# Patient Record
Sex: FEMALE | Race: WHITE | NOT HISPANIC OR LATINO | Employment: OTHER | URBAN - METROPOLITAN AREA
[De-identification: names, ages, dates, MRNs, and addresses within clinical notes are randomized per-mention and may not be internally consistent; named-entity substitution may affect disease eponyms.]

---

## 2017-04-24 ENCOUNTER — ALLSCRIPTS OFFICE VISIT (OUTPATIENT)
Dept: OTHER | Facility: OTHER | Age: 67
End: 2017-04-24

## 2017-10-01 DIAGNOSIS — D72.819 DECREASED WHITE BLOOD CELL COUNT: ICD-10-CM

## 2017-10-01 DIAGNOSIS — E55.9 VITAMIN D DEFICIENCY: ICD-10-CM

## 2017-10-01 DIAGNOSIS — E78.5 HYPERLIPIDEMIA: ICD-10-CM

## 2017-10-10 ENCOUNTER — APPOINTMENT (OUTPATIENT)
Dept: LAB | Facility: CLINIC | Age: 67
End: 2017-10-10
Payer: MEDICARE

## 2017-10-10 ENCOUNTER — TRANSCRIBE ORDERS (OUTPATIENT)
Dept: LAB | Facility: CLINIC | Age: 67
End: 2017-10-10

## 2017-10-10 DIAGNOSIS — E55.9 VITAMIN D DEFICIENCY: ICD-10-CM

## 2017-10-10 DIAGNOSIS — E78.5 HYPERLIPIDEMIA: ICD-10-CM

## 2017-10-10 DIAGNOSIS — D72.819 DECREASED WHITE BLOOD CELL COUNT: ICD-10-CM

## 2017-10-10 LAB
25(OH)D3 SERPL-MCNC: 32.5 NG/ML (ref 30–100)
ALBUMIN SERPL BCP-MCNC: 3.7 G/DL (ref 3.5–5)
ALP SERPL-CCNC: 69 U/L (ref 46–116)
ALT SERPL W P-5'-P-CCNC: 20 U/L (ref 12–78)
ANION GAP SERPL CALCULATED.3IONS-SCNC: 9 MMOL/L (ref 4–13)
AST SERPL W P-5'-P-CCNC: 22 U/L (ref 5–45)
BASOPHILS # BLD AUTO: 0.03 THOUSANDS/ΜL (ref 0–0.1)
BASOPHILS NFR BLD AUTO: 1 % (ref 0–1)
BILIRUB SERPL-MCNC: 0.43 MG/DL (ref 0.2–1)
BUN SERPL-MCNC: 16 MG/DL (ref 5–25)
CALCIUM SERPL-MCNC: 9.3 MG/DL (ref 8.3–10.1)
CHLORIDE SERPL-SCNC: 105 MMOL/L (ref 100–108)
CHOLEST SERPL-MCNC: 205 MG/DL (ref 50–200)
CO2 SERPL-SCNC: 27 MMOL/L (ref 21–32)
CREAT SERPL-MCNC: 0.89 MG/DL (ref 0.6–1.3)
EOSINOPHIL # BLD AUTO: 0.14 THOUSAND/ΜL (ref 0–0.61)
EOSINOPHIL NFR BLD AUTO: 3 % (ref 0–6)
ERYTHROCYTE [DISTWIDTH] IN BLOOD BY AUTOMATED COUNT: 13.7 % (ref 11.6–15.1)
GFR SERPL CREATININE-BSD FRML MDRD: 68 ML/MIN/1.73SQ M
GLUCOSE P FAST SERPL-MCNC: 84 MG/DL (ref 65–99)
HCT VFR BLD AUTO: 41.1 % (ref 34.8–46.1)
HDLC SERPL-MCNC: 78 MG/DL (ref 40–60)
HGB BLD-MCNC: 13.8 G/DL (ref 11.5–15.4)
LDLC SERPL CALC-MCNC: 117 MG/DL (ref 0–100)
LYMPHOCYTES # BLD AUTO: 0.84 THOUSANDS/ΜL (ref 0.6–4.47)
LYMPHOCYTES NFR BLD AUTO: 20 % (ref 14–44)
MCH RBC QN AUTO: 29.9 PG (ref 26.8–34.3)
MCHC RBC AUTO-ENTMCNC: 33.6 G/DL (ref 31.4–37.4)
MCV RBC AUTO: 89 FL (ref 82–98)
MONOCYTES # BLD AUTO: 0.39 THOUSAND/ΜL (ref 0.17–1.22)
MONOCYTES NFR BLD AUTO: 9 % (ref 4–12)
NEUTROPHILS # BLD AUTO: 2.76 THOUSANDS/ΜL (ref 1.85–7.62)
NEUTS SEG NFR BLD AUTO: 67 % (ref 43–75)
NRBC BLD AUTO-RTO: 0 /100 WBCS
PLATELET # BLD AUTO: 257 THOUSANDS/UL (ref 149–390)
PMV BLD AUTO: 11.3 FL (ref 8.9–12.7)
POTASSIUM SERPL-SCNC: 3.9 MMOL/L (ref 3.5–5.3)
PROT SERPL-MCNC: 7.6 G/DL (ref 6.4–8.2)
RBC # BLD AUTO: 4.62 MILLION/UL (ref 3.81–5.12)
SODIUM SERPL-SCNC: 141 MMOL/L (ref 136–145)
TRIGL SERPL-MCNC: 48 MG/DL
TSH SERPL DL<=0.05 MIU/L-ACNC: 3.57 UIU/ML (ref 0.36–3.74)
WBC # BLD AUTO: 4.17 THOUSAND/UL (ref 4.31–10.16)

## 2017-10-10 PROCEDURE — 85025 COMPLETE CBC W/AUTO DIFF WBC: CPT

## 2017-10-10 PROCEDURE — 84443 ASSAY THYROID STIM HORMONE: CPT

## 2017-10-10 PROCEDURE — 80053 COMPREHEN METABOLIC PANEL: CPT

## 2017-10-10 PROCEDURE — 82306 VITAMIN D 25 HYDROXY: CPT

## 2017-10-10 PROCEDURE — 80061 LIPID PANEL: CPT

## 2017-10-10 PROCEDURE — 36415 COLL VENOUS BLD VENIPUNCTURE: CPT

## 2017-10-11 ENCOUNTER — TRANSCRIBE ORDERS (OUTPATIENT)
Dept: ADMINISTRATIVE | Facility: HOSPITAL | Age: 67
End: 2017-10-11

## 2017-10-11 DIAGNOSIS — Z12.31 VISIT FOR SCREENING MAMMOGRAM: Primary | ICD-10-CM

## 2017-10-31 ENCOUNTER — ALLSCRIPTS OFFICE VISIT (OUTPATIENT)
Dept: OTHER | Facility: OTHER | Age: 67
End: 2017-10-31

## 2017-11-01 NOTE — PROGRESS NOTES
Assessment  1  Depression with anxiety (300 4) (F41 8)   2  Hyperlipidemia (272 4) (E78 5)   3  Irritable bowel syndrome (564 1) (K58 9)   4  Leukopenia (288 50) (D72 819)   5  Need for pneumococcal vaccine (V03 82) (Z23)   6  Vitamin D deficiency (268 9) (E55 9)   7  Abnormal mammogram (793 80) (R92 8)    Plan  Hyperlipidemia    · (1) CBC/PLT/DIFF; Status:Active; Requested for:01Apr2018;    · (1) COMPREHENSIVE METABOLIC PANEL; Status:Active; Requested for:01Apr2018;    · (1) LIPID PANEL, FASTING; Status:Active; Requested for:01Apr2018;   Need for pneumococcal vaccine    · Prevnar 13 Intramuscular Suspension; Inject one dose; To Be Done: [id number]  Urge incontinence of urine    · The plan of care for urinary incontinence is detailed in the plan and/or discussion section of today's  note ; Status:Complete;   Done: 31Oct2017    Discussion/Summary  Discussion Summary:   Lab data reviewed in detail and compared to prior  improvements in LDL with dietary modification, continue with same, no indication for medication  leukopenia remains stable, other cell lines in the normal range  with anxiety stable on present regimen  mammogramâfollow-up diagnostic mammogram and ultrasounds to be done, I will evaluate results when available  D deficiency stable on the supplements  stable with Prilosec next  maintenanceâflu shot and Prevnar today next  follow-up after labs in 6 months, sooner as neededyour advanced directive  Medication SE Review and Pt Understands Tx: Possible side effects of new medications were reviewed with the patient/guardian today  The treatment plan was reviewed with the patient/guardian  The patient/guardian understands and agrees with the treatment plan      Chief Complaint  Chief Complaint Chronic Condition [facility]: Patient is here today for follow up of chronic conditions described in HPI        History of Present Illness  HPI: Feeling generally wellâbut upset w/ recent abnl mammo, done in  system, needs diagnostic  Plans to complete dx w/u then come back to    with anxiety has been fairly stable, down lately d/t 's PD  Keeping busy w/ quilting and bible study  has been better since she stopped using Advil is using Tylenol for her pains, she really needs the Bentyl  out desserts 6 wks ago  vitiligo seems to be worsening, seeing Derm in a few mos  Review of Systems  Complete-Female:   Constitutional: No fever, no chills, feels well, no tiredness, no recent weight gain or weight loss  Eyes: No complaints of eye pain, no red eyes, no eyesight problems, no discharge, no dry eyes, no itching of eyes  ENT: no complaints of earache, no loss of hearing, no nose bleeds, no nasal discharge, no sore throat, no hoarseness  Cardiovascular: No complaints of slow heart rate, no fast heart rate, no chest pain, no palpitations, no leg claudication, no lower extremity edema  Respiratory: No complaints of shortness of breath, no wheezing, no cough, no SOB on exertion, no orthopnea, no PND  Gastrointestinal: No complaints of abdominal pain, no constipation, no nausea or vomiting, no diarrhea, no bloody stools  Genitourinary: No complaints of dysuria, no incontinence, no pelvic pain, no dysmenorrhea, no vaginal discharge or bleeding  Musculoskeletal: No complaints of arthralgias, no myalgias, no joint swelling or stiffness, no limb pain or swelling  Integumentary: as noted in HPI  Neurological: No complaints of headache, no confusion, no convulsions, no numbness, no dizziness or fainting, no tingling, no limb weakness, no difficulty walking  Psychiatric: Not suicidal, no sleep disturbance, no anxiety or depression, no change in personality, no emotional problems  Endocrine: No complaints of proptosis, no hot flashes, no muscle weakness, no deepening of the voice, no feelings of weakness     Hematologic/Lymphatic: No complaints of swollen glands, no swollen glands in the neck, does not bleed easily, does not bruise easily  Active Problems  1  Advance directive declined by patient (V49 89) (Z78 9)   2  Allergic rhinitis (477 9) (J30 9)   3  Aortic valve insufficiency (424 1) (I35 1)   4  Depression with anxiety (300 4) (F41 8)   5  Dermatitis (692 9) (L30 9)   6  Flu vaccine need (V04 81) (Z23)   7  GERD without esophagitis (530 81) (K21 9)   8  Hyperlipidemia (272 4) (E78 5)   9  Irritable bowel syndrome (564 1) (K58 9)   10  Leukopenia (288 50) (D72 819)   11  Need for pneumococcal vaccine (V03 82) (Z23)   12  Osteopenia (733 90) (M85 80)   13  Pigmentation abnormality of skin (709 00) (L81 9)   14  Screening for genitourinary condition (V81 6) (Z13 89)   15  Skin abnormality (757 9) (L98 9)   16  Tick bite (919 4,E906 4) (W57 XXXA)   17  TMJ arthralgia (524 62) (M26 629)   18  Urge incontinence of urine (788 31) (N39 41)   19  Vitamin D deficiency (268 9) (E55 9)    Past Medical History  1  History of Depression with anxiety (300 4) (F41 8)   2  History of Fibrocystic breast disease, unspecified laterality (610 1) (N60 19)   3  History of basal cell carcinoma (V10 83) (Z85 828)   4  History of osteopenia (V13 59) (Z87 39)   5  History of urinary frequency (V13 09) (Z87 898)   6  History of Irritable bowel syndrome (564 1) (K58 9)  Active Problems And Past Medical History Reviewed: The active problems and past medical history were reviewed and updated today  Surgical History  1  History of Biopsy Breast Open   2  History of Complete Colonoscopy   3  History of Total Abdominal Hysterectomy  Surgical History Reviewed: The surgical history was reviewed and updated today  Family History  Mother    1  Family history of Atrial Fibrillation   2  Family history of Bladder Cancer (V16 52)   3  Family history of Breast Cancer (V16 3)   4  Family history of Chronic Obstructive Pulmonary Disease   5  Family history of Kidney Cancer (V16 51)   6  Family history of Osteoporosis (V17 81)   7  Family history of Transient Ischemic Attack  Father    8  Family history of Bladder Cancer (V16 52)   9  Family history of Glaucoma (V19 11)   10  Family history of Hypertension (V17 49)  Maternal Grandfather    11  Family history of Coronary Artery Disease (V17 49)  Maternal Aunt    12  Family history of Coronary Artery Disease (V17 49)  Family History Reviewed: The family history was reviewed and updated today  Social History   · Advance directive declined by patient (V49 89) (Z78 9)   · Being A Social Drinker   · Never A Smoker   · Non smoker / tobacco user (V49 89) (Z78 9)  Social History Reviewed: The social history was reviewed and updated today  Current Meds   1  Advil CAPS Recorded   2  ALPRAZolam 0 25 MG Oral Tablet; TAKE 1 TABLET 3 times daily PRN anxiety; Therapy: 77Pem4268 to (Evaluate:19Mar2016); Last Rx:20Nov2015 Ordered   3  Calcium + D TABS; Take 1 tablet daily Recorded   4  [employer]S Vitamin D CAPS; 1 Cap daily Recorded   5  Daily Multivitamin TABS; Take 1 tablet daily Recorded   6  Dicyclomine HCl - 10 MG Oral Capsule; 1 cap QID PRN; Therapy: 28HVB9609 to (Evaluate:16Nov2016)  Requested for: 13TJV1548; Last Rx:62Tmz1112   Ordered   7  Escitalopram Oxalate 10 MG Oral Tablet; 1 PO QD; Therapy: 74PAC7810 to (Last Rx:27Gtn4296)  Requested for: 69Gxd4849 Ordered   8  Estrace 0 1 MG/GM Vaginal Cream; 0 1 CREA PV X 30 Recorded   9  Fish Oil 1200 MG Oral Capsule; Take 1 capsule twice daily; Therapy: (Recorded:30Apr2015) to Recorded   10  PriLOSEC OTC TBEC Recorded  Medication List Reviewed: The medication list was reviewed and updated today  Allergies  1  Penicillins   2   Sulfa Drugs    Vitals  Vital Signs    Recorded: 14RJV1726 10:07AM   Heart Rate 64   Respiration 12   Systolic 990   Diastolic 84   Height 5 ft 2 4 in   Weight 152 lb 4 oz   BMI Calculated 27 49   BSA Calculated 1 71     Physical Exam    Constitutional   General appearance: No acute distress, well appearing and well nourished  Eyes   Conjunctiva and lids: No swelling, erythema or discharge  Pupils and irises: Equal, round and reactive to light  Ears, Nose, Mouth, and Throat   Oropharynx: Normal with no erythema, edema, exudate or lesions  Pulmonary   Respiratory effort: No increased work of breathing or signs of respiratory distress  Auscultation of lungs: Clear to auscultation  Cardiovascular   Auscultation of heart: Normal rate and rhythm, normal S1 and S2, without murmurs  Examination of extremities for edema and/or varicosities: Normal     Carotid pulses: Normal     Lymphatic   Palpation of lymph nodes in neck: No lymphadenopathy  Musculoskeletal   Gait and station: Normal     Skin   Skin and subcutaneous tissue: Abnormal  -- hypopigmentation on hands  Neurologic   Cranial nerves: Cranial nerves 2-12 intact  Psychiatric   Orientation to person, place, and time: Normal     Mood and affect: Normal          Results/Data  Falls Risk Assessment (Dx Z13 89 Screen for Neurologic Disorder) 31Oct2017 10:06AM Russell Ruggiero     Test Name Result Flag Reference   Falls Risk      No falls in the past year     (1) VITAMIN D 25-HYDROXY 10Oct2017 10:02AM Ivania Stinson Order Number: [id number]     Test Name Result Flag Reference   VIT D 25-HYDROX 32 5 ng/mL  30 0-100 0   This assay is a certified procedure of the CDC Vitamin D Standardization Certification Program (VDSCP)     Deficiency <20ng/ml   Insufficiency 20-30ng/ml   Sufficient  ng/ml     *Patients undergoing fluorescein dye angiography may retain small amounts of fluorescein in the body for 48-72 hours post procedure  Samples containing fluorescein can produce falsely elevated Vitamin D values  If the patient had this procedure, a specimen should be resubmitted post fluorescein clearance       (1) CBC/PLT/DIFF 38DSG1121 10:02AM Ivania Stinson Order Number: [id number]     Test Name Result Flag Reference   WBC COUNT 4 17 Thousand/uL L 4 31-10 16   RBC COUNT 4 62 Million/uL  3 81-5 12   HEMOGLOBIN 13 8 g/dL  11 5-15 4   HEMATOCRIT 41 1 %  34 8-46  1   MCV 89 fL  82-98   MCH 29 9 pg  26 8-34 3   MCHC 33 6 g/dL  31 4-37 4   RDW 13 7 %  11 6-15 1   MPV 11 3 fL  8 9-12 7   PLATELET COUNT 731 Thousands/uL  149-390   nRBC AUTOMATED 0 /100 WBCs     NEUTROPHILS RELATIVE PERCENT 67 %  43-75   LYMPHOCYTES RELATIVE PERCENT 20 %  14-44   MONOCYTES RELATIVE PERCENT 9 %  4-12   EOSINOPHILS RELATIVE PERCENT 3 %  0-6   BASOPHILS RELATIVE PERCENT 1 %  0-1   NEUTROPHILS ABSOLUTE COUNT 2 76 Thousands/? ??L  1 85-7 62   LYMPHOCYTES ABSOLUTE COUNT 0 84 Thousands/? ??L  0 60-4 47   MONOCYTES ABSOLUTE COUNT 0 39 Thousand/? ??L  0 17-1 22   EOSINOPHILS ABSOLUTE COUNT 0 14 Thousand/? ??L  0 00-0 61   BASOPHILS ABSOLUTE COUNT 0 03 Thousands/? ??L  0 00-0 10     (1) LIPID PANEL, FASTING 20OSX4144 10:02AM Tita Stinson [facility] Order Number: [id number]     Test Name Result Flag Reference   CHOLESTEROL 205 mg/dL H    HDL,DIRECT 78 mg/dL H 40-60   Specimen collection should occur prior to Metamizole administration due to the potential for falsley depressed results  LDL CHOLESTEROL CALCULATED 117 mg/dL H 0-100   Triglyceride:        Normal <150 mg/dl   Borderline High 150-199 mg/dl   High 200-499 mg/dl   Very High >499 mg/dl      Cholesterol:       Desirable <200 mg/dl    Borderline High 200-239 mg/dl    High >239 mg/dl      HDL Cholesterol:       High>59 mg/dL    Low <41 mg/dL      This screening LDL is a calculated result  It does not have the accuracy of the Direct Measured LDL in the monitoring of patients with hyperlipidemia and/or statin therapy  Direct Measure LDL (DYA466) must be ordered separately in these patients  TRIGLYCERIDES 48 mg/dL  <=150   Specimen collection should occur prior to N-Acetylcysteine or Metamizole administration due to the potential for falsely depressed results       (1) COMPREHENSIVE METABOLIC PANEL 38ECC3495 43:85VS Salvatore Streeter Order Number: [id number]     Test Name Result Flag Reference   SODIUM 141 mmol/L  136-145   POTASSIUM 3 9 mmol/L  3 5-5 3   CHLORIDE 105 mmol/L  100-108   CARBON DIOXIDE 27 mmol/L  21-32   ANION GAP (CALC) 9 mmol/L  4-13   BLOOD UREA NITROGEN 16 mg/dL  5-25   CREATININE 0 89 mg/dL  0 60-1 30   Standardized to IDMS reference method   CALCIUM 9 3 mg/dL  8 3-10 1   BILI, TOTAL 0 43 mg/dL  0 20-1 00   ALK PHOSPHATAS 69 U/L     ALT (SGPT) 20 U/L  12-78   Specimen collection should occur prior to Sulfasalazine and/or Sulfapyridine administration due to the potential for falsely depressed results  AST(SGOT) 22 U/L  5-45   Specimen collection should occur prior to Sulfasalazine administration due to the potential for falsely depressed results  ALBUMIN 3 7 g/dL  3 5-5 0   TOTAL PROTEIN 7 6 g/dL  6 4-8 2   eGFR 68 ml/min/1 73sq m     National Kidney Disease Education Program recommendations are as follows:  GFR calculation is accurate only with a steady state creatinine  Chronic Kidney disease less than 60 ml/min/1 73 sq  meters  Kidney failure less than 15 ml/min/1 73 sq  meters  GLUCOSE FASTING 84 mg/dL  65-99   Specimen collection should occur prior to Sulfasalazine administration due to the potential for falsely depressed results  Specimen collection should occur prior to Sulfapyridine administration due to the potential for falsely elevated results  (1) TSH WITH FT4 REFLEX 10Oct2017 10:02AM Gamal Stinson Order Number: [id number]     Test Name Result Flag Reference   TSH 3 570 uIU/mL  0 358-3 740   Patients undergoing fluorescein dye angiography may retain small amounts of fluorescein in the body for 48-72 hours post procedure  Samples containing fluorescein can produce falsely depressed TSH values  If the patient had this procedure,a specimen should be resubmitted post fluorescein clearance            The recommended reference ranges for TSH during pregnancy are as follows:  First trimester 0 1 to 2 5 uIU/mL  Second trimester  0 2 to 3 0 uIU/mL  Third trimester 0 3 to 3 0 uIU/m     Future Appointments    Date/Time Provider Specialty Site   04/26/2018 10:45 AM KENDALL Rosado   Internal Medicine [facility]     Signatures   Electronically signed by : KENDALL Navarro ; Oct 31 2017 10:48AM EST                       (Author)

## 2018-01-13 VITALS
BODY MASS INDEX: 28.02 KG/M2 | HEART RATE: 64 BPM | HEIGHT: 62 IN | SYSTOLIC BLOOD PRESSURE: 122 MMHG | DIASTOLIC BLOOD PRESSURE: 84 MMHG | WEIGHT: 152.25 LBS | RESPIRATION RATE: 12 BRPM

## 2018-01-15 VITALS
BODY MASS INDEX: 28.25 KG/M2 | DIASTOLIC BLOOD PRESSURE: 80 MMHG | RESPIRATION RATE: 12 BRPM | HEART RATE: 68 BPM | WEIGHT: 153.5 LBS | HEIGHT: 62 IN | SYSTOLIC BLOOD PRESSURE: 122 MMHG

## 2018-03-21 ENCOUNTER — TELEPHONE (OUTPATIENT)
Dept: INTERNAL MEDICINE CLINIC | Facility: CLINIC | Age: 68
End: 2018-03-21

## 2018-03-21 DIAGNOSIS — F32.4 MAJOR DEPRESSIVE DISORDER WITH SINGLE EPISODE, IN PARTIAL REMISSION (HCC): Primary | ICD-10-CM

## 2018-03-21 RX ORDER — ESCITALOPRAM OXALATE 10 MG/1
10 TABLET ORAL DAILY
Qty: 90 TABLET | Refills: 3 | Status: SHIPPED | OUTPATIENT
Start: 2018-03-21 | End: 2018-04-26

## 2018-03-21 RX ORDER — ESCITALOPRAM OXALATE 10 MG/1
TABLET ORAL DAILY
COMMUNITY
Start: 2016-11-11 | End: 2018-03-21 | Stop reason: SDUPTHER

## 2018-04-01 DIAGNOSIS — E78.5 HYPERLIPIDEMIA: ICD-10-CM

## 2018-04-16 ENCOUNTER — TRANSCRIBE ORDERS (OUTPATIENT)
Dept: ADMINISTRATIVE | Facility: HOSPITAL | Age: 68
End: 2018-04-16

## 2018-04-16 DIAGNOSIS — J32.9 RHINOSINUSITIS: Primary | ICD-10-CM

## 2018-04-16 DIAGNOSIS — J31.0 RHINOSINUSITIS: Primary | ICD-10-CM

## 2018-04-19 ENCOUNTER — HOSPITAL ENCOUNTER (OUTPATIENT)
Dept: CT IMAGING | Facility: CLINIC | Age: 68
Discharge: HOME | End: 2018-04-19
Payer: MEDICARE

## 2018-04-19 ENCOUNTER — APPOINTMENT (OUTPATIENT)
Dept: LAB | Facility: CLINIC | Age: 68
End: 2018-04-19
Payer: MEDICARE

## 2018-04-19 ENCOUNTER — TRANSCRIBE ORDERS (OUTPATIENT)
Dept: MRI IMAGING | Facility: CLINIC | Age: 68
End: 2018-04-19

## 2018-04-19 DIAGNOSIS — E03.9 HYPOTHYROIDISM, UNSPECIFIED TYPE: ICD-10-CM

## 2018-04-19 DIAGNOSIS — D64.9 ANEMIA, UNSPECIFIED TYPE: ICD-10-CM

## 2018-04-19 DIAGNOSIS — L80 VITILIGO: Primary | ICD-10-CM

## 2018-04-19 DIAGNOSIS — E78.5 HYPERLIPIDEMIA: ICD-10-CM

## 2018-04-19 DIAGNOSIS — L80 VITILIGO: ICD-10-CM

## 2018-04-19 DIAGNOSIS — J32.9 RHINOSINUSITIS: ICD-10-CM

## 2018-04-19 DIAGNOSIS — J31.0 RHINOSINUSITIS: ICD-10-CM

## 2018-04-19 LAB
ALBUMIN SERPL BCP-MCNC: 4.3 G/DL (ref 3.5–5)
ALP SERPL-CCNC: 84 U/L (ref 46–116)
ALT SERPL W P-5'-P-CCNC: 24 U/L (ref 12–78)
ANION GAP SERPL CALCULATED.3IONS-SCNC: 6 MMOL/L (ref 4–13)
AST SERPL W P-5'-P-CCNC: 22 U/L (ref 5–45)
BASOPHILS # BLD AUTO: 0.03 THOUSANDS/ΜL (ref 0–0.1)
BASOPHILS NFR BLD AUTO: 1 % (ref 0–1)
BILIRUB SERPL-MCNC: 0.46 MG/DL (ref 0.2–1)
BUN SERPL-MCNC: 25 MG/DL (ref 5–25)
CALCIUM SERPL-MCNC: 9.3 MG/DL (ref 8.3–10.1)
CHLORIDE SERPL-SCNC: 104 MMOL/L (ref 100–108)
CHOLEST SERPL-MCNC: 246 MG/DL (ref 50–200)
CO2 SERPL-SCNC: 29 MMOL/L (ref 21–32)
CREAT SERPL-MCNC: 0.93 MG/DL (ref 0.6–1.3)
EOSINOPHIL # BLD AUTO: 0.09 THOUSAND/ΜL (ref 0–0.61)
EOSINOPHIL NFR BLD AUTO: 2 % (ref 0–6)
ERYTHROCYTE [DISTWIDTH] IN BLOOD BY AUTOMATED COUNT: 13.7 % (ref 11.6–15.1)
GFR SERPL CREATININE-BSD FRML MDRD: 64 ML/MIN/1.73SQ M
GLUCOSE P FAST SERPL-MCNC: 103 MG/DL (ref 65–99)
HCT VFR BLD AUTO: 41.9 % (ref 34.8–46.1)
HDLC SERPL-MCNC: 78 MG/DL (ref 40–60)
HGB BLD-MCNC: 14.2 G/DL (ref 11.5–15.4)
LDLC SERPL CALC-MCNC: 158 MG/DL (ref 0–100)
LYMPHOCYTES # BLD AUTO: 0.85 THOUSANDS/ΜL (ref 0.6–4.47)
LYMPHOCYTES NFR BLD AUTO: 18 % (ref 14–44)
MCH RBC QN AUTO: 30.7 PG (ref 26.8–34.3)
MCHC RBC AUTO-ENTMCNC: 33.9 G/DL (ref 31.4–37.4)
MCV RBC AUTO: 91 FL (ref 82–98)
MONOCYTES # BLD AUTO: 0.48 THOUSAND/ΜL (ref 0.17–1.22)
MONOCYTES NFR BLD AUTO: 10 % (ref 4–12)
NEUTROPHILS # BLD AUTO: 3.17 THOUSANDS/ΜL (ref 1.85–7.62)
NEUTS SEG NFR BLD AUTO: 69 % (ref 43–75)
NONHDLC SERPL-MCNC: 168 MG/DL
NRBC BLD AUTO-RTO: 0 /100 WBCS
PLATELET # BLD AUTO: 270 THOUSANDS/UL (ref 149–390)
PMV BLD AUTO: 11.2 FL (ref 8.9–12.7)
POTASSIUM SERPL-SCNC: 3.9 MMOL/L (ref 3.5–5.3)
PROT SERPL-MCNC: 8.1 G/DL (ref 6.4–8.2)
RBC # BLD AUTO: 4.63 MILLION/UL (ref 3.81–5.12)
SODIUM SERPL-SCNC: 139 MMOL/L (ref 136–145)
TRIGL SERPL-MCNC: 51 MG/DL
TSH SERPL DL<=0.05 MIU/L-ACNC: 4.16 UIU/ML (ref 0.36–3.74)
VIT B12 SERPL-MCNC: 570 PG/ML (ref 100–900)
WBC # BLD AUTO: 4.64 THOUSAND/UL (ref 4.31–10.16)

## 2018-04-19 PROCEDURE — 36415 COLL VENOUS BLD VENIPUNCTURE: CPT

## 2018-04-19 PROCEDURE — 85025 COMPLETE CBC W/AUTO DIFF WBC: CPT

## 2018-04-19 PROCEDURE — 86376 MICROSOMAL ANTIBODY EACH: CPT

## 2018-04-19 PROCEDURE — 80053 COMPREHEN METABOLIC PANEL: CPT

## 2018-04-19 PROCEDURE — 70486 CT MAXILLOFACIAL W/O DYE: CPT

## 2018-04-19 PROCEDURE — 86800 THYROGLOBULIN ANTIBODY: CPT

## 2018-04-19 PROCEDURE — 82941 ASSAY OF GASTRIN: CPT

## 2018-04-19 PROCEDURE — 83918 ORGANIC ACIDS TOTAL QUANT: CPT

## 2018-04-19 PROCEDURE — 80061 LIPID PANEL: CPT

## 2018-04-19 PROCEDURE — 82607 VITAMIN B-12: CPT

## 2018-04-19 PROCEDURE — 86255 FLUORESCENT ANTIBODY SCREEN: CPT

## 2018-04-19 PROCEDURE — 84443 ASSAY THYROID STIM HORMONE: CPT

## 2018-04-20 LAB
THYROGLOB AB SERPL-ACNC: <1 IU/ML (ref 0–0.9)
THYROPEROXIDASE AB SERPL-ACNC: 15 IU/ML (ref 0–34)

## 2018-04-21 LAB — ADRENAL AB TITR SER IF: NEGATIVE {TITER}

## 2018-04-22 LAB — GASTRIN SERPL-MCNC: 31 PG/ML (ref 0–115)

## 2018-04-23 LAB — METHYLMALONATE SERPL-SCNC: 157 NMOL/L (ref 0–378)

## 2018-04-25 RX ORDER — AMOXICILLIN 500 MG
1 CAPSULE ORAL 2 TIMES DAILY
COMMUNITY
End: 2018-04-26

## 2018-04-25 RX ORDER — ERGOCALCIFEROL (VITAMIN D2) 10 MCG
1 TABLET ORAL DAILY
COMMUNITY
End: 2021-05-06 | Stop reason: ALTCHOICE

## 2018-04-25 RX ORDER — DICYCLOMINE HYDROCHLORIDE 10 MG/1
CAPSULE ORAL AS NEEDED
COMMUNITY
Start: 2014-01-15 | End: 2021-01-28 | Stop reason: ALTCHOICE

## 2018-04-25 RX ORDER — ALPRAZOLAM 0.25 MG/1
1 TABLET ORAL 3 TIMES DAILY
COMMUNITY
Start: 2014-09-18 | End: 2018-04-26 | Stop reason: SDUPTHER

## 2018-04-25 RX ORDER — OMEPRAZOLE 20 MG/1
TABLET, DELAYED RELEASE ORAL
COMMUNITY
End: 2019-05-10

## 2018-04-25 RX ORDER — ESTRADIOL 0.1 MG/G
CREAM VAGINAL
COMMUNITY
End: 2019-11-18

## 2018-04-25 RX ORDER — OMEGA-3 FATTY ACIDS/FISH OIL 300-1000MG
CAPSULE ORAL AS NEEDED
COMMUNITY

## 2018-04-25 RX ORDER — LANOLIN ALCOHOL/MO/W.PET/CERES
1 CREAM (GRAM) TOPICAL DAILY
COMMUNITY
End: 2018-04-26

## 2018-04-26 ENCOUNTER — OFFICE VISIT (OUTPATIENT)
Dept: INTERNAL MEDICINE CLINIC | Facility: CLINIC | Age: 68
End: 2018-04-26
Payer: MEDICARE

## 2018-04-26 VITALS
HEART RATE: 80 BPM | WEIGHT: 154.4 LBS | SYSTOLIC BLOOD PRESSURE: 118 MMHG | BODY MASS INDEX: 28.41 KG/M2 | HEIGHT: 62 IN | RESPIRATION RATE: 14 BRPM | DIASTOLIC BLOOD PRESSURE: 80 MMHG

## 2018-04-26 DIAGNOSIS — Z01.818 PREOP EXAMINATION: ICD-10-CM

## 2018-04-26 DIAGNOSIS — E03.8 SUBCLINICAL HYPOTHYROIDISM: ICD-10-CM

## 2018-04-26 DIAGNOSIS — R73.01 IMPAIRED FASTING BLOOD SUGAR: ICD-10-CM

## 2018-04-26 DIAGNOSIS — J30.89 NON-SEASONAL ALLERGIC RHINITIS, UNSPECIFIED TRIGGER: ICD-10-CM

## 2018-04-26 DIAGNOSIS — K21.9 GERD WITHOUT ESOPHAGITIS: Primary | ICD-10-CM

## 2018-04-26 DIAGNOSIS — F41.8 DEPRESSION WITH ANXIETY: ICD-10-CM

## 2018-04-26 DIAGNOSIS — E78.2 MIXED HYPERLIPIDEMIA: ICD-10-CM

## 2018-04-26 DIAGNOSIS — K58.9 IRRITABLE BOWEL SYNDROME, UNSPECIFIED TYPE: ICD-10-CM

## 2018-04-26 DIAGNOSIS — E55.9 VITAMIN D DEFICIENCY: ICD-10-CM

## 2018-04-26 PROCEDURE — 99214 OFFICE O/P EST MOD 30 MIN: CPT | Performed by: INTERNAL MEDICINE

## 2018-04-26 RX ORDER — ESCITALOPRAM OXALATE 20 MG/1
20 TABLET ORAL DAILY
Qty: 90 TABLET | Refills: 3 | Status: SHIPPED | OUTPATIENT
Start: 2018-04-26 | End: 2019-05-10 | Stop reason: SDUPTHER

## 2018-04-26 RX ORDER — ALPRAZOLAM 0.25 MG/1
0.25 TABLET ORAL 3 TIMES DAILY PRN
Qty: 30 TABLET | Refills: 0 | Status: SHIPPED | OUTPATIENT
Start: 2018-04-26 | End: 2019-05-10 | Stop reason: SDUPTHER

## 2018-04-26 NOTE — PATIENT INSTRUCTIONS
Lab data reviewed in detail in compared to prior    EKG reviewed showing normal sinus rhythm with incomplete right bundle branch block and nonpathologic appearing Q-waves in leads 2, 3 and AVF  There is no old EKG for comparison  There is an echocardiogram from 2016 showing normal ejection fraction with no regional wall motion abnormalities  Patient has excellent exercise tolerance  I see no medical contraindication to proceeding with planned surgery  I see no indication for further preoperative testing    Impaired fasting glucose at 1:03 a m -check A1c prior to follow-up     Hyperlipidemia -10 year atherosclerotic risk is calculated at 4 8%- continue with lifestyle modifications with exercise and low cholesterol diet, weight loss as able and recheck in 6 months    GERD-we discussed long-term potential affects of proton pump inhibitors to include loss of bone mineral density, vitamin and electrolyte deficiencies, pneumonia, C diff colitis, interstitial nephritis  She will attempt to wean off taking every other day for 2 weeks then every 3rd day, okay to supplement with over-the-counter Zantac or Pepcid as needed, okay to take 3-5 times per week rather than 7 days per week if that is what is necessary  Routine follow-up after labs in 6 months, sooner as needed    Pneumovax due after 10/31/18

## 2018-04-26 NOTE — PROGRESS NOTES
Assessment/Plan:    No problem-specific Assessment & Plan notes found for this encounter  Diagnoses and all orders for this visit:    GERD without esophagitis    Irritable bowel syndrome, unspecified type    Non-seasonal allergic rhinitis, unspecified trigger    Depression with anxiety  -     escitalopram (LEXAPRO) 20 mg tablet; Take 1 tablet (20 mg total) by mouth daily  -     ALPRAZolam (XANAX) 0 25 mg tablet; Take 1 tablet (0 25 mg total) by mouth 3 (three) times a day as needed for anxiety    Mixed hyperlipidemia  -     CBC; Future  -     Comprehensive metabolic panel; Future  -     Lipid panel; Future    Vitamin D deficiency  -     Vitamin D 25 hydroxy; Future    Impaired fasting blood sugar  -     Hemoglobin A1c; Future    Subclinical hypothyroidism  -     TSH, 3rd generation with T4 reflex; Future    Preop examination    Other orders  -     Ibuprofen (ADVIL) 200 MG CAPS; Take by mouth  -     Discontinue: ALPRAZolam (XANAX) 0 25 mg tablet; Take 1 tablet by mouth 3 (three) times a day  -     Discontinue: calcium citrate-vitamin D (CITRACAL+D) 315-200 MG-UNIT per tablet; Take 1 tablet by mouth daily  -     Cholecalciferol (CVS VITAMIN D) 2000 units CAPS; Take 400 Units by mouth daily    -     Multiple Vitamin (DAILY VALUE MULTIVITAMIN) TABS; Take 1 tablet by mouth daily  -     dicyclomine (BENTYL) 10 mg capsule; Take by mouth  -     estradiol (ESTRACE VAGINAL) 0 1 mg/g vaginal cream; Insert into the vagina  -     Discontinue: Omega-3 Fatty Acids (FISH OIL) 1200 MG CAPS; Take 1 capsule by mouth 2 (two) times a day  -     omeprazole (PRILOSEC OTC) 20 MG tablet; Take by mouth        Patient Instructions     Lab data reviewed in detail in compared to prior    EKG reviewed showing normal sinus rhythm with incomplete right bundle branch block and nonpathologic appearing Q-waves in leads 2, 3 and AVF  There is no old EKG for comparison    There is an echocardiogram from 2016 showing normal ejection fraction with no regional wall motion abnormalities  Patient has excellent exercise tolerance  I see no medical contraindication to proceeding with planned surgery  I see no indication for further preoperative testing    Impaired fasting glucose at 1:03 a m -check A1c prior to follow-up     Hyperlipidemia -10 year atherosclerotic risk is calculated at 4 8%- continue with lifestyle modifications with exercise and low cholesterol diet, weight loss as able and recheck in 6 months    GERD-we discussed long-term potential affects of proton pump inhibitors to include loss of bone mineral density, vitamin and electrolyte deficiencies, pneumonia, C diff colitis, interstitial nephritis  She will attempt to wean off taking every other day for 2 weeks then every 3rd day, okay to supplement with over-the-counter Zantac or Pepcid as needed, okay to take 3-5 times per week rather than 7 days per week if that is what is necessary  Routine follow-up after labs in 6 months, sooner as needed  Pneumovax due after 10/31/18    Subjective:      Patient ID: Shoaib Devries is a 79 y o  female  Feeling generally well    Spent 3 months in  [facility] and was quite active riding bikes with no exertion related chest pain or shortness of breath also playing tennis with no exertion related issues, less active since she came home but as weather change she plans to get back into the exercise routine  Started w/ yoga  Vitiligo-een by derm, opted no tx  Using tanning cream     Had precancerous lesion frozen on ear  She had been seen by ENT and was noted to have reflux and was started on omeprazole, she has questions regarding long-term use  GERD symptoms are under good control  Also she is undergoing elective septoplasty and turbinoplasty next week, EKG needed to be completed  Anxiety has been  Under good control on Lexapro, she had to go from 10-20 mg because 10 was ineffective and insurance would not cover 15    She has been stable on 20 mg but recently a 10 mg dose was refilled so she will run out  She has been using alprazolam as needed, once or twice per month and would like refill  Hyperlipidemia-she admits she has not been following low-cholesterol diet over the last several months  The following portions of the patient's history were reviewed and updated as appropriate: allergies, current medications, past family history, past medical history, past social history, past surgical history and problem list     Review of Systems   Constitutional: Negative for appetite change, chills, diaphoresis, fatigue, fever and unexpected weight change  HENT: Negative for congestion, hearing loss and rhinorrhea  Eyes: Negative for visual disturbance  Respiratory: Negative for cough, chest tightness, shortness of breath and wheezing  Cardiovascular: Negative for chest pain, palpitations and leg swelling  Gastrointestinal: Negative for abdominal pain and blood in stool  Endocrine: Negative for cold intolerance, heat intolerance, polydipsia and polyuria  Genitourinary: Negative for difficulty urinating, dysuria, frequency and urgency  Musculoskeletal: Negative for arthralgias and myalgias  Skin: Negative for rash  Neurological: Negative for dizziness, weakness, light-headedness and headaches  Hematological: Does not bruise/bleed easily  Psychiatric/Behavioral: Negative for dysphoric mood and sleep disturbance  Objective:      /80 (BP Location: Left arm, Patient Position: Sitting)   Pulse 80   Resp 14   Ht 5' 2 4" (1 585 m)   Wt 70 kg (154 lb 6 4 oz)   BMI 27 88 kg/m²          Physical Exam   Constitutional: She is oriented to person, place, and time  She appears well-developed and well-nourished  HENT:   Head: Normocephalic and atraumatic  Nose: Nose normal    Mouth/Throat: Oropharynx is clear and moist    Eyes: Conjunctivae and EOM are normal  Pupils are equal, round, and reactive to light   No scleral icterus  Neck: Normal range of motion  Neck supple  No JVD present  No tracheal deviation present  No thyromegaly present  Cardiovascular: Normal rate, regular rhythm and intact distal pulses  Exam reveals no gallop and no friction rub  No murmur heard  Pulmonary/Chest: Effort normal and breath sounds normal  No respiratory distress  She has no wheezes  She has no rales  Abdominal: Soft  Bowel sounds are normal  She exhibits no distension and no mass  There is no tenderness  There is no rebound and no guarding  Musculoskeletal: She exhibits no edema or tenderness  Lymphadenopathy:     She has no cervical adenopathy  Neurological: She is alert and oriented to person, place, and time  No cranial nerve deficit  Skin: Skin is warm and dry  No rash noted  No erythema  R ear lesion w/ slough and mild surrounding erythema  Psychiatric: She has a normal mood and affect   Her behavior is normal  Judgment and thought content normal

## 2018-08-14 ENCOUNTER — OFFICE VISIT (OUTPATIENT)
Dept: OBGYN CLINIC | Facility: CLINIC | Age: 68
End: 2018-08-14
Payer: MEDICARE

## 2018-08-14 VITALS
HEIGHT: 63 IN | SYSTOLIC BLOOD PRESSURE: 118 MMHG | HEART RATE: 73 BPM | BODY MASS INDEX: 28 KG/M2 | DIASTOLIC BLOOD PRESSURE: 77 MMHG | WEIGHT: 158 LBS

## 2018-08-14 DIAGNOSIS — M18.0 ARTHRITIS OF CARPOMETACARPAL (CMC) JOINT OF BOTH THUMBS: Primary | ICD-10-CM

## 2018-08-14 PROCEDURE — 20600 DRAIN/INJ JOINT/BURSA W/O US: CPT | Performed by: ORTHOPAEDIC SURGERY

## 2018-08-14 PROCEDURE — 99213 OFFICE O/P EST LOW 20 MIN: CPT | Performed by: ORTHOPAEDIC SURGERY

## 2018-08-14 RX ORDER — LIDOCAINE HYDROCHLORIDE 10 MG/ML
0.5 INJECTION, SOLUTION INFILTRATION; PERINEURAL
Status: COMPLETED | OUTPATIENT
Start: 2018-08-14 | End: 2018-08-14

## 2018-08-14 RX ORDER — LIDOCAINE HYDROCHLORIDE 10 MG/ML
2 INJECTION, SOLUTION INFILTRATION; PERINEURAL
Status: COMPLETED | OUTPATIENT
Start: 2018-08-14 | End: 2018-08-14

## 2018-08-14 RX ORDER — TRIAMCINOLONE ACETONIDE 40 MG/ML
20 INJECTION, SUSPENSION INTRA-ARTICULAR; INTRAMUSCULAR
Status: COMPLETED | OUTPATIENT
Start: 2018-08-14 | End: 2018-08-14

## 2018-08-14 RX ADMIN — LIDOCAINE HYDROCHLORIDE 0.5 ML: 10 INJECTION, SOLUTION INFILTRATION; PERINEURAL at 11:20

## 2018-08-14 RX ADMIN — LIDOCAINE HYDROCHLORIDE 2 ML: 10 INJECTION, SOLUTION INFILTRATION; PERINEURAL at 11:20

## 2018-08-14 RX ADMIN — TRIAMCINOLONE ACETONIDE 20 MG: 40 INJECTION, SUSPENSION INTRA-ARTICULAR; INTRAMUSCULAR at 11:20

## 2018-08-14 RX ADMIN — Medication 0.05 MEQ: at 11:20

## 2018-08-14 NOTE — PROGRESS NOTES
CHIEF COMPLAINT:  Chief Complaint   Patient presents with    Left Thumb - Pain    Right Thumb - Pain       SUBJECTIVE:  Arjun Fu is a 79y o  year old LHD female who presents to the office as a previous patient of DR Te Godoy at [address] [place] [address]  Pt states she has had 3 jenae injections for her right CMC arthritis, spaced 3 months to 1 year apart  She describes minimal numbness to the volar aspect of her right palm  Patient states she is now having pain into her left CMC joint as well  Pt states that her last injection was given 8/10/2017 into her right [facility] joint  She has not yet had any treatment for her left [facility] joint arthritis  She describes increased pain and difficulty with opening Jars bilaterally        PAST MEDICAL HISTORY:  Past Medical History:   Diagnosis Date    Basal cell carcinoma     Depression with anxiety     Fibrocystic breast disease, unspecified laterality     Irritable bowel syndrome     Osteopenia        PAST SURGICAL HISTORY:  Past Surgical History:   Procedure Laterality Date    BREAST BIOPSY      COLONOSCOPY      7/13/12, colon polyp biopsied, hemorrhoids - Dr Mauro Aldana      due to uterine prolapse       FAMILY HISTORY:  Family History   Problem Relation Age of Onset    Atrial fibrillation Mother     Cancer Mother         Bladder    Breast cancer Mother     COPD Mother     Kidney cancer Mother     Osteoporosis Mother     Transient ischemic attack Mother    [employer] Cancer Father         Bladder    Glaucoma Father     Hypertension Father     Coronary artery disease Maternal Grandfather     Coronary artery disease Maternal Aunt        SOCIAL HISTORY:  Social History   Substance Use Topics    Smoking status: Never Smoker    Smokeless tobacco: Never Used    Alcohol use Yes      Comment: social       MEDICATIONS:    Current Outpatient Prescriptions:     escitalopram (LEXAPRO) 20 mg tablet, Take 1 tablet (20 mg total) by mouth daily, Disp: 90 tablet, Rfl: 3    estradiol (ESTRACE VAGINAL) 0 1 mg/g vaginal cream, Insert into the vagina, Disp: , Rfl:     Ibuprofen (ADVIL) 200 MG CAPS, Take by mouth, Disp: , Rfl:     Multiple Vitamin (DAILY VALUE MULTIVITAMIN) TABS, Take 1 tablet by mouth daily, Disp: , Rfl:     omeprazole (PRILOSEC OTC) 20 MG tablet, Take by mouth, Disp: , Rfl:     ALPRAZolam (XANAX) 0 25 mg tablet, Take 1 tablet (0 25 mg total) by mouth 3 (three) times a day as needed for anxiety, Disp: 30 tablet, Rfl: 0    dicyclomine (BENTYL) 10 mg capsule, Take by mouth, Disp: , Rfl:     ALLERGIES:  Allergies   Allergen Reactions    Penicillins Hives and Rash    Sulfa Antibiotics Hives and Rash       REVIEW OF SYSTEMS:  Review of Systems   Constitutional: Negative for chills, fever and unexpected weight change  HENT: Negative for hearing loss, nosebleeds and sore throat  Eyes: Negative for pain, redness and visual disturbance  Respiratory: Negative for cough, shortness of breath and wheezing  Cardiovascular: Positive for palpitations  Negative for chest pain and leg swelling  Gastrointestinal: Negative for abdominal pain, nausea and vomiting  Endocrine: Negative for polydipsia and polyuria  Genitourinary: Negative for dysuria and hematuria  Musculoskeletal: Positive for arthralgias  Negative for joint swelling and myalgias  Skin: Negative for rash and wound  Neurological: Positive for numbness  Negative for dizziness and headaches  Psychiatric/Behavioral: Negative for decreased concentration, dysphoric mood and suicidal ideas  The patient is nervous/anxious          VITALS:  Vitals:    08/14/18 1055   BP: 118/77   Pulse: 73       LABS:  HgA1c: No results found for: HGBA1C  BMP:   Lab Results   Component Value Date    CALCIUM 9 3 04/19/2018     04/19/2018    K 3 9 04/19/2018    CO2 29 04/19/2018     04/19/2018    BUN 25 04/19/2018    CREATININE 0 93 04/19/2018 _____________________________________________________  PHYSICAL EXAMINATION:  General: well developed and well nourished, alert, oriented times 3 and appears comfortable  Psychiatric: Normal  HEENT: Trachea Midline, No torticollis  Pulmonary: No audible wheezing or respiratory distress   Skin: No masses, erthema, lacerations, fluctation, ulcerations  Neurovascular: Sensation Intact to the Median, Ulnar, Radial Nerve, Motor Intact to the Median, Ulnar, Radial Nerve and Pulses Intact    MUSCULOSKELETAL EXAMINATION:    CMC Exam:  Right   No adduction contracture  No hyperextension deformity of MCP joint  Positive localized tenderness over radial and dorsal aspect of thumb (CMC joint)  Grind test is Positive for pain and Positive for crepitus  No triggering or tenderness over the A1 pulley  No pain with Finkelsteins maneuver         CMC Exam:  Left  No adduction contracture  No hyperextension deformity of MCP joint  Positive localized tenderness over radial and dorsal aspect of thumb (CMC joint)  Grind test is Positive for pain and Positive for crepitus  No triggering or tenderness over the A1 pulley  No pain with Finkelsteins maneuver           ___________________________________________________  STUDIES REVIEWED:  No studies reviewed         PROCEDURES PERFORMED:  Small joint injection  Date/Time: 8/14/2018 11:20 AM  Consent given by: patient  Timeout: Immediately prior to procedure a time out was called to verify the correct patient, procedure, equipment, support staff and site/side marked as required   Supporting Documentation  Indications: pain   Procedure Details  Location: thumb - R thumb CMC  Needle size: 27 G  Ultrasound guidance: no  Medications administered: 0 5 mL lidocaine 1 %; 2 mL lidocaine 1 %; 0 05 mEq sodium bicarbonate 8 4 %; 20 mg triamcinolone acetonide 40 mg/mL    Patient tolerance: patient tolerated the procedure well with no immediate complications  Dressing:  Sterile dressing applied  Small joint injection  Date/Time: 8/14/2018 11:20 AM  Consent given by: patient  Timeout: Immediately prior to procedure a time out was called to verify the correct patient, procedure, equipment, support staff and site/side marked as required   Supporting Documentation  Indications: pain   Procedure Details  Location: thumb - L thumb CMC  Needle size: 27 G  Ultrasound guidance: no  Medications administered: 0 5 mL lidocaine 1 %; 2 mL lidocaine 1 %; 0 05 mEq sodium bicarbonate 8 4 %; 20 mg triamcinolone acetonide 40 mg/mL    Patient tolerance: patient tolerated the procedure well with no immediate complications  Dressing:  Sterile dressing applied           _____________________________________________________  ASSESSMENT/PLAN:    Assessment:   bilateral Thumb CMC Arthritis      Plan:   CSI cornell cmc joint given today  Pt was advised to use ice for 1-2 days for discomfort from injection  F/u in 2 weeks for reevaluation with xrays of b/l wrists ericka view    Diagnoses and all orders for this visit:    Arthritis of carpometacarpal (CMC) joint of both thumbs  -     Small joint arthrocentesis  -     Small joint arthrocentesis            Follow Up:  Return in about 2 weeks (around 8/28/2018)  To Do Next Visit:  Re-evaluation of current issue and X-rays of the  bilateral  wrist with ericka view    General Discussions:  [facility] Arthritis: The anatomy and physiology of carpometacarpal joint arthritis was discussed with the patient today in the office  Deterioration of the articular cartilage eventually leads to hypermobility at the thumb [facility] joint, resulting in joint subluxation, osteophyte formation, cystic changes within the trapezium and base of the first metacarpal, as well as subchondral sclerosis  Eventually, pain, limited mobility, and compensatory hyperextension at the metacarpophalangeal joint may develop    While normal activity and usage of the thumb joint may provide a painful experience to the patient, this typically does not result in damage to the thumb or hand  Treatment options include resting thumb spica splints to decreased joint edema, pain, and inflammation  Therapy exercises to strengthen the thenar musculature may relieve pain, but do not alter the overall continued development of osteoarthritis  Oral medications, topical medications, corticosteroid injections may decrease pain and increase overall function  Eventually, approximately 5% of patients may require surgical intervention                                                                                                                                                                                                       Scribe Attestation    I,:   Boyd Montoya am acting as a scribe while in the presence of the attending physician :        I,:   Adeola Price MD personally performed the services described in this documentation    as scribed in my presence :

## 2018-08-30 ENCOUNTER — APPOINTMENT (OUTPATIENT)
Dept: RADIOLOGY | Facility: CLINIC | Age: 68
End: 2018-08-30
Payer: MEDICARE

## 2018-08-30 ENCOUNTER — OFFICE VISIT (OUTPATIENT)
Dept: OBGYN CLINIC | Facility: CLINIC | Age: 68
End: 2018-08-30
Payer: MEDICARE

## 2018-08-30 VITALS
WEIGHT: 157.6 LBS | SYSTOLIC BLOOD PRESSURE: 144 MMHG | DIASTOLIC BLOOD PRESSURE: 84 MMHG | BODY MASS INDEX: 27.93 KG/M2 | RESPIRATION RATE: 18 BRPM | HEIGHT: 63 IN | HEART RATE: 76 BPM

## 2018-08-30 DIAGNOSIS — M25.532 BILATERAL WRIST PAIN: ICD-10-CM

## 2018-08-30 DIAGNOSIS — M25.532 BILATERAL WRIST PAIN: Primary | ICD-10-CM

## 2018-08-30 DIAGNOSIS — M25.531 BILATERAL WRIST PAIN: Primary | ICD-10-CM

## 2018-08-30 DIAGNOSIS — M25.531 BILATERAL WRIST PAIN: ICD-10-CM

## 2018-08-30 DIAGNOSIS — M18.0 ARTHRITIS OF CARPOMETACARPAL (CMC) JOINT OF BOTH THUMBS: ICD-10-CM

## 2018-08-30 PROCEDURE — 99213 OFFICE O/P EST LOW 20 MIN: CPT | Performed by: ORTHOPAEDIC SURGERY

## 2018-08-30 PROCEDURE — 73110 X-RAY EXAM OF WRIST: CPT

## 2018-08-30 NOTE — PROGRESS NOTES
CHIEF COMPLAINT:  Chief Complaint   Patient presents with    Left Thumb - Pain    Right Thumb - Pain       SUBJECTIVE:  Val Winslow is a 79y o  year old LHD female who presents to the office today for follow up for bilateral thumb CMC arthritis  At the patient last appointment she received bilateral CMC joint CSI  The patient states that the cortisone injection into her left [facility] joint gave her near 100 percent improvement of her symptoms  The patient states the cortisone injection into her right [facility] joint gave her 75 percent improvement of her symptoms  The patient states she is still experiencing occasional soreness with moment of her right thumb as well as minimal numbness to the volar aspect of her right palm  The patient denies any other associated numbness or tingling            PAST MEDICAL HISTORY:  Past Medical History:   Diagnosis Date    Basal cell carcinoma     Depression with anxiety     Fibrocystic breast disease, unspecified laterality     Irritable bowel syndrome     Osteopenia        PAST SURGICAL HISTORY:  Past Surgical History:   Procedure Laterality Date    BREAST BIOPSY      COLONOSCOPY      7/13/12, colon polyp biopsied, hemorrhoids - Dr Alfred Counter      due to uterine prolapse       FAMILY HISTORY:  Family History   Problem Relation Age of Onset    Atrial fibrillation Mother     Cancer Mother         Bladder    Breast cancer Mother     COPD Mother     Kidney cancer Mother     Osteoporosis Mother     Transient ischemic attack Mother    José Miguel Destiney Cancer Father         Bladder    Glaucoma Father     Hypertension Father     Coronary artery disease Maternal Grandfather     Coronary artery disease Maternal Aunt        SOCIAL HISTORY:  Social History   Substance Use Topics    Smoking status: Never Smoker    Smokeless tobacco: Never Used    Alcohol use Yes      Comment: social       MEDICATIONS:    Current Outpatient Prescriptions:     ALPRAZolam (XANAX) 0 25 mg tablet, Take 1 tablet (0 25 mg total) by mouth 3 (three) times a day as needed for anxiety, Disp: 30 tablet, Rfl: 0    dicyclomine (BENTYL) 10 mg capsule, Take by mouth, Disp: , Rfl:     escitalopram (LEXAPRO) 20 mg tablet, Take 1 tablet (20 mg total) by mouth daily, Disp: 90 tablet, Rfl: 3    estradiol (ESTRACE VAGINAL) 0 1 mg/g vaginal cream, Insert into the vagina, Disp: , Rfl:     Ibuprofen (ADVIL) 200 MG CAPS, Take by mouth, Disp: , Rfl:     Multiple Vitamin (DAILY VALUE MULTIVITAMIN) TABS, Take 1 tablet by mouth daily, Disp: , Rfl:     omeprazole (PRILOSEC OTC) 20 MG tablet, Take by mouth, Disp: , Rfl:     ALLERGIES:  Allergies   Allergen Reactions    Penicillins Hives and Rash    Sulfa Antibiotics Hives and Rash       REVIEW OF SYSTEMS:  Review of Systems   Constitutional: Negative for chills, fever and unexpected weight change  HENT: Negative for hearing loss, nosebleeds and sore throat  Eyes: Negative for pain, redness and visual disturbance  Respiratory: Negative for cough, shortness of breath and wheezing  Cardiovascular: Negative for chest pain, palpitations and leg swelling  Gastrointestinal: Negative for abdominal pain, nausea and vomiting  Endocrine: Negative for polydipsia and polyuria  Genitourinary: Negative for difficulty urinating and hematuria  Musculoskeletal: Negative for arthralgias, joint swelling and myalgias  Skin: Negative for rash and wound  Neurological: Negative for dizziness, numbness and headaches  Psychiatric/Behavioral: Negative for decreased concentration, dysphoric mood and suicidal ideas  The patient is not nervous/anxious          VITALS:  Vitals:    08/30/18 1020   BP: 144/84   Pulse: 76   Resp: 18       LABS:  HgA1c: No results found for: HGBA1C  BMP:   Lab Results   Component Value Date    CALCIUM 9 3 04/19/2018     04/19/2018    K 3 9 04/19/2018    CO2 29 04/19/2018     04/19/2018    BUN 25 04/19/2018    CREATININE 0 93 04/19/2018       _____________________________________________________  PHYSICAL EXAMINATION:  General: well developed and well nourished, alert, oriented times 3 and appears comfortable  Psychiatric: Normal  HEENT: Trachea Midline, No torticollis  Pulmonary: No audible wheezing or respiratory distress   Skin: No masses, erthema, lacerations, fluctation, ulcerations  Neurovascular: Sensation Intact to the Median, Ulnar, Radial Nerve, Motor Intact to the Median, Ulnar, Radial Nerve and Pulses Intact    MUSCULOSKELETAL EXAMINATION:    Left CMC Exam:  No adduction contracture  No hyperextension deformity of MCP joint  Negative localized tenderness over radial and dorsal aspect of thumb (CMC joint)  Grind test is Negative for pain and Negative for crepitus  No triggering or tenderness over the A1 pulley  No pain with Finkelsteins maneuver       Right CMC Exam:  No adduction contracture  No hyperextension deformity of MCP joint  Negative localized tenderness over radial and dorsal aspect of thumb (CMC joint)  Grind test is Negative for pain and Positive for crepitus  No triggering or tenderness over the A1 pulley  No pain with Finkelsteins maneuver         ___________________________________________________  STUDIES REVIEWED:  I personally reviewed the following images of 3v left and right wrist with rey views and my interpretation is CMC arthritis, right worse then left       PROCEDURES PERFORMED:  Procedures  No Procedures performed today    _____________________________________________________  ASSESSMENT/PLAN:    Bilateral thumb CMC arthritis   * Take NSAID's as needed for pain control, recommended to take with food, she may take tylenol in between   * Take vitamin B6 to help with the numbness to the right palm, if this numbness gets worse and travels into the thumb, index or long fingers of the right hand she should follow up in the office   * Follow up as needed if symptoms get worse   At any point if she feels like the CSI are no longer working for her she may want to think about surgical intervention     Follow Up:  No Follow-up on file        To Do Next Visit:  Re-evaluation of current issue                                                                                                                                                                                                   Scribe Attestation    I,:   Junior Brown am acting as a scribe while in the presence of the attending physician :        I,:   Daphne Jackson MD personally performed the services described in this documentation    as scribed in my presence :

## 2018-11-01 ENCOUNTER — APPOINTMENT (OUTPATIENT)
Dept: LAB | Age: 68
End: 2018-11-01
Payer: MEDICARE

## 2018-11-01 DIAGNOSIS — E55.9 VITAMIN D DEFICIENCY: ICD-10-CM

## 2018-11-01 DIAGNOSIS — E78.2 MIXED HYPERLIPIDEMIA: ICD-10-CM

## 2018-11-01 DIAGNOSIS — E03.8 SUBCLINICAL HYPOTHYROIDISM: ICD-10-CM

## 2018-11-01 DIAGNOSIS — R73.01 IMPAIRED FASTING BLOOD SUGAR: ICD-10-CM

## 2018-11-01 LAB
25(OH)D3 SERPL-MCNC: 36.4 NG/ML (ref 30–100)
ALBUMIN SERPL BCP-MCNC: 3.8 G/DL (ref 3.5–5)
ALP SERPL-CCNC: 75 U/L (ref 46–116)
ALT SERPL W P-5'-P-CCNC: 20 U/L (ref 12–78)
ANION GAP SERPL CALCULATED.3IONS-SCNC: 6 MMOL/L (ref 4–13)
AST SERPL W P-5'-P-CCNC: 19 U/L (ref 5–45)
BILIRUB SERPL-MCNC: 0.52 MG/DL (ref 0.2–1)
BUN SERPL-MCNC: 22 MG/DL (ref 5–25)
CALCIUM SERPL-MCNC: 9.3 MG/DL (ref 8.3–10.1)
CHLORIDE SERPL-SCNC: 104 MMOL/L (ref 100–108)
CHOLEST SERPL-MCNC: 240 MG/DL (ref 50–200)
CO2 SERPL-SCNC: 30 MMOL/L (ref 21–32)
CREAT SERPL-MCNC: 1.05 MG/DL (ref 0.6–1.3)
ERYTHROCYTE [DISTWIDTH] IN BLOOD BY AUTOMATED COUNT: 13.1 % (ref 11.6–15.1)
EST. AVERAGE GLUCOSE BLD GHB EST-MCNC: 117 MG/DL
GFR SERPL CREATININE-BSD FRML MDRD: 55 ML/MIN/1.73SQ M
GLUCOSE P FAST SERPL-MCNC: 97 MG/DL (ref 65–99)
HBA1C MFR BLD: 5.7 % (ref 4.2–6.3)
HCT VFR BLD AUTO: 40.2 % (ref 34.8–46.1)
HDLC SERPL-MCNC: 75 MG/DL (ref 40–60)
HGB BLD-MCNC: 13.2 G/DL (ref 11.5–15.4)
LDLC SERPL CALC-MCNC: 153 MG/DL (ref 0–100)
MCH RBC QN AUTO: 30 PG (ref 26.8–34.3)
MCHC RBC AUTO-ENTMCNC: 32.8 G/DL (ref 31.4–37.4)
MCV RBC AUTO: 91 FL (ref 82–98)
NONHDLC SERPL-MCNC: 165 MG/DL
PLATELET # BLD AUTO: 247 THOUSANDS/UL (ref 149–390)
PMV BLD AUTO: 10.9 FL (ref 8.9–12.7)
POTASSIUM SERPL-SCNC: 3.7 MMOL/L (ref 3.5–5.3)
PROT SERPL-MCNC: 7.2 G/DL (ref 6.4–8.2)
RBC # BLD AUTO: 4.4 MILLION/UL (ref 3.81–5.12)
SODIUM SERPL-SCNC: 140 MMOL/L (ref 136–145)
T4 FREE SERPL-MCNC: 1.01 NG/DL (ref 0.76–1.46)
TRIGL SERPL-MCNC: 59 MG/DL
TSH SERPL DL<=0.05 MIU/L-ACNC: 4.7 UIU/ML (ref 0.36–3.74)
WBC # BLD AUTO: 4.19 THOUSAND/UL (ref 4.31–10.16)

## 2018-11-01 PROCEDURE — 82306 VITAMIN D 25 HYDROXY: CPT

## 2018-11-01 PROCEDURE — 85027 COMPLETE CBC AUTOMATED: CPT

## 2018-11-01 PROCEDURE — 80061 LIPID PANEL: CPT

## 2018-11-01 PROCEDURE — 83036 HEMOGLOBIN GLYCOSYLATED A1C: CPT

## 2018-11-01 PROCEDURE — 84439 ASSAY OF FREE THYROXINE: CPT

## 2018-11-01 PROCEDURE — 84443 ASSAY THYROID STIM HORMONE: CPT

## 2018-11-01 PROCEDURE — 36415 COLL VENOUS BLD VENIPUNCTURE: CPT

## 2018-11-01 PROCEDURE — 80053 COMPREHEN METABOLIC PANEL: CPT

## 2018-11-29 ENCOUNTER — OFFICE VISIT (OUTPATIENT)
Dept: OBGYN CLINIC | Facility: CLINIC | Age: 68
End: 2018-11-29
Payer: MEDICARE

## 2018-11-29 VITALS
WEIGHT: 158 LBS | HEART RATE: 74 BPM | DIASTOLIC BLOOD PRESSURE: 85 MMHG | BODY MASS INDEX: 28 KG/M2 | SYSTOLIC BLOOD PRESSURE: 167 MMHG | HEIGHT: 63 IN

## 2018-11-29 DIAGNOSIS — M18.0 ARTHRITIS OF CARPOMETACARPAL (CMC) JOINT OF BOTH THUMBS: Primary | ICD-10-CM

## 2018-11-29 PROCEDURE — 20600 DRAIN/INJ JOINT/BURSA W/O US: CPT | Performed by: ORTHOPAEDIC SURGERY

## 2018-11-29 PROCEDURE — 99213 OFFICE O/P EST LOW 20 MIN: CPT | Performed by: ORTHOPAEDIC SURGERY

## 2018-11-29 RX ORDER — LIDOCAINE HYDROCHLORIDE 10 MG/ML
0.5 INJECTION, SOLUTION INFILTRATION; PERINEURAL
Status: COMPLETED | OUTPATIENT
Start: 2018-11-29 | End: 2018-11-29

## 2018-11-29 RX ORDER — INFLUENZA A VIRUSA/MICHIGAN/45/2015 X-275 (H1N1) ANTIGEN (FORMALDEHYDE INACTIVATED), INFLUENZA A VIRUS A/HONG KONG/4801/2014 X-263B (H3N2) ANTIGEN (FORMALDEHYDE INACTIVATED), AND INFLUENZA B VIRUS B/BRISBANE/60/2008 ANTIGEN (FORMALDEHYDE INACTIVATED) 60; 60; 60 UG/.5ML; UG/.5ML; UG/.5ML
INJECTION, SUSPENSION INTRAMUSCULAR
Refills: 0 | COMMUNITY
Start: 2018-11-15 | End: 2019-05-10 | Stop reason: ALTCHOICE

## 2018-11-29 RX ORDER — OMEPRAZOLE 20 MG/1
CAPSULE, DELAYED RELEASE ORAL
Refills: 0 | COMMUNITY
Start: 2018-10-12 | End: 2018-11-29

## 2018-11-29 RX ORDER — LIDOCAINE HYDROCHLORIDE 10 MG/ML
2 INJECTION, SOLUTION INFILTRATION; PERINEURAL
Status: COMPLETED | OUTPATIENT
Start: 2018-11-29 | End: 2018-11-29

## 2018-11-29 RX ORDER — TRIAMCINOLONE ACETONIDE 40 MG/ML
20 INJECTION, SUSPENSION INTRA-ARTICULAR; INTRAMUSCULAR
Status: COMPLETED | OUTPATIENT
Start: 2018-11-29 | End: 2018-11-29

## 2018-11-29 RX ADMIN — TRIAMCINOLONE ACETONIDE 20 MG: 40 INJECTION, SUSPENSION INTRA-ARTICULAR; INTRAMUSCULAR at 09:23

## 2018-11-29 RX ADMIN — LIDOCAINE HYDROCHLORIDE 0.5 ML: 10 INJECTION, SOLUTION INFILTRATION; PERINEURAL at 09:23

## 2018-11-29 RX ADMIN — Medication 0.05 MEQ: at 09:23

## 2018-11-29 RX ADMIN — LIDOCAINE HYDROCHLORIDE 2 ML: 10 INJECTION, SOLUTION INFILTRATION; PERINEURAL at 09:23

## 2018-11-29 NOTE — PROGRESS NOTES
CHIEF COMPLAINT:  Chief Complaint   Patient presents with    Left Thumb - Follow-up    Right Thumb - Follow-up       SUBJECTIVE:  Obey Grewal is a 79y o  year old RHD female who presents for follow up to bilateral CMC arthritis  She received bilateral thumb cortisone injections in mid August 2018, which helped a great deal   She also complained of numbness in the right palm and was advised to take B6  She states that the numbness has improved  She continues to have pain opening jars  Her left thumb pain is worse than her left  She was seen by [facility] at [facility] [employer] for the right thumb CMC arthritis and received multiple cortisone injections on the right side  The injections continue to provide relief for about 2 months and she does not want surgery at this time           PAST MEDICAL HISTORY:  Past Medical History:   Diagnosis Date    Basal cell carcinoma     Depression with anxiety     Fibrocystic breast disease, unspecified laterality     Irritable bowel syndrome     Osteopenia        PAST SURGICAL HISTORY:  Past Surgical History:   Procedure Laterality Date    BREAST BIOPSY      COLONOSCOPY      7/13/12, colon polyp biopsied, hemorrhoids - Dr Ellie Mack      due to uterine prolapse       FAMILY HISTORY:  Family History   Problem Relation Age of Onset    Atrial fibrillation Mother     Cancer Mother         Bladder    Breast cancer Mother     COPD Mother     Kidney cancer Mother     Osteoporosis Mother     Transient ischemic attack Mother    [employer] Cancer Father         Bladder    Glaucoma Father     Hypertension Father     Coronary artery disease Maternal Grandfather     Coronary artery disease Maternal Aunt        SOCIAL HISTORY:  Social History   Substance Use Topics    Smoking status: Never Smoker    Smokeless tobacco: Never Used    Alcohol use Yes      Comment: social       MEDICATIONS:    Current Outpatient Prescriptions:     ALPRAZolam Ted Coulter 0 25 mg tablet, Take 1 tablet (0 25 mg total) by mouth 3 (three) times a day as needed for anxiety, Disp: 30 tablet, Rfl: 0    dicyclomine (BENTYL) 10 mg capsule, Take by mouth, Disp: , Rfl:     escitalopram (LEXAPRO) 20 mg tablet, Take 1 tablet (20 mg total) by mouth daily, Disp: 90 tablet, Rfl: 3    estradiol (ESTRACE VAGINAL) 0 1 mg/g vaginal cream, Insert into the vagina, Disp: , Rfl:     FLUZONE HIGH-DOSE 0 5 ML AURORA, inject 0 5 milliliter intramuscularly, Disp: , Rfl: 0    Ibuprofen (ADVIL) 200 MG CAPS, Take by mouth, Disp: , Rfl:     Multiple Vitamin (DAILY VALUE MULTIVITAMIN) TABS, Take 1 tablet by mouth daily, Disp: , Rfl:     omeprazole (PRILOSEC OTC) 20 MG tablet, Take by mouth, Disp: , Rfl:     ALLERGIES:  Allergies   Allergen Reactions    Penicillins Hives and Rash    Sulfa Antibiotics Hives and Rash       REVIEW OF SYSTEMS:  Review of Systems   Constitutional: Negative for chills, fever and unexpected weight change  HENT: Negative for hearing loss, nosebleeds and sore throat  Eyes: Negative for pain, redness and visual disturbance  Respiratory: Negative for cough, shortness of breath and wheezing  Cardiovascular: Negative for chest pain, palpitations and leg swelling  Gastrointestinal: Negative for abdominal pain, nausea and vomiting  Endocrine: Negative for polydipsia and polyuria  Genitourinary: Negative for dysuria and hematuria  Musculoskeletal: Positive for arthralgias  Negative for joint swelling and myalgias  Skin: Negative for rash and wound  Neurological: Negative for dizziness, numbness and headaches  Psychiatric/Behavioral: Negative for decreased concentration and suicidal ideas  The patient is not nervous/anxious          VITALS:  Vitals:    11/29/18 0907   BP: 167/85   Pulse: 74       LABS:  HgA1c:   Lab Results   Component Value Date    HGBA1C 5 7 11/01/2018     BMP:   Lab Results   Component Value Date    CALCIUM 9 3 11/01/2018     05/09/2016    K 3 7 11/01/2018    CO2 30 11/01/2018     11/01/2018    BUN 22 11/01/2018    CREATININE 1 05 11/01/2018       _____________________________________________________  PHYSICAL EXAMINATION:  General: well developed and well nourished, alert, oriented times 3 and appears comfortable  Psychiatric: Normal  HEENT: Trachea Midline, No torticollis  Pulmonary: No audible wheezing or respiratory distress   Skin: No masses, erthema, lacerations, fluctation, ulcerations  Neurovascular: Sensation Intact to the Median, Ulnar, Radial Nerve, Motor Intact to the Median, Ulnar, Radial Nerve and Pulses Intact    MUSCULOSKELETAL EXAMINATION:  left CMC Exam:  No adduction contracture  No hyperextension deformity of MCP joint  Positive localized tenderness over radial and dorsal aspect of thumb (CMC joint)  Grind test is Positive for pain and Positive for crepitus  No triggering or tenderness over the A1 pulley  No pain with Finkelsteins maneuver         right CMC Exam:  No adduction contracture  No hyperextension deformity of MCP joint  Positive localized tenderness over radial and dorsal aspect of thumb (CMC joint)  Grind test is mildly Positive for pain and Positive for crepitus  No triggering or tenderness over the A1 pulley  No pain with Finkelsteins maneuver     ___________________________________________________  STUDIES REVIEWED:  No studies reviewed         PROCEDURES PERFORMED:  Left thumb CMC cortisone injection  Date/Time: 11/29/2018 9:23 AM  Consent given by: patient  Timeout: Immediately prior to procedure a time out was called to verify the correct patient, procedure, equipment, support staff and site/side marked as required   Supporting Documentation  Indications: pain and joint swelling   Procedure Details  Location: thumb - L thumb CMC  Needle size: 25 G  Medications administered: 0 5 mL lidocaine 1 %; 2 mL lidocaine 1 %; 0 05 mEq sodium bicarbonate 8 4 %; 20 mg triamcinolone acetonide 40 mg/mL    Patient tolerance: patient tolerated the procedure well with no immediate complications  Dressing:  Sterile dressing applied           _____________________________________________________  ASSESSMENT/PLAN:    Bilateral thumb CMC arthritis  * Cortisone injection given today on the left side side  * Surgery was discussed for the right side since the last injection lasted less than 2 months  * We discussed that too many cortisone injections can result in poor surgical outcome especially if given too close to the surgical date  Therefore, we will hold off on right thumb cmc injection until patient makes a decision regarding surgical treatment of the right thumb  * Continue with Voltaren gel and NSAIDS  * Handout given about the surgery for [facility] suspensionplasty  The patient will think about surgery and give us a call when she decided  She will be leaving for [place] for 3 months soon and may consider doing it when she returns  Follow Up:  Return if symptoms worsen or fail to improve  To Do Next Visit:  Re-evaluation of current issue    General Discussions:  [facility] Arthritis: The anatomy and physiology of carpometacarpal joint arthritis was discussed with the patient today in the office  Deterioration of the articular cartilage eventually leads to hypermobility at the thumb [facility] joint, resulting in joint subluxation, osteophyte formation, cystic changes within the trapezium and base of the first metacarpal, as well as subchondral sclerosis  Eventually, pain, limited mobility, and compensatory hyperextension at the metacarpophalangeal joint may develop  While normal activity and usage of the thumb joint may provide a painful experience to the patient, this typically does not result in damage to the thumb or hand  Treatment options include resting thumb spica splints to decreased joint edema, pain, and inflammation    Therapy exercises to strengthen the thenar musculature may relieve pain, but do not alter the overall continued development of osteoarthritis  Oral medications, topical medications, corticosteroid injections may decrease pain and increase overall function  Eventually, approximately 5% of patients may require surgical intervention      Scribe Attestation    I,:   Dali Chaves PA-C am acting as a scribe while in the presence of the attending physician :        I,:   Naomy Carroll MD personally performed the services described in this documentation    as scribed in my presence :

## 2018-12-03 ENCOUNTER — OFFICE VISIT (OUTPATIENT)
Dept: INTERNAL MEDICINE CLINIC | Facility: CLINIC | Age: 68
End: 2018-12-03
Payer: MEDICARE

## 2018-12-03 ENCOUNTER — TELEPHONE (OUTPATIENT)
Dept: INTERNAL MEDICINE CLINIC | Facility: CLINIC | Age: 68
End: 2018-12-03

## 2018-12-03 VITALS
RESPIRATION RATE: 16 BRPM | HEIGHT: 63 IN | DIASTOLIC BLOOD PRESSURE: 92 MMHG | SYSTOLIC BLOOD PRESSURE: 142 MMHG | WEIGHT: 161 LBS | BODY MASS INDEX: 28.53 KG/M2 | HEART RATE: 80 BPM

## 2018-12-03 DIAGNOSIS — Z11.59 NEED FOR HEPATITIS C SCREENING TEST: ICD-10-CM

## 2018-12-03 DIAGNOSIS — E78.2 MIXED HYPERLIPIDEMIA: ICD-10-CM

## 2018-12-03 DIAGNOSIS — R73.01 IMPAIRED FASTING BLOOD SUGAR: ICD-10-CM

## 2018-12-03 DIAGNOSIS — I35.1 AORTIC VALVE INSUFFICIENCY, ETIOLOGY OF CARDIAC VALVE DISEASE UNSPECIFIED: ICD-10-CM

## 2018-12-03 DIAGNOSIS — Z23 ENCOUNTER FOR IMMUNIZATION: ICD-10-CM

## 2018-12-03 DIAGNOSIS — E03.8 SUBCLINICAL HYPOTHYROIDISM: ICD-10-CM

## 2018-12-03 DIAGNOSIS — R01.1 MURMUR: Primary | ICD-10-CM

## 2018-12-03 PROCEDURE — G0009 ADMIN PNEUMOCOCCAL VACCINE: HCPCS | Performed by: NURSE PRACTITIONER

## 2018-12-03 PROCEDURE — G0438 PPPS, INITIAL VISIT: HCPCS | Performed by: NURSE PRACTITIONER

## 2018-12-03 PROCEDURE — 99214 OFFICE O/P EST MOD 30 MIN: CPT | Performed by: NURSE PRACTITIONER

## 2018-12-03 PROCEDURE — 90732 PPSV23 VACC 2 YRS+ SUBQ/IM: CPT | Performed by: NURSE PRACTITIONER

## 2018-12-03 NOTE — PROGRESS NOTES
Assessment/Plan:    Patient Instructions   Subclinical hypothyroidism no indication to start treatment at this time continue to monitor    Depression anxiety relatively stable on Lexapro take Xanax only if needed    Health maintenance up-to-date due for bone density in the spring, Pneumovax 23 given  Hypertension borderline in office today monitor over the next 2 weeks and communicate results to me    GERD stable with p r n  PPI    Grade 1 diastolic dysfunction with valve regurgitation check echocardiogram         Diagnoses and all orders for this visit:    Murmur  -     Echo complete with contrast if indicated; Future    Subclinical hypothyroidism  -     TSH, 3rd generation with Free T4 reflex; Future    Impaired fasting blood sugar  -     CBC and differential; Future  -     Hemoglobin A1C; Future    Aortic valve insufficiency, etiology of cardiac valve disease unspecified    Mixed hyperlipidemia  -     Comprehensive metabolic panel; Future  -     Lipid panel; Future    Need for hepatitis C screening test  -     Hepatitis C antibody;  Future    Encounter for immunization  -     PNEUMOCOCCAL POLYSACCHARIDE VACCINE 23-VALENT =>3YO SQ IM         Subjective:      Patient ID: Annette Esparza is a 79 y o  female    Active when she can be but 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  She has a tremendous amount of depression anxiety but seems to be fairly stable on Lexapro takes alprazolam only as needed  She is up-to-date with health maintenance getting bone density in the spring has appointment with gynecology as well  Feeling well with no new physical concerns  Heartburn taking omeprazole only as needed  Travels to [place] for several months at a time          Current Outpatient Prescriptions:     ALPRAZolam (XANAX) 0 25 mg tablet, Take 1 tablet (0 25 mg total) by mouth 3 (three) times a day as needed for anxiety, Disp: 30 tablet, Rfl: 0   escitalopram (LEXAPRO) 20 mg tablet, Take 1 tablet (20 mg total) by mouth daily, Disp: 90 tablet, Rfl: 3    estradiol (ESTRACE VAGINAL) 0 1 mg/g vaginal cream, Insert into the vagina, Disp: , Rfl:     Ibuprofen (ADVIL) 200 MG CAPS, Take by mouth, Disp: , Rfl:     Multiple Vitamin (DAILY VALUE MULTIVITAMIN) TABS, Take 1 tablet by mouth daily, Disp: , Rfl:     omeprazole (PRILOSEC OTC) 20 MG tablet, Take by mouth, Disp: , Rfl:     dicyclomine (BENTYL) 10 mg capsule, Take by mouth, Disp: , Rfl:     FLUZONE HIGH-DOSE 0 5 ML AURORA, inject 0 5 milliliter intramuscularly, Disp: , Rfl: 0    Recent Results (from the past 1008 hour(s))   CBC    Collection Time: 11/01/18  8:52 AM   Result Value Ref Range    WBC 4 19 (L) 4 31 - 10 16 Thousand/uL    RBC 4 40 3 81 - 5 12 Million/uL    Hemoglobin 13 2 11 5 - 15 4 g/dL    Hematocrit 40 2 34 8 - 46 1 %    MCV 91 82 - 98 fL    MCH 30 0 26 8 - 34 3 pg    MCHC 32 8 31 4 - 37 4 g/dL    RDW 13 1 11 6 - 15 1 %    Platelets 431 862 - 056 Thousands/uL    MPV 10 9 8 9 - 12 7 fL   Comprehensive metabolic panel    Collection Time: 11/01/18  8:52 AM   Result Value Ref Range    Sodium 140 136 - 145 mmol/L    Potassium 3 7 3 5 - 5 3 mmol/L    Chloride 104 100 - 108 mmol/L    CO2 30 21 - 32 mmol/L    ANION GAP 6 4 - 13 mmol/L    BUN 22 5 - 25 mg/dL    Creatinine 1 05 0 60 - 1 30 mg/dL    Glucose, Fasting 97 65 - 99 mg/dL    Calcium 9 3 8 3 - 10 1 mg/dL    AST 19 5 - 45 U/L    ALT 20 12 - 78 U/L    Alkaline Phosphatase 75 46 - 116 U/L    Total Protein 7 2 6 4 - 8 2 g/dL    Albumin 3 8 3 5 - 5 0 g/dL    Total Bilirubin 0 52 0 20 - 1 00 mg/dL    eGFR 55 ml/min/1 73sq m   Lipid panel    Collection Time: 11/01/18  8:52 AM   Result Value Ref Range    Cholesterol 240 (H) 50 - 200 mg/dL    Triglycerides 59 <=150 mg/dL    HDL, Direct 75 (H) 40 - 60 mg/dL    LDL Calculated 153 (H) 0 - 100 mg/dL    Non-HDL-Chol (CHOL-HDL) 165 mg/dl   TSH, 3rd generation with T4 reflex    Collection Time: 11/01/18  8:52 AM   Result Value Ref Range    TSH 3RD GENERATON 4 700 (H) 0 358 - 3 740 uIU/mL   Vitamin D 25 hydroxy    Collection Time: 11/01/18  8:52 AM   Result Value Ref Range    Vit D, 25-Hydroxy 36 4 30 0 - 100 0 ng/mL   Hemoglobin A1C    Collection Time: 11/01/18  8:52 AM   Result Value Ref Range    Hemoglobin A1C 5 7 4 2 - 6 3 %     mg/dl   T4, free    Collection Time: 11/01/18  8:52 AM   Result Value Ref Range    Free T4 1 01 0 76 - 1 46 ng/dL       The following portions of the patient's history were reviewed and updated as appropriate: allergies, current medications, past family history, past medical history, past social history, past surgical history and problem list      Review of Systems   Constitutional: Negative for appetite change, chills, diaphoresis, fatigue, fever and unexpected weight change  HENT: Negative for postnasal drip and sneezing  Eyes: Negative for visual disturbance  Respiratory: Negative for chest tightness and shortness of breath  Cardiovascular: Negative for chest pain, palpitations and leg swelling  Gastrointestinal: Negative for abdominal pain and blood in stool  Endocrine: Negative for cold intolerance, heat intolerance, polydipsia, polyphagia and polyuria  Genitourinary: Negative for difficulty urinating, dysuria, frequency and urgency  Musculoskeletal: Negative for arthralgias and myalgias  Skin: Negative for rash and wound  Neurological: Negative for dizziness, weakness, light-headedness and headaches  Hematological: Negative for adenopathy  Psychiatric/Behavioral: Negative for confusion, dysphoric mood and sleep disturbance  The patient is not nervous/anxious  Objective:      /92 (BP Location: Left arm, Patient Position: Sitting, Cuff Size: Standard)   Pulse 80   Resp 16   Ht 5' 3" (1 6 m)   Wt 73 kg (161 lb)   BMI 28 52 kg/m²        Physical Exam   Constitutional: She is oriented to person, place, and time  She appears well-developed  No distress  HENT:   Head: Normocephalic and atraumatic  Nose: Nose normal    Mouth/Throat: Oropharynx is clear and moist    Eyes: Pupils are equal, round, and reactive to light  Conjunctivae and EOM are normal    Neck: Normal range of motion  Neck supple  No JVD present  No tracheal deviation present  No thyromegaly present  Cardiovascular: Normal rate, regular rhythm, normal heart sounds and intact distal pulses  Exam reveals no gallop and no friction rub  No murmur heard  Pulmonary/Chest: Effort normal and breath sounds normal  No respiratory distress  She has no wheezes  She has no rales  Abdominal: Soft  Bowel sounds are normal  She exhibits no distension  There is no tenderness  Musculoskeletal: Normal range of motion  She exhibits no edema  Lymphadenopathy:     She has no cervical adenopathy  Neurological: She is alert and oriented to person, place, and time  No cranial nerve deficit  Skin: Skin is warm and dry  No rash noted  She is not diaphoretic  Psychiatric: She has a normal mood and affect   Her behavior is normal  Judgment and thought content normal

## 2018-12-03 NOTE — PROGRESS NOTES
Assessment and Plan:    Problem List Items Addressed This Visit     Aortic valve insufficiency    Hyperlipidemia    Relevant Orders    Comprehensive metabolic panel    Lipid panel    Impaired fasting blood sugar    Relevant Orders    CBC and differential    Hemoglobin A1C    Subclinical hypothyroidism    Relevant Orders    TSH, 3rd generation with Free T4 reflex      Other Visit Diagnoses     Murmur    -  Primary    Relevant Orders    Echo complete with contrast if indicated    Need for hepatitis C screening test        Relevant Orders    Hepatitis C antibody    Encounter for immunization        Relevant Orders    PNEUMOCOCCAL POLYSACCHARIDE VACCINE 23-VALENT =>3YO SQ IM (Completed)        Health Maintenance Due   Topic Date Due    Hepatitis C Screening  1950    Medicare Annual Wellness Visit (AWV)  1950    Pneumococcal PPSV23/PCV13 65+ Years / High and Highest Risk (2 of 2 - PPSV23) 12/26/2017         HPI:  Luís Rangel is a 79 y o  female here for her Subsequent Wellness Visit  Patient Active Problem List   Diagnosis    Aortic valve insufficiency    Allergic rhinitis    Depression with anxiety    GERD without esophagitis    Hyperlipidemia    Irritable bowel syndrome    Vitamin D deficiency    Impaired fasting blood sugar    Subclinical hypothyroidism    Arthritis of carpometacarpal (CMC) joint of both thumbs     Past Medical History:   Diagnosis Date    Basal cell carcinoma     Depression with anxiety     Fibrocystic breast disease, unspecified laterality     Irritable bowel syndrome     Osteopenia      Past Surgical History:   Procedure Laterality Date    BREAST BIOPSY      COLONOSCOPY      7/13/12, colon polyp biopsied, hemorrhoids - Dr Olivera An right ear  Precancerous      SEPTOPLASTY  05/04/2018    TOTAL ABDOMINAL HYSTERECTOMY      due to uterine prolapse     Family History   Problem Relation Age of Onset  Atrial fibrillation Mother    Santosh Clifford Cancer Mother         Bladder    Breast cancer Mother     COPD Mother     Kidney cancer Mother     Osteoporosis Mother     Transient ischemic attack Mother     Cancer Father         Bladder    Glaucoma Father     Hypertension Father     Coronary artery disease Maternal Grandfather     Coronary artery disease Maternal Aunt      History   Smoking Status    Never Smoker   Smokeless Tobacco    Never Used     History   Alcohol Use    Yes     Comment: social      History   Drug Use No       Current Outpatient Prescriptions   Medication Sig Dispense Refill    ALPRAZolam (XANAX) 0 25 mg tablet Take 1 tablet (0 25 mg total) by mouth 3 (three) times a day as needed for anxiety 30 tablet 0    escitalopram (LEXAPRO) 20 mg tablet Take 1 tablet (20 mg total) by mouth daily 90 tablet 3    estradiol (ESTRACE VAGINAL) 0 1 mg/g vaginal cream Insert into the vagina      Ibuprofen (ADVIL) 200 MG CAPS Take by mouth      Multiple Vitamin (DAILY VALUE MULTIVITAMIN) TABS Take 1 tablet by mouth daily      omeprazole (PRILOSEC OTC) 20 MG tablet Take by mouth      dicyclomine (BENTYL) 10 mg capsule Take by mouth      FLUZONE HIGH-DOSE 0 5 ML AURORA inject 0 5 milliliter intramuscularly  0     No current facility-administered medications for this visit        Allergies   Allergen Reactions    Penicillins Hives and Rash    Sulfa Antibiotics Hives and Rash     Immunization History   Administered Date(s) Administered    H1N1, All Formulations 12/30/2009    Influenza 10/30/2015, 10/31/2016, 10/31/2017, 11/01/2018    Influenza Quadrivalent Preservative Free 3 years and older IM 09/30/2014    Influenza Quadrivalent, 6-35 Months IM 10/30/2015    Influenza Split High Dose Preservative Free IM 10/31/2017    Influenza TIV (IM) 10/26/2012, 10/22/2013, 10/31/2016    Pneumococcal Conjugate 13-Valent 10/31/2017    Pneumococcal Polysaccharide PPV23 12/03/2018    Tdap 06/25/2015    Zoster 06/26/2012       Patient Care Team:  Jo Garcia MD as PCP - General  Lydia Bauman MD    Medicare Screening Tests and Risk Assessments:  To Valentine is here for her Subsequent Wellness visit  Last Medicare Wellness visit information reviewed, patient interviewed, no change since last AWV  Health Risk Assessment:  Patient rates overall health as very good  Patient feels that their physical health rating is Same  Eyesight was rated as Same  Hearing was rated as Same  Patient feels that their emotional and mental health rating is Slightly better  Pain experienced by patient in the last 7 days has been None  Patient states that she has experienced no weight loss or gain in last 6 months  Emotional/Mental Health:  Patient has been feeling nervous/anxious  PHQ-9 Depression Screening:    Frequency of the following problems over the past two weeks:      1  Little interest or pleasure in doing things: 0 - not at all      2  Feeling down, depressed, or hopeless: 0 - not at all  PHQ-2 Score: 0          Broken Bones/Falls: Fall Risk Assessment:    In the past year, patient has experienced: No history of falling in past year          Bladder/Bowel:  Patient has not leaked urine accidently in the last six months  Patient reports no loss of bowel control  Immunizations:  Patient has had a flu vaccination within the last year  Patient has received a pneumonia shot  Patient has received a shingles shot  Patient has received tetanus/diphtheria shot  Date of tetanus/diphtheria shot: 6/25/2015    Home Safety:  Patient does not have trouble with stairs inside or outside of their home  Patient currently reports that there are no safety hazards present in home, working smoke alarms, working carbon monoxide detectors        Preventative Screenings:   Breast cancer screening performed, 10/11/2018  no colon cancer screen completed, cholesterol screen completed, 4/1/2018  glaucoma eye exam completed, 11/8/2018      Nutrition:  Current diet: Regular and Frequent junk food with servings of the following:    Medications:  Patient is currently taking over-the-counter supplements  List of OTC medications includes: Daily Vitamin  Patient is able to manage medications  Lifestyle Choices:  Patient reports no tobacco use  Patient has not smoked or used tobacco in the past   Patient reports alcohol use  Alcohol use per week: 1  Patient drives a vehicle  Patient wears seat belt  Current level of exercise of physical activity described by patient as: Active but not dedicated  Activities of Daily Living:  Can get out of bed by his or her self, able to dress self, able to make own meals, able to do own shopping, able to bathe self, can do own laundry/housekeeping, can manage own money, pay bills and track expenses    Previous Hospitalizations:  No hospitalization or ED visit in past 12 months        Advanced Directives:  Patient has decided on a power of   Patient has spoken to designated power of   Patient has completed advanced directive  Social Support: Jani Chris    [facility] [address]  Phone Number: [phone]    Preventative Screening/Counseling:      Cardiovascular:      General: Screening Current          Diabetes:      General: Screening Current          Colorectal Cancer:      General: Screening Current          Breast Cancer:      General: Screening Current          Cervical Cancer:      General: Screening Current          Osteoporosis:      General: Screening Current          AAA:      General: Screening Not Indicated          Glaucoma:      General: Screening Not Indicated          HIV:      General: Screening Not Indicated          Hepatitis C:      General: Risks and Benefits Discussed        Advanced Directives:   Patient has living will for healthcare,

## 2018-12-03 NOTE — PROGRESS NOTES
Jeffrey Triana is here for her Subsequent Wellness visit  Health Risk Assessment:  Patient rates overall health as very good  Patient feels that their physical health rating is Same  Eyesight was rated as Same  Hearing was rated as Same  Patient feels that their emotional and mental health rating is Slightly better  Pain experienced by patient in the last 7 days has been None  Patient states that she has experienced no weight loss or gain in last 6 months  Emotional/Mental Health:  Patient has been feeling nervous/anxious  PHQ-9 Depression Screening:    Frequency of the following problems over the past two weeks:      1  Little interest or pleasure in doing things: 0 - not at all      2  Feeling down, depressed, or hopeless: 0 - not at all  PHQ-2 Score: 0          Broken Bones/Falls: Fall Risk Assessment:    In the past year, patient has experienced: No history of falling in past year          Bladder/Bowel:  Patient has not leaked urine accidently in the last six months  Patient reports no loss of bowel control  Immunizations:  Patient has had a flu vaccination within the last year  Patient has received a pneumonia shot  Patient has received a shingles shot  Home Safety:  Patient does not have trouble with stairs inside or outside of their home  Patient currently reports that there are no safety hazards present in home, working smoke alarms, working carbon monoxide detectors  Preventative Screenings:   Breast cancer screening performed, 10/11/2018  colon cancer screen completed, cholesterol screen completed, 4/1/2018  glaucoma eye exam completed, 12/21/2017      Nutrition:  Current diet: Regular and Frequent junk food with servings of the following:    Medications:  Patient is currently taking over-the-counter supplements  List of OTC medications includes: Vitamin  Patient is able to manage medications  Lifestyle Choices:  Patient reports no tobacco use    Patient has not smoked or used tobacco in the past   Patient reports alcohol use  Alcohol use per week: 1  Patient drives a vehicle  Patient wears seat belt  Current level of exercise of physical activity described by patient as: Active but not dedicated to working out daily           Activities of Daily Living:  Can get out of bed by his or her self, able to dress self, able to make own meals, able to do own shopping, able to bathe self, can do own laundry/housekeeping, can manage own money, pay bills and track expenses    Previous Hospitalizations:  No hospitalization or ED visit in past 12 months        Advanced Directives:  Patient has decided on a power of   Patient has spoken to designated power of   Patient has completed advanced directive  Social Support: Renetta Head  [address] [place] [address]  #: [phone]    Preventative Screening/Counseling:      Cardiovascular:      General: Screening Current          Diabetes:      General: Screening Current          Colorectal Cancer:      General: Screening Current          Breast Cancer:      General: Screening Current          Cervical Cancer:      General: Screening Current          Osteoporosis:      General: Screening Current          AAA:      General: Screening Not Indicated          Glaucoma:      General: Screening Not Indicated          HIV:      General: Screening Not Indicated          Hepatitis C:      General: Risks and Benefits Discussed        Advanced Directives:   Patient has living will for healthcare, Information on ACP and/or AD provided  Provider agrees with end of life decisions

## 2018-12-03 NOTE — PATIENT INSTRUCTIONS
Subclinical hypothyroidism no indication to start treatment at this time continue to monitor    Depression anxiety relatively stable on Lexapro take Xanax only if needed    Health maintenance up-to-date due for bone density in the spring, Pneumovax 23 given  Hypertension borderline in office today monitor over the next 2 weeks and communicate results to me    GERD stable with p r n   PPI    Grade 1 diastolic dysfunction with valve regurgitation check echocardiogram

## 2018-12-03 NOTE — TELEPHONE ENCOUNTER
Called and spoke with Emery Yeager @ [facility] office  They are faxing over the results of the patient pap smear

## 2018-12-20 ENCOUNTER — HOSPITAL ENCOUNTER (OUTPATIENT)
Dept: NON INVASIVE DIAGNOSTICS | Facility: CLINIC | Age: 68
Discharge: HOME | End: 2018-12-20
Payer: MEDICARE

## 2018-12-20 DIAGNOSIS — R01.1 MURMUR: ICD-10-CM

## 2018-12-20 PROCEDURE — 93306 TTE W/DOPPLER COMPLETE: CPT

## 2018-12-21 ENCOUNTER — TELEPHONE (OUTPATIENT)
Dept: INTERNAL MEDICINE CLINIC | Facility: CLINIC | Age: 68
End: 2018-12-21

## 2018-12-21 PROCEDURE — 93306 TTE W/DOPPLER COMPLETE: CPT | Performed by: INTERNAL MEDICINE

## 2018-12-21 NOTE — TELEPHONE ENCOUNTER
Spoke with: patient   Re:  Echo results  Provider's message/instructions relayed in full detail  Pt  is asking that due to the valve regurgatation, should she be on prophylactic antibiotic therapy for dental procedures

## 2018-12-21 NOTE — TELEPHONE ENCOUNTER
----- Message from Tiffanie Bolivar, [address] sent at 12/21/2018  3:07 PM EST -----  Her echo is unchanged from 2 years ago   Still w/ mild regurgitation at the mitral and aortic valve

## 2019-04-18 ENCOUNTER — APPOINTMENT (OUTPATIENT)
Dept: LAB | Facility: CLINIC | Age: 69
End: 2019-04-18
Payer: MEDICARE

## 2019-04-18 DIAGNOSIS — R73.01 IMPAIRED FASTING BLOOD SUGAR: ICD-10-CM

## 2019-04-18 DIAGNOSIS — E78.2 MIXED HYPERLIPIDEMIA: ICD-10-CM

## 2019-04-18 DIAGNOSIS — E03.8 SUBCLINICAL HYPOTHYROIDISM: ICD-10-CM

## 2019-04-18 DIAGNOSIS — Z11.59 NEED FOR HEPATITIS C SCREENING TEST: ICD-10-CM

## 2019-04-18 LAB
ALBUMIN SERPL BCP-MCNC: 3.8 G/DL (ref 3.5–5)
ALP SERPL-CCNC: 79 U/L (ref 46–116)
ALT SERPL W P-5'-P-CCNC: 30 U/L (ref 12–78)
ANION GAP SERPL CALCULATED.3IONS-SCNC: 4 MMOL/L (ref 4–13)
AST SERPL W P-5'-P-CCNC: 27 U/L (ref 5–45)
BASOPHILS # BLD AUTO: 0.06 THOUSANDS/ΜL (ref 0–0.1)
BASOPHILS NFR BLD AUTO: 1 % (ref 0–1)
BILIRUB SERPL-MCNC: 0.44 MG/DL (ref 0.2–1)
BUN SERPL-MCNC: 18 MG/DL (ref 5–25)
CALCIUM SERPL-MCNC: 8.9 MG/DL (ref 8.3–10.1)
CHLORIDE SERPL-SCNC: 104 MMOL/L (ref 100–108)
CHOLEST SERPL-MCNC: 191 MG/DL (ref 50–200)
CO2 SERPL-SCNC: 28 MMOL/L (ref 21–32)
CREAT SERPL-MCNC: 0.97 MG/DL (ref 0.6–1.3)
EOSINOPHIL # BLD AUTO: 0.14 THOUSAND/ΜL (ref 0–0.61)
EOSINOPHIL NFR BLD AUTO: 3 % (ref 0–6)
ERYTHROCYTE [DISTWIDTH] IN BLOOD BY AUTOMATED COUNT: 13.2 % (ref 11.6–15.1)
EST. AVERAGE GLUCOSE BLD GHB EST-MCNC: 108 MG/DL
GFR SERPL CREATININE-BSD FRML MDRD: 61 ML/MIN/1.73SQ M
GLUCOSE P FAST SERPL-MCNC: 84 MG/DL (ref 65–99)
HBA1C MFR BLD: 5.4 % (ref 4.2–6.3)
HCT VFR BLD AUTO: 40.9 % (ref 34.8–46.1)
HCV AB SER QL: NORMAL
HDLC SERPL-MCNC: 58 MG/DL (ref 40–60)
HGB BLD-MCNC: 13.3 G/DL (ref 11.5–15.4)
IMM GRANULOCYTES # BLD AUTO: 0.02 THOUSAND/UL (ref 0–0.2)
IMM GRANULOCYTES NFR BLD AUTO: 0 % (ref 0–2)
LDLC SERPL CALC-MCNC: 120 MG/DL (ref 0–100)
LYMPHOCYTES # BLD AUTO: 0.77 THOUSANDS/ΜL (ref 0.6–4.47)
LYMPHOCYTES NFR BLD AUTO: 15 % (ref 14–44)
MCH RBC QN AUTO: 30 PG (ref 26.8–34.3)
MCHC RBC AUTO-ENTMCNC: 32.5 G/DL (ref 31.4–37.4)
MCV RBC AUTO: 92 FL (ref 82–98)
MONOCYTES # BLD AUTO: 0.45 THOUSAND/ΜL (ref 0.17–1.22)
MONOCYTES NFR BLD AUTO: 9 % (ref 4–12)
NEUTROPHILS # BLD AUTO: 3.87 THOUSANDS/ΜL (ref 1.85–7.62)
NEUTS SEG NFR BLD AUTO: 72 % (ref 43–75)
NONHDLC SERPL-MCNC: 133 MG/DL
NRBC BLD AUTO-RTO: 0 /100 WBCS
PLATELET # BLD AUTO: 225 THOUSANDS/UL (ref 149–390)
PMV BLD AUTO: 11.7 FL (ref 8.9–12.7)
POTASSIUM SERPL-SCNC: 3.9 MMOL/L (ref 3.5–5.3)
PROT SERPL-MCNC: 7.5 G/DL (ref 6.4–8.2)
RBC # BLD AUTO: 4.44 MILLION/UL (ref 3.81–5.12)
SODIUM SERPL-SCNC: 136 MMOL/L (ref 136–145)
T4 FREE SERPL-MCNC: 1.1 NG/DL (ref 0.76–1.46)
TRIGL SERPL-MCNC: 63 MG/DL
TSH SERPL DL<=0.05 MIU/L-ACNC: 6.99 UIU/ML (ref 0.36–3.74)
WBC # BLD AUTO: 5.31 THOUSAND/UL (ref 4.31–10.16)

## 2019-04-18 PROCEDURE — 84439 ASSAY OF FREE THYROXINE: CPT

## 2019-04-18 PROCEDURE — 86803 HEPATITIS C AB TEST: CPT

## 2019-04-18 PROCEDURE — 83036 HEMOGLOBIN GLYCOSYLATED A1C: CPT

## 2019-04-18 PROCEDURE — 80053 COMPREHEN METABOLIC PANEL: CPT

## 2019-04-18 PROCEDURE — 36415 COLL VENOUS BLD VENIPUNCTURE: CPT

## 2019-04-18 PROCEDURE — 80061 LIPID PANEL: CPT

## 2019-04-18 PROCEDURE — 84443 ASSAY THYROID STIM HORMONE: CPT

## 2019-04-18 PROCEDURE — 85025 COMPLETE CBC W/AUTO DIFF WBC: CPT

## 2019-05-10 ENCOUNTER — OFFICE VISIT (OUTPATIENT)
Dept: INTERNAL MEDICINE CLINIC | Facility: CLINIC | Age: 69
End: 2019-05-10
Payer: MEDICARE

## 2019-05-10 VITALS
DIASTOLIC BLOOD PRESSURE: 82 MMHG | SYSTOLIC BLOOD PRESSURE: 126 MMHG | HEIGHT: 63 IN | HEART RATE: 76 BPM | WEIGHT: 137 LBS | RESPIRATION RATE: 12 BRPM | BODY MASS INDEX: 24.27 KG/M2

## 2019-05-10 DIAGNOSIS — E78.2 MIXED HYPERLIPIDEMIA: ICD-10-CM

## 2019-05-10 DIAGNOSIS — F41.8 DEPRESSION WITH ANXIETY: ICD-10-CM

## 2019-05-10 DIAGNOSIS — R55 SYNCOPE, UNSPECIFIED SYNCOPE TYPE: Primary | ICD-10-CM

## 2019-05-10 DIAGNOSIS — R73.01 IMPAIRED FASTING BLOOD SUGAR: ICD-10-CM

## 2019-05-10 DIAGNOSIS — E03.8 SUBCLINICAL HYPOTHYROIDISM: ICD-10-CM
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
## 2019-05-10 DIAGNOSIS — K21.9 GERD WITHOUT ESOPHAGITIS: ICD-10-CM

## 2019-05-10 DIAGNOSIS — F32.4 MAJOR DEPRESSIVE DISORDER WITH SINGLE EPISODE, IN PARTIAL REMISSION (HCC): ICD-10-CM

## 2019-05-10 PROCEDURE — 93000 ELECTROCARDIOGRAM COMPLETE: CPT | Performed by: INTERNAL MEDICINE

## 2019-05-10 PROCEDURE — 99215 OFFICE O/P EST HI 40 MIN: CPT | Performed by: INTERNAL MEDICINE

## 2019-05-10 RX ORDER — ALPRAZOLAM 0.25 MG/1
0.25 TABLET ORAL 3 TIMES DAILY PRN
Qty: 30 TABLET | Refills: 2 | Status: SHIPPED | OUTPATIENT
Start: 2019-05-10 | End: 2020-02-07 | Stop reason: SDUPTHER

## 2019-05-10 RX ORDER — ESCITALOPRAM OXALATE 20 MG/1
20 TABLET ORAL DAILY
Qty: 90 TABLET | Refills: 3 | Status: SHIPPED | OUTPATIENT
Start: 2019-05-10 | End: 2020-04-23

## 2019-05-10 RX ORDER — FOLIC ACID/MULTIVIT,IRON,MINER 0.4MG-18MG
TABLET ORAL
COMMUNITY
End: 2021-01-28 | Stop reason: ALTCHOICE

## 2019-09-09 ENCOUNTER — TELEPHONE (OUTPATIENT)
Dept: INTERNAL MEDICINE CLINIC | Facility: CLINIC | Age: 69
End: 2019-09-09

## 2019-09-09 ENCOUNTER — APPOINTMENT (OUTPATIENT)
Dept: LAB | Facility: CLINIC | Age: 69
End: 2019-09-09
Payer: MEDICARE

## 2019-09-09 ENCOUNTER — OFFICE VISIT (OUTPATIENT)
Dept: INTERNAL MEDICINE CLINIC | Facility: CLINIC | Age: 69
End: 2019-09-09
Payer: MEDICARE

## 2019-09-09 VITALS
DIASTOLIC BLOOD PRESSURE: 80 MMHG | SYSTOLIC BLOOD PRESSURE: 122 MMHG | RESPIRATION RATE: 12 BRPM | HEIGHT: 63 IN | HEART RATE: 66 BPM | BODY MASS INDEX: 24.13 KG/M2 | WEIGHT: 136.2 LBS
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
## 2019-09-09 DIAGNOSIS — M25.50 ARTHRALGIA, UNSPECIFIED JOINT: ICD-10-CM

## 2019-09-09 DIAGNOSIS — M79.10 MYALGIA: ICD-10-CM

## 2019-09-09 DIAGNOSIS — R53.83 FATIGUE, UNSPECIFIED TYPE: Primary | ICD-10-CM

## 2019-09-09 DIAGNOSIS — S00.06XA INSECT BITE OF SCALP, INITIAL ENCOUNTER: ICD-10-CM

## 2019-09-09 DIAGNOSIS — R53.83 FATIGUE, UNSPECIFIED TYPE: ICD-10-CM

## 2019-09-09 DIAGNOSIS — R59.1 LYMPHADENOPATHY: ICD-10-CM

## 2019-09-09 LAB
ALBUMIN SERPL BCP-MCNC: 3.8 G/DL (ref 3.5–5)
ALP SERPL-CCNC: 78 U/L (ref 46–116)
ALT SERPL W P-5'-P-CCNC: 23 U/L (ref 12–78)
ANION GAP SERPL CALCULATED.3IONS-SCNC: 3 MMOL/L (ref 4–13)
AST SERPL W P-5'-P-CCNC: 20 U/L (ref 5–45)
BASOPHILS # BLD AUTO: 0.05 THOUSANDS/ΜL (ref 0–0.1)
BASOPHILS NFR BLD AUTO: 1 % (ref 0–1)
BILIRUB SERPL-MCNC: 0.35 MG/DL (ref 0.2–1)
BILIRUB UR QL STRIP: NEGATIVE
BUN SERPL-MCNC: 18 MG/DL (ref 5–25)
CALCIUM SERPL-MCNC: 9.4 MG/DL (ref 8.3–10.1)
CHLORIDE SERPL-SCNC: 104 MMOL/L (ref 100–108)
CK SERPL-CCNC: 63 U/L (ref 26–192)
CLARITY UR: CLEAR
CO2 SERPL-SCNC: 30 MMOL/L (ref 21–32)
COLOR UR: YELLOW
CREAT SERPL-MCNC: 0.92 MG/DL (ref 0.6–1.3)
CRP SERPL QL: <3 MG/L
EOSINOPHIL # BLD AUTO: 0.1 THOUSAND/ΜL (ref 0–0.61)
EOSINOPHIL NFR BLD AUTO: 2 % (ref 0–6)
ERYTHROCYTE [DISTWIDTH] IN BLOOD BY AUTOMATED COUNT: 13.1 % (ref 11.6–15.1)
ERYTHROCYTE [SEDIMENTATION RATE] IN BLOOD: 23 MM/HOUR (ref 0–20)
GFR SERPL CREATININE-BSD FRML MDRD: 65 ML/MIN/1.73SQ M
GLUCOSE P FAST SERPL-MCNC: 94 MG/DL (ref 65–99)
GLUCOSE UR STRIP-MCNC: NEGATIVE MG/DL
HCT VFR BLD AUTO: 41.2 % (ref 34.8–46.1)
HGB BLD-MCNC: 13.5 G/DL (ref 11.5–15.4)
HGB UR QL STRIP.AUTO: NEGATIVE
IMM GRANULOCYTES # BLD AUTO: 0.01 THOUSAND/UL (ref 0–0.2)
IMM GRANULOCYTES NFR BLD AUTO: 0 % (ref 0–2)
KETONES UR STRIP-MCNC: NEGATIVE MG/DL
LDH SERPL-CCNC: 181 U/L (ref 81–234)
LEUKOCYTE ESTERASE UR QL STRIP: NEGATIVE
LYMPHOCYTES # BLD AUTO: 0.71 THOUSANDS/ΜL (ref 0.6–4.47)
LYMPHOCYTES NFR BLD AUTO: 17 % (ref 14–44)
MAGNESIUM SERPL-MCNC: 2.4 MG/DL (ref 1.6–2.6)
MCH RBC QN AUTO: 30.3 PG (ref 26.8–34.3)
MCHC RBC AUTO-ENTMCNC: 32.8 G/DL (ref 31.4–37.4)
MCV RBC AUTO: 93 FL (ref 82–98)
MONOCYTES # BLD AUTO: 0.38 THOUSAND/ΜL (ref 0.17–1.22)
MONOCYTES NFR BLD AUTO: 9 % (ref 4–12)
NEUTROPHILS # BLD AUTO: 2.86 THOUSANDS/ΜL (ref 1.85–7.62)
NEUTS SEG NFR BLD AUTO: 71 % (ref 43–75)
NITRITE UR QL STRIP: NEGATIVE
NRBC BLD AUTO-RTO: 0 /100 WBCS
PH UR STRIP.AUTO: 7 [PH]
PHOSPHATE SERPL-MCNC: 3.9 MG/DL (ref 2.3–4.1)
PLATELET # BLD AUTO: 218 THOUSANDS/UL (ref 149–390)
PMV BLD AUTO: 11.4 FL (ref 8.9–12.7)
POTASSIUM SERPL-SCNC: 3.8 MMOL/L (ref 3.5–5.3)
PROT SERPL-MCNC: 7.7 G/DL (ref 6.4–8.2)
PROT UR STRIP-MCNC: NEGATIVE MG/DL
RBC # BLD AUTO: 4.45 MILLION/UL (ref 3.81–5.12)
SODIUM SERPL-SCNC: 137 MMOL/L (ref 136–145)
SP GR UR STRIP.AUTO: 1.01 (ref 1–1.03)
T4 FREE SERPL-MCNC: 0.91 NG/DL (ref 0.76–1.46)
TSH SERPL DL<=0.05 MIU/L-ACNC: 4.74 UIU/ML (ref 0.36–3.74)
UROBILINOGEN UR QL STRIP.AUTO: 0.2 E.U./DL
WBC # BLD AUTO: 4.11 THOUSAND/UL (ref 4.31–10.16)

## 2019-09-09 PROCEDURE — 86140 C-REACTIVE PROTEIN: CPT

## 2019-09-09 PROCEDURE — 85652 RBC SED RATE AUTOMATED: CPT

## 2019-09-09 PROCEDURE — 85025 COMPLETE CBC W/AUTO DIFF WBC: CPT

## 2019-09-09 PROCEDURE — 36415 COLL VENOUS BLD VENIPUNCTURE: CPT

## 2019-09-09 PROCEDURE — 80053 COMPREHEN METABOLIC PANEL: CPT

## 2019-09-09 PROCEDURE — 83735 ASSAY OF MAGNESIUM: CPT

## 2019-09-09 PROCEDURE — 86618 LYME DISEASE ANTIBODY: CPT

## 2019-09-09 PROCEDURE — 83615 LACTATE (LD) (LDH) ENZYME: CPT

## 2019-09-09 PROCEDURE — 81003 URINALYSIS AUTO W/O SCOPE: CPT | Performed by: INTERNAL MEDICINE

## 2019-09-09 PROCEDURE — 84439 ASSAY OF FREE THYROXINE: CPT

## 2019-09-09 PROCEDURE — 82550 ASSAY OF CK (CPK): CPT

## 2019-09-09 PROCEDURE — 84443 ASSAY THYROID STIM HORMONE: CPT

## 2019-09-09 PROCEDURE — 99213 OFFICE O/P EST LOW 20 MIN: CPT | Performed by: INTERNAL MEDICINE

## 2019-09-09 PROCEDURE — 84100 ASSAY OF PHOSPHORUS: CPT

## 2019-09-09 NOTE — TELEPHONE ENCOUNTER
----- Message from Kathyleen Harada, MD sent at 9/9/2019  2:31 PM EDT -----  Notify, initial labs are all very reassuring    Lyme and thyroid pnd

## 2019-09-09 NOTE — PROGRESS NOTES
Assessment/Plan:    Diagnoses and all orders for this visit:    Fatigue, unspecified type  -     Lyme Antibody Profile with reflex to WB; Future  -     LD,Blood; Future  -     C-reactive protein; Future  -     Sedimentation rate, automated; Future  -     CK; Future  -     Magnesium; Future  -     Phosphorus; Future  -     CBC and differential; Future  -     Comprehensive metabolic panel; Future  -     TSH, 3rd generation with Free T4 reflex; Future  -     UA w Reflex to Microscopic w Reflex to Culture    Arthralgia, unspecified joint  -     Lyme Antibody Profile with reflex to WB; Future  -     LD,Blood; Future  -     C-reactive protein; Future  -     Sedimentation rate, automated; Future  -     CK; Future  -     Magnesium; Future  -     Phosphorus; Future  -     CBC and differential; Future  -     Comprehensive metabolic panel; Future  -     TSH, 3rd generation with Free T4 reflex; Future  -     UA w Reflex to Microscopic w Reflex to Culture    Myalgia  -     Lyme Antibody Profile with reflex to WB; Future  -     LD,Blood; Future  -     C-reactive protein; Future  -     Sedimentation rate, automated; Future  -     CK; Future  -     Magnesium; Future  -     Phosphorus; Future  -     CBC and differential; Future  -     Comprehensive metabolic panel; Future  -     TSH, 3rd generation with Free T4 reflex; Future  -     UA w Reflex to Microscopic w Reflex to Culture    Lymphadenopathy  -     Lyme Antibody Profile with reflex to WB; Future  -     LD,Blood; Future  -     C-reactive protein; Future  -     Sedimentation rate, automated; Future  -     CK; Future  -     Magnesium; Future  -     Phosphorus; Future  -     CBC and differential; Future  -     Comprehensive metabolic panel; Future  -     TSH, 3rd generation with Free T4 reflex; Future  -     UA w Reflex to Microscopic w Reflex to Culture    Insect bite of scalp, initial encounter  -     Lyme Antibody Profile with reflex to WB; Future  -     LD,Blood;  Future  - C-reactive protein; Future  -     Sedimentation rate, automated; Future  -     CK; Future  -     Magnesium; Future  -     Phosphorus; Future  -     CBC and differential; Future  -     Comprehensive metabolic panel; Future  -     TSH, 3rd generation with Free T4 reflex; Future  -     UA w Reflex to Microscopic w Reflex to Culture         Patient Instructions   Fatigue, arthralgia, myalgias are of unclear etiology  Lymphadenopathy was likely reactive to insect or other bite  Will check labs and follow accordingly  Patient is advised to get plenty of rest   Keep well-hydrated  Continue to use over-the-counter anti-inflammatories and analgesics as needed  I will contact you with results of blood work and further instructions  If symptoms fail to improve and this round of lab work was unremarkable will follow up to pursue further workup  Subjective:      Patient ID: Camron Moreno is a 81 y o  female    Patient presents for evaluation of fatigue, arthralgias and myalgias  Symptoms began abruptly about 1 week ago  She notes she has been working diligently over the last 2-3 months moving and getting her new house ready, after having packed her old home  She is also doing quite a bit of gardening and has had lots of energy to complete these tasks until last week  She is having significant arthritic pains in her thumbs as well as her neck and notes overwhelming fatigue and lack of energy despite getting good sleep at night  She has been taking some ibuprofen with minimal relief  She also has bitemporal headache but no visual disturbance  She has had occasional palpitations but no chest pain or shortness of breath  She denies any fevers, chills, night sweats, dysuria  She has some slight constipation  She has no change in appetite  She did have a lump on her right neck that she thought was an enlarged lymph node but it seems to have resolved    She had a bite on the back of her neck and scratched at it   She is not aware if it was a tick  She has not had rash  Current Outpatient Medications:     ALPRAZolam (XANAX) 0 25 mg tablet, Take 1 tablet (0 25 mg total) by mouth 3 (three) times a day as needed for anxiety (Patient taking differently: Take 0 25 mg by mouth as needed for anxiety ), Disp: 30 tablet, Rfl: 2    dicyclomine (BENTYL) 10 mg capsule, Take by mouth as needed , Disp: , Rfl:     escitalopram (LEXAPRO) 20 mg tablet, Take 1 tablet (20 mg total) by mouth daily, Disp: 90 tablet, Rfl: 3    estradiol (ESTRACE VAGINAL) 0 1 mg/g vaginal cream, Insert into the vagina, Disp: , Rfl:     Ibuprofen (ADVIL) 200 MG CAPS, Take by mouth as needed , Disp: , Rfl:     Multiple Vitamin (DAILY VALUE MULTIVITAMIN) TABS, Take 1 tablet by mouth daily, Disp: , Rfl:     Omega-3 Fatty Acids (OMEGA-3 FISH OIL) 1200 MG CAPS, Take by mouth, Disp: , Rfl:     No results found for this or any previous visit (from the past 1008 hour(s))  The following portions of the patient's history were reviewed and updated as appropriate: allergies, current medications, past family history, past medical history, past social history, past surgical history and problem list      Review of Systems   Constitutional: Positive for fatigue  Negative for appetite change, chills, diaphoresis, fever and unexpected weight change  HENT: Negative for congestion, hearing loss and rhinorrhea  Eyes: Negative for visual disturbance  Respiratory: Negative for cough, chest tightness, shortness of breath and wheezing  Cardiovascular: Negative for chest pain, palpitations and leg swelling  Gastrointestinal: Negative for abdominal pain and blood in stool  Endocrine: Negative for cold intolerance, heat intolerance, polydipsia and polyuria  Genitourinary: Negative for difficulty urinating, dysuria, frequency and urgency  Musculoskeletal: Positive for arthralgias and myalgias  Skin: Positive for wound  Negative for rash  Neurological: Negative for dizziness, weakness, light-headedness and headaches  Hematological: Does not bruise/bleed easily  Psychiatric/Behavioral: Negative for dysphoric mood and sleep disturbance  Objective:      Vitals:    09/09/19 0958   BP: 122/80   Pulse: 66   Resp: 12          Physical Exam   Constitutional: She is oriented to person, place, and time  She appears well-developed and well-nourished  HENT:   Head: Normocephalic and atraumatic  Nose: Nose normal    Mouth/Throat: Oropharynx is clear and moist    Eyes: Pupils are equal, round, and reactive to light  Conjunctivae and EOM are normal  No scleral icterus  Neck: Normal range of motion  Neck supple  No JVD present  No tracheal deviation present  No thyromegaly present  Cardiovascular: Normal rate, regular rhythm and intact distal pulses  Exam reveals no gallop and no friction rub  No murmur heard  Pulmonary/Chest: Effort normal and breath sounds normal  No respiratory distress  She has no wheezes  She has no rales  Abdominal: Soft  Bowel sounds are normal  She exhibits no distension and no mass  There is no tenderness  There is no rebound and no guarding  Musculoskeletal: She exhibits no edema or tenderness  Lymphadenopathy:     She has no cervical adenopathy  Neurological: She is alert and oriented to person, place, and time  No cranial nerve deficit  Skin: Skin is warm and dry  No rash noted  No erythema  Approximately 8 mm crusted lesion in the inferior occipital region with surrounding blanching erythema in an irregular pattern   Psychiatric: She has a normal mood and affect   Her behavior is normal  Judgment and thought content normal

## 2019-09-09 NOTE — PATIENT INSTRUCTIONS
Fatigue, arthralgia, myalgias are of unclear etiology  Lymphadenopathy was likely reactive to insect or other bite  Will check labs and follow accordingly  Patient is advised to get plenty of rest   Keep well-hydrated  Continue to use over-the-counter anti-inflammatories and analgesics as needed  I will contact you with results of blood work and further instructions  If symptoms fail to improve and this round of lab work was unremarkable will follow up to pursue further workup

## 2019-09-10 ENCOUNTER — TELEPHONE (OUTPATIENT)
Dept: INTERNAL MEDICINE CLINIC | Facility: CLINIC | Age: 69
End: 2019-09-10

## 2019-09-10 LAB — B BURGDOR IGG+IGM SER-ACNC: <0.91 ISR (ref 0–0.9)

## 2019-09-10 NOTE — TELEPHONE ENCOUNTER
----- Message from Judy Ambriz MD sent at 9/10/2019  2:08 PM EDT -----  Notify-Lyme negative and thyroid stable

## 2019-10-31 ENCOUNTER — CLINICAL SUPPORT (OUTPATIENT)
Dept: INTERNAL MEDICINE CLINIC | Facility: CLINIC | Age: 69
End: 2019-10-31
Payer: MEDICARE

## 2019-10-31 ENCOUNTER — TELEPHONE (OUTPATIENT)
Dept: INTERNAL MEDICINE CLINIC | Facility: CLINIC | Age: 69
End: 2019-10-31

## 2019-10-31 DIAGNOSIS — Z23 ENCOUNTER FOR IMMUNIZATION: Primary | ICD-10-CM

## 2019-10-31 PROCEDURE — G0008 ADMIN INFLUENZA VIRUS VAC: HCPCS

## 2019-10-31 PROCEDURE — 90662 IIV NO PRSV INCREASED AG IM: CPT

## 2019-10-31 NOTE — TELEPHONE ENCOUNTER
Pt saw dr Daniel Holley 9/9 - had labs done -   Has labs in chart - wants to know if she should do them again? ??    Please call pt

## 2019-11-13 ENCOUNTER — APPOINTMENT (OUTPATIENT)
Dept: LAB | Age: 69
End: 2019-11-13
Payer: MEDICARE

## 2019-11-13 DIAGNOSIS — E78.2 MIXED HYPERLIPIDEMIA: ICD-10-CM

## 2019-11-13 DIAGNOSIS — E03.8 SUBCLINICAL HYPOTHYROIDISM: ICD-10-CM

## 2019-11-13 DIAGNOSIS — R73.01 IMPAIRED FASTING BLOOD SUGAR: ICD-10-CM

## 2019-11-13 LAB
ALBUMIN SERPL BCP-MCNC: 3.8 G/DL (ref 3.5–5)
ALP SERPL-CCNC: 73 U/L (ref 46–116)
ALT SERPL W P-5'-P-CCNC: 28 U/L (ref 12–78)
ANION GAP SERPL CALCULATED.3IONS-SCNC: 9 MMOL/L (ref 4–13)
AST SERPL W P-5'-P-CCNC: 19 U/L (ref 5–45)
BASOPHILS # BLD AUTO: 0.06 THOUSANDS/ΜL (ref 0–0.1)
BASOPHILS NFR BLD AUTO: 1 % (ref 0–1)
BILIRUB SERPL-MCNC: 0.45 MG/DL (ref 0.2–1)
BUN SERPL-MCNC: 19 MG/DL (ref 5–25)
CALCIUM SERPL-MCNC: 9.4 MG/DL (ref 8.3–10.1)
CHLORIDE SERPL-SCNC: 105 MMOL/L (ref 100–108)
CHOLEST SERPL-MCNC: 269 MG/DL (ref 50–200)
CO2 SERPL-SCNC: 27 MMOL/L (ref 21–32)
CREAT SERPL-MCNC: 0.99 MG/DL (ref 0.6–1.3)
EOSINOPHIL # BLD AUTO: 0.18 THOUSAND/ΜL (ref 0–0.61)
EOSINOPHIL NFR BLD AUTO: 4 % (ref 0–6)
ERYTHROCYTE [DISTWIDTH] IN BLOOD BY AUTOMATED COUNT: 12.7 % (ref 11.6–15.1)
EST. AVERAGE GLUCOSE BLD GHB EST-MCNC: 111 MG/DL
GFR SERPL CREATININE-BSD FRML MDRD: 59 ML/MIN/1.73SQ M
GLUCOSE P FAST SERPL-MCNC: 97 MG/DL (ref 65–99)
HBA1C MFR BLD: 5.5 % (ref 4.2–6.3)
HCT VFR BLD AUTO: 41.7 % (ref 34.8–46.1)
HDLC SERPL-MCNC: 76 MG/DL
HGB BLD-MCNC: 13.3 G/DL (ref 11.5–15.4)
IMM GRANULOCYTES # BLD AUTO: 0.01 THOUSAND/UL (ref 0–0.2)
IMM GRANULOCYTES NFR BLD AUTO: 0 % (ref 0–2)
LDLC SERPL CALC-MCNC: 184 MG/DL (ref 0–100)
LYMPHOCYTES # BLD AUTO: 0.82 THOUSANDS/ΜL (ref 0.6–4.47)
LYMPHOCYTES NFR BLD AUTO: 19 % (ref 14–44)
MCH RBC QN AUTO: 29.7 PG (ref 26.8–34.3)
MCHC RBC AUTO-ENTMCNC: 31.9 G/DL (ref 31.4–37.4)
MCV RBC AUTO: 93 FL (ref 82–98)
MONOCYTES # BLD AUTO: 0.36 THOUSAND/ΜL (ref 0.17–1.22)
MONOCYTES NFR BLD AUTO: 9 % (ref 4–12)
NEUTROPHILS # BLD AUTO: 2.79 THOUSANDS/ΜL (ref 1.85–7.62)
NEUTS SEG NFR BLD AUTO: 67 % (ref 43–75)
NONHDLC SERPL-MCNC: 193 MG/DL
NRBC BLD AUTO-RTO: 0 /100 WBCS
PLATELET # BLD AUTO: 228 THOUSANDS/UL (ref 149–390)
PMV BLD AUTO: 10.9 FL (ref 8.9–12.7)
POTASSIUM SERPL-SCNC: 3.6 MMOL/L (ref 3.5–5.3)
PROT SERPL-MCNC: 7.4 G/DL (ref 6.4–8.2)
RBC # BLD AUTO: 4.48 MILLION/UL (ref 3.81–5.12)
SODIUM SERPL-SCNC: 141 MMOL/L (ref 136–145)
T4 FREE SERPL-MCNC: 0.92 NG/DL (ref 0.76–1.46)
TRIGL SERPL-MCNC: 45 MG/DL
TSH SERPL DL<=0.05 MIU/L-ACNC: 8.07 UIU/ML (ref 0.36–3.74)
WBC # BLD AUTO: 4.22 THOUSAND/UL (ref 4.31–10.16)

## 2019-11-13 PROCEDURE — 84439 ASSAY OF FREE THYROXINE: CPT

## 2019-11-13 PROCEDURE — 36415 COLL VENOUS BLD VENIPUNCTURE: CPT

## 2019-11-13 PROCEDURE — 80053 COMPREHEN METABOLIC PANEL: CPT

## 2019-11-13 PROCEDURE — 85025 COMPLETE CBC W/AUTO DIFF WBC: CPT

## 2019-11-13 PROCEDURE — 80061 LIPID PANEL: CPT

## 2019-11-13 PROCEDURE — 84443 ASSAY THYROID STIM HORMONE: CPT

## 2019-11-13 PROCEDURE — 83036 HEMOGLOBIN GLYCOSYLATED A1C: CPT

## 2019-11-18 ENCOUNTER — OFFICE VISIT (OUTPATIENT)
Dept: INTERNAL MEDICINE CLINIC | Facility: CLINIC | Age: 69
End: 2019-11-18
Payer: MEDICARE

## 2019-11-18 VITALS
DIASTOLIC BLOOD PRESSURE: 80 MMHG | HEIGHT: 63 IN | WEIGHT: 142.2 LBS | BODY MASS INDEX: 25.2 KG/M2 | RESPIRATION RATE: 12 BRPM | SYSTOLIC BLOOD PRESSURE: 126 MMHG | HEART RATE: 60 BPM

## 2019-11-18 DIAGNOSIS — E78.2 MIXED HYPERLIPIDEMIA: Primary | ICD-10-CM

## 2019-11-18 DIAGNOSIS — I35.1 AORTIC VALVE INSUFFICIENCY, ETIOLOGY OF CARDIAC VALVE DISEASE UNSPECIFIED: ICD-10-CM

## 2019-11-18 DIAGNOSIS — L80 VITILIGO: ICD-10-CM

## 2019-11-18 DIAGNOSIS — E03.8 SUBCLINICAL HYPOTHYROIDISM: ICD-10-CM

## 2019-11-18 DIAGNOSIS — F41.8 DEPRESSION WITH ANXIETY: ICD-10-CM

## 2019-11-18 DIAGNOSIS — K21.9 GERD WITHOUT ESOPHAGITIS: ICD-10-CM

## 2019-11-18 DIAGNOSIS — R73.01 IMPAIRED FASTING BLOOD SUGAR: ICD-10-CM

## 2019-11-18 PROCEDURE — 99214 OFFICE O/P EST MOD 30 MIN: CPT | Performed by: INTERNAL MEDICINE

## 2019-11-18 NOTE — PROGRESS NOTES
Assessment/Plan:    Diagnoses and all orders for this visit:    Mixed hyperlipidemia  -     Lipid panel; Future    Depression with anxiety    Subclinical hypothyroidism  -     TSH, 3rd generation with Free T4 reflex; Future    Impaired fasting blood sugar  -     CBC and differential; Future  -     Comprehensive metabolic panel; Future  -     Hemoglobin A1C; Future    GERD without esophagitis    Aortic valve insufficiency, etiology of cardiac valve disease unspecified    Vitiligo    Other orders  -     Calcipotriene-Betameth Diprop (ENSTILAR) 0 005-0 064 % FOAM; Apply topically         Patient Instructions   Lab data reviewed in detail and compared prior    Hyperlipidemia-LDL has risen dramatically likely related to recent move and change in diet  We discussed starting on statin if LDL remains greater than 160 by follow-up  Blood pressure remained stable without medication    Depression and anxiety stable with Lexapro and alprazolam    Subclinical hypothyroidism-stable free T4, no indication for medication    Mild aortic insufficiency-echo was stable between 2016 and 2018, recheck in fall    Health maintenance is up-to-date, waiting for availability of new shingles vaccine    Routine follow-up after labs in 6 months      Subjective:      Patient ID: Shani Weinstein is a 81 y o  female    Feeling generally well    Moved back to [place] and loving it  Back to exercising a few days per week, but gained back 6 lbs  Vitiligo-seen by derm, started Enstilar 1 mo ago, giving it a shot  GERD-remains stable since wt loss  Rare gerd improves w/ otc tums  Anxiety has been under good control on Lexapro, 20 mg  She has been using alprazolam as needed, once or twice per month and would like refill  Hyperlipidemia-watching diet          Current Outpatient Medications:     ALPRAZolam (XANAX) 0 25 mg tablet, Take 1 tablet (0 25 mg total) by mouth 3 (three) times a day as needed for anxiety (Patient taking differently: Take 0 25 mg by mouth as needed for anxiety ), Disp: 30 tablet, Rfl: 2    Calcipotriene-Betameth Diprop (ENSTILAR) 0 005-0 064 % FOAM, Apply topically, Disp: , Rfl:     dicyclomine (BENTYL) 10 mg capsule, Take by mouth as needed , Disp: , Rfl:     escitalopram (LEXAPRO) 20 mg tablet, Take 1 tablet (20 mg total) by mouth daily, Disp: 90 tablet, Rfl: 3    Ibuprofen (ADVIL) 200 MG CAPS, Take by mouth as needed , Disp: , Rfl:     Multiple Vitamin (DAILY VALUE MULTIVITAMIN) TABS, Take 1 tablet by mouth daily, Disp: , Rfl:     Omega-3 Fatty Acids (OMEGA-3 FISH OIL) 1200 MG CAPS, Take by mouth, Disp: , Rfl:     Recent Results (from the past 1008 hour(s))   CBC and differential    Collection Time: 11/13/19  8:12 AM   Result Value Ref Range    WBC 4 22 (L) 4 31 - 10 16 Thousand/uL    RBC 4 48 3 81 - 5 12 Million/uL    Hemoglobin 13 3 11 5 - 15 4 g/dL    Hematocrit 41 7 34 8 - 46 1 %    MCV 93 82 - 98 fL    MCH 29 7 26 8 - 34 3 pg    MCHC 31 9 31 4 - 37 4 g/dL    RDW 12 7 11 6 - 15 1 %    MPV 10 9 8 9 - 12 7 fL    Platelets 812 641 - 496 Thousands/uL    nRBC 0 /100 WBCs    Neutrophils Relative 67 43 - 75 %    Immat GRANS % 0 0 - 2 %    Lymphocytes Relative 19 14 - 44 %    Monocytes Relative 9 4 - 12 %    Eosinophils Relative 4 0 - 6 %    Basophils Relative 1 0 - 1 %    Neutrophils Absolute 2 79 1 85 - 7 62 Thousands/µL    Immature Grans Absolute 0 01 0 00 - 0 20 Thousand/uL    Lymphocytes Absolute 0 82 0 60 - 4 47 Thousands/µL    Monocytes Absolute 0 36 0 17 - 1 22 Thousand/µL    Eosinophils Absolute 0 18 0 00 - 0 61 Thousand/µL    Basophils Absolute 0 06 0 00 - 0 10 Thousands/µL   Comprehensive metabolic panel    Collection Time: 11/13/19  8:12 AM   Result Value Ref Range    Sodium 141 136 - 145 mmol/L    Potassium 3 6 3 5 - 5 3 mmol/L    Chloride 105 100 - 108 mmol/L    CO2 27 21 - 32 mmol/L    ANION GAP 9 4 - 13 mmol/L    BUN 19 5 - 25 mg/dL    Creatinine 0 99 0 60 - 1 30 mg/dL    Glucose, Fasting 97 65 - 99 mg/dL    Calcium 9 4 8 3 - 10 1 mg/dL    AST 19 5 - 45 U/L    ALT 28 12 - 78 U/L    Alkaline Phosphatase 73 46 - 116 U/L    Total Protein 7 4 6 4 - 8 2 g/dL    Albumin 3 8 3 5 - 5 0 g/dL    Total Bilirubin 0 45 0 20 - 1 00 mg/dL    eGFR 59 ml/min/1 73sq m   Lipid panel    Collection Time: 11/13/19  8:12 AM   Result Value Ref Range    Cholesterol 269 (H) 50 - 200 mg/dL    Triglycerides 45 <=150 mg/dL    HDL, Direct 76 >=40 mg/dL    LDL Calculated 184 (H) 0 - 100 mg/dL    Non-HDL-Chol (CHOL-HDL) 193 mg/dl   TSH, 3rd generation with Free T4 reflex    Collection Time: 11/13/19  8:12 AM   Result Value Ref Range    TSH 3RD GENERATON 8 070 (H) 0 358 - 3 740 uIU/mL   Hemoglobin A1C    Collection Time: 11/13/19  8:12 AM   Result Value Ref Range    Hemoglobin A1C 5 5 4 2 - 6 3 %     mg/dl   T4, free    Collection Time: 11/13/19  8:12 AM   Result Value Ref Range    Free T4 0 92 0 76 - 1 46 ng/dL       The following portions of the patient's history were reviewed and updated as appropriate: allergies, current medications, past family history, past medical history, past social history, past surgical history and problem list      Review of Systems   Constitutional: Negative for appetite change, chills, diaphoresis, fatigue, fever and unexpected weight change  HENT: Negative for congestion, hearing loss and rhinorrhea  Eyes: Negative for visual disturbance  Respiratory: Negative for cough, chest tightness, shortness of breath and wheezing  Cardiovascular: Negative for chest pain, palpitations and leg swelling  Gastrointestinal: Negative for abdominal pain and blood in stool  Endocrine: Negative for cold intolerance, heat intolerance, polydipsia and polyuria  Genitourinary: Negative for difficulty urinating, dysuria, frequency and urgency  Musculoskeletal: Negative for arthralgias and myalgias  Skin: Negative for rash     Neurological: Negative for dizziness, weakness, light-headedness and headaches  Hematological: Does not bruise/bleed easily  Psychiatric/Behavioral: Negative for dysphoric mood and sleep disturbance  Objective:      Vitals:    11/18/19 1424   BP: 126/80   Pulse: 60   Resp: 12          Physical Exam   Constitutional: She is oriented to person, place, and time  She appears well-developed and well-nourished  HENT:   Head: Normocephalic and atraumatic  Nose: Nose normal    Mouth/Throat: Oropharynx is clear and moist    Eyes: Pupils are equal, round, and reactive to light  Conjunctivae and EOM are normal  No scleral icterus  Neck: Normal range of motion  Neck supple  No JVD present  No tracheal deviation present  No thyromegaly present  Cardiovascular: Normal rate, regular rhythm and intact distal pulses  Exam reveals no gallop and no friction rub  No murmur heard  Pulmonary/Chest: Effort normal and breath sounds normal  No respiratory distress  She has no wheezes  She has no rales  Musculoskeletal: She exhibits no edema or deformity  Lymphadenopathy:     She has no cervical adenopathy  Neurological: She is alert and oriented to person, place, and time  No cranial nerve deficit  Skin: Skin is warm and dry  No rash noted  No erythema  No pallor  Psychiatric: She has a normal mood and affect   Her behavior is normal  Judgment and thought content normal

## 2019-11-18 NOTE — PATIENT INSTRUCTIONS
Lab data reviewed in detail and compared prior    Hyperlipidemia-LDL has risen dramatically likely related to recent move and change in diet  We discussed starting on statin if LDL remains greater than 160 by follow-up      Blood pressure remained stable without medication    Depression and anxiety stable with Lexapro and alprazolam    Subclinical hypothyroidism-stable free T4, no indication for medication    Mild aortic insufficiency-echo was stable between 2016 and 2018, recheck in fall    Health maintenance is up-to-date, waiting for availability of new shingles vaccine    Routine follow-up after labs in 6 months

## 2019-11-19 ENCOUNTER — ANNUAL EXAM (OUTPATIENT)
Dept: OBGYN CLINIC | Facility: CLINIC | Age: 69
End: 2019-11-19
Payer: MEDICARE

## 2019-11-19 VITALS
BODY MASS INDEX: 24.98 KG/M2 | DIASTOLIC BLOOD PRESSURE: 78 MMHG | SYSTOLIC BLOOD PRESSURE: 124 MMHG | HEIGHT: 63 IN | WEIGHT: 141 LBS

## 2019-11-19 DIAGNOSIS — Z12.39 BREAST CANCER SCREENING: ICD-10-CM

## 2019-11-19 DIAGNOSIS — Z01.419 ENCOUNTER FOR ANNUAL ROUTINE GYNECOLOGICAL EXAMINATION: Primary | ICD-10-CM

## 2019-11-19 PROCEDURE — G0101 CA SCREEN;PELVIC/BREAST EXAM: HCPCS | Performed by: OBSTETRICS & GYNECOLOGY

## 2019-11-19 NOTE — PROGRESS NOTES
Assessment/Plan:  Pap smear deferred due to low risk status  Discussed over-the-counter vaginal moisturizers  All questions answered  She will notify me if she would like to restart vaginal estrogen  Encouraged self-breast examination as well as calcium supplementation  Continue annual 3D mammogram, given breast density  She will continue to follow-up with her family physician as scheduled  Return to office in 2 years per Medicare recommendation/protocol  No problem-specific Assessment & Plan notes found for this encounter  Diagnoses and all orders for this visit:    Encounter for annual routine gynecological examination    Breast cancer screening  -     Mammo screening bilateral w 3d & cad; Future          Subjective:      Patient ID: Vivien Deleon is a 81 y o  female  HPI     This is a 73-year-old female  ([facility] x2,  x2) presents as a new patient for annual well gyn exam   Her last gyn exam was approximately 2 years ago  Patient underwent a vaginal hysterectomy at age 29 due to prolapse  She states all her Pap smears have been normal   She went through menopause at age 50  She was never on hormone replacement therapy  She did start vaginal estrogen intermittently and then discontinued approximately 1 year ago  She denies any vaginal bleeding or spotting  There has been no changes in bowel or bladder function  Patient has been in a monogamous relationship with her  for over 40 years  She does follow up with her family physician on a regular basis  She is up-to-date with her screening colonoscopy  Her  was diagnosed with Parkinson's disease approximately 5 years ago  Overall he is doing well      The following portions of the patient's history were reviewed and updated as appropriate: allergies, current medications, past family history, past medical history, past social history, past surgical history and problem list     Review of Systems Constitutional: Negative for fatigue, fever and unexpected weight change  Respiratory: Negative for cough, chest tightness, shortness of breath and wheezing  Cardiovascular: Negative  Negative for chest pain and palpitations  Gastrointestinal: Negative  Negative for abdominal distention, abdominal pain, blood in stool, constipation, diarrhea, nausea and vomiting  Genitourinary: Negative  Negative for difficulty urinating, dyspareunia, dysuria, flank pain, frequency, genital sores, hematuria, pelvic pain, urgency, vaginal bleeding, vaginal discharge and vaginal pain  Skin: Negative for rash  Objective:      /78   Ht 5' 3" (1 6 m)   Wt 64 kg (141 lb)   Breastfeeding? No   BMI 24 98 kg/m²          Physical Exam   Constitutional: She appears well-developed and well-nourished  Cardiovascular: Normal rate and regular rhythm  Pulmonary/Chest: Effort normal and breath sounds normal  Right breast exhibits no inverted nipple, no mass, no nipple discharge, no skin change and no tenderness  Left breast exhibits no inverted nipple, no mass, no nipple discharge, no skin change and no tenderness  Abdominal: Soft  Bowel sounds are normal  She exhibits no distension  There is no tenderness  There is no rebound and no guarding  Genitourinary: Vagina normal  There is no lesion on the right labia  There is no lesion on the left labia  Cervix exhibits no discharge and no friability  Right adnexum displays no mass, no tenderness and no fullness  Left adnexum displays no mass, no tenderness and no fullness  No vaginal discharge found  Genitourinary Comments: The vagina is evident of estrogen deficiency  The cervix is surgically absent  The cuff is well-supported  Rectovaginal exam is confirmatory

## 2019-12-11 ENCOUNTER — OFFICE VISIT (OUTPATIENT)
Dept: OBGYN CLINIC | Facility: HOSPITAL | Age: 69
End: 2019-12-11
Payer: MEDICARE

## 2019-12-11 VITALS
DIASTOLIC BLOOD PRESSURE: 85 MMHG | HEIGHT: 63 IN | HEART RATE: 75 BPM | BODY MASS INDEX: 24.98 KG/M2 | SYSTOLIC BLOOD PRESSURE: 144 MMHG

## 2019-12-11 DIAGNOSIS — M18.0 ARTHRITIS OF CARPOMETACARPAL (CMC) JOINT OF BOTH THUMBS: Primary | ICD-10-CM

## 2019-12-11 PROCEDURE — 20600 DRAIN/INJ JOINT/BURSA W/O US: CPT | Performed by: ORTHOPAEDIC SURGERY

## 2019-12-11 PROCEDURE — 99214 OFFICE O/P EST MOD 30 MIN: CPT | Performed by: ORTHOPAEDIC SURGERY

## 2019-12-11 RX ORDER — LIDOCAINE HYDROCHLORIDE 10 MG/ML
0.5 INJECTION, SOLUTION EPIDURAL; INFILTRATION; INTRACAUDAL; PERINEURAL
Status: COMPLETED | OUTPATIENT
Start: 2019-12-11 | End: 2019-12-11

## 2019-12-11 RX ORDER — MELOXICAM 7.5 MG/1
7.5 TABLET ORAL DAILY
Qty: 30 TABLET | Refills: 0 | Status: SHIPPED | OUTPATIENT
Start: 2019-12-11 | End: 2021-01-28 | Stop reason: ALTCHOICE

## 2019-12-11 RX ORDER — BETAMETHASONE SODIUM PHOSPHATE AND BETAMETHASONE ACETATE 3; 3 MG/ML; MG/ML
6 INJECTION, SUSPENSION INTRA-ARTICULAR; INTRALESIONAL; INTRAMUSCULAR; SOFT TISSUE
Status: COMPLETED | OUTPATIENT
Start: 2019-12-11 | End: 2019-12-11

## 2019-12-11 RX ADMIN — BETAMETHASONE SODIUM PHOSPHATE AND BETAMETHASONE ACETATE 6 MG: 3; 3 INJECTION, SUSPENSION INTRA-ARTICULAR; INTRALESIONAL; INTRAMUSCULAR; SOFT TISSUE at 10:33

## 2019-12-11 RX ADMIN — LIDOCAINE HYDROCHLORIDE 0.5 ML: 10 INJECTION, SOLUTION EPIDURAL; INFILTRATION; INTRACAUDAL; PERINEURAL at 10:33

## 2019-12-11 NOTE — PROGRESS NOTES
ASSESSMENT/PLAN:    Assessment:   Thumb CMC Arthritis  bilateral    Plan:   steroid injection  Comfort cool brace  Mobic 7 5  Activities as tolerated    Follow Up:  3  month(s)    To Do Next Visit:   alis [facility] OA    General Discussions:  CMC Arthritis: The anatomy and physiology of carpometacarpal joint arthritis was discussed with the patient today in the office  Deterioration of the articular cartilage eventually leads to hypermobility at the thumb [facility] joint, resulting in joint subluxation, osteophyte formation, cystic changes within the trapezium and base of the first metacarpal, as well as subchondral sclerosis  Eventually, pain, limited mobility, and compensatory hyperextension at the metacarpophalangeal joint may develop  While normal activity and usage of the thumb joint may provide a painful experience to the patient, this typically does not result in damage to the thumb or hand  Treatment options include resting thumb spica splints to decreased joint edema, pain, and inflammation  Therapy exercises to strengthen the thenar musculature may relieve pain, but do not alter the overall continued development of osteoarthritis  Oral medications, topical medications, corticosteroid injections may decrease pain and increase overall function  Eventually, approximately 5% of patients may require surgical intervention  _____________________________________________________  CHIEF COMPLAINT:  Chief Complaint   Patient presents with    Left Thumb - Pain    Right Thumb - Pain         SUBJECTIVE:  Brandy Brothers is a 81y o  year old female who presents with Pain  Moderate  Intermittant  Dull and Aching to the bilateral CMC joints  This started several year(s) ago as Insidious  Patient previous treated with Dr Anitra Martinez however she has moved down to [place] and does not want to drive all the way up to see him           PAST MEDICAL HISTORY:  Past Medical History:   Diagnosis Date    Basal cell carcinoma     Depression with anxiety     Fibrocystic breast disease, unspecified laterality     Irritable bowel syndrome     Osteopenia        PAST SURGICAL HISTORY:  Past Surgical History:   Procedure Laterality Date    BREAST BIOPSY      COLONOSCOPY      7/13/12, colon polyp biopsied, hemorrhoids - Dr Barbra Rangel right ear  Precancerous      SEPTOPLASTY  05/04/2018    VAGINAL HYSTERECTOMY  1982    prolapse       FAMILY HISTORY:  Family History   Problem Relation Age of Onset    Atrial fibrillation Mother     Cancer Mother         Bladder    Breast cancer Mother     COPD Mother     Kidney cancer Mother     Osteoporosis Mother     Transient ischemic attack Mother     Cancer Father         Bladder    Glaucoma Father     Hypertension Father     Coronary artery disease Maternal Grandfather     Coronary artery disease Maternal Aunt        SOCIAL HISTORY:  Social History     Tobacco Use    Smoking status: Never Smoker    Smokeless tobacco: Never Used   Substance Use Topics    Alcohol use: Yes     Frequency: 4 or more times a week     Drinks per session: 1 or 2     Comment: maybe 1 glass of wine a week    Drug use: No       MEDICATIONS:    Current Outpatient Medications:     ALPRAZolam (XANAX) 0 25 mg tablet, Take 1 tablet (0 25 mg total) by mouth 3 (three) times a day as needed for anxiety (Patient taking differently: Take 0 25 mg by mouth as needed for anxiety ), Disp: 30 tablet, Rfl: 2    Calcipotriene-Betameth Diprop (ENSTILAR) 0 005-0 064 % FOAM, Apply topically, Disp: , Rfl:     dicyclomine (BENTYL) 10 mg capsule, Take by mouth as needed , Disp: , Rfl:     escitalopram (LEXAPRO) 20 mg tablet, Take 1 tablet (20 mg total) by mouth daily, Disp: 90 tablet, Rfl: 3    Ibuprofen (ADVIL) 200 MG CAPS, Take by mouth as needed , Disp: , Rfl:     Multiple Vitamin (DAILY VALUE MULTIVITAMIN) TABS, Take 1 tablet by mouth daily, Disp: , Rfl:   Omega-3 Fatty Acids (OMEGA-3 FISH OIL) 1200 MG CAPS, Take by mouth, Disp: , Rfl:     ALLERGIES:  Allergies   Allergen Reactions    Penicillins Hives and Rash    Sulfa Antibiotics Hives and Rash       REVIEW OF SYSTEMS:  Pertinent items are noted in HPI  A comprehensive review of systems was negative      LABS:  HgA1c:   Lab Results   Component Value Date    HGBA1C 5 5 11/13/2019     BMP:   Lab Results   Component Value Date    CALCIUM 9 4 11/13/2019     05/09/2016    K 3 6 11/13/2019    CO2 27 11/13/2019     11/13/2019    BUN 19 11/13/2019    CREATININE 0 99 11/13/2019         _____________________________________________________  PHYSICAL EXAMINATION:  /85   Pulse 75   Ht 5' 3" (1 6 m)   BMI 24 98 kg/m²   General: well developed and well nourished, alert, oriented times 3 and appears comfortable  Psychiatric: Normal  HEENT: Trachea Midline, No torticollis  Cardiovascular: No discernable arrhythmia  Pulmonary: No wheezing or stridor  Skin: No masses, erythema, lacerations, fluctation, ulcerations  Neurovascular: Sensation Intact to the Median, Ulnar, Radial Nerve, Motor Intact to the Median, Ulnar, Radial Nerve and Pulses Intact    MUSCULOSKELETAL EXAMINATION:  Bilateral CMC Exam:  No hyperextension deformity of MCP joint  Positive localized tenderness over radial and dorsal aspect of thumb (CMC joint)  Grind test is Positive for pain and Positive for crepitus  No triggering or tenderness over the A1 milena  Negative Finkelsteins maneuver       _____________________________________________________  STUDIES REVIEWED:  Images were reviewd in PACS: B/L wrist x-rays demonstrate degenerative changes of the B/L CMC joints      PROCEDURES PERFORMED:  Small joint arthrocentesis: L thumb CMC  Date/Time: 12/11/2019 10:33 AM  Consent given by: patient  Site marked: site marked  Timeout: Immediately prior to procedure a time out was called to verify the correct patient, procedure, equipment, support staff and site/side marked as required   Supporting Documentation  Indications: pain   Procedure Details  Location: thumb - L thumb CMC  Preparation: Patient was prepped and draped in the usual sterile fashion  Needle size: 25 G  Ultrasound guidance: no  Approach: radial  Medications administered: 6 mg betamethasone acetate-betamethasone sodium phosphate 6 (3-3) mg/mL; 0 5 mL lidocaine (PF) 1 %    Patient tolerance: patient tolerated the procedure well with no immediate complications  Dressing:  Sterile dressing applied             Scribe Attestation    I,:   Mary Flores am acting as a scribe while in the presence of the attending physician :        I,:   Sharron Jc MD personally performed the services described in this documentation    as scribed in my presence :

## 2019-12-11 NOTE — PATIENT INSTRUCTIONS
What is it [facility] Arthritis? In a normal joint, cartilage covers the end of the bones and serves as a shock absorber to allow smooth, pain-free movement  In osteoarthritis (OA, or degenerative arthritis) the cartilage layer wears out, resulting in direct contact between the bones and producing pain and deformity  One of the most common joints to develop OA in the hand is the base of the thumb  The thumb basal joint, also called the carpometacarpal McLeod) joint, is a specialized saddle shaped joint that is formed by a small bone of the wrist (trapezium) and the first bone of the thumb (metacarpal)  The saddle shaped joint allows the thumb to have a wide range of motions, including up, down, across the palm, and the ability to pinch (see Figure 1)  Who gets it? OA at the base of the thumb is more commonly seen in women over the age of 36  The exact cause is unknown, but genetics, previous injuries such as fractures or dislocations, and generalized joint laxity may predispose towards development of this type of arthritis  What are the symptoms and signs? The most common symptom is pain at the base of the thumb  The pain can be aggravated by activities that require pinching, such as opening jars, turning door knobs or keys, and writing  Severity can also progress to pain at rest and pain at night  In more severe cases, progressive destruction and mal-alignment of the joint occurs, and a bump develops at the base of the thumb as the metacarpal moves out of the saddle joint  This shift in the joint can cause limited motion and weakness, making pinch difficult (see Figure 2)  The next joint above the [facility] may compensate by loosening, causing it to bend further back (hyperextension)  How is the diagnosis made? The diagnosis is made by history and physical evaluation  Pressure and movement such as twisting will produce pain at the joint  A grinding sensation may also be present at the joint (see Figure 3)  X-rays are used to confirm the diagnosis, although symptom severity often does not correlate with x-ray findings  What are the treatment options? Less severe thumb arthritis will usually respond to non-surgical care  Arthritis medication, splinting and limited cortisone injections may help alleviate pain  A hand therapist might provide a variety of rigid and non-rigid splints which can be used while sleeping or during activities  Patients with advanced disease or who fail non-surgical treatment may be candidates for surgical reconstruction  A variety of surgical techniques are available that can successfully reduce or eliminate pain  Surgical procedures include removal of arthritic bone and joint reconstruction (arthroplasty), joint fusion, bone realignment, and even arthroscopy in select cases  A consultation with your hand surgeon can help decide the best option for you (see Figure 4)  © 2012 [employer]  www handcare  org

## 2020-01-16 ENCOUNTER — HOSPITAL ENCOUNTER (OUTPATIENT)
Dept: RADIOLOGY | Age: 70
Discharge: HOME | End: 2020-01-16
Payer: MEDICARE

## 2020-01-16 VITALS — BODY MASS INDEX: 24.98 KG/M2 | WEIGHT: 141 LBS | HEIGHT: 63 IN

## 2020-01-16 DIAGNOSIS — Z12.39 BREAST CANCER SCREENING: ICD-10-CM

## 2020-01-16 PROCEDURE — 77067 SCR MAMMO BI INCL CAD: CPT

## 2020-01-16 PROCEDURE — 77063 BREAST TOMOSYNTHESIS BI: CPT

## 2020-01-20 ENCOUNTER — TELEPHONE (OUTPATIENT)
Dept: OBGYN CLINIC | Facility: CLINIC | Age: 70
End: 2020-01-20

## 2020-01-20 NOTE — TELEPHONE ENCOUNTER
pt's mgram results 1/16/2020 - (L) breast asymmetry - recom diag (L) breast mgram & diag (L) breast U/S - pt has appt for same scheduled for 1/27/2020

## 2020-01-29 ENCOUNTER — HOSPITAL ENCOUNTER (OUTPATIENT)
Dept: ULTRASOUND IMAGING | Facility: CLINIC | Age: 70
Discharge: HOME | End: 2020-01-29
Payer: MEDICARE

## 2020-01-29 ENCOUNTER — HOSPITAL ENCOUNTER (OUTPATIENT)
Dept: MAMMOGRAPHY | Facility: CLINIC | Age: 70
Discharge: HOME | End: 2020-01-29
Payer: MEDICARE

## 2020-01-29 ENCOUNTER — TRANSCRIBE ORDERS (OUTPATIENT)
Dept: MAMMOGRAPHY | Facility: CLINIC | Age: 70
End: 2020-01-29

## 2020-01-29 DIAGNOSIS — R92.8 ABNORMAL MAMMOGRAM: ICD-10-CM

## 2020-01-29 DIAGNOSIS — R92.8 ABNORMAL MAMMOGRAM: Primary | ICD-10-CM

## 2020-01-29 PROCEDURE — 77065 DX MAMMO INCL CAD UNI: CPT

## 2020-01-29 PROCEDURE — 76642 ULTRASOUND BREAST LIMITED: CPT

## 2020-01-29 PROCEDURE — G0279 TOMOSYNTHESIS, MAMMO: HCPCS

## 2020-02-03 ENCOUNTER — TELEPHONE (OUTPATIENT)
Dept: OBGYN CLINIC | Facility: CLINIC | Age: 70
End: 2020-02-03

## 2020-02-03 NOTE — TELEPHONE ENCOUNTER
----- Message from Sd Edgar DO sent at 1/29/2020 11:47 AM EST -----  Inform patient needs six-month follow-up diagnostic mammogram breast ultrasound

## 2020-02-07 DIAGNOSIS — F41.8 DEPRESSION WITH ANXIETY: ICD-10-CM

## 2020-02-07 RX ORDER — ALPRAZOLAM 0.25 MG/1
0.25 TABLET ORAL 3 TIMES DAILY PRN
Qty: 30 TABLET | Refills: 2 | Status: SHIPPED | OUTPATIENT
Start: 2020-02-07 | End: 2021-07-06 | Stop reason: SDUPTHER

## 2020-04-23 DIAGNOSIS — F41.8 DEPRESSION WITH ANXIETY: ICD-10-CM

## 2020-04-23 RX ORDER — ESCITALOPRAM OXALATE 20 MG/1
TABLET ORAL
Qty: 90 TABLET | Refills: 3 | Status: SHIPPED | OUTPATIENT
Start: 2020-04-23 | End: 2021-03-19 | Stop reason: CLARIF

## 2020-04-30 ENCOUNTER — TELEMEDICINE (OUTPATIENT)
Dept: INTERNAL MEDICINE CLINIC | Facility: CLINIC | Age: 70
End: 2020-04-30
Payer: MEDICARE

## 2020-04-30 VITALS
SYSTOLIC BLOOD PRESSURE: 126 MMHG | TEMPERATURE: 97.5 F | DIASTOLIC BLOOD PRESSURE: 82 MMHG | BODY MASS INDEX: 26.04 KG/M2 | HEART RATE: 80 BPM | WEIGHT: 147 LBS

## 2020-04-30 DIAGNOSIS — J30.89 NON-SEASONAL ALLERGIC RHINITIS, UNSPECIFIED TRIGGER: Primary | ICD-10-CM

## 2020-04-30 DIAGNOSIS — I35.1 AORTIC VALVE INSUFFICIENCY, ETIOLOGY OF CARDIAC VALVE DISEASE UNSPECIFIED: ICD-10-CM

## 2020-04-30 PROCEDURE — 99214 OFFICE O/P EST MOD 30 MIN: CPT | Performed by: INTERNAL MEDICINE

## 2020-04-30 RX ORDER — FLUTICASONE PROPIONATE 50 MCG
1 SPRAY, SUSPENSION (ML) NASAL 2 TIMES DAILY WITH MEALS
Qty: 16 G | Refills: 0 | Status: SHIPPED | OUTPATIENT
Start: 2020-04-30 | End: 2020-05-22

## 2020-04-30 RX ORDER — LEVOCETIRIZINE DIHYDROCHLORIDE 5 MG/1
5 TABLET, FILM COATED ORAL EVERY EVENING
Qty: 30 TABLET | Refills: 3 | Status: SHIPPED | OUTPATIENT
Start: 2020-04-30 | End: 2020-07-22

## 2020-05-04 ENCOUNTER — TELEPHONE (OUTPATIENT)
Dept: INTERNAL MEDICINE CLINIC | Facility: CLINIC | Age: 70
End: 2020-05-04

## 2020-05-04 DIAGNOSIS — J30.89 NON-SEASONAL ALLERGIC RHINITIS, UNSPECIFIED TRIGGER: Primary | ICD-10-CM

## 2020-05-04 RX ORDER — MONTELUKAST SODIUM 10 MG/1
10 TABLET ORAL
Qty: 30 TABLET | Refills: 5 | Status: SHIPPED | OUTPATIENT
Start: 2020-05-04 | End: 2021-03-11 | Stop reason: CLARIF

## 2020-05-06 ENCOUNTER — TELEPHONE (OUTPATIENT)
Dept: INTERNAL MEDICINE CLINIC | Facility: CLINIC | Age: 70
End: 2020-05-06

## 2020-05-06 ENCOUNTER — TELEMEDICINE (OUTPATIENT)
Dept: INTERNAL MEDICINE CLINIC | Facility: CLINIC | Age: 70
End: 2020-05-06
Payer: MEDICARE

## 2020-05-06 VITALS — TEMPERATURE: 95.1 F

## 2020-05-06 DIAGNOSIS — Z20.828 EXPOSURE TO SARS-ASSOCIATED CORONAVIRUS: ICD-10-CM

## 2020-05-06 DIAGNOSIS — J30.89 NON-SEASONAL ALLERGIC RHINITIS, UNSPECIFIED TRIGGER: Primary | ICD-10-CM

## 2020-05-06 DIAGNOSIS — J20.9 ACUTE BRONCHITIS, UNSPECIFIED ORGANISM: ICD-10-CM

## 2020-05-06 PROCEDURE — U0003 INFECTIOUS AGENT DETECTION BY NUCLEIC ACID (DNA OR RNA); SEVERE ACUTE RESPIRATORY SYNDROME CORONAVIRUS 2 (SARS-COV-2) (CORONAVIRUS DISEASE [COVID-19]), AMPLIFIED PROBE TECHNIQUE, MAKING USE OF HIGH THROUGHPUT TECHNOLOGIES AS DESCRIBED BY CMS-2020-01-R: HCPCS

## 2020-05-06 PROCEDURE — 99214 OFFICE O/P EST MOD 30 MIN: CPT | Performed by: INTERNAL MEDICINE

## 2020-05-06 RX ORDER — ALBUTEROL SULFATE 90 UG/1
2 AEROSOL, METERED RESPIRATORY (INHALATION) EVERY 6 HOURS PRN
Qty: 1 INHALER | Refills: 0 | Status: SHIPPED | OUTPATIENT
Start: 2020-05-06 | End: 2020-05-22

## 2020-05-06 RX ORDER — PREDNISONE 10 MG/1
10 TABLET ORAL DAILY
Qty: 12 TABLET | Refills: 0 | Status: SHIPPED | OUTPATIENT
Start: 2020-05-06 | End: 2020-05-22

## 2020-05-07 ENCOUNTER — TELEPHONE (OUTPATIENT)
Dept: INTERNAL MEDICINE CLINIC | Facility: CLINIC | Age: 70
End: 2020-05-07

## 2020-05-07 LAB — SARS-COV-2 RNA SPEC QL NAA+PROBE: NOT DETECTED

## 2020-05-08 ENCOUNTER — TELEMEDICINE (OUTPATIENT)
Dept: INTERNAL MEDICINE CLINIC | Facility: CLINIC | Age: 70
End: 2020-05-08
Payer: MEDICARE

## 2020-05-08 VITALS
TEMPERATURE: 95 F | HEART RATE: 68 BPM | BODY MASS INDEX: 26.04 KG/M2 | SYSTOLIC BLOOD PRESSURE: 144 MMHG | RESPIRATION RATE: 16 BRPM | WEIGHT: 147 LBS | DIASTOLIC BLOOD PRESSURE: 72 MMHG

## 2020-05-08 DIAGNOSIS — J18.9 ATYPICAL PNEUMONIA: Primary | ICD-10-CM

## 2020-05-08 DIAGNOSIS — J18.9 ATYPICAL PNEUMONIA: ICD-10-CM

## 2020-05-08 PROCEDURE — 99213 OFFICE O/P EST LOW 20 MIN: CPT | Performed by: INTERNAL MEDICINE

## 2020-05-08 RX ORDER — AZITHROMYCIN 250 MG/1
250 TABLET, FILM COATED ORAL DAILY
Qty: 6 TABLET | Refills: 0 | Status: SHIPPED | OUTPATIENT
Start: 2020-05-08 | End: 2020-05-13

## 2020-05-08 RX ORDER — AZITHROMYCIN 250 MG/1
250 TABLET, FILM COATED ORAL DAILY
Qty: 6 TABLET | Refills: 0 | Status: SHIPPED | OUTPATIENT
Start: 2020-05-08 | End: 2020-05-08 | Stop reason: SDUPTHER

## 2020-05-16 ENCOUNTER — APPOINTMENT (OUTPATIENT)
Dept: LAB | Facility: CLINIC | Age: 70
End: 2020-05-16
Payer: MEDICARE

## 2020-05-16 ENCOUNTER — HOSPITAL ENCOUNTER (OUTPATIENT)
Dept: RADIOLOGY | Facility: HOSPITAL | Age: 70
Discharge: HOME | End: 2020-05-16
Payer: MEDICARE

## 2020-05-16 DIAGNOSIS — J18.9 ATYPICAL PNEUMONIA: ICD-10-CM

## 2020-05-16 DIAGNOSIS — E03.8 SUBCLINICAL HYPOTHYROIDISM: ICD-10-CM

## 2020-05-16 DIAGNOSIS — R73.01 IMPAIRED FASTING BLOOD SUGAR: ICD-10-CM

## 2020-05-16 DIAGNOSIS — E78.2 MIXED HYPERLIPIDEMIA: ICD-10-CM

## 2020-05-16 LAB
ALBUMIN SERPL BCP-MCNC: 3.5 G/DL (ref 3.5–5)
ALP SERPL-CCNC: 80 U/L (ref 46–116)
ALT SERPL W P-5'-P-CCNC: 23 U/L (ref 12–78)
ANION GAP SERPL CALCULATED.3IONS-SCNC: 5 MMOL/L (ref 4–13)
AST SERPL W P-5'-P-CCNC: 19 U/L (ref 5–45)
BASOPHILS # BLD AUTO: 0.07 THOUSANDS/ΜL (ref 0–0.1)
BASOPHILS NFR BLD AUTO: 1 % (ref 0–1)
BILIRUB SERPL-MCNC: 0.42 MG/DL (ref 0.2–1)
BUN SERPL-MCNC: 18 MG/DL (ref 5–25)
CALCIUM SERPL-MCNC: 9.1 MG/DL (ref 8.3–10.1)
CHLORIDE SERPL-SCNC: 104 MMOL/L (ref 100–108)
CHOLEST SERPL-MCNC: 231 MG/DL (ref 50–200)
CO2 SERPL-SCNC: 32 MMOL/L (ref 21–32)
CREAT SERPL-MCNC: 0.97 MG/DL (ref 0.6–1.3)
EOSINOPHIL # BLD AUTO: 0.17 THOUSAND/ΜL (ref 0–0.61)
EOSINOPHIL NFR BLD AUTO: 3 % (ref 0–6)
ERYTHROCYTE [DISTWIDTH] IN BLOOD BY AUTOMATED COUNT: 13.2 % (ref 11.6–15.1)
EST. AVERAGE GLUCOSE BLD GHB EST-MCNC: 111 MG/DL
GFR SERPL CREATININE-BSD FRML MDRD: 60 ML/MIN/1.73SQ M
GLUCOSE P FAST SERPL-MCNC: 94 MG/DL (ref 65–99)
HBA1C MFR BLD: 5.5 %
HCT VFR BLD AUTO: 41.8 % (ref 34.8–46.1)
HDLC SERPL-MCNC: 68 MG/DL
HGB BLD-MCNC: 13.4 G/DL (ref 11.5–15.4)
IMM GRANULOCYTES # BLD AUTO: 0.05 THOUSAND/UL (ref 0–0.2)
IMM GRANULOCYTES NFR BLD AUTO: 1 % (ref 0–2)
LDLC SERPL CALC-MCNC: 153 MG/DL (ref 0–100)
LYMPHOCYTES # BLD AUTO: 0.85 THOUSANDS/ΜL (ref 0.6–4.47)
LYMPHOCYTES NFR BLD AUTO: 13 % (ref 14–44)
MCH RBC QN AUTO: 29.6 PG (ref 26.8–34.3)
MCHC RBC AUTO-ENTMCNC: 32.1 G/DL (ref 31.4–37.4)
MCV RBC AUTO: 92 FL (ref 82–98)
MONOCYTES # BLD AUTO: 0.56 THOUSAND/ΜL (ref 0.17–1.22)
MONOCYTES NFR BLD AUTO: 9 % (ref 4–12)
NEUTROPHILS # BLD AUTO: 4.92 THOUSANDS/ΜL (ref 1.85–7.62)
NEUTS SEG NFR BLD AUTO: 73 % (ref 43–75)
NONHDLC SERPL-MCNC: 163 MG/DL
NRBC BLD AUTO-RTO: 0 /100 WBCS
PLATELET # BLD AUTO: 291 THOUSANDS/UL (ref 149–390)
PMV BLD AUTO: 10.3 FL (ref 8.9–12.7)
POTASSIUM SERPL-SCNC: 4.5 MMOL/L (ref 3.5–5.3)
PROCALCITONIN SERPL-MCNC: <0.05 NG/ML
PROT SERPL-MCNC: 7.6 G/DL (ref 6.4–8.2)
RBC # BLD AUTO: 4.53 MILLION/UL (ref 3.81–5.12)
SODIUM SERPL-SCNC: 141 MMOL/L (ref 136–145)
TRIGL SERPL-MCNC: 52 MG/DL
TSH SERPL DL<=0.05 MIU/L-ACNC: 3.6 UIU/ML (ref 0.36–3.74)
WBC # BLD AUTO: 6.62 THOUSAND/UL (ref 4.31–10.16)

## 2020-05-16 PROCEDURE — 71046 X-RAY EXAM CHEST 2 VIEWS: CPT

## 2020-05-16 PROCEDURE — 83036 HEMOGLOBIN GLYCOSYLATED A1C: CPT

## 2020-05-16 PROCEDURE — 85025 COMPLETE CBC W/AUTO DIFF WBC: CPT

## 2020-05-16 PROCEDURE — 84443 ASSAY THYROID STIM HORMONE: CPT

## 2020-05-16 PROCEDURE — 80061 LIPID PANEL: CPT

## 2020-05-16 PROCEDURE — 80053 COMPREHEN METABOLIC PANEL: CPT

## 2020-05-16 PROCEDURE — 36415 COLL VENOUS BLD VENIPUNCTURE: CPT

## 2020-05-16 PROCEDURE — 84145 PROCALCITONIN (PCT): CPT

## 2020-05-21 ENCOUNTER — TELEPHONE (OUTPATIENT)
Dept: INTERNAL MEDICINE CLINIC | Facility: CLINIC | Age: 70
End: 2020-05-21

## 2020-05-22 ENCOUNTER — APPOINTMENT (OUTPATIENT)
Dept: LAB | Facility: CLINIC | Age: 70
End: 2020-05-22
Payer: MEDICARE

## 2020-05-22 ENCOUNTER — OFFICE VISIT (OUTPATIENT)
Dept: INTERNAL MEDICINE CLINIC | Facility: CLINIC | Age: 70
End: 2020-05-22
Payer: MEDICARE

## 2020-05-22 VITALS
HEIGHT: 61 IN | DIASTOLIC BLOOD PRESSURE: 80 MMHG | BODY MASS INDEX: 28.58 KG/M2 | HEART RATE: 60 BPM | SYSTOLIC BLOOD PRESSURE: 132 MMHG | WEIGHT: 151.4 LBS | RESPIRATION RATE: 12 BRPM | TEMPERATURE: 98 F

## 2020-05-22 DIAGNOSIS — J30.89 NON-SEASONAL ALLERGIC RHINITIS, UNSPECIFIED TRIGGER: ICD-10-CM

## 2020-05-22 DIAGNOSIS — E03.8 SUBCLINICAL HYPOTHYROIDISM: ICD-10-CM

## 2020-05-22 DIAGNOSIS — R73.01 IMPAIRED FASTING BLOOD SUGAR: ICD-10-CM

## 2020-05-22 DIAGNOSIS — F41.8 DEPRESSION WITH ANXIETY: ICD-10-CM

## 2020-05-22 DIAGNOSIS — Z78.0 MENOPAUSE: ICD-10-CM

## 2020-05-22 DIAGNOSIS — K21.9 GERD WITHOUT ESOPHAGITIS: ICD-10-CM

## 2020-05-22 DIAGNOSIS — E78.2 MIXED HYPERLIPIDEMIA: ICD-10-CM

## 2020-05-22 DIAGNOSIS — K58.9 IRRITABLE BOWEL SYNDROME, UNSPECIFIED TYPE: ICD-10-CM

## 2020-05-22 DIAGNOSIS — J30.89 NON-SEASONAL ALLERGIC RHINITIS, UNSPECIFIED TRIGGER: Primary | ICD-10-CM

## 2020-05-22 PROCEDURE — 3008F BODY MASS INDEX DOCD: CPT | Performed by: INTERNAL MEDICINE

## 2020-05-22 PROCEDURE — 36415 COLL VENOUS BLD VENIPUNCTURE: CPT

## 2020-05-22 PROCEDURE — 1123F ACP DISCUSS/DSCN MKR DOCD: CPT | Performed by: INTERNAL MEDICINE

## 2020-05-22 PROCEDURE — 1160F RVW MEDS BY RX/DR IN RCRD: CPT | Performed by: INTERNAL MEDICINE

## 2020-05-22 PROCEDURE — 1170F FXNL STATUS ASSESSED: CPT | Performed by: INTERNAL MEDICINE

## 2020-05-22 PROCEDURE — G0439 PPPS, SUBSEQ VISIT: HCPCS | Performed by: INTERNAL MEDICINE

## 2020-05-22 PROCEDURE — 1036F TOBACCO NON-USER: CPT | Performed by: INTERNAL MEDICINE

## 2020-05-22 PROCEDURE — 99214 OFFICE O/P EST MOD 30 MIN: CPT | Performed by: INTERNAL MEDICINE

## 2020-05-22 PROCEDURE — 86003 ALLG SPEC IGE CRUDE XTRC EA: CPT

## 2020-05-22 PROCEDURE — 82785 ASSAY OF IGE: CPT

## 2020-05-22 PROCEDURE — 4040F PNEUMOC VAC/ADMIN/RCVD: CPT | Performed by: INTERNAL MEDICINE

## 2020-05-22 PROCEDURE — 1125F AMNT PAIN NOTED PAIN PRSNT: CPT | Performed by: INTERNAL MEDICINE

## 2020-05-22 RX ORDER — FLUTICASONE PROPIONATE 50 MCG
1 SPRAY, SUSPENSION (ML) NASAL 2 TIMES DAILY WITH MEALS
Qty: 16 ML | Refills: 0 | Status: SHIPPED | OUTPATIENT
Start: 2020-05-22 | End: 2020-06-29

## 2020-05-22 RX ORDER — FAMOTIDINE 10 MG
10 TABLET ORAL 2 TIMES DAILY PRN
COMMUNITY
End: 2021-12-07 | Stop reason: ALTCHOICE

## 2020-05-26 ENCOUNTER — TELEPHONE (OUTPATIENT)
Dept: INTERNAL MEDICINE CLINIC | Facility: CLINIC | Age: 70
End: 2020-05-26

## 2020-05-26 LAB
A ALTERNATA IGE QN: <0.1 KUA/I
A FUMIGATUS IGE QN: <0.1 KUA/I
ALLERGEN COMMENT: NORMAL
BERMUDA GRASS IGE QN: <0.1 KUA/I
BOXELDER IGE QN: <0.1 KUA/I
C HERBARUM IGE QN: <0.1 KUA/I
CAT DANDER IGE QN: <0.1 KUA/I
CMN PIGWEED IGE QN: <0.1 KUA/I
COMMON RAGWEED IGE QN: <0.1 KUA/I
COTTONWOOD IGE QN: <0.1 KUA/I
D FARINAE IGE QN: <0.1 KUA/I
D PTERONYSS IGE QN: <0.1 KUA/I
DOG DANDER IGE QN: <0.1 KUA/I
LONDON PLANE IGE QN: <0.1 KUA/I
MOUSE URINE PROT IGE QN: <0.1 KUA/I
MT JUNIPER IGE QN: <0.1 KUA/I
MUGWORT IGE QN: <0.1 KUA/I
P NOTATUM IGE QN: <0.1 KUA/I
ROACH IGE QN: <0.1 KUA/I
SHEEP SORREL IGE QN: <0.1 KUA/I
SILVER BIRCH IGE QN: <0.1 KUA/I
TIMOTHY IGE QN: <0.1 KUA/I
TOTAL IGE SMQN RAST: 9.79 KU/L (ref 0–113)
WALNUT IGE QN: <0.1 KUA/I
WHITE ASH IGE QN: <0.1 KUA/I
WHITE ELM IGE QN: <0.1 KUA/I
WHITE MULBERRY IGE QN: <0.1 KUA/I
WHITE OAK IGE QN: <0.1 KUA/I

## 2020-06-27 DIAGNOSIS — J30.89 NON-SEASONAL ALLERGIC RHINITIS, UNSPECIFIED TRIGGER: ICD-10-CM

## 2020-06-29 RX ORDER — FLUTICASONE PROPIONATE 50 MCG
1 SPRAY, SUSPENSION (ML) NASAL 2 TIMES DAILY WITH MEALS
Qty: 16 ML | Refills: 0 | Status: SHIPPED | OUTPATIENT
Start: 2020-06-29 | End: 2020-07-08 | Stop reason: SDUPTHER

## 2020-07-08 DIAGNOSIS — J30.89 NON-SEASONAL ALLERGIC RHINITIS, UNSPECIFIED TRIGGER: ICD-10-CM

## 2020-07-08 RX ORDER — FLUTICASONE PROPIONATE 50 MCG
1 SPRAY, SUSPENSION (ML) NASAL 2 TIMES DAILY WITH MEALS
Qty: 48 ML | Refills: 1 | Status: SHIPPED | OUTPATIENT
Start: 2020-07-08

## 2020-07-22 DIAGNOSIS — J30.89 NON-SEASONAL ALLERGIC RHINITIS, UNSPECIFIED TRIGGER: ICD-10-CM

## 2020-07-22 RX ORDER — LEVOCETIRIZINE DIHYDROCHLORIDE 5 MG/1
TABLET, FILM COATED ORAL
Qty: 90 TABLET | Refills: 1 | Status: SHIPPED | OUTPATIENT
Start: 2020-07-22 | End: 2021-05-23

## 2020-07-29 ENCOUNTER — HOSPITAL ENCOUNTER (OUTPATIENT)
Dept: ULTRASOUND IMAGING | Facility: CLINIC | Age: 70
Discharge: HOME | End: 2020-07-29
Payer: MEDICARE

## 2020-07-29 ENCOUNTER — HOSPITAL ENCOUNTER (OUTPATIENT)
Dept: MAMMOGRAPHY | Facility: CLINIC | Age: 70
Discharge: HOME | End: 2020-07-29
Payer: MEDICARE

## 2020-07-29 ENCOUNTER — TELEPHONE (OUTPATIENT)
Dept: OBGYN CLINIC | Facility: CLINIC | Age: 70
End: 2020-07-29

## 2020-07-29 VITALS — HEIGHT: 61 IN | WEIGHT: 151 LBS | BODY MASS INDEX: 28.51 KG/M2

## 2020-07-29 VITALS — TEMPERATURE: 96.9 F

## 2020-07-29 DIAGNOSIS — N64.89 BREAST ASYMMETRY: ICD-10-CM

## 2020-07-29 DIAGNOSIS — R92.8 ABNORMAL MAMMOGRAM: ICD-10-CM

## 2020-07-29 DIAGNOSIS — Z12.39 SCREENING FOR BREAST CANCER: Primary | ICD-10-CM

## 2020-07-29 PROCEDURE — 77065 DX MAMMO INCL CAD UNI: CPT

## 2020-07-29 PROCEDURE — 76642 ULTRASOUND BREAST LIMITED: CPT

## 2020-07-29 PROCEDURE — G0279 TOMOSYNTHESIS, MAMMO: HCPCS

## 2020-07-29 NOTE — TELEPHONE ENCOUNTER
Pt had diag (L) breast mgram & (L) breast U/S 7/29/2020 - (L) breast asymmetry, no interval change  recom 6 month diag (L) breast U/S @ time of screening mgram (due 1/2021)  Rad orders in pt's chart  Lm pt's as

## 2020-07-29 NOTE — TELEPHONE ENCOUNTER
Pt informed of results - she has screening mgram sched for 1/20/2021 @ [facility] - she will call scheduling to see if she needs to reschedule for [facility] for both

## 2020-07-29 NOTE — TELEPHONE ENCOUNTER
----- Message from Sally Yanez DO sent at 7/29/2020 12:19 PM EDT -----  Inform patient needs six-month follow-up diagnostic mammogram and breast ultrasound, stable

## 2020-07-30 ENCOUNTER — TELEPHONE (OUTPATIENT)
Dept: OBGYN CLINIC | Facility: CLINIC | Age: 70
End: 2020-07-30

## 2020-07-30 NOTE — TELEPHONE ENCOUNTER
----- Message from Chantell Ramos DO sent at 7/29/2020 12:19 PM EDT -----  Inform patient needs six-month follow-up diagnostic mammogram and breast ultrasound, stable

## 2020-10-13 ENCOUNTER — AMB VIDEO VISIT (OUTPATIENT)
Dept: OTHER | Facility: HOSPITAL | Age: 70
End: 2020-10-13

## 2020-10-13 PROCEDURE — EVISIT: Performed by: FAMILY MEDICINE

## 2020-11-20 ENCOUNTER — OFFICE VISIT (OUTPATIENT)
Dept: INTERNAL MEDICINE CLINIC | Facility: CLINIC | Age: 70
End: 2020-11-20
Payer: MEDICARE

## 2020-11-20 VITALS
WEIGHT: 156.2 LBS | OXYGEN SATURATION: 97 % | BODY MASS INDEX: 28.74 KG/M2 | SYSTOLIC BLOOD PRESSURE: 150 MMHG | DIASTOLIC BLOOD PRESSURE: 90 MMHG | HEART RATE: 84 BPM | TEMPERATURE: 97.3 F | HEIGHT: 62 IN

## 2020-11-20 DIAGNOSIS — K21.9 GERD WITHOUT ESOPHAGITIS: ICD-10-CM

## 2020-11-20 DIAGNOSIS — E78.2 MIXED HYPERLIPIDEMIA: ICD-10-CM

## 2020-11-20 DIAGNOSIS — K21.9 GASTROESOPHAGEAL REFLUX DISEASE WITHOUT ESOPHAGITIS: Primary | ICD-10-CM

## 2020-11-20 DIAGNOSIS — I35.1 NONRHEUMATIC AORTIC VALVE INSUFFICIENCY: ICD-10-CM

## 2020-11-20 DIAGNOSIS — R73.01 IMPAIRED FASTING BLOOD SUGAR: ICD-10-CM

## 2020-11-20 DIAGNOSIS — Z12.11 SCREENING FOR MALIGNANT NEOPLASM OF COLON: ICD-10-CM

## 2020-11-20 PROCEDURE — 99213 OFFICE O/P EST LOW 20 MIN: CPT | Performed by: INTERNAL MEDICINE

## 2020-11-20 RX ORDER — OMEPRAZOLE 20 MG/1
20 CAPSULE, DELAYED RELEASE ORAL
Qty: 30 CAPSULE | Refills: 5 | Status: SHIPPED | OUTPATIENT
Start: 2020-11-20 | End: 2021-05-12

## 2020-11-22 PROBLEM — Z12.11 SCREENING FOR MALIGNANT NEOPLASM OF COLON: Status: ACTIVE | Noted: 2020-11-22

## 2020-11-22 PROBLEM — K21.9 GASTROESOPHAGEAL REFLUX DISEASE WITHOUT ESOPHAGITIS: Status: ACTIVE | Noted: 2020-11-22

## 2020-12-16 ENCOUNTER — OFFICE VISIT (OUTPATIENT)
Dept: OBGYN CLINIC | Facility: HOSPITAL | Age: 70
End: 2020-12-16
Payer: MEDICARE

## 2020-12-16 VITALS
DIASTOLIC BLOOD PRESSURE: 77 MMHG | HEIGHT: 62 IN | BODY MASS INDEX: 28.57 KG/M2 | HEART RATE: 71 BPM | SYSTOLIC BLOOD PRESSURE: 126 MMHG

## 2020-12-16 DIAGNOSIS — M18.12 ARTHRITIS OF CARPOMETACARPAL (CMC) JOINT OF LEFT THUMB: ICD-10-CM

## 2020-12-16 DIAGNOSIS — M18.11 ARTHRITIS OF CARPOMETACARPAL (CMC) JOINT OF RIGHT THUMB: Primary | ICD-10-CM

## 2020-12-16 PROCEDURE — 20600 DRAIN/INJ JOINT/BURSA W/O US: CPT | Performed by: ORTHOPAEDIC SURGERY

## 2020-12-16 PROCEDURE — 99213 OFFICE O/P EST LOW 20 MIN: CPT | Performed by: ORTHOPAEDIC SURGERY

## 2020-12-16 RX ORDER — LIDOCAINE HYDROCHLORIDE 10 MG/ML
1 INJECTION, SOLUTION INFILTRATION; PERINEURAL
Status: COMPLETED | OUTPATIENT
Start: 2020-12-16 | End: 2020-12-16

## 2020-12-16 RX ORDER — BETAMETHASONE SODIUM PHOSPHATE AND BETAMETHASONE ACETATE 3; 3 MG/ML; MG/ML
3 INJECTION, SUSPENSION INTRA-ARTICULAR; INTRALESIONAL; INTRAMUSCULAR; SOFT TISSUE
Status: COMPLETED | OUTPATIENT
Start: 2020-12-16 | End: 2020-12-16

## 2020-12-16 RX ADMIN — LIDOCAINE HYDROCHLORIDE 1 ML: 10 INJECTION, SOLUTION INFILTRATION; PERINEURAL at 14:14

## 2020-12-16 RX ADMIN — BETAMETHASONE SODIUM PHOSPHATE AND BETAMETHASONE ACETATE 3 MG: 3; 3 INJECTION, SUSPENSION INTRA-ARTICULAR; INTRALESIONAL; INTRAMUSCULAR; SOFT TISSUE at 14:14

## 2021-01-08 ENCOUNTER — HOSPITAL ENCOUNTER (OUTPATIENT)
Dept: NON INVASIVE DIAGNOSTICS | Facility: CLINIC | Age: 71
Discharge: HOME | End: 2021-01-08
Payer: MEDICARE

## 2021-01-08 DIAGNOSIS — I35.1 NONRHEUMATIC AORTIC VALVE INSUFFICIENCY: ICD-10-CM

## 2021-01-08 PROCEDURE — 93306 TTE W/DOPPLER COMPLETE: CPT | Performed by: INTERNAL MEDICINE

## 2021-01-08 PROCEDURE — 93306 TTE W/DOPPLER COMPLETE: CPT

## 2021-01-19 ENCOUNTER — TELEPHONE (OUTPATIENT)
Dept: OBGYN CLINIC | Facility: CLINIC | Age: 71
End: 2021-01-19

## 2021-01-19 DIAGNOSIS — N64.89 BREAST ASYMMETRY: Primary | ICD-10-CM

## 2021-01-22 ENCOUNTER — LAB (OUTPATIENT)
Dept: LAB | Age: 71
End: 2021-01-22
Payer: MEDICARE

## 2021-01-22 DIAGNOSIS — R73.01 IMPAIRED FASTING BLOOD SUGAR: ICD-10-CM

## 2021-01-22 DIAGNOSIS — K21.9 GASTROESOPHAGEAL REFLUX DISEASE WITHOUT ESOPHAGITIS: ICD-10-CM

## 2021-01-22 DIAGNOSIS — E78.2 MIXED HYPERLIPIDEMIA: ICD-10-CM

## 2021-01-22 LAB
ALBUMIN SERPL BCP-MCNC: 3.7 G/DL (ref 3.5–5)
ALP SERPL-CCNC: 85 U/L (ref 46–116)
ALT SERPL W P-5'-P-CCNC: 22 U/L (ref 12–78)
ANION GAP SERPL CALCULATED.3IONS-SCNC: 2 MMOL/L (ref 4–13)
AST SERPL W P-5'-P-CCNC: 19 U/L (ref 5–45)
BASOPHILS # BLD AUTO: 0.05 THOUSANDS/ΜL (ref 0–0.1)
BASOPHILS NFR BLD AUTO: 1 % (ref 0–1)
BILIRUB SERPL-MCNC: 0.39 MG/DL (ref 0.2–1)
BUN SERPL-MCNC: 24 MG/DL (ref 5–25)
CALCIUM SERPL-MCNC: 9.4 MG/DL (ref 8.3–10.1)
CHLORIDE SERPL-SCNC: 106 MMOL/L (ref 100–108)
CHOLEST SERPL-MCNC: 264 MG/DL (ref 50–200)
CO2 SERPL-SCNC: 33 MMOL/L (ref 21–32)
CREAT SERPL-MCNC: 1 MG/DL (ref 0.6–1.3)
EOSINOPHIL # BLD AUTO: 0.11 THOUSAND/ΜL (ref 0–0.61)
EOSINOPHIL NFR BLD AUTO: 2 % (ref 0–6)
ERYTHROCYTE [DISTWIDTH] IN BLOOD BY AUTOMATED COUNT: 12.5 % (ref 11.6–15.1)
EST. AVERAGE GLUCOSE BLD GHB EST-MCNC: 108 MG/DL
GFR SERPL CREATININE-BSD FRML MDRD: 58 ML/MIN/1.73SQ M
GLUCOSE SERPL-MCNC: 95 MG/DL (ref 65–140)
HBA1C MFR BLD: 5.4 %
HCT VFR BLD AUTO: 42 % (ref 34.8–46.1)
HDLC SERPL-MCNC: 69 MG/DL
HGB BLD-MCNC: 13.8 G/DL (ref 11.5–15.4)
IMM GRANULOCYTES # BLD AUTO: 0.02 THOUSAND/UL (ref 0–0.2)
IMM GRANULOCYTES NFR BLD AUTO: 0 % (ref 0–2)
LDLC SERPL CALC-MCNC: 182 MG/DL (ref 0–100)
LYMPHOCYTES # BLD AUTO: 1.05 THOUSANDS/ΜL (ref 0.6–4.47)
LYMPHOCYTES NFR BLD AUTO: 22 % (ref 14–44)
MCH RBC QN AUTO: 30.2 PG (ref 26.8–34.3)
MCHC RBC AUTO-ENTMCNC: 32.9 G/DL (ref 31.4–37.4)
MCV RBC AUTO: 92 FL (ref 82–98)
MONOCYTES # BLD AUTO: 0.52 THOUSAND/ΜL (ref 0.17–1.22)
MONOCYTES NFR BLD AUTO: 11 % (ref 4–12)
NEUTROPHILS # BLD AUTO: 3.12 THOUSANDS/ΜL (ref 1.85–7.62)
NEUTS SEG NFR BLD AUTO: 64 % (ref 43–75)
NRBC BLD AUTO-RTO: 0 /100 WBCS
PLATELET # BLD AUTO: 252 THOUSANDS/UL (ref 149–390)
PMV BLD AUTO: 10.5 FL (ref 8.9–12.7)
POTASSIUM SERPL-SCNC: 4.6 MMOL/L (ref 3.5–5.3)
PROT SERPL-MCNC: 7.7 G/DL (ref 6.4–8.2)
RBC # BLD AUTO: 4.57 MILLION/UL (ref 3.81–5.12)
SODIUM SERPL-SCNC: 141 MMOL/L (ref 136–145)
TRIGL SERPL-MCNC: 66 MG/DL
WBC # BLD AUTO: 4.87 THOUSAND/UL (ref 4.31–10.16)

## 2021-01-22 PROCEDURE — 80053 COMPREHEN METABOLIC PANEL: CPT

## 2021-01-22 PROCEDURE — 83036 HEMOGLOBIN GLYCOSYLATED A1C: CPT

## 2021-01-22 PROCEDURE — 36415 COLL VENOUS BLD VENIPUNCTURE: CPT

## 2021-01-22 PROCEDURE — 80061 LIPID PANEL: CPT

## 2021-01-22 PROCEDURE — 85025 COMPLETE CBC W/AUTO DIFF WBC: CPT

## 2021-01-29 ENCOUNTER — OFFICE VISIT (OUTPATIENT)
Dept: INTERNAL MEDICINE CLINIC | Facility: CLINIC | Age: 71
End: 2021-01-29
Payer: MEDICARE

## 2021-01-29 VITALS
DIASTOLIC BLOOD PRESSURE: 74 MMHG | WEIGHT: 155.4 LBS | SYSTOLIC BLOOD PRESSURE: 114 MMHG | HEART RATE: 72 BPM | RESPIRATION RATE: 16 BRPM | HEIGHT: 62 IN | BODY MASS INDEX: 28.6 KG/M2 | OXYGEN SATURATION: 98 %

## 2021-01-29 DIAGNOSIS — H81.11 BENIGN PAROXYSMAL POSITIONAL VERTIGO OF RIGHT EAR: ICD-10-CM

## 2021-01-29 DIAGNOSIS — F32.89 OTHER DEPRESSION: ICD-10-CM

## 2021-01-29 DIAGNOSIS — H81.10 BENIGN PAROXYSMAL POSITIONAL VERTIGO, UNSPECIFIED LATERALITY: ICD-10-CM

## 2021-01-29 DIAGNOSIS — E78.5 HYPERLIPIDEMIA, UNSPECIFIED HYPERLIPIDEMIA TYPE: Primary | ICD-10-CM

## 2021-01-29 PROCEDURE — 99214 OFFICE O/P EST MOD 30 MIN: CPT | Performed by: INTERNAL MEDICINE

## 2021-01-29 RX ORDER — ROSUVASTATIN CALCIUM 5 MG/1
5 TABLET, COATED ORAL DAILY
Qty: 30 TABLET | Refills: 5 | Status: SHIPPED | OUTPATIENT
Start: 2021-01-29 | End: 2021-06-18 | Stop reason: SDUPTHER

## 2021-01-29 NOTE — PROGRESS NOTES
Assessment/Plan:    Benign paroxysmal positional vertigo   Symptoms are compatible with benign positional vertigo will have    Depression   Depression/ anxiety no suicidal ideation continue Lexapro 20 mg once daily I would like her to meet with counselor Ruben Crooks? Can Shayan review this case with Dr Yakelin Dias to see if any recommendations she had been on a no other SSRI many years ago but does not know the name of it she had done well on Lexapro but more recently with caregiver stress, COVID-19 she has had more symptoms of anxiety depression? If she would be a good candidate for Viibryd I will let Omar Love further evaluate this with the Dr Yakelin Dias     Hyperlipidemia   Start Crestor 5 mg once daily low-cholesterol diet check CMP/ lipid 4 weeks please notify me immediately if any muscle achiness  Reviewed the risks benefits and side effects of the medication with the patient          Problem List Items Addressed This Visit        Nervous and Auditory    Benign paroxysmal positional vertigo of right ear    Benign paroxysmal positional vertigo      Symptoms are compatible with benign positional vertigo will have         Relevant Orders    Ambulatory referral to Physical Therapy       Other    Hyperlipidemia - Primary      Start Crestor 5 mg once daily low-cholesterol diet check CMP/ lipid 4 weeks please notify me immediately if any muscle achiness  Reviewed the risks benefits and side effects of the medication with the patient          Relevant Medications    rosuvastatin (CRESTOR) 5 mg tablet    Other Relevant Orders    Comprehensive metabolic panel    Lipid Panel with Direct LDL reflex    Depression      Depression/ anxiety no suicidal ideation continue Lexapro 20 mg once daily I would like her to meet with counselor Ruben Crooks?   Can Shayan review this case with Dr Yakelin Dias to see if any recommendations she had been on a no other SSRI many years ago but does not know the name of it she had done well on Lexapro but more recently with caregiver stress, COVID-19 she has had more symptoms of anxiety depression? If she would be a good candidate for Viibryd I will let Johnnie Epstein further evaluate this with the Dr Ty Bustos    Ambulatory referral to Psychiatry          RTO in 2 months call if any problems    Subjective:      Patient ID: Timothy Hathaway is a 78 y o  female  HPI  78-year old female coming in for a follow up office visit regarding hyperlipidemia, depression, benign paroxysmal vertigo; The patient reports me compliant taking medications without untoward side effects the  The patient is here to review his medical condition, update me on the medical condition and the patient reports me no hospitalizations and no ER visits  She reports me she would like to see a counselor regarding anxiety and depression she reports me that a lot of stress caring for her  also the [place] 19 pandemic has caused increased anxiety and depression;anxeity , politics , depression, compulsive eating ,  Picking skin 1 day ago a week a go he is making decisions , love   No plan no si, thought of death  Several weeks ago  Which resolved currently no SI she did not have a plan  Patient also mentions mild intermittent vertigo with turning the head    The following portions of the patient's history were reviewed and updated as appropriate: allergies, current medications, past family history, past medical history, past social history, past surgical history and problem list     Review of Systems   Constitutional: Negative for activity change, appetite change and unexpected weight change  HENT: Negative for congestion and postnasal drip  Eyes: Negative for visual disturbance  Respiratory: Negative for cough and shortness of breath  Cardiovascular: Negative for chest pain  Gastrointestinal: Negative for abdominal pain, diarrhea, nausea and vomiting  Neurological: Positive for dizziness  Negative for headaches  Psychiatric/Behavioral: Negative for suicidal ideas  The patient is nervous/anxious  Depression         Objective:    No follow-ups on file  No results found  Allergies   Allergen Reactions    Penicillins Hives and Rash    Sulfa Antibiotics Hives and Rash       Past Medical History:   Diagnosis Date    Basal cell carcinoma     Depression with anxiety     Fibrocystic breast disease, unspecified laterality     Irritable bowel syndrome     Osteopenia      Past Surgical History:   Procedure Laterality Date    BREAST CYST ASPIRATION      one performed-unsure which breast    BREAST EXCISIONAL BIOPSY Bilateral     one on each breast    COLONOSCOPY      7/13/12, colon polyp biopsied, hemorrhoids - Dr Flor Infante right ear  Precancerous      SEPTOPLASTY  05/04/2018    VAGINAL HYSTERECTOMY  1982    prolapse; about age 30     Current Outpatient Medications on File Prior to Visit   Medication Sig Dispense Refill    ALPRAZolam (XANAX) 0 25 mg tablet Take 1 tablet (0 25 mg total) by mouth 3 (three) times a day as needed for anxiety 30 tablet 2    escitalopram (LEXAPRO) 20 mg tablet TAKE 1 TABLET DAILY 90 tablet 3    famotidine (PEPCID) 10 mg tablet Take 10 mg by mouth 2 (two) times a day as needed for heartburn      fluticasone (FLONASE) 50 mcg/act nasal spray 1 SPRAY INTO EACH NOSTRIL 2 (TWO) TIMES A DAY WITH MEALS 48 mL 1    Ibuprofen (ADVIL) 200 MG CAPS Take by mouth as needed       levocetirizine (XYZAL) 5 MG tablet TAKE 1 TABLET BY MOUTH EVERY DAY IN THE EVENING 90 tablet 1    montelukast (SINGULAIR) 10 mg tablet Take 1 tablet (10 mg total) by mouth daily at bedtime (Patient taking differently: Take 10 mg by mouth as needed ) 30 tablet 5    Multiple Vitamin (DAILY VALUE MULTIVITAMIN) TABS Take 1 tablet by mouth daily      omeprazole (PriLOSEC) 20 mg delayed release capsule Take 1 capsule (20 mg total) by mouth daily before breakfast 30 capsule 5     No current facility-administered medications on file prior to visit  Family History   Problem Relation Age of Onset    Atrial fibrillation Mother    Luis Javed Cancer Mother [de-identified]        Bladder    Breast cancer Mother [de-identified]    COPD Mother     Kidney cancer Mother [de-identified]    Osteoporosis Mother     Transient ischemic attack Mother    Luisallyson Schroederles Cancer Father         Bladder    Glaucoma Father     Hypertension Father     Coronary artery disease Maternal Grandfather     Coronary artery disease Maternal Aunt     No Known Problems Maternal Grandmother     No Known Problems Paternal Grandmother     No Known Problems Paternal Grandfather     No Known Problems Sister     No Known Problems Daughter     No Known Problems Son     No Known Problems Paternal Aunt     No Known Problems Paternal Aunt     No Known Problems Paternal Aunt      Social History     Socioeconomic History    Marital status: /Civil Union     Spouse name: Not on file    Number of children: Not on file    Years of education: Not on file    Highest education level: Not on file   Occupational History    Not on file   Social Needs    Financial resource strain: Not on file    Food insecurity     Worry: Not on file     Inability: Not on file    Transportation needs     Medical: Not on file     Non-medical: Not on file   Tobacco Use    Smoking status: Never Smoker    Smokeless tobacco: Never Used   Substance and Sexual Activity    Alcohol use: Yes     Frequency: 4 or more times a week     Drinks per session: 1 or 2     Comment: maybe 1 glass of wine a week    Drug use: No    Sexual activity: Yes     Partners: Male   Lifestyle    Physical activity     Days per week: 3 days     Minutes per session: 30 min    Stress:  To some extent   Relationships    Social connections     Talks on phone: Not on file     Gets together: Not on file     Attends Yazdanism service: Not on file     Active member of club or organization: Not on file     Attends meetings of clubs or organizations: Not on file     Relationship status: Not on file    Intimate partner violence     Fear of current or ex partner: Not on file     Emotionally abused: Not on file     Physically abused: Not on file     Forced sexual activity: Not on file   Other Topics Concern    Not on file   Social History Narrative    Advance directive declined by patient     Vitals:    01/29/21 1447   BP: 114/74   Pulse: 72   Resp: 16   SpO2: 98%   Weight: 70 5 kg (155 lb 6 4 oz)   Height: 5' 2" (1 575 m)     Results for orders placed or performed in visit on 01/22/21   Comprehensive metabolic panel   Result Value Ref Range    Sodium 141 136 - 145 mmol/L    Potassium 4 6 3 5 - 5 3 mmol/L    Chloride 106 100 - 108 mmol/L    CO2 33 (H) 21 - 32 mmol/L    ANION GAP 2 (L) 4 - 13 mmol/L    BUN 24 5 - 25 mg/dL    Creatinine 1 00 0 60 - 1 30 mg/dL    Glucose 95 65 - 140 mg/dL    Calcium 9 4 8 3 - 10 1 mg/dL    AST 19 5 - 45 U/L    ALT 22 12 - 78 U/L    Alkaline Phosphatase 85 46 - 116 U/L    Total Protein 7 7 6 4 - 8 2 g/dL    Albumin 3 7 3 5 - 5 0 g/dL    Total Bilirubin 0 39 0 20 - 1 00 mg/dL    eGFR 58 ml/min/1 73sq m   Hemoglobin A1C   Result Value Ref Range    Hemoglobin A1C 5 4 Normal 3 8-5 6%; PreDiabetic 5 7-6 4%;  Diabetic >=6 5%; Glycemic control for adults with diabetes <7 0% %     mg/dl   Lipid Panel with Direct LDL reflex   Result Value Ref Range    Cholesterol 264 (H) 50 - 200 mg/dL    Triglycerides 66 <=150 mg/dL    HDL, Direct 69 >=40 mg/dL    LDL Calculated 182 (H) 0 - 100 mg/dL   CBC and differential   Result Value Ref Range    WBC 4 87 4 31 - 10 16 Thousand/uL    RBC 4 57 3 81 - 5 12 Million/uL    Hemoglobin 13 8 11 5 - 15 4 g/dL    Hematocrit 42 0 34 8 - 46 1 %    MCV 92 82 - 98 fL    MCH 30 2 26 8 - 34 3 pg    MCHC 32 9 31 4 - 37 4 g/dL    RDW 12 5 11 6 - 15 1 %    MPV 10 5 8 9 - 12 7 fL    Platelets 834 615 - 480 Thousands/uL    nRBC 0 /100 WBCs Neutrophils Relative 64 43 - 75 %    Immat GRANS % 0 0 - 2 %    Lymphocytes Relative 22 14 - 44 %    Monocytes Relative 11 4 - 12 %    Eosinophils Relative 2 0 - 6 %    Basophils Relative 1 0 - 1 %    Neutrophils Absolute 3 12 1 85 - 7 62 Thousands/µL    Immature Grans Absolute 0 02 0 00 - 0 20 Thousand/uL    Lymphocytes Absolute 1 05 0 60 - 4 47 Thousands/µL    Monocytes Absolute 0 52 0 17 - 1 22 Thousand/µL    Eosinophils Absolute 0 11 0 00 - 0 61 Thousand/µL    Basophils Absolute 0 05 0 00 - 0 10 Thousands/µL     Weight (last 2 days)     Date/Time   Weight    01/29/21 1447   70 5 (155 4)            Body mass index is 28 42 kg/m²  BP      Temp      Pulse     Resp      SpO2        Vitals:    01/29/21 1447   Weight: 70 5 kg (155 lb 6 4 oz)     Vitals:    01/29/21 1447   Weight: 70 5 kg (155 lb 6 4 oz)       /74   Pulse 72   Resp 16   Ht 5' 2" (1 575 m)   Wt 70 5 kg (155 lb 6 4 oz)   SpO2 98%   BMI 28 42 kg/m²          Physical Exam  Constitutional:       Appearance: She is well-developed  HENT:      Head: Normocephalic  Eyes:      General: No scleral icterus  Right eye: No discharge  Left eye: No discharge  Conjunctiva/sclera: Conjunctivae normal       Pupils: Pupils are equal, round, and reactive to light  Neck:      Musculoskeletal: Neck supple  Cardiovascular:      Rate and Rhythm: Normal rate and regular rhythm  Heart sounds: Normal heart sounds  No murmur  No friction rub  No gallop  Pulmonary:      Effort: No respiratory distress  Breath sounds: Normal breath sounds  No wheezing or rales  Abdominal:      General: Bowel sounds are normal  There is no distension  Palpations: Abdomen is soft  There is no mass  Tenderness: There is no abdominal tenderness  There is no guarding or rebound  Musculoskeletal:         General: No deformity  Lymphadenopathy:      Cervical: No cervical adenopathy  Neurological:      Mental Status: She is alert  Coordination: Coordination normal    Psychiatric:         Mood and Affect: Mood is anxious and depressed  Thought Content: Thought content does not include suicidal ideation

## 2021-01-31 PROBLEM — H81.10 BENIGN PAROXYSMAL POSITIONAL VERTIGO: Status: ACTIVE | Noted: 2021-01-31

## 2021-01-31 PROBLEM — F32.A DEPRESSION: Status: ACTIVE | Noted: 2021-01-31

## 2021-01-31 PROBLEM — H81.11 BENIGN PAROXYSMAL POSITIONAL VERTIGO OF RIGHT EAR: Status: ACTIVE | Noted: 2021-01-31

## 2021-01-31 NOTE — ASSESSMENT & PLAN NOTE
Start Crestor 5 mg once daily low-cholesterol diet check CMP/ lipid 4 weeks please notify me immediately if any muscle achiness    Reviewed the risks benefits and side effects of the medication with the patient

## 2021-01-31 NOTE — ASSESSMENT & PLAN NOTE
Depression/ anxiety no suicidal ideation continue Lexapro 20 mg once daily I would like her to meet with counselor Mar Jones? Can Shayan review this case with Dr To Cantu to see if any recommendations she had been on a no other SSRI many years ago but does not know the name of it she had done well on Lexapro but more recently with caregiver stress, COVID-19 she has had more symptoms of anxiety depression?   If she would be a good candidate for Viibryd I will let Eleazar Morning further evaluate this with the Dr To Cantu

## 2021-02-03 ENCOUNTER — TELEPHONE (OUTPATIENT)
Dept: PSYCHIATRY | Facility: CLINIC | Age: 71
End: 2021-02-03

## 2021-02-04 ENCOUNTER — EVALUATION (OUTPATIENT)
Dept: PHYSICAL THERAPY | Facility: CLINIC | Age: 71
End: 2021-02-04
Payer: MEDICARE

## 2021-02-04 DIAGNOSIS — R42 DIZZINESS: Primary | ICD-10-CM

## 2021-02-04 DIAGNOSIS — H81.11 BENIGN PAROXYSMAL POSITIONAL VERTIGO OF RIGHT EAR: ICD-10-CM

## 2021-02-04 PROCEDURE — 97161 PT EVAL LOW COMPLEX 20 MIN: CPT | Performed by: PHYSICAL THERAPIST

## 2021-02-04 PROCEDURE — 95992 CANALITH REPOSITIONING PROC: CPT | Performed by: PHYSICAL THERAPIST

## 2021-02-04 NOTE — PROGRESS NOTES
PT Evaluation     Today's date: 2021  Patient name: Linda Wallace  : 1950  MRN: [id number]  Referring provider: Griffin Simeon DO  Dx:   Encounter Diagnosis     ICD-10-CM    1  Dizziness  R42    2  Benign paroxysmal positional vertigo, unspecified laterality  H81 10 Ambulatory referral to Physical Therapy                  Assessment  Assessment details: Pt is a 78year old female referred with dizziness and potential BPPV  Pt presented today with a positive right [employer] test, no nystagmus noted initially, only slight symptoms of dizziness, however on third Epley pt with increased dizziness and slight nystagmus noted  Vibration then added to attempt to promote more movement of the crystal, pt with improved symptoms after the 4th round with vibration  Pt to follow-up with PT on Monday for re-testing and further vestibular testing as needed  Post-CRM precautions reviewed, pt communicated understanding  Understanding of Dx/Px/POC: good   Prognosis: good    Goals    STG  1  Pt will demonstrate negative positional testing within 2 weeks  2  Pt will demonstrate independence with HEP as needed within 2 weeks  LTG  1  Pt will deny dizziness within 4 weeks  2  Pt will demonstrate normal vestibular testing within 4 weeks  Plan  Patient would benefit from: skilled physical therapy  Planned therapy interventions: balance, canalith repositioning, patient education, neuromuscular re-education and home exercise program  Frequency: 2x week  Duration in weeks: 4  Plan of Care beginning date: 2021  Plan of Care expiration date: 3/5/2021  Treatment plan discussed with: patient        Subjective Evaluation    History of Present Illness  Mechanism of injury: About two weeks ago started with vertigo when getting gout of bed  Had a few months ago however subsided on it's own  Still getting dizzy however less now, is getting better  Feels off balance    Pain  No pain reported          Objective    DHI: 24/100    Sharp-Griselda: negative  Vertebral Artery Screen: negative  Cervical AROM: WNL, complaints of tightness with SB'ing, no pain  DixHallpike: Right: positive (only for symptoms, no nystagmus noted); Left: negative  Roll Test: Right negative;  Left negative           Precautions: anxiety/depression      Manuals 2/4/21            CRM, R side Epley x3 reps, x1rep with vibration                                                   Neuro Re-Ed                                                                                                        Ther Ex                                                                                                                     Ther Activity                                       Gait Training                                       Modalities

## 2021-02-08 ENCOUNTER — OFFICE VISIT (OUTPATIENT)
Dept: PHYSICAL THERAPY | Facility: CLINIC | Age: 71
End: 2021-02-08
Payer: MEDICARE

## 2021-02-08 DIAGNOSIS — R42 DIZZINESS: ICD-10-CM

## 2021-02-08 DIAGNOSIS — H81.11 BENIGN PAROXYSMAL POSITIONAL VERTIGO OF RIGHT EAR: Primary | ICD-10-CM

## 2021-02-08 PROCEDURE — 95992 CANALITH REPOSITIONING PROC: CPT | Performed by: PHYSICAL THERAPIST

## 2021-02-08 NOTE — PROGRESS NOTES
Daily Note     Today's date: 2021  Patient name: Jaz Rousseau  : 1950  MRN: [id number]  Referring provider: Amber Tafoya DO  Dx:   Encounter Diagnosis     ICD-10-CM    1  Benign paroxysmal positional vertigo of right ear  H81 11    2  Dizziness  R42                   Subjective: Feeling a lot better, still getting the dizziness but not as bad  Objective: See treatment diary below    Assessment: Pt positive for R BPPV again today per symptoms  No nystagmus noted  Initially thought to have a slow downbeating nystagmus, however very slow and very brief, potentially pt focusing on the rack of dumbbells  Only noted on first test of [facility], then not seen again  Symptoms reduced with each rep of the Epley  Will follow-up with PT on   Plan: Re-assess positional testing and treat as appropriate  Further vestibular testing once BPPV cleared       Precautions: anxiety/depression      Manuals 21           CRM, R side Epley x3 reps, x1rep with vibration Epley x3 reps, x2rep with vibration                                                  Neuro Re-Ed                                                                                                        Ther Ex                                                                                                                     Ther Activity                                       Gait Training                                       Modalities

## 2021-02-09 ENCOUNTER — HOSPITAL ENCOUNTER (OUTPATIENT)
Dept: MAMMOGRAPHY | Facility: CLINIC | Age: 71
Discharge: HOME | End: 2021-02-09
Payer: MEDICARE

## 2021-02-09 ENCOUNTER — HOSPITAL ENCOUNTER (OUTPATIENT)
Dept: ULTRASOUND IMAGING | Facility: CLINIC | Age: 71
Discharge: HOME | End: 2021-02-09
Payer: MEDICARE

## 2021-02-09 ENCOUNTER — TELEPHONE (OUTPATIENT)
Dept: OBGYN CLINIC | Facility: CLINIC | Age: 71
End: 2021-02-09

## 2021-02-09 VITALS — BODY MASS INDEX: 28.52 KG/M2 | WEIGHT: 155 LBS | HEIGHT: 62 IN

## 2021-02-09 DIAGNOSIS — N64.89 BREAST ASYMMETRY: ICD-10-CM

## 2021-02-09 DIAGNOSIS — N64.89 BREAST ASYMMETRY: Primary | ICD-10-CM

## 2021-02-09 PROCEDURE — G0279 TOMOSYNTHESIS, MAMMO: HCPCS

## 2021-02-09 PROCEDURE — 77066 DX MAMMO INCL CAD BI: CPT

## 2021-02-09 PROCEDURE — 76642 ULTRASOUND BREAST LIMITED: CPT

## 2021-02-09 NOTE — TELEPHONE ENCOUNTER
----- Message from Renard Dc DO sent at 2/9/2021  2:49 PM EST -----  Inform pt needs 6 month f/u dx mammo and breast us, also due for annual visit

## 2021-02-09 NOTE — TELEPHONE ENCOUNTER
Pt had diag bilat 3D mgram & (L) breast U/S 2/9/2021 - unchanged (L) breast asymmetry - recom diag 3D (L) mgram & diag (L) breast U/S in 6 months (due 8/2021)    Pt informed - she will schedule appt time ([facility])

## 2021-02-10 ENCOUNTER — APPOINTMENT (OUTPATIENT)
Dept: PHYSICAL THERAPY | Facility: CLINIC | Age: 71
End: 2021-02-10
Payer: MEDICARE

## 2021-02-10 ENCOUNTER — SOCIAL WORK (OUTPATIENT)
Dept: BEHAVIORAL/MENTAL HEALTH CLINIC | Facility: CLINIC | Age: 71
End: 2021-02-10
Payer: MEDICARE

## 2021-02-10 DIAGNOSIS — F32.89 OTHER DEPRESSION: ICD-10-CM

## 2021-02-10 PROCEDURE — 90834 PSYTX W PT 45 MINUTES: CPT | Performed by: SOCIAL WORKER

## 2021-02-10 NOTE — PSYCH
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 caregiver for  with progressing Parkinson's  Mourning the loss of her  as the disease progresses  Struggling with more periods of sadness and depression as well as some irritability and anger  Has periods where she feels anxious and overwhelmed  Had been well maintained on Lexapro 20 mg but feels it has not been helpful for some time  Denies any SI  Patient ID: Rosalee Nathan is a 78 y o  female  Met with Dahlia Cardoza for 45 minutes from 10:00AM-10:45AM  Has been struggling with increased anxiety and depression due to 's progressing Parkinson's  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 has created  Has not been able to be involved in her [employer] and [place]  Review of Systems   Psychiatric/Behavioral: Positive for dysphoric mood and sleep disturbance  The patient is nervous/anxious  Objective: Dahlia Cardoza presents as anxious and depressed  Tearful at points during session  She is verbal, cooperative and well oriented during session  Physical Exam  Psychiatric:         Attention and Perception: Attention and perception normal          Mood and Affect: Mood is anxious and depressed  Affect is tearful  Speech: Speech normal          Behavior: Behavior normal  Behavior is cooperative  Thought Content: Thought content normal          Cognition and Memory: Cognition and memory normal          Judgment: Judgment normal       Comments: Having sleep issues  Sleeps well when taking Xanax at bed  Fearful of developing dependence

## 2021-02-10 NOTE — PATIENT INSTRUCTIONS
Processed her struggles with anxiety and depression secondary to 's health issues- normalization, validation and supportive therapy provided  Reviewed coping strategies for her mood issues and need to develop additional supports  Will refer her for the caregiver support group at [facility]  PCP requesting consultation for medication options due to ineffectiveness of Lexapro and her ongoing sleep issues  Will facilitate this and develop treatment plan with PCP

## 2021-02-12 ENCOUNTER — OFFICE VISIT (OUTPATIENT)
Dept: PHYSICAL THERAPY | Facility: CLINIC | Age: 71
End: 2021-02-12
Payer: MEDICARE

## 2021-02-12 DIAGNOSIS — H81.11 BENIGN PAROXYSMAL POSITIONAL VERTIGO OF RIGHT EAR: Primary | ICD-10-CM

## 2021-02-12 DIAGNOSIS — R42 DIZZINESS: ICD-10-CM

## 2021-02-12 PROCEDURE — 97112 NEUROMUSCULAR REEDUCATION: CPT | Performed by: PHYSICAL THERAPIST

## 2021-02-12 NOTE — PROGRESS NOTES
Daily Note     Today's date: 2021  Patient name: Ary Espinoza  : 1950  MRN: [id number]  Referring provider: Jesus Nguyen DO  Dx:   Encounter Diagnosis     ICD-10-CM    1  Benign paroxysmal positional vertigo of right ear  H81 11    2  Dizziness  R42                   Subjective: Has been feeling good  Did some cleaning this morning and had some dizziness, not spinning, when cleaning under bed, however may be due to her allergies  Objective: See treatment diary below  Modified Clinical Test of Sensory Interaction on Balance  30 eyes open firm surface  30 eyes closed firm surface  30 eyes open foam surface  30 eyes closed foam surface    VOMS (Vestibular/Ocular Motor Screen)--no symptoms unless noted, normal less than 2/10 symptoms for each     Baseline symptoms (marie presence of headache, dizziness, nausea, fogginess)      Smooth pursuit Normal     Saccades (horizontal) Normal     Saccades (vertical)  Normal     Convergence (near point)--Normal     VOR (horizontal) Normal     VOR (vertical) Normal     Visual Motion Sensitivity Normal    Gaze holding: normal room light    DixHallpike: Right: negative; Left: negative  Roll Test: Right negative; Left negative      Flowsheet Rows      Most Recent Value   Dynamic Gait Index   Gait level surface   3   Change in gait speed  3   Gait with horizontal head turns   2   Gait with vertical head turns   3   Gait and pivot turn  3   Step over obstacle  3   Step around obstacle  3   Steps  3   Total score   23          Assessment: Pt negative for positional testing today  Other vestibular testing negative as well  Pt had some difficulty with EC balance on foam and walking with HTs, educated to complete these at home as HEP, she communicated understanding  Pt does not require PT services at this time, will follow up with PT as needed  Plan: 30 day hold       Precautions: anxiety/depression      Manuals 21           CRM, R side Epley x3 reps, x1rep with vibration Epley x3 reps, x2rep with vibration                                                  Neuro Re-Ed                                                                                                        Ther Ex                                                                                                                     Ther Activity                                       Gait Training                                       Modalities

## 2021-02-15 ENCOUNTER — DOCUMENTATION (OUTPATIENT)
Dept: INTERNAL MEDICINE CLINIC | Facility: CLINIC | Age: 71
End: 2021-02-15

## 2021-02-15 DIAGNOSIS — F41.8 DEPRESSION WITH ANXIETY: Primary | ICD-10-CM

## 2021-02-15 RX ORDER — VILAZODONE HYDROCHLORIDE 10 MG/1
10 TABLET ORAL
Qty: 30 TABLET | Refills: 1 | Status: SHIPPED | OUTPATIENT
Start: 2021-02-15 | End: 2021-03-11 | Stop reason: CLARIF

## 2021-02-16 DIAGNOSIS — F41.8 DEPRESSION WITH ANXIETY: Primary | ICD-10-CM

## 2021-02-16 RX ORDER — SERTRALINE HYDROCHLORIDE 25 MG/1
25 TABLET, FILM COATED ORAL DAILY
Qty: 30 TABLET | Refills: 1 | Status: SHIPPED | OUTPATIENT
Start: 2021-02-16 | End: 2021-03-11

## 2021-02-16 NOTE — PROGRESS NOTES
Diandra, PharmD, Nelda Salas    The following is per review of patient's pertinent medical/medication history:     Reason for documentation: Per PCP request, patient's insurance formulary reviewed for alternative depression/anxiety rx  Duplicate encounter for tracking purposes    Findings:     Previous SSRI:   - fluoxetine: uncertain why rx stopped  - escitalopram: currently on  - vilazodone: stopped 3/2014 d/t cost    Recommendations: Could consider trial with alternative SSRI or SNRI  Vilazodone does not seem to have significant difference in anxiety related outcomes when compared to SSRI  Sertraline and citalopram appear to be preferred SSRI; duloxetine is preferred SNRI         Demographics  Interaction Method: Virtual    Topic(s) Addressed  Medication Management    Intervention(s) Made    Pharmacologic:      Medication Conversion - Non-Preferred to Preferred    Tool(s) Used  Not Applicable    Time Spent:   Time Spent in Direct Patient Care: 0 minutes    Time Spent in Care Coordination: 30 minutes    Recommendation(s) Accepted by the Patient/Caregiver: Not Applicable

## 2021-03-01 ENCOUNTER — LAB (OUTPATIENT)
Dept: LAB | Age: 71
End: 2021-03-01
Payer: MEDICARE

## 2021-03-01 DIAGNOSIS — E78.5 HYPERLIPIDEMIA, UNSPECIFIED HYPERLIPIDEMIA TYPE: ICD-10-CM

## 2021-03-01 LAB
ALBUMIN SERPL BCP-MCNC: 4 G/DL (ref 3.5–5)
ALP SERPL-CCNC: 79 U/L (ref 46–116)
ALT SERPL W P-5'-P-CCNC: 19 U/L (ref 12–78)
ANION GAP SERPL CALCULATED.3IONS-SCNC: 2 MMOL/L (ref 4–13)
AST SERPL W P-5'-P-CCNC: 17 U/L (ref 5–45)
BILIRUB SERPL-MCNC: 0.29 MG/DL (ref 0.2–1)
BUN SERPL-MCNC: 17 MG/DL (ref 5–25)
CALCIUM SERPL-MCNC: 10 MG/DL (ref 8.3–10.1)
CHLORIDE SERPL-SCNC: 111 MMOL/L (ref 100–108)
CHOLEST SERPL-MCNC: 163 MG/DL (ref 50–200)
CO2 SERPL-SCNC: 31 MMOL/L (ref 21–32)
CREAT SERPL-MCNC: 0.9 MG/DL (ref 0.6–1.3)
GFR SERPL CREATININE-BSD FRML MDRD: 66 ML/MIN/1.73SQ M
GLUCOSE P FAST SERPL-MCNC: 92 MG/DL (ref 65–99)
HDLC SERPL-MCNC: 68 MG/DL
LDLC SERPL CALC-MCNC: 84 MG/DL (ref 0–100)
POTASSIUM SERPL-SCNC: 4.2 MMOL/L (ref 3.5–5.3)
PROT SERPL-MCNC: 7.9 G/DL (ref 6.4–8.2)
SODIUM SERPL-SCNC: 144 MMOL/L (ref 136–145)
TRIGL SERPL-MCNC: 53 MG/DL

## 2021-03-01 PROCEDURE — 80061 LIPID PANEL: CPT

## 2021-03-01 PROCEDURE — 80053 COMPREHEN METABOLIC PANEL: CPT

## 2021-03-01 PROCEDURE — 36415 COLL VENOUS BLD VENIPUNCTURE: CPT

## 2021-03-11 ENCOUNTER — OFFICE VISIT (OUTPATIENT)
Dept: GASTROENTEROLOGY | Facility: CLINIC | Age: 71
End: 2021-03-11
Payer: MEDICARE

## 2021-03-11 VITALS
DIASTOLIC BLOOD PRESSURE: 72 MMHG | TEMPERATURE: 96.4 F | BODY MASS INDEX: 28.34 KG/M2 | WEIGHT: 154 LBS | HEIGHT: 62 IN | SYSTOLIC BLOOD PRESSURE: 118 MMHG

## 2021-03-11 DIAGNOSIS — K21.9 GASTROESOPHAGEAL REFLUX DISEASE WITHOUT ESOPHAGITIS: ICD-10-CM

## 2021-03-11 DIAGNOSIS — Z12.11 SCREENING FOR MALIGNANT NEOPLASM OF COLON: ICD-10-CM

## 2021-03-11 DIAGNOSIS — F41.8 DEPRESSION WITH ANXIETY: ICD-10-CM

## 2021-03-11 DIAGNOSIS — K58.9 IRRITABLE BOWEL SYNDROME, UNSPECIFIED TYPE: Primary | ICD-10-CM

## 2021-03-11 PROCEDURE — 99204 OFFICE O/P NEW MOD 45 MIN: CPT | Performed by: INTERNAL MEDICINE

## 2021-03-11 RX ORDER — SERTRALINE HYDROCHLORIDE 25 MG/1
TABLET, FILM COATED ORAL
Qty: 30 TABLET | Refills: 1 | Status: SHIPPED | OUTPATIENT
Start: 2021-03-11 | End: 2021-04-05

## 2021-03-11 RX ORDER — SODIUM, POTASSIUM,MAG SULFATES 17.5-3.13G
1 SOLUTION, RECONSTITUTED, ORAL ORAL ONCE
Qty: 1 BOTTLE | Refills: 0 | Status: SHIPPED | OUTPATIENT
Start: 2021-03-11 | End: 2021-05-06 | Stop reason: ALTCHOICE

## 2021-03-11 NOTE — PROGRESS NOTES
[facility] - Outpatient Consultation  Shani Weinstein 78 y o  female MRN: [id number]  Encounter: [id number]          ASSESSMENT AND PLAN:  78year old female referred by PCP  1  Gastroesophageal reflux disease without esophagitis  -given longstanding disease will do EGD to screen for Vences's     2  Screening for malignant neoplasm of colon  -will do colonoscopy; denies history of polyps but was given a 5 year follow up    3  Irritable bowel syndrome, unspecified type  -bentyl PRN    Follow up annually      ______________________________________________________________________    HPI:  78year old female referred by her PCP due to GERD  She reports GERD and IBS treated with bentyl in the past but more recently reports over the past year her GERD worsened over the past year  Was recently started on omeprazole 20 mg daily which has really helped her symtpoms  Denies any dysphagia or odynophagia or weight loss  Last colonoscopy was in 2015 and doesn't think she had any polyps removed at that time  Was given a 5 year follow up for her colonoscopy as she thinks she had polyps removed in the past   Her mother had polyps but denies family history of colon cancer  REVIEW OF SYSTEMS:    CONSTITUTIONAL: Denies any fever, chills, rigors, and weight loss  HEENT: No earache or tinnitus  Denies hearing loss or visual disturbances  CARDIOVASCULAR: No chest pain or palpitations  RESPIRATORY: Denies any cough, hemoptysis, shortness of breath or dyspnea on exertion  GASTROINTESTINAL: As noted in the History of Present Illness  GENITOURINARY: No problems with urination  Denies any hematuria or dysuria  NEUROLOGIC: No dizziness or vertigo, denies headaches  MUSCULOSKELETAL: Denies any muscle or joint pain  SKIN: Denies skin rashes or itching  ENDOCRINE: Denies excessive thirst  Denies intolerance to heat or cold  PSYCHOSOCIAL: Denies depression or anxiety   Denies any recent memory loss  Historical Information   Past Medical History:   Diagnosis Date    Basal cell carcinoma     Depression with anxiety     Fibrocystic breast disease, unspecified laterality     Irritable bowel syndrome     Osteopenia      Past Surgical History:   Procedure Laterality Date    BREAST CYST ASPIRATION      one performed-unsure which breast    BREAST EXCISIONAL BIOPSY Bilateral     one on each breast aprox 1980s    COLONOSCOPY      7/13/12, colon polyp biopsied, hemorrhoids - Dr Argueta Friend right ear  Precancerous      SEPTOPLASTY  05/04/2018    VAGINAL HYSTERECTOMY  1982    prolapse; about age 30     Social History   Social History     Substance and Sexual Activity   Alcohol Use Yes    Frequency: 4 or more times a week    Drinks per session: 1 or 2    Comment: maybe 1 glass of wine a week     Social History     Substance and Sexual Activity   Drug Use No     Social History     Tobacco Use   Smoking Status Never Smoker   Smokeless Tobacco Never Used     Family History   Problem Relation Age of Onset    Atrial fibrillation Mother     Cancer Mother [de-identified]        Bladder    Breast cancer Mother [de-identified]    COPD Mother     Kidney cancer Mother [de-identified]    Osteoporosis Mother     Transient ischemic attack Mother     Cancer Father         Bladder    Glaucoma Father     Hypertension Father     Coronary artery disease Maternal Grandfather     Coronary artery disease Maternal Aunt     No Known Problems Maternal Grandmother     No Known Problems Paternal Grandmother     No Known Problems Paternal Grandfather     No Known Problems Sister     No Known Problems Daughter     No Known Problems Son     No Known Problems Paternal Aunt     No Known Problems Paternal Aunt     No Known Problems Paternal Aunt        Meds/Allergies       Current Outpatient Medications:     ALPRAZolam (XANAX) 0 25 mg tablet    escitalopram (LEXAPRO) 20 mg tablet    famotidine (PEPCID) 10 mg tablet    fluticasone (FLONASE) 50 mcg/act nasal spray    Ibuprofen (ADVIL) 200 MG CAPS    levocetirizine (XYZAL) 5 MG tablet    montelukast (SINGULAIR) 10 mg tablet    Multiple Vitamin (DAILY VALUE MULTIVITAMIN) TABS    omeprazole (PriLOSEC) 20 mg delayed release capsule    rosuvastatin (CRESTOR) 5 mg tablet    sertraline (ZOLOFT) 25 mg tablet    vilazodone (VIIBRYD) 10 mg tablet    Allergies   Allergen Reactions    Penicillins Hives and Rash    Sulfa Antibiotics Hives and Rash           Objective     Blood pressure 118/72, temperature (!) 96 4 °F (35 8 °C), temperature source Tympanic, height 5' 2" (1 575 m), weight 69 9 kg (154 lb), not currently breastfeeding  Body mass index is 28 17 kg/m²  PHYSICAL EXAM:      General Appearance:   Alert, cooperative, no distress   HEENT:   Normocephalic, atraumatic, anicteric      Neck:  Supple, symmetrical, trachea midline   Lungs:   Clear to auscultation bilaterally; no rales, rhonchi or wheezing; respirations unlabored    Heart[de-identified]   Regular rate and rhythm; no murmur, rub, or gallop  Abdomen:   Soft, non-tender, non-distended; normal bowel sounds; no masses, no organomegaly    Genitalia:   Deferred    Rectal:   Deferred    Extremities:  No cyanosis, clubbing or edema    Pulses:  2+ and symmetric    Skin:  No jaundice, rashes, or lesions    Lymph nodes:  No palpable cervical lymphadenopathy        Lab Results:   No visits with results within 1 Day(s) from this visit     Latest known visit with results is:   Lab on 03/01/2021   Component Date Value    Sodium 03/01/2021 144     Potassium 03/01/2021 4 2     Chloride 03/01/2021 111*    CO2 03/01/2021 31     ANION GAP 03/01/2021 2*    BUN 03/01/2021 17     Creatinine 03/01/2021 0 90     Glucose, Fasting 03/01/2021 92     Calcium 03/01/2021 10 0     AST 03/01/2021 17     ALT 03/01/2021 19     Alkaline Phosphatase 03/01/2021 79     Total Protein 03/01/2021 7 9     Albumin 03/01/2021 4 0     Total Bilirubin 03/01/2021 0 29     eGFR 03/01/2021 66     Cholesterol 03/01/2021 163     Triglycerides 03/01/2021 53     HDL, Direct 03/01/2021 68     LDL Calculated 03/01/2021 84          Radiology Results:   Mammo Diagnostic Bilateral W 3d & Cad, Us Breast Left Limited (diagnostic)    Result Date: 2/9/2021  Narrative: DIAGNOSIS: Breast asymmetry TECHNIQUE: Digital diagnostic mammography was performed  Computer Aided Detection (CAD) analyzed all applicable images  Ultrasound of the bilateral breast(s) was performed  COMPARISONS: Prior breast imaging dated: 07/29/2020, 07/29/2020, 01/29/2020, 01/29/2020, 01/16/2020, 11/09/2018, 10/24/2018, 11/07/2017, 10/10/2017, 09/12/2016, and 04/16/2015 RELEVANT HISTORY: Family Breast Cancer History: History of breast cancer in Mother  Family Medical History: Family medical history includes breast cancer in mother  Personal History: Hormone history includes birth control and estrogen replacement therapy  Surgical history includes breast biopsy, breast cyst aspiration, and hysterectomy  Medical history includes fibrocystic breast  RISK ASSESSMENT: 5 Year Tyrer-Cuzick: 2 16 % 10 Year Tyrer-Cuzick: 4 37 % Lifetime Tyrer-Cuzick: 7 85 % TISSUE DENSITY: The breasts are heterogeneously dense, which may obscure small masses  INDICATION: Timothy Hathaway is a 78 y o  female presenting for 6 month follow up (L) breast asymmetry - see results 7/29/2020 - (R) breast screening  FINDINGS: LEFT 1) ASYMMETRY Mammo diagnostic bilateral w 3d & cad: There is an asymmetry seen in the upper region of the left breast at 2 o'clock in the middle depth, 9 cm from the nipple  This has not changed mammographic or ultrasound appearance  RIGHT There are no suspicious masses, grouped microcalcifications or areas of architectural distortion  The skin and nipple areolar complex are unremarkable  Impression: No significant change   ASSESSMENT/BI-RADS CATEGORY: Left: 3 - Probably Benign Right: 2 - Benign Overall: 3 - Probably Benign RECOMMENDATION:      - Diagnostic mammogram and ultrasound in 6 months for the left breast  Workstation ID: [id number]

## 2021-03-11 NOTE — PATIENT INSTRUCTIONS
Colon/egd scheduled at [facility] with Dr Ana Ladd instructions given to pt by Staci in the office

## 2021-03-18 ENCOUNTER — PATIENT MESSAGE (OUTPATIENT)
Dept: GASTROENTEROLOGY | Facility: CLINIC | Age: 71
End: 2021-03-18

## 2021-03-26 ENCOUNTER — TELEPHONE (OUTPATIENT)
Dept: GASTROENTEROLOGY | Facility: CLINIC | Age: 71
End: 2021-03-26

## 2021-03-26 DIAGNOSIS — Z12.11 SCREENING FOR MALIGNANT NEOPLASM OF COLON: Primary | ICD-10-CM

## 2021-03-26 RX ORDER — SODIUM, POTASSIUM,MAG SULFATES 17.5-3.13G
SOLUTION, RECONSTITUTED, ORAL ORAL
Qty: 1 BOTTLE | Refills: 0 | Status: SHIPPED | COMMUNITY
Start: 2021-03-26 | End: 2021-05-06 | Stop reason: ALTCHOICE

## 2021-03-26 NOTE — TELEPHONE ENCOUNTER
I returned pts call, pt advised Lee-prep has a co-pay of [phone], I advised we can provide pt a sample , pt will go to [facility] office to  sample, please place up front, Thank You

## 2021-03-26 NOTE — TELEPHONE ENCOUNTER
Patient called the office looking to speak with you about her bowl prep  If you can please follow up with patient   Thank you

## 2021-04-05 DIAGNOSIS — F41.8 DEPRESSION WITH ANXIETY: ICD-10-CM

## 2021-04-05 RX ORDER — SERTRALINE HYDROCHLORIDE 25 MG/1
TABLET, FILM COATED ORAL
Qty: 30 TABLET | Refills: 1 | Status: SHIPPED | OUTPATIENT
Start: 2021-04-05 | End: 2021-04-30

## 2021-04-06 ENCOUNTER — OFFICE VISIT (OUTPATIENT)
Dept: INTERNAL MEDICINE CLINIC | Facility: CLINIC | Age: 71
End: 2021-04-06
Payer: MEDICARE

## 2021-04-06 VITALS
DIASTOLIC BLOOD PRESSURE: 74 MMHG | BODY MASS INDEX: 28.78 KG/M2 | HEIGHT: 62 IN | HEART RATE: 70 BPM | WEIGHT: 156.4 LBS | OXYGEN SATURATION: 99 % | SYSTOLIC BLOOD PRESSURE: 122 MMHG | RESPIRATION RATE: 16 BRPM

## 2021-04-06 DIAGNOSIS — R09.89 OTHER SPECIFIED SYMPTOMS AND SIGNS INVOLVING THE CIRCULATORY AND RESPIRATORY SYSTEMS: ICD-10-CM

## 2021-04-06 DIAGNOSIS — H93.19 TINNITUS, UNSPECIFIED LATERALITY: Primary | ICD-10-CM

## 2021-04-06 DIAGNOSIS — F32.4 MAJOR DEPRESSIVE DISORDER WITH SINGLE EPISODE, IN PARTIAL REMISSION (HCC): ICD-10-CM

## 2021-04-06 DIAGNOSIS — K21.9 GASTROESOPHAGEAL REFLUX DISEASE WITHOUT ESOPHAGITIS: ICD-10-CM

## 2021-04-06 DIAGNOSIS — R89.9 ABNORMAL LABORATORY TEST: ICD-10-CM

## 2021-04-06 DIAGNOSIS — H93.A2 PULSATILE TINNITUS OF LEFT EAR: ICD-10-CM

## 2021-04-06 PROCEDURE — 99214 OFFICE O/P EST MOD 30 MIN: CPT | Performed by: INTERNAL MEDICINE

## 2021-04-06 NOTE — PROGRESS NOTES
Assessment/Plan:    Gastroesophageal reflux disease without esophagitis   Improvement of the symptoms continue with the PPI patient going for upper endoscopy with GI    Pulsatile tinnitus of left ear   Rule out aneurysm rule out carotid artery disease check carotid Doppler/check MRA of the brain if negative see ENT for workup of hearing loss    Abnormal laboratory test   Low anion gap check SPEP /UPEP    Major depressive disorder with single episode, in partial remission ([address] )   Improved and doing much better continue with current dose of Zoloft tolerating well without side effects    Tinnitus    See ENT for evaluation    Other specified symptoms and signs involving the circulatory and respiratory systems    Patient does report me pulsatile tinnitus rule out cerebral aneurysm         Problem List Items Addressed This Visit        Digestive    Gastroesophageal reflux disease without esophagitis      Improvement of the symptoms continue with the PPI patient going for upper endoscopy with GI            Nervous and Auditory    Pulsatile tinnitus of left ear      Rule out aneurysm rule out carotid artery disease check carotid Doppler/check MRA of the brain if negative see ENT for workup of hearing loss         Relevant Orders    VAS carotid complete study    MRA head w wo contrast       Other    Major depressive disorder with single episode, in partial remission ([address] )      Improved and doing much better continue with current dose of Zoloft tolerating well without side effects         Tinnitus - Primary       See ENT for evaluation         Relevant Orders    Ambulatory Referral to Otolaryngology    Abnormal laboratory test      Low anion gap check SPEP /UPEP         Relevant Orders    Protein electrophoresis, urine    Protein electrophoresis, serum    Other specified symptoms and signs involving the circulatory and respiratory systems       Patient does report me pulsatile tinnitus rule out cerebral aneurysm Relevant Orders    VAS carotid complete study            RTO in 6 months call if any problems  Subjective:      Patient ID: Libby Crocker is a 78 y o  female  HPI 82-year-old female coming in for follow-up examination pulsatile tinnitus, abnormal laboratory tests, major depression, GERD; The patient reports me compliant taking medications without untoward side effects the  The patient is here to review his medical condition, update me on the medical condition and the patient reports me no hospitalizations and no ER visits  She is here to review her laboratories he reports me new symptom of pulsatile tinnitus she reports me her GERD has improved with the Prilosec in she has seen GI and scheduled for upper endoscopy / colonoscopy in the near future  she reports me she has noticed intermittent pulsatile tinnitus she describes it as being a buzzing sensation with her heartbeat she notices it more at nighttime when she is laying on her left side he has noticed it over last couple months  She is unaware of any up central nervous system problems she has not had any diagnostic workup at this point  The following portions of the patient's history were reviewed and updated as appropriate: allergies, current medications, past family history, past medical history, past social history, past surgical history and problem list     Review of Systems   Constitutional: Negative for activity change, appetite change and unexpected weight change  HENT:        Pulsatile tinnitus   Eyes: Negative for visual disturbance  Respiratory: Negative for cough and shortness of breath  Cardiovascular: Negative for chest pain  Gastrointestinal: Negative for abdominal pain, diarrhea, nausea and vomiting  Neurological: Negative for dizziness, light-headedness and headaches  Objective:    No follow-ups on file  No results found        Allergies   Allergen Reactions    Penicillins Hives and Rash    Sulfa Antibiotics Hives and Rash       Past Medical History:   Diagnosis Date    Basal cell carcinoma     Depression with anxiety     Fibrocystic breast disease, unspecified laterality     Irritable bowel syndrome     Osteopenia      Past Surgical History:   Procedure Laterality Date    BREAST CYST ASPIRATION      one performed-unsure which breast    BREAST EXCISIONAL BIOPSY Bilateral     one on each breast aprox 1980s    COLONOSCOPY      7/13/12, colon polyp biopsied, hemorrhoids - Dr Leena Saavedra right ear  Precancerous      SEPTOPLASTY  05/04/2018    VAGINAL HYSTERECTOMY  1982    prolapse; about age 30     Current Outpatient Medications on File Prior to Visit   Medication Sig Dispense Refill    ALPRAZolam (XANAX) 0 25 mg tablet Take 1 tablet (0 25 mg total) by mouth 3 (three) times a day as needed for anxiety 30 tablet 2    famotidine (PEPCID) 10 mg tablet Take 10 mg by mouth 2 (two) times a day as needed for heartburn      fluticasone (FLONASE) 50 mcg/act nasal spray 1 SPRAY INTO EACH NOSTRIL 2 (TWO) TIMES A DAY WITH MEALS 48 mL 1    Ibuprofen (ADVIL) 200 MG CAPS Take by mouth as needed       levocetirizine (XYZAL) 5 MG tablet TAKE 1 TABLET BY MOUTH EVERY DAY IN THE EVENING 90 tablet 1    Multiple Vitamin (DAILY VALUE MULTIVITAMIN) TABS Take 1 tablet by mouth daily      Na Sulfate-K Sulfate-Mg Sulf (Suprep Bowel Prep Kit) 17 5-3 13-1 6 GM/177ML SOLN Lot- [id number]  Exp- 9/2022 1 Bottle 0    omeprazole (PriLOSEC) 20 mg delayed release capsule Take 1 capsule (20 mg total) by mouth daily before breakfast 30 capsule 5    rosuvastatin (CRESTOR) 5 mg tablet Take 1 tablet (5 mg total) by mouth daily 30 tablet 5    sertraline (ZOLOFT) 25 mg tablet TAKE 1 TABLET BY MOUTH EVERY DAY 30 tablet 1    Na Sulfate-K Sulfate-Mg Sulf (Suprep Bowel Prep Kit) 17 5-3 13-1 6 GM/177ML SOLN Take 1 Bottle by mouth once for 1 dose 1 Bottle 0     No current facility-administered medications on file prior to visit  Family History   Problem Relation Age of Onset    Atrial fibrillation Mother    Jules Pérez Cancer Mother [de-identified]        Bladder    Breast cancer Mother [de-identified]    COPD Mother     Kidney cancer Mother [de-identified]    Osteoporosis Mother     Transient ischemic attack Mother    Jules Pérez Cancer Father         Bladder    Glaucoma Father     Hypertension Father     Coronary artery disease Maternal Grandfather     Coronary artery disease Maternal Aunt     No Known Problems Maternal Grandmother     No Known Problems Paternal Grandmother     No Known Problems Paternal Grandfather     No Known Problems Sister     No Known Problems Daughter     No Known Problems Son     No Known Problems Paternal Aunt     No Known Problems Paternal Aunt     No Known Problems Paternal Aunt      Social History     Socioeconomic History    Marital status: /Civil Union     Spouse name: Not on file    Number of children: Not on file    Years of education: Not on file    Highest education level: Not on file   Occupational History    Not on file   Social Needs    Financial resource strain: Not on file    Food insecurity     Worry: Not on file     Inability: Not on file    Transportation needs     Medical: Not on file     Non-medical: Not on file   Tobacco Use    Smoking status: Never Smoker    Smokeless tobacco: Never Used   Substance and Sexual Activity    Alcohol use: Yes     Frequency: 4 or more times a week     Drinks per session: 1 or 2     Comment: maybe 1 glass of wine a week    Drug use: No    Sexual activity: Yes     Partners: Male   Lifestyle    Physical activity     Days per week: 3 days     Minutes per session: 30 min    Stress:  To some extent   Relationships    Social connections     Talks on phone: Not on file     Gets together: Not on file     Attends Judaism service: Not on file     Active member of club or organization: Not on file     Attends meetings of clubs or organizations: Not on file     Relationship status: Not on file    Intimate partner violence     Fear of current or ex partner: Not on file     Emotionally abused: Not on file     Physically abused: Not on file     Forced sexual activity: Not on file   Other Topics Concern    Not on file   Social History Narrative    Advance directive declined by patient     Vitals:    04/06/21 1004   BP: 122/74   Pulse: 70   Resp: 16   SpO2: 99%   Weight: 70 9 kg (156 lb 6 4 oz)   Height: 5' 2" (1 575 m)     Results for orders placed or performed in visit on 03/01/21   Comprehensive metabolic panel   Result Value Ref Range    Sodium 144 136 - 145 mmol/L    Potassium 4 2 3 5 - 5 3 mmol/L    Chloride 111 (H) 100 - 108 mmol/L    CO2 31 21 - 32 mmol/L    ANION GAP 2 (L) 4 - 13 mmol/L    BUN 17 5 - 25 mg/dL    Creatinine 0 90 0 60 - 1 30 mg/dL    Glucose, Fasting 92 65 - 99 mg/dL    Calcium 10 0 8 3 - 10 1 mg/dL    AST 17 5 - 45 U/L    ALT 19 12 - 78 U/L    Alkaline Phosphatase 79 46 - 116 U/L    Total Protein 7 9 6 4 - 8 2 g/dL    Albumin 4 0 3 5 - 5 0 g/dL    Total Bilirubin 0 29 0 20 - 1 00 mg/dL    eGFR 66 ml/min/1 73sq m   Lipid Panel with Direct LDL reflex   Result Value Ref Range    Cholesterol 163 50 - 200 mg/dL    Triglycerides 53 <=150 mg/dL    HDL, Direct 68 >=40 mg/dL    LDL Calculated 84 0 - 100 mg/dL     Weight (last 2 days)     Date/Time   Weight    04/06/21 1004   70 9 (156 4)            Body mass index is 28 61 kg/m²  BP      Temp      Pulse     Resp      SpO2        Vitals:    04/06/21 1004   Weight: 70 9 kg (156 lb 6 4 oz)     Vitals:    04/06/21 1004   Weight: 70 9 kg (156 lb 6 4 oz)       /74   Pulse 70   Resp 16   Ht 5' 2" (1 575 m)   Wt 70 9 kg (156 lb 6 4 oz)   SpO2 99%   BMI 28 61 kg/m²          Physical Exam  Constitutional:       Appearance: She is well-developed  HENT:      Head: Normocephalic  Eyes:      General: No scleral icterus  Right eye: No discharge  Left eye: No discharge  Conjunctiva/sclera: Conjunctivae normal       Pupils: Pupils are equal, round, and reactive to light  Neck:      Musculoskeletal: Neck supple  Cardiovascular:      Rate and Rhythm: Normal rate and regular rhythm  Heart sounds: Normal heart sounds  No murmur  No friction rub  No gallop  Pulmonary:      Effort: No respiratory distress  Breath sounds: Normal breath sounds  No wheezing or rales  Abdominal:      General: Bowel sounds are normal  There is no distension  Palpations: Abdomen is soft  There is no mass  Tenderness: There is no abdominal tenderness  There is no guarding or rebound  Musculoskeletal:         General: No deformity  Lymphadenopathy:      Cervical: No cervical adenopathy  Neurological:      Mental Status: She is alert  Coordination: Coordination normal        no carotid bruitsBMI Counseling: Body mass index is 28 61 kg/m²  The BMI is above normal  Nutrition recommendations include decreasing portion sizes and moderation in carbohydrate intake  Exercise recommendations include exercising 3-5 times per week

## 2021-04-06 NOTE — ASSESSMENT & PLAN NOTE
Improved and doing much better continue with current dose of Zoloft tolerating well without side effects
Improvement of the symptoms continue with the PPI patient going for upper endoscopy with GI
Low anion gap check SPEP /UPEP
Patient does report me pulsatile tinnitus rule out cerebral aneurysm
Rule out aneurysm rule out carotid artery disease check carotid Doppler/check MRA of the brain if negative see ENT for workup of hearing loss
See ENT for evaluation
Abdomen soft, non-tender, no guarding.

## 2021-04-22 ENCOUNTER — ANESTHESIA EVENT (OUTPATIENT)
Dept: GASTROENTEROLOGY | Facility: AMBULARY SURGERY CENTER | Age: 71
End: 2021-04-22

## 2021-04-26 ENCOUNTER — TELEPHONE (OUTPATIENT)
Dept: OBGYN CLINIC | Facility: CLINIC | Age: 71
End: 2021-04-26

## 2021-04-26 NOTE — TELEPHONE ENCOUNTER
Pt informed of KA's recom - she will try moisturizing gel first & recall if unimprovement of sx for problem appt

## 2021-04-26 NOTE — TELEPHONE ENCOUNTER
pt due for yearly 11/2021 due to Medicare guidelines  Hx hysterectomy age 29  She had prev used Estrace cream 2018, also had sample Premarin cream that she used previously  recom Hyalo- Gyn vaginal moisturizer  She is having issue with vag dryness & vaginal burning with intercourse even with using lubricant  Please ask me about this when you get a chance

## 2021-04-26 NOTE — TELEPHONE ENCOUNTER
Pt called would like a refill on her premerin vag cream I do not see it in her med list  Sanjuana Linton prescribed it for her

## 2021-04-30 ENCOUNTER — HOSPITAL ENCOUNTER (OUTPATIENT)
Dept: RADIOLOGY | Facility: HOSPITAL | Age: 71
Discharge: HOME | End: 2021-04-30
Payer: MEDICARE

## 2021-04-30 ENCOUNTER — HOSPITAL ENCOUNTER (OUTPATIENT)
Dept: NON INVASIVE DIAGNOSTICS | Facility: HOSPITAL | Age: 71
Discharge: HOME | End: 2021-04-30
Payer: MEDICARE

## 2021-04-30 DIAGNOSIS — H93.A2 PULSATILE TINNITUS OF LEFT EAR: ICD-10-CM

## 2021-04-30 DIAGNOSIS — R09.89 OTHER SPECIFIED SYMPTOMS AND SIGNS INVOLVING THE CIRCULATORY AND RESPIRATORY SYSTEMS: ICD-10-CM

## 2021-04-30 DIAGNOSIS — F41.8 DEPRESSION WITH ANXIETY: ICD-10-CM

## 2021-04-30 PROCEDURE — 93880 EXTRACRANIAL BILAT STUDY: CPT

## 2021-04-30 PROCEDURE — 70544 MR ANGIOGRAPHY HEAD W/O DYE: CPT

## 2021-04-30 PROCEDURE — G1004 CDSM NDSC: HCPCS

## 2021-04-30 RX ORDER — SERTRALINE HYDROCHLORIDE 25 MG/1
TABLET, FILM COATED ORAL
Qty: 30 TABLET | Refills: 1 | Status: SHIPPED | OUTPATIENT
Start: 2021-04-30 | End: 2021-05-24

## 2021-05-01 PROCEDURE — 93880 EXTRACRANIAL BILAT STUDY: CPT | Performed by: SURGERY

## 2021-05-05 NOTE — ANESTHESIA PREPROCEDURE EVALUATION
Procedure:  COLONOSCOPY  EGD    Relevant Problems   CARDIO   (+) Aortic valve insufficiency   (+) Hyperlipidemia      ENDO   (+) Subclinical hypothyroidism      GI/HEPATIC   (+) GERD without esophagitis   (+) Gastroesophageal reflux disease without esophagitis      MUSCULOSKELETAL   (+) Arthritis of carpometacarpal (CMC) joint of both thumbs      NEURO/PSYCH   (+) Depression   (+) Depression with anxiety   (+) Major depressive disorder with single episode, in partial remission ([address] )      LEFT VENTRICLE: Size was normal  Systolic function was normal  Ejection fraction was estimated to be 55 %  There were no regional wall motion abnormalities  Wall thickness was at the upper limits of normal  The changes were consistent with  concentric remodeling (increased wall thickness with normal wall mass)  DOPPLER: Doppler parameters were consistent with abnormal left ventricular relaxation (grade 1 diastolic dysfunction)      RIGHT VENTRICLE: The size was normal  Systolic function was normal      LEFT ATRIUM: Size was normal      RIGHT ATRIUM: Size was normal      MITRAL VALVE: Valve structure was normal  There was normal leaflet separation  DOPPLER: The transmitral velocity was within the normal range  There was no evidence for stenosis  There was mild regurgitation      AORTIC VALVE: The valve was probably trileaflet  Leaflets exhibited normal thickness and normal cuspal separation  The valve was not well visualized  DOPPLER: Transaortic velocity was within the normal range  There was no evidence for  stenosis  There was mild regurgitation      TRICUSPID VALVE: The valve structure was normal  There was normal leaflet separation  DOPPLER: The transtricuspid velocity was within the normal range  There was no evidence for stenosis  There was trace regurgitation  Pulmonary artery  systolic pressure was within the normal range   Estimated peak PA pressure was 25 mmHg      PULMONIC VALVE: DOPPLER: The transpulmonic velocity was within the normal range  There was trace regurgitation      PERICARDIUM: There was no pericardial effusion      AORTA: The root exhibited normal size  The ascending aorta was normal in size      PULMONARY VEINS: DOPPLER: Doppler flow pattern was normal in the pulmonary vein(s)      Physical Exam    Airway    Mallampati score: II  TM Distance: >3 FB  Neck ROM: full     Dental   No notable dental hx     Cardiovascular  Cardiovascular exam normal    Pulmonary  Pulmonary exam normal     Other Findings        Anesthesia Plan  ASA Score- 3     Anesthesia Type- IV sedation with anesthesia with ASA Monitors  Additional Monitors:   Airway Plan:           Plan Factors-    Chart reviewed  Induction-     Postoperative Plan-     Informed Consent- Anesthetic plan and risks discussed with patient  I personally reviewed this patient with the CRNA  Discussed and agreed on the Anesthesia Plan with the CRNA  [place]

## 2021-05-06 ENCOUNTER — ANESTHESIA (OUTPATIENT)
Dept: GASTROENTEROLOGY | Facility: AMBULARY SURGERY CENTER | Age: 71
End: 2021-05-06

## 2021-05-06 ENCOUNTER — HOSPITAL ENCOUNTER (OUTPATIENT)
Dept: RADIOLOGY | Facility: HOSPITAL | Age: 71
Discharge: HOME | End: 2021-05-06
Payer: MEDICARE

## 2021-05-06 ENCOUNTER — OFFICE VISIT (OUTPATIENT)
Dept: OBGYN CLINIC | Facility: HOSPITAL | Age: 71
End: 2021-05-06
Payer: MEDICARE

## 2021-05-06 ENCOUNTER — HOSPITAL ENCOUNTER (OUTPATIENT)
Dept: GASTROENTEROLOGY | Facility: AMBULARY SURGERY CENTER | Age: 71
Setting detail: OUTPATIENT SURGERY
Discharge: HOME | End: 2021-05-06
Attending: INTERNAL MEDICINE
Payer: MEDICARE

## 2021-05-06 VITALS
HEIGHT: 63 IN | WEIGHT: 151 LBS | SYSTOLIC BLOOD PRESSURE: 120 MMHG | DIASTOLIC BLOOD PRESSURE: 76 MMHG | HEART RATE: 76 BPM | BODY MASS INDEX: 26.75 KG/M2

## 2021-05-06 VITALS
BODY MASS INDEX: 26.82 KG/M2 | WEIGHT: 151.4 LBS | HEART RATE: 88 BPM | DIASTOLIC BLOOD PRESSURE: 71 MMHG | TEMPERATURE: 97 F | OXYGEN SATURATION: 100 % | SYSTOLIC BLOOD PRESSURE: 126 MMHG | HEIGHT: 63 IN | RESPIRATION RATE: 18 BRPM

## 2021-05-06 DIAGNOSIS — Z12.11 SPECIAL SCREENING FOR MALIGNANT NEOPLASMS, COLON: ICD-10-CM

## 2021-05-06 DIAGNOSIS — M79.672 PAIN IN LEFT FOOT: Primary | ICD-10-CM

## 2021-05-06 DIAGNOSIS — K21.9 GASTROESOPHAGEAL REFLUX DISEASE, UNSPECIFIED WHETHER ESOPHAGITIS PRESENT: ICD-10-CM

## 2021-05-06 DIAGNOSIS — Q74.2 ACCESSORY NAVICULAR BONE OF LEFT FOOT: ICD-10-CM

## 2021-05-06 DIAGNOSIS — M79.672 PAIN IN LEFT FOOT: ICD-10-CM

## 2021-05-06 DIAGNOSIS — M76.822 POSTERIOR TIBIAL TENDINITIS OF LEFT LOWER EXTREMITY: ICD-10-CM

## 2021-05-06 PROCEDURE — 73630 X-RAY EXAM OF FOOT: CPT

## 2021-05-06 PROCEDURE — 99213 OFFICE O/P EST LOW 20 MIN: CPT | Performed by: PHYSICIAN ASSISTANT

## 2021-05-06 PROCEDURE — G0121 COLON CA SCRN NOT HI RSK IND: HCPCS | Performed by: INTERNAL MEDICINE

## 2021-05-06 PROCEDURE — 43235 EGD DIAGNOSTIC BRUSH WASH: CPT | Performed by: INTERNAL MEDICINE

## 2021-05-06 RX ORDER — PROPOFOL 10 MG/ML
INJECTION, EMULSION INTRAVENOUS AS NEEDED
Status: DISCONTINUED | OUTPATIENT
Start: 2021-05-06 | End: 2021-05-06

## 2021-05-06 RX ORDER — LIDOCAINE HYDROCHLORIDE 10 MG/ML
INJECTION, SOLUTION EPIDURAL; INFILTRATION; INTRACAUDAL; PERINEURAL AS NEEDED
Status: DISCONTINUED | OUTPATIENT
Start: 2021-05-06 | End: 2021-05-06

## 2021-05-06 RX ORDER — SODIUM CHLORIDE, SODIUM LACTATE, POTASSIUM CHLORIDE, CALCIUM CHLORIDE 600; 310; 30; 20 MG/100ML; MG/100ML; MG/100ML; MG/100ML
INJECTION, SOLUTION INTRAVENOUS CONTINUOUS PRN
Status: DISCONTINUED | OUTPATIENT
Start: 2021-05-06 | End: 2021-05-06

## 2021-05-06 RX ADMIN — PROPOFOL 30 MG: 10 INJECTION, EMULSION INTRAVENOUS at 10:07

## 2021-05-06 RX ADMIN — SODIUM CHLORIDE, SODIUM LACTATE, POTASSIUM CHLORIDE, AND CALCIUM CHLORIDE: .6; .31; .03; .02 INJECTION, SOLUTION INTRAVENOUS at 09:49

## 2021-05-06 RX ADMIN — PROPOFOL 50 MG: 10 INJECTION, EMULSION INTRAVENOUS at 09:56

## 2021-05-06 RX ADMIN — PROPOFOL 40 MG: 10 INJECTION, EMULSION INTRAVENOUS at 10:04

## 2021-05-06 RX ADMIN — PROPOFOL 80 MG: 10 INJECTION, EMULSION INTRAVENOUS at 09:53

## 2021-05-06 RX ADMIN — PROPOFOL 40 MG: 10 INJECTION, EMULSION INTRAVENOUS at 10:00

## 2021-05-06 RX ADMIN — PROPOFOL 50 MG: 10 INJECTION, EMULSION INTRAVENOUS at 09:58

## 2021-05-06 RX ADMIN — LIDOCAINE HYDROCHLORIDE 50 MG: 10 INJECTION, SOLUTION EPIDURAL; INFILTRATION; INTRACAUDAL at 09:53

## 2021-05-06 NOTE — H&P
History and Physical -  [facility]  Cheryl Jones 73 y o  female MRN: [id number]                  HPI: Cheryl Jones is a 73y o  year old female who presents for GERD and colon cancer screening , for EGD and colonoscopy  REVIEW OF SYSTEMS: Per the HPI, and otherwise unremarkable  Historical Information   Past Medical History:   Diagnosis Date    Basal cell carcinoma     Colon polyp     Depression with anxiety     Fibrocystic breast disease, unspecified laterality     Irritable bowel syndrome     Osteopenia      Past Surgical History:   Procedure Laterality Date    BREAST CYST ASPIRATION      one performed-unsure which breast    BREAST EXCISIONAL BIOPSY Bilateral     one on each breast aprox 1980s    COLONOSCOPY      7/13/12, colon polyp biopsied, hemorrhoids - Dr Dea Parikh right ear  Precancerous      SEPTOPLASTY  05/04/2018    VAGINAL HYSTERECTOMY  1982    prolapse; about age 30     Social History   Social History     Substance and Sexual Activity   Alcohol Use Yes    Frequency: Monthly or less    Drinks per session: 1 or 2    Binge frequency: Never     Social History     Substance and Sexual Activity   Drug Use No     Social History     Tobacco Use   Smoking Status Never Smoker   Smokeless Tobacco Never Used     Family History   Problem Relation Age of Onset    Atrial fibrillation Mother     Cancer Mother [de-identified]        Bladder    Breast cancer Mother [de-identified]    COPD Mother     Kidney cancer Mother [de-identified]    Osteoporosis Mother     Transient ischemic attack Mother     Cancer Father         Bladder    Glaucoma Father     Hypertension Father     Coronary artery disease Maternal Grandfather     Coronary artery disease Maternal Aunt     No Known Problems Maternal Grandmother     No Known Problems Paternal Grandmother     No Known Problems Paternal Grandfather     No Known Problems Sister     No Known Problems Daughter     No Known Problems Son     No Known Problems Paternal Aunt     No Known Problems Paternal Aunt     No Known Problems Paternal Aunt        Meds/Allergies       Current Outpatient Medications:     ALPRAZolam (XANAX) 0 25 mg tablet    Ibuprofen (ADVIL) 200 MG CAPS    levocetirizine (XYZAL) 5 MG tablet    omeprazole (PriLOSEC) 20 mg delayed release capsule    rosuvastatin (CRESTOR) 5 mg tablet    sertraline (ZOLOFT) 25 mg tablet    famotidine (PEPCID) 10 mg tablet    fluticasone (FLONASE) 50 mcg/act nasal spray    Allergies   Allergen Reactions    Penicillins Hives and Rash    Sulfa Antibiotics Hives and Rash       Objective     /74   Pulse 69   Temp 97 6 °F (36 4 °C) (Temporal)   Resp 18   Ht 5' 3" (1 6 m)   Wt 68 7 kg (151 lb 6 4 oz)   SpO2 98%   BMI 26 82 kg/m²       PHYSICAL EXAM    Gen: NAD  Head: NCAT  CV: RRR  CHEST: Clear  ABD: soft, NT/ND  EXT: no edema      ASSESSMENT/PLAN:  This is a 73y o  year old female here for EGD and colonoscopy, and she is stable and optimized for her procedure

## 2021-05-06 NOTE — DISCHARGE INSTRUCTIONS
Upper Endoscopy   WHAT YOU NEED TO KNOW:   An upper endoscopy is also called an upper gastrointestinal (GI) endoscopy, or an esophagogastroduodenoscopy (EGD)  You may feel bloated, gassy, or have some abdominal discomfort after your procedure  Your throat may be sore for 24 to 36 hours  You may burp or pass gas from air that is still inside your body  DISCHARGE INSTRUCTIONS:   Call 911 if:   · You have sudden chest pain or trouble breathing  Seek care immediately if:   · You feel dizzy or faint  · You have trouble swallowing  · You have severe throat pain  · Your bowel movements are very dark or black  · Your abdomen is hard and firm and you have severe pain  · You vomit blood  Contact your healthcare provider if:   · You feel full or bloated and cannot burp or pass gas  · You have not had a bowel movement for 3 days after your procedure  · You have neck pain  · You have a fever or chills  · You have nausea or are vomiting  · You have a rash or hives  · You have questions or concerns about your endoscopy  Relieve a sore throat:  Suck on throat lozenges or crushed ice  Gargle with a small amount of warm salt water  Mix 1 teaspoon of salt and 1 cup of warm water to make salt water  Relieve gas and discomfort from bloating:  Lie on your right side with a heating pad on your abdomen  Take short walks to help pass gas  Eat small meals until bloating is relieved  Rest after your procedure:  Do not drive or make important decisions until the day after your procedure  Return to your normal activity as directed  You can usually return to work the day after your procedure  Follow up with your healthcare provider as directed:  Write down your questions so you remember to ask them during your visits  © Copyright [address] Information is for End User's use only and may not be sold, redistributed or otherwise used for commercial purposes   All illustrations and images included in CareNotes® are the copyrighted property of [employer] D [employer] M , Inc  or [facility] [place]   The above information is an  only  It is not intended as medical advice for individual conditions or treatments  Talk to your doctor, nurse or pharmacist before following any medical regimen to see if it is safe and effective for you  Colonoscopy   WHAT YOU NEED TO KNOW:   A colonoscopy is a procedure to examine the inside of your colon (intestine) with a scope  Polyps or tissue growths may have been removed during your colonoscopy  It is normal to feel bloated and to have some abdominal discomfort  You should be passing gas  If you have hemorrhoids or you had polyps removed, you may have a small amount of bleeding  DISCHARGE INSTRUCTIONS:   Call your doctor if:   · You have a large amount of bright red blood in your bowel movements  · Your abdomen is hard and firm and you have severe pain  · You have sudden trouble breathing  · You develop a rash or hives  · You have a fever within 24 hours of your procedure  · You have not had a bowel movement for 3 days after your procedure  · You have questions or concerns about your condition or care  After your colonoscopy:   · Do not lift, strain, or run  for 3 days  · Rest as much as possible  You have been given medicine to relax you  Do not  drive or make important decisions for at least 24 hours  Return to your normal activity as directed  · Relieve gas and discomfort from bloating  by lying on your left side with a heating pad on your abdomen  You may need to take short walks to help the gas move out  Eat small meals until bloating is relieved  If you had polyps removed: For 7 days after your procedure:  · Do not  take aspirin  · Do not  go on long car rides  Help prevent constipation:   · Eat a variety of healthy foods    Healthy foods include fruit, vegetables, whole-grain breads, low-fat dairy products, beans, lean meat, and fish  Ask if you need to be on a special diet  Your healthcare provider may recommend that you eat high-fiber foods such as cooked beans  Fiber helps you have regular bowel movements  · Drink liquids as directed  Adults should drink between 9 and 13 eight-ounce cups of liquid every day  Ask what amount is best for you  For most people, good liquids to drink are water, juice, and milk  · Exercise as directed  Talk to your healthcare provider about the best exercise plan for you  Exercise can help prevent constipation, decrease your blood pressure and improve your health  Follow up with your healthcare provider as directed:  Write down your questions so you remember to ask them during your visits  © Copyright [address] Information is for End User's use only and may not be sold, redistributed or otherwise used for commercial purposes  All illustrations and images included in CareNotes® are the copyrighted property of A D A M , Inc  or [facility] [place]   The above information is an  only  It is not intended as medical advice for individual conditions or treatments  Talk to your doctor, nurse or pharmacist before following any medical regimen to see if it is safe and effective for you

## 2021-05-06 NOTE — PROGRESS NOTES
Assessment/Plan   Diagnoses and all orders for this visit:    Pain in left foot    Posterior tibial tendinitis of left lower extremity    Accessory navicular    - PT  - Ice as needed  - Wear shoes with good arch support  - Follow up with podiatry          Subjective   Patient ID: Najma Conde is a 73 y o  female  Vitals:    05/06/21 1845   BP: 120/76   Pulse: 68     70yo female comes in for an evaluation of her left foot  She c/o medial left midfoot pain, on and off for several months with no injury  This most recent episode was worse than normal, but she has been resting for 3 days and is now asymptomatic again  The pain comes on with activity, especially walking, and resolves with rest   She does not take NSAIDs due to tinnitus  She always wears shoes with arch support  No numbness or tingling  The following portions of the patient's history were reviewed and updated as appropriate: allergies, current medications, past family history, past medical history, past social history, past surgical history and problem list     Review of Systems  Ortho Exam  Past Medical History:   Diagnosis Date    Basal cell carcinoma     Colon polyp     Depression with anxiety     Fibrocystic breast disease, unspecified laterality     Irritable bowel syndrome     Osteopenia      Past Surgical History:   Procedure Laterality Date    BREAST CYST ASPIRATION      one performed-unsure which breast    BREAST EXCISIONAL BIOPSY Bilateral     one on each breast aprox 1980s    COLONOSCOPY      7/13/12, colon polyp biopsied, hemorrhoids - Dr Ortega Welsh right ear  Precancerous      SEPTOPLASTY  05/04/2018    VAGINAL HYSTERECTOMY  1982    prolapse; about age 30     Family History   Problem Relation Age of Onset    Atrial fibrillation Mother     Cancer Mother [de-identified]        Bladder    Breast cancer Mother [de-identified]    COPD Mother     Kidney cancer Mother [de-identified]    Osteoporosis Mother     Transient ischemic attack Mother    [place] Cancer Father         Bladder    Glaucoma Father     Hypertension Father     Coronary artery disease Maternal Grandfather     Coronary artery disease Maternal Aunt     No Known Problems Maternal Grandmother     No Known Problems Paternal Grandmother     No Known Problems Paternal Grandfather     No Known Problems Sister     No Known Problems Daughter     No Known Problems Son     No Known Problems Paternal Aunt     No Known Problems Paternal Aunt     No Known Problems Paternal Aunt      Social History     Occupational History    Not on file   Tobacco Use    Smoking status: Never Smoker    Smokeless tobacco: Never Used   Substance and Sexual Activity    Alcohol use: Yes     Frequency: Monthly or less     Drinks per session: 1 or 2     Binge frequency: Never    Drug use: No    Sexual activity: Yes     Partners: Male       Review of Systems   Constitutional: Negative  HENT: Negative  Eyes: Negative  Respiratory: Negative  Cardiovascular: Negative  Gastrointestinal: Negative  Endocrine: Negative  Genitourinary: Negative  Musculoskeletal: As below      Allergic/Immunologic: Negative  Neurological: Negative  Hematological: Negative  Psychiatric/Behavioral: Negative  Objective   Physical Exam        I have personally reviewed pertinent films in PACS and my interpretation is no acute displaced fracture  Accessory navicular         · Constitutional: Awake, Alert, Oriented  · Eyes: EOMI  · Psych: Mood and affect appropriate  · Heart: regular rate and rhythm  · Lungs: No audible wheezing  · Abdomen: soft  · Lymph: no lymphedema             left foot:  - Appearance   No swelling, discoloration, deformity, or ecchymosis  - Palpation   Non-tender now  She points to the medial midfoot as the area where the pain used to be    - ROM   Dorsiflexion: 0, Plantarflexion: 30, Inversion: 20 and Eversion: 0  - Special Tests   Negative anterior drawer  - Motor   normal 5/5 in all planes  - NVI distally

## 2021-05-06 NOTE — ANESTHESIA POSTPROCEDURE EVALUATION
Post-Op Assessment Note    CV Status:  Stable    Pain management: adequate     Mental Status:  Alert and awake   Hydration Status:  Euvolemic   PONV Controlled:  Controlled   Airway Patency:  Patent      Post Op Vitals Reviewed: Yes      Staff: CRNA         No complications documented      /71 (05/06/21 1044)    Temp     Pulse     Resp 18 (05/06/21 1044)    SpO2 100 % (05/06/21 1044)

## 2021-05-12 DIAGNOSIS — K21.9 GASTROESOPHAGEAL REFLUX DISEASE WITHOUT ESOPHAGITIS: ICD-10-CM

## 2021-05-12 RX ORDER — OMEPRAZOLE 20 MG/1
20 CAPSULE, DELAYED RELEASE ORAL
Qty: 90 CAPSULE | Refills: 1 | Status: SHIPPED | OUTPATIENT
Start: 2021-05-12 | End: 2021-11-05

## 2021-05-23 DIAGNOSIS — F41.8 DEPRESSION WITH ANXIETY: ICD-10-CM

## 2021-05-23 DIAGNOSIS — J30.89 NON-SEASONAL ALLERGIC RHINITIS, UNSPECIFIED TRIGGER: ICD-10-CM

## 2021-05-23 RX ORDER — LEVOCETIRIZINE DIHYDROCHLORIDE 5 MG/1
TABLET, FILM COATED ORAL
Qty: 90 TABLET | Refills: 1 | Status: SHIPPED | OUTPATIENT
Start: 2021-05-23

## 2021-05-24 RX ORDER — SERTRALINE HYDROCHLORIDE 25 MG/1
TABLET, FILM COATED ORAL
Qty: 30 TABLET | Refills: 1 | Status: SHIPPED | OUTPATIENT
Start: 2021-05-24 | End: 2021-06-18 | Stop reason: SDUPTHER

## 2021-06-18 DIAGNOSIS — E78.5 HYPERLIPIDEMIA, UNSPECIFIED HYPERLIPIDEMIA TYPE: ICD-10-CM

## 2021-06-18 DIAGNOSIS — F41.8 DEPRESSION WITH ANXIETY: ICD-10-CM

## 2021-06-18 RX ORDER — ROSUVASTATIN CALCIUM 5 MG/1
5 TABLET, COATED ORAL DAILY
Qty: 90 TABLET | Refills: 1 | Status: SHIPPED | OUTPATIENT
Start: 2021-06-18 | End: 2021-09-28

## 2021-06-18 RX ORDER — SERTRALINE HYDROCHLORIDE 25 MG/1
25 TABLET, FILM COATED ORAL DAILY
Qty: 90 TABLET | Refills: 1 | Status: SHIPPED | OUTPATIENT
Start: 2021-06-18 | End: 2021-12-10

## 2021-07-06 DIAGNOSIS — F41.8 DEPRESSION WITH ANXIETY: ICD-10-CM

## 2021-07-06 RX ORDER — ALPRAZOLAM 0.25 MG/1
0.25 TABLET ORAL 3 TIMES DAILY PRN
Qty: 30 TABLET | Refills: 0 | Status: SHIPPED | OUTPATIENT
Start: 2021-07-06 | End: 2021-12-20 | Stop reason: SDUPTHER

## 2021-08-08 ENCOUNTER — APPOINTMENT (EMERGENCY)
Dept: RADIOLOGY | Facility: HOSPITAL | Age: 71
End: 2021-08-08
Payer: MEDICARE

## 2021-08-08 ENCOUNTER — HOSPITAL ENCOUNTER (EMERGENCY)
Facility: HOSPITAL | Age: 71
Discharge: HOME | End: 2021-08-08
Attending: EMERGENCY MEDICINE | Admitting: EMERGENCY MEDICINE
Payer: MEDICARE

## 2021-08-08 VITALS
RESPIRATION RATE: 17 BRPM | HEART RATE: 72 BPM | HEIGHT: 63 IN | TEMPERATURE: 98 F | DIASTOLIC BLOOD PRESSURE: 63 MMHG | WEIGHT: 150 LBS | SYSTOLIC BLOOD PRESSURE: 137 MMHG | BODY MASS INDEX: 26.58 KG/M2 | OXYGEN SATURATION: 97 %

## 2021-08-08 DIAGNOSIS — M54.50 LOW BACK PAIN: ICD-10-CM

## 2021-08-08 DIAGNOSIS — S06.0X9A CONCUSSION: ICD-10-CM
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
## 2021-08-08 LAB
ANION GAP SERPL CALCULATED.3IONS-SCNC: 8 MMOL/L (ref 4–13)
BASOPHILS # BLD AUTO: 0.04 THOUSANDS/ΜL (ref 0–0.1)
BASOPHILS NFR BLD AUTO: 1 % (ref 0–1)
BUN SERPL-MCNC: 24 MG/DL (ref 5–25)
CALCIUM SERPL-MCNC: 9 MG/DL (ref 8.3–10.1)
CHLORIDE SERPL-SCNC: 108 MMOL/L (ref 100–108)
CO2 SERPL-SCNC: 26 MMOL/L (ref 21–32)
CREAT SERPL-MCNC: 1.04 MG/DL (ref 0.6–1.3)
EOSINOPHIL # BLD AUTO: 0.09 THOUSAND/ΜL (ref 0–0.61)
EOSINOPHIL NFR BLD AUTO: 1 % (ref 0–6)
ERYTHROCYTE [DISTWIDTH] IN BLOOD BY AUTOMATED COUNT: 12.7 % (ref 11.6–15.1)
GFR SERPL CREATININE-BSD FRML MDRD: 55 ML/MIN/1.73SQ M
GLUCOSE SERPL-MCNC: 101 MG/DL (ref 65–140)
HCT VFR BLD AUTO: 36.9 % (ref 34.8–46.1)
HGB BLD-MCNC: 12.1 G/DL (ref 11.5–15.4)
IMM GRANULOCYTES # BLD AUTO: 0.06 THOUSAND/UL (ref 0–0.2)
IMM GRANULOCYTES NFR BLD AUTO: 1 % (ref 0–2)
LYMPHOCYTES # BLD AUTO: 0.72 THOUSANDS/ΜL (ref 0.6–4.47)
LYMPHOCYTES NFR BLD AUTO: 9 % (ref 14–44)
MCH RBC QN AUTO: 29.8 PG (ref 26.8–34.3)
MCHC RBC AUTO-ENTMCNC: 32.8 G/DL (ref 31.4–37.4)
MCV RBC AUTO: 91 FL (ref 82–98)
MONOCYTES # BLD AUTO: 0.57 THOUSAND/ΜL (ref 0.17–1.22)
MONOCYTES NFR BLD AUTO: 7 % (ref 4–12)
NEUTROPHILS # BLD AUTO: 6.34 THOUSANDS/ΜL (ref 1.85–7.62)
NEUTS SEG NFR BLD AUTO: 81 % (ref 43–75)
NRBC BLD AUTO-RTO: 0 /100 WBCS
PLATELET # BLD AUTO: 195 THOUSANDS/UL (ref 149–390)
PMV BLD AUTO: 11.2 FL (ref 8.9–12.7)
POTASSIUM SERPL-SCNC: 3.6 MMOL/L (ref 3.5–5.3)
RBC # BLD AUTO: 4.06 MILLION/UL (ref 3.81–5.12)
SODIUM SERPL-SCNC: 142 MMOL/L (ref 136–145)
WBC # BLD AUTO: 7.82 THOUSAND/UL (ref 4.31–10.16)

## 2021-08-08 PROCEDURE — G1004 CDSM NDSC: HCPCS

## 2021-08-08 PROCEDURE — 85025 COMPLETE CBC W/AUTO DIFF WBC: CPT | Performed by: EMERGENCY MEDICINE

## 2021-08-08 PROCEDURE — 70450 CT HEAD/BRAIN W/O DYE: CPT

## 2021-08-08 PROCEDURE — 36415 COLL VENOUS BLD VENIPUNCTURE: CPT | Performed by: EMERGENCY MEDICINE

## 2021-08-08 PROCEDURE — 99285 EMERGENCY DEPT VISIT HI MDM: CPT

## 2021-08-08 PROCEDURE — 72125 CT NECK SPINE W/O DYE: CPT

## 2021-08-08 PROCEDURE — 96375 TX/PRO/DX INJ NEW DRUG ADDON: CPT

## 2021-08-08 PROCEDURE — 74176 CT ABD & PELVIS W/O CONTRAST: CPT

## 2021-08-08 PROCEDURE — 99285 EMERGENCY DEPT VISIT HI MDM: CPT | Performed by: EMERGENCY MEDICINE

## 2021-08-08 PROCEDURE — 96374 THER/PROPH/DIAG INJ IV PUSH: CPT

## 2021-08-08 PROCEDURE — 80048 BASIC METABOLIC PNL TOTAL CA: CPT | Performed by: EMERGENCY MEDICINE

## 2021-08-08 PROCEDURE — 71045 X-RAY EXAM CHEST 1 VIEW: CPT

## 2021-08-08 RX ORDER — LIDOCAINE 50 MG/G
1 PATCH TOPICAL ONCE
Status: DISCONTINUED | OUTPATIENT
Start: 2021-08-08 | End: 2021-08-08 | Stop reason: HOSPADM

## 2021-08-08 RX ORDER — ONDANSETRON 2 MG/ML
1 INJECTION INTRAMUSCULAR; INTRAVENOUS ONCE
Status: COMPLETED | OUTPATIENT
Start: 2021-08-08 | End: 2021-08-08

## 2021-08-08 RX ORDER — HYDROMORPHONE HCL/PF 1 MG/ML
0.5 SYRINGE (ML) INJECTION ONCE
Status: COMPLETED | OUTPATIENT
Start: 2021-08-08 | End: 2021-08-08

## 2021-08-08 RX ORDER — FENTANYL CITRATE 50 UG/ML
50 INJECTION, SOLUTION INTRAMUSCULAR; INTRAVENOUS ONCE
Status: COMPLETED | OUTPATIENT
Start: 2021-08-08 | End: 2021-08-08

## 2021-08-08 RX ORDER — ONDANSETRON 2 MG/ML
4 INJECTION INTRAMUSCULAR; INTRAVENOUS ONCE
Status: COMPLETED | OUTPATIENT
Start: 2021-08-08 | End: 2021-08-08

## 2021-08-08 RX ORDER — LIDOCAINE 50 MG/G
1 PATCH TOPICAL DAILY
Qty: 30 PATCH | Refills: 0 | Status: SHIPPED | OUTPATIENT
Start: 2021-08-08 | End: 2021-12-08 | Stop reason: ALTCHOICE

## 2021-08-08 RX ADMIN — ONDANSETRON 4 MG: 2 INJECTION INTRAMUSCULAR; INTRAVENOUS at 17:23

## 2021-08-08 RX ADMIN — FENTANYL CITRATE 50 MCG: 50 INJECTION INTRAMUSCULAR; INTRAVENOUS at 17:21

## 2021-08-08 RX ADMIN — LIDOCAINE 1 PATCH: 50 PATCH TOPICAL at 19:59

## 2021-08-08 RX ADMIN — HYDROMORPHONE HYDROCHLORIDE 0.5 MG: 1 INJECTION, SOLUTION INTRAMUSCULAR; INTRAVENOUS; SUBCUTANEOUS at 18:22

## 2021-08-08 NOTE — DISCHARGE INSTRUCTIONS
You have been seen for pain after a fall  Please take Tylenol as needed for discomfort  Return to the emergency department if you develop worsening pain, weakness, lightheadedness or any other symptoms of concern  Please follow up with your PCP by calling the number provided

## 2021-08-08 NOTE — ED PROVIDER NOTES
History  Chief Complaint   Patient presents with    Fall     pt fell from a distance of about 4 feet  landed flat on low back  pt cannot recall if a head strike occured  Deitra Severe is a 73y o  year old female presenting to the [facility] ED after a fall  Patient was standing on four foot ladder doing yard work when she lost her footing and fell backwards  Occured shortly PTA  No prodromal headache, chest pain, dyspnea or lightheadedness  Patient did strike their head, neck and back  Patient did have three transient episodes of LOC and nausea/vomiting since that time  Patient does not take AC/AP medications  At this time patient has nausea and low back/tail bone pain  Low back pain rated as intense, 8/10 and radiating to lower extremities  No weakness in lower extremities or incontinence  Denies chest pain, dyspnea or abdominal pain  Patient has received zofran from EMS JARON Santiago History provided by:  Patient   used: No        Prior to Admission Medications   Prescriptions Last Dose Informant Patient Reported? Taking?    ALPRAZolam (XANAX) 0 25 mg tablet   No No   Sig: Take 1 tablet (0 25 mg total) by mouth 3 (three) times a day as needed for anxiety   Ibuprofen (ADVIL) 200 MG CAPS  Self Yes No   Sig: Take by mouth as needed    famotidine (PEPCID) 10 mg tablet  Self Yes No   Sig: Take 10 mg by mouth 2 (two) times a day as needed for heartburn   fluticasone (FLONASE) 50 mcg/act nasal spray  Self No No   Si SPRAY INTO EACH NOSTRIL 2 (TWO) TIMES A DAY WITH MEALS   Patient taking differently: 1 spray into each nostril as needed    ipratropium (ATROVENT) 0 03 % nasal spray   No No   Si spray into each nostril 3 (three) times a day   levocetirizine (XYZAL) 5 MG tablet   No No   Sig: TAKE 1 TABLET BY MOUTH EVERY DAY IN THE EVENING   omeprazole (PriLOSEC) 20 mg delayed release capsule   No No   Sig: TAKE 1 CAPSULE (20 MG TOTAL) BY MOUTH DAILY BEFORE BREAKFAST   rosuvastatin (CRESTOR) 5 mg tablet   No No   Sig: Take 1 tablet (5 mg total) by mouth daily   sertraline (ZOLOFT) 25 mg tablet   No No   Sig: Take 1 tablet (25 mg total) by mouth daily      Facility-Administered Medications: None       Past Medical History:   Diagnosis Date    Arthritis     Basal cell carcinoma     Colon polyp     Depression with anxiety     Fibrocystic breast disease, unspecified laterality     GERD (gastroesophageal reflux disease)     High cholesterol     Irritable bowel syndrome     Osteopenia        Past Surgical History:   Procedure Laterality Date    BREAST CYST ASPIRATION      one performed-unsure which breast    BREAST EXCISIONAL BIOPSY Bilateral     one on each breast aprox 1980s    COLONOSCOPY      7/13/12, colon polyp biopsied, hemorrhoids - Dr Buddy Garcia right ear  Precancerous   SEPTOPLASTY  05/04/2018    VAGINAL HYSTERECTOMY  1982    prolapse; about age 30       Family History   Problem Relation Age of Onset    Atrial fibrillation Mother     Cancer Mother [de-identified]        Bladder    Breast cancer Mother [de-identified]    COPD Mother     Kidney cancer Mother [de-identified]    Osteoporosis Mother     Transient ischemic attack Mother    [place] Marline Cancer Father         Bladder    Glaucoma Father     Hypertension Father     Coronary artery disease Maternal Grandfather     Coronary artery disease Maternal Aunt     No Known Problems Maternal Grandmother     No Known Problems Paternal Grandmother     No Known Problems Paternal Grandfather     No Known Problems Sister     No Known Problems Daughter     No Known Problems Son     No Known Problems Paternal Aunt     No Known Problems Paternal Aunt     No Known Problems Paternal Aunt      I have reviewed and agree with the history as documented      E-Cigarette/Vaping    E-Cigarette Use Never User      E-Cigarette/Vaping Substances    Nicotine No     THC No     CBD No     Flavoring No     Other No  Unknown No      Social History     Tobacco Use    Smoking status: Never Smoker    Smokeless tobacco: Never Used   Vaping Use    Vaping Use: Never used   Substance Use Topics    Alcohol use: Yes     Comment: rare glass of wine    Drug use: No        Review of Systems   Constitutional: Negative for chills and fever  HENT: Negative for congestion and rhinorrhea  Eyes: Negative for visual disturbance  Respiratory: Negative for cough and shortness of breath  Cardiovascular: Negative for chest pain and leg swelling  Gastrointestinal: Positive for nausea and vomiting  Negative for abdominal pain and diarrhea  Endocrine: Negative for polyuria  Genitourinary: Negative for difficulty urinating, dysuria, flank pain and hematuria  Musculoskeletal: Positive for arthralgias, back pain and neck pain  Skin: Negative for rash  Neurological: Positive for numbness  Negative for dizziness, syncope, weakness, light-headedness and headaches  Psychiatric/Behavioral: Negative for behavioral problems and confusion  All other systems reviewed and are negative  Physical Exam  ED Triage Vitals   Temperature Pulse Respirations Blood Pressure SpO2   08/08/21 1647 08/08/21 1643 08/08/21 1643 08/08/21 1643 08/08/21 1643   98 °F (36 7 °C) 71 17 138/63 97 %      Temp src Heart Rate Source Patient Position - Orthostatic VS BP Location FiO2 (%)   -- 08/08/21 1643 08/08/21 1643 08/08/21 1643 --    Monitor Lying Right arm       Pain Score       08/08/21 1643       8             Orthostatic Vital Signs  Vitals:    08/08/21 1643 08/08/21 1815 08/08/21 1830   BP: 138/63  137/63   Pulse: 71 68 72   Patient Position - Orthostatic VS: Lying         Physical Exam  Vitals and nursing note reviewed  Constitutional:       General: She is not in acute distress  Appearance: Normal appearance  She is well-developed  She is not ill-appearing, toxic-appearing or diaphoretic  HENT:      Head: Normocephalic and atraumatic  Nose: No congestion or rhinorrhea  Eyes:      General:         Right eye: No discharge  Left eye: No discharge  Extraocular Movements: Extraocular movements intact  Pupils: Pupils are equal, round, and reactive to light  Cardiovascular:      Rate and Rhythm: Normal rate and regular rhythm  Pulmonary:      Effort: Pulmonary effort is normal  No accessory muscle usage or respiratory distress  Breath sounds: Normal breath sounds  No stridor  No decreased breath sounds, wheezing, rhonchi or rales  Abdominal:      General: Bowel sounds are normal  There is no distension  Palpations: Abdomen is soft  Tenderness: There is no abdominal tenderness  There is no guarding or rebound  Musculoskeletal:      Right shoulder: No tenderness  Left shoulder: No tenderness  Right upper arm: No tenderness  Left upper arm: No tenderness  Right elbow: Normal range of motion  No tenderness  Left elbow: Normal range of motion  No tenderness  Right forearm: No tenderness  Left forearm: No tenderness  Right wrist: No swelling or tenderness  Left wrist: No swelling or tenderness  Right hand: No swelling, tenderness or bony tenderness  Normal sensation  Left hand: No swelling, tenderness or bony tenderness  Normal sensation  Cervical back: Normal range of motion and neck supple  Tenderness present  No bony tenderness  Thoracic back: No bony tenderness  Lumbar back: Tenderness present  No bony tenderness  Right hip: No deformity or tenderness  Left hip: No deformity or tenderness  Right upper leg: No tenderness  Left upper leg: No tenderness  Right knee: No swelling  No tenderness  Left knee: No swelling  No tenderness  Right lower leg: No tenderness  No edema  Left lower leg: No tenderness  No edema  Right ankle: No tenderness  Left ankle: No tenderness        Right foot: No swelling, deformity or bony tenderness  Left foot: No swelling, deformity or bony tenderness  Skin:     Capillary Refill: Capillary refill takes less than 2 seconds  Findings: No rash  Neurological:      Mental Status: She is alert and oriented to person, place, and time  GCS: GCS eye subscore is 4  GCS verbal subscore is 5  GCS motor subscore is 6  Cranial Nerves: No dysarthria or facial asymmetry  Motor: No weakness  Gait: Gait normal       Comments: Sensation grossly intact throughout  5/5 strength bilateral upper and lower extremities     Psychiatric:         Mood and Affect: Mood normal          Behavior: Behavior normal          ED Medications  Medications   ondansetron (FOR EMS ONLY) (ZOFRAN) 4 mg/2 mL injection 4 mg (0 mg Does not apply Given to EMS 8/8/21 1645)   fentanyl citrate (PF) 100 MCG/2ML 50 mcg (50 mcg Intravenous Given 8/8/21 1721)   ondansetron (ZOFRAN) injection 4 mg (4 mg Intravenous Given 8/8/21 1723)   HYDROmorphone (DILAUDID) injection 0 5 mg (0 5 mg Intravenous Given 8/8/21 1822)       Diagnostic Studies  Results Reviewed     Procedure Component Value Units Date/Time    Basic metabolic panel [[id number]] Collected: 08/08/21 1713    Lab Status: Final result Specimen: Blood from Arm, Left Updated: 08/08/21 1739     Sodium 142 mmol/L      Potassium 3 6 mmol/L      Chloride 108 mmol/L      CO2 26 mmol/L      ANION GAP 8 mmol/L      BUN 24 mg/dL      Creatinine 1 04 mg/dL      Glucose 101 mg/dL      Calcium 9 0 mg/dL      eGFR 55 ml/min/1 73sq m     Narrative:      [place] guidelines for Chronic Kidney Disease (CKD):     Stage 1 with normal or high GFR (GFR > 90 mL/min/1 73 square meters)    Stage 2 Mild CKD (GFR = 60-89 mL/min/1 73 square meters)    Stage 3A Moderate CKD (GFR = 45-59 mL/min/1 73 square meters)    Stage 3B Moderate CKD (GFR = 30-44 mL/min/1 73 square meters)    Stage 4 Severe CKD (GFR = 15-29 mL/min/1 73 square meters)    Stage 5 End Stage CKD (GFR <15 mL/min/1 73 square meters)  Note: GFR calculation is accurate only with a steady state creatinine    CBC and differential [[id number]]  (Abnormal) Collected: 08/08/21 1713    Lab Status: Final result Specimen: Blood from Arm, Left Updated: 08/08/21 1723     WBC 7 82 Thousand/uL      RBC 4 06 Million/uL      Hemoglobin 12 1 g/dL      Hematocrit 36 9 %      MCV 91 fL      MCH 29 8 pg      MCHC 32 8 g/dL      RDW 12 7 %      MPV 11 2 fL      Platelets 365 Thousands/uL      nRBC 0 /100 WBCs      Neutrophils Relative 81 %      Immat GRANS % 1 %      Lymphocytes Relative 9 %      Monocytes Relative 7 %      Eosinophils Relative 1 %      Basophils Relative 1 %      Neutrophils Absolute 6 34 Thousands/µL      Immature Grans Absolute 0 06 Thousand/uL      Lymphocytes Absolute 0 72 Thousands/µL      Monocytes Absolute 0 57 Thousand/µL      Eosinophils Absolute 0 09 Thousand/µL      Basophils Absolute 0 04 Thousands/µL                  CT head without contrast   Final Result by Elisa Montaño MD (08/08 1900)      No acute intracranial abnormality  Workstation performed: [id number]         CT cervical spine without contrast   Final Result by Elisa Montaño MD (08/08 1903)      No cervical spine fracture or traumatic malalignment  Workstation performed: [id number]         CT abdomen pelvis wo contrast   Final Result by Elisa Montaño MD (08/08 1901)   No evidence of acute posttraumatic injury throughout the lower chest, abdomen and pelvis  Workstation performed: [id number]         XR chest 1 view portable    (Results Pending)         Procedures  Procedures      ED Course  ED Course as of Aug 09 0031   Rios Sylvester Aug 08, 2021   1926 Reviewed CTs  Patient ambulatory in the emergency department  Will plan for discharge at this time                                            MDM  Number of Diagnoses or Management Options  Concussion  Fall, initial encounter  Low back pain  Diagnosis management comments:   73 y o  female presenting after a fall  Will order labs and imaging to evaluate for intracranial hemorrhage or acute fracture of the spine or pelvis  Will also treat symptomatically  Reassessment: Patient ambulatory in ED  No weakness/numbness/tingling in extremities  After diagnostic lab testing and imaging, no definitive abnormality was noted that would explain the patient's symptoms  I explained to the patient the relevant test results  I explained to the patient that although no definitive diagnosis could be made today, the possibility exists that it may be too early in the disease process to diagnose serious illness  Reviewed CT and labs results with patient  Likely soft tissue injury  Also discussed possibility of concussion  I have discussed with the patient our plan to discharge them from the ED and the patient is in agreement with this plan  The patient was provided a written after visit summary with strict RTED precautions  Discharge Plan: Symptomatic management, PCP f/u  Followup: I have discussed with the patient plan to follow up with their PCP  Contact information provided in AVS        Amount and/or Complexity of Data Reviewed  Clinical lab tests: ordered and reviewed  Tests in the radiology section of CPT®: ordered and reviewed  Review and summarize past medical records: yes  Independent visualization of images, tracings, or specimens: yes        Disposition  Final diagnoses:   Fall, initial encounter   Concussion   Low back pain     Time reflects when diagnosis was documented in both MDM as applicable and the Disposition within this note     Time User Action Codes Description Comment    8/8/2021  7:19 PM Mars Pa Add [F02  VAVO] Fall, initial encounter     8/8/2021  7:19 PM Mars Pa Add [S06 0X9A] Concussion     8/8/2021  7:19 PM Mars Pa Add [M54 5] Low back pain       ED Disposition     ED Disposition Condition Date/Time Comment    Discharge Stable Sun Aug 8, 2021  7:26 PM Chang Bell discharge to home/self care  Follow-up Information     Follow up With Specialties Details Why [address], DO Internal Medicine Call  To make appointment for reevaluation in 3-5 days  [facility] [address]            Discharge Medication List as of 8/8/2021  7:49 PM      START taking these medications    Details   lidocaine (LIDODERM) 5 % Apply 1 patch topically daily Remove & Discard patch within 12 hours or as directed by MD, Starting Sun 8/8/2021, Print         CONTINUE these medications which have NOT CHANGED    Details   ALPRAZolam (XANAX) 0 25 mg tablet Take 1 tablet (0 25 mg total) by mouth 3 (three) times a day as needed for anxiety, Starting Tue 7/6/2021, Normal      famotidine (PEPCID) 10 mg tablet Take 10 mg by mouth 2 (two) times a day as needed for heartburn, Historical Med      fluticasone (FLONASE) 50 mcg/act nasal spray 1 SPRAY INTO EACH NOSTRIL 2 (TWO) TIMES A DAY WITH MEALS, Starting Wed 7/8/2020, Normal      Ibuprofen (ADVIL) 200 MG CAPS Take by mouth as needed , Historical Med      ipratropium (ATROVENT) 0 03 % nasal spray 1 spray into each nostril 3 (three) times a day, Starting Fri 5/21/2021, Normal      levocetirizine (XYZAL) 5 MG tablet TAKE 1 TABLET BY MOUTH EVERY DAY IN THE EVENING, Normal      omeprazole (PriLOSEC) 20 mg delayed release capsule TAKE 1 CAPSULE (20 MG TOTAL) BY MOUTH DAILY BEFORE BREAKFAST, Starting Wed 5/12/2021, Normal      rosuvastatin (CRESTOR) 5 mg tablet Take 1 tablet (5 mg total) by mouth daily, Starting Fri 6/18/2021, Normal      sertraline (ZOLOFT) 25 mg tablet Take 1 tablet (25 mg total) by mouth daily, Starting Fri 6/18/2021, Normal           No discharge procedures on file  PDMP Review     None           ED Provider  Attending physically available and evaluated Chang Bell   LUCIANO managed the patient along with the ED Attending      Electronically Signed by         Laura Buchanan DO  08/09/21 5673

## 2021-08-08 NOTE — ED ATTENDING ATTESTATION
8/8/2021  Marvel Chopra DO, saw and evaluated the patient  I have discussed the patient with the resident/non-physician practitioner and agree with the resident's/non-physician practitioner's findings, Plan of Care, and MDM as documented in the resident's/non-physician practitioner's note, except where noted  All available labs and Radiology studies were reviewed  I was present for key portions of any procedure(s) performed by the resident/non-physician practitioner and I was immediately available to provide assistance  At this point I agree with the current assessment done in the Emergency Department  I have conducted an independent evaluation of this patient a history and physical is as follows:    70-year-old woman presents for evaluation status post fall from approximately 4 ft step ladder flat onto her lower back  Denies LOC however she is unsure if she struck her head  Complains of back pain, as well as tingling in her fingers in bilateral hands  Denies focal weakness, numbness  Symptoms constant, worse with movement, no other alleviating or exacerbating factors  Moderate severity  Impression:  Fall from height plan:  CT head, C-spine, abdomen pelvis with spine recons, however analgesia, reassess        ED Course         Critical Care Time  Procedures

## 2021-08-11 ENCOUNTER — OFFICE VISIT (OUTPATIENT)
Dept: INTERNAL MEDICINE CLINIC | Facility: CLINIC | Age: 71
End: 2021-08-11
Payer: MEDICARE

## 2021-08-11 VITALS
BODY MASS INDEX: 26.57 KG/M2 | HEART RATE: 71 BPM | DIASTOLIC BLOOD PRESSURE: 80 MMHG | OXYGEN SATURATION: 99 % | SYSTOLIC BLOOD PRESSURE: 135 MMHG | TEMPERATURE: 97.5 F | HEIGHT: 63 IN

## 2021-08-11 DIAGNOSIS — M54.2 NECK PAIN: ICD-10-CM
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
## 2021-08-11 DIAGNOSIS — M54.50 ACUTE MIDLINE LOW BACK PAIN, UNSPECIFIED WHETHER SCIATICA PRESENT: ICD-10-CM

## 2021-08-11 PROBLEM — M54.9 BACK PAIN: Status: ACTIVE | Noted: 2021-08-11
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
## 2021-08-11 PROCEDURE — 1124F ACP DISCUSS-NO DSCNMKR DOCD: CPT | Performed by: GENERAL ACUTE CARE HOSPITAL

## 2021-08-11 PROCEDURE — 99213 OFFICE O/P EST LOW 20 MIN: CPT | Performed by: GENERAL ACUTE CARE HOSPITAL

## 2021-08-11 RX ORDER — CYCLOBENZAPRINE HCL 5 MG
5 TABLET ORAL 3 TIMES DAILY PRN
Qty: 30 TABLET | Refills: 0 | Status: SHIPPED | OUTPATIENT
Start: 2021-08-11 | End: 2021-12-12

## 2021-08-11 NOTE — PROGRESS NOTES
Assessment/Plan:    Fall  Reviewed ER note, image report and lab results  No fracture or acute injury  Back pain  Secondary to fall  No red flag signs  Tylenol, voltaren gel, lidocaine patch, heat or ice, muscle relaxer as needed  F/u in 2 weeks  If symptoms continue will need physical therapy  Neck pain  Secondary to fall  No red flag signs  Tylenol, voltaren gel, lidocaine patch, heat or ice, muscle relaxer as needed  F/u in 2 weeks  If symptoms continue will need physical therapy  Diagnoses and all orders for this visit:    Fall, sequela  -     cyclobenzaprine (FLEXERIL) 5 mg tablet; Take 1 tablet (5 mg total) by mouth 3 (three) times a day as needed for muscle spasms  -     Ambulatory referral to Physical Therapy; Future    Acute midline low back pain, unspecified whether sciatica present    Neck pain          Subjective:      Patient ID: Tatiana Mayorga is a 73 y o  female  HPI    Mane [place] off ladder hid head and back, hit back on a rock  Went to ER 8/8  Had several episodes of LOC n/v    CT of head, cervical, a/p reviewed, CXR, unremarkable  Monday slept most of the day  Yesterday started to take a walk  Uses lidocaine patch  voltaren gel  Tylenol  Felt improvement  lastnight had burning pain in lower back  No more nausea vomiting today  Now still has moderate pain in lower back and neck  The following portions of the patient's history were reviewed and updated as appropriate: allergies, past family history, past social history and past surgical history      Review of Systems      Objective:      /80   Pulse 71   Temp 97 5 °F (36 4 °C) (Temporal)   Ht 5' 3" (1 6 m)   SpO2 99%   BMI 26 57 kg/m²          Physical Exam

## 2021-08-11 NOTE — ASSESSMENT & PLAN NOTE
Secondary to fall  No red flag signs  Tylenol, voltaren gel, lidocaine patch, heat or ice, muscle relaxer as needed  F/u in 2 weeks  If symptoms continue will need physical therapy

## 2021-08-25 ENCOUNTER — HOSPITAL ENCOUNTER (OUTPATIENT)
Dept: ULTRASOUND IMAGING | Facility: CLINIC | Age: 71
Discharge: HOME | End: 2021-08-25
Payer: MEDICARE

## 2021-08-25 ENCOUNTER — HOSPITAL ENCOUNTER (OUTPATIENT)
Dept: MAMMOGRAPHY | Facility: CLINIC | Age: 71
Discharge: HOME | End: 2021-08-25
Payer: MEDICARE

## 2021-08-25 VITALS — HEIGHT: 63 IN | BODY MASS INDEX: 26.58 KG/M2 | WEIGHT: 150 LBS

## 2021-08-25 DIAGNOSIS — N64.89 BREAST ASYMMETRY: ICD-10-CM

## 2021-08-25 PROCEDURE — 76642 ULTRASOUND BREAST LIMITED: CPT

## 2021-08-25 PROCEDURE — G0279 TOMOSYNTHESIS, MAMMO: HCPCS

## 2021-08-25 PROCEDURE — 77065 DX MAMMO INCL CAD UNI: CPT

## 2021-09-27 DIAGNOSIS — E78.5 HYPERLIPIDEMIA, UNSPECIFIED HYPERLIPIDEMIA TYPE: ICD-10-CM

## 2021-09-28 RX ORDER — PRAVASTATIN SODIUM 40 MG
40 TABLET ORAL
Qty: 90 TABLET | Refills: 3 | Status: SHIPPED | OUTPATIENT
Start: 2021-09-28 | End: 2021-11-09 | Stop reason: ALTCHOICE

## 2021-09-28 NOTE — TELEPHONE ENCOUNTER
Would recommend changing to pravastatin 40mg/day  Pravastatin has limited drug interactions and is mod intensity statin (similar to atorva and rosuva intensity she was on)  No previous trial documented with pravastatin  Atorvastatin order changed to Pravastatin; pended for PCP signature/concurrence

## 2021-10-02 ENCOUNTER — IMMUNIZATIONS (OUTPATIENT)
Dept: INTERNAL MEDICINE CLINIC | Facility: CLINIC | Age: 71
End: 2021-10-02
Payer: MEDICARE

## 2021-10-02 DIAGNOSIS — Z23 ENCOUNTER FOR IMMUNIZATION: Primary | ICD-10-CM

## 2021-10-02 PROCEDURE — G0008 ADMIN INFLUENZA VIRUS VAC: HCPCS | Performed by: INTERNAL MEDICINE

## 2021-10-02 PROCEDURE — 90662 IIV NO PRSV INCREASED AG IM: CPT | Performed by: INTERNAL MEDICINE

## 2021-11-05 ENCOUNTER — PATIENT MESSAGE (OUTPATIENT)
Dept: INTERNAL MEDICINE CLINIC | Facility: CLINIC | Age: 71
End: 2021-11-05

## 2021-11-05 DIAGNOSIS — K21.9 GASTROESOPHAGEAL REFLUX DISEASE WITHOUT ESOPHAGITIS: ICD-10-CM

## 2021-11-05 RX ORDER — OMEPRAZOLE 20 MG/1
20 CAPSULE, DELAYED RELEASE ORAL
Qty: 90 CAPSULE | Refills: 1 | Status: SHIPPED | OUTPATIENT
Start: 2021-11-05

## 2021-11-09 DIAGNOSIS — E78.2 MIXED HYPERLIPIDEMIA: Primary | ICD-10-CM

## 2021-11-09 RX ORDER — ROSUVASTATIN CALCIUM 5 MG/1
5 TABLET, COATED ORAL DAILY
Qty: 90 TABLET | Refills: 3 | Status: SHIPPED | OUTPATIENT
Start: 2021-11-09

## 2021-11-18 ENCOUNTER — TELEPHONE (OUTPATIENT)
Dept: OTHER | Facility: OTHER | Age: 71
End: 2021-11-18

## 2021-12-06 ENCOUNTER — APPOINTMENT (OUTPATIENT)
Dept: LAB | Facility: HOSPITAL | Age: 71
End: 2021-12-06
Payer: MEDICARE

## 2021-12-06 DIAGNOSIS — R89.9 ABNORMAL LABORATORY TEST: ICD-10-CM

## 2021-12-06 PROCEDURE — 84166 PROTEIN E-PHORESIS/URINE/CSF: CPT | Performed by: PATHOLOGY

## 2021-12-06 PROCEDURE — 36415 COLL VENOUS BLD VENIPUNCTURE: CPT

## 2021-12-06 PROCEDURE — 84165 PROTEIN E-PHORESIS SERUM: CPT

## 2021-12-06 PROCEDURE — 84166 PROTEIN E-PHORESIS/URINE/CSF: CPT | Performed by: INTERNAL MEDICINE

## 2021-12-06 PROCEDURE — 84165 PROTEIN E-PHORESIS SERUM: CPT | Performed by: PATHOLOGY

## 2021-12-07 LAB
ALBUMIN SERPL ELPH-MCNC: 4.41 G/DL (ref 3.5–5)
ALBUMIN SERPL ELPH-MCNC: 59.6 % (ref 52–65)
ALBUMIN UR ELPH-MCNC: 100 %
ALPHA1 GLOB MFR UR ELPH: 0 %
ALPHA1 GLOB SERPL ELPH-MCNC: 0.3 G/DL (ref 0.1–0.4)
ALPHA1 GLOB SERPL ELPH-MCNC: 4.1 % (ref 2.5–5)
ALPHA2 GLOB MFR UR ELPH: 0 %
ALPHA2 GLOB SERPL ELPH-MCNC: 0.84 G/DL (ref 0.4–1.2)
ALPHA2 GLOB SERPL ELPH-MCNC: 11.4 % (ref 7–13)
B-GLOBULIN MFR UR ELPH: 0 %
BETA GLOB ABNORMAL SERPL ELPH-MCNC: 0.44 G/DL (ref 0.4–0.8)
BETA1 GLOB SERPL ELPH-MCNC: 5.9 % (ref 5–13)
BETA2 GLOB SERPL ELPH-MCNC: 4.5 % (ref 2–8)
BETA2+GAMMA GLOB SERPL ELPH-MCNC: 0.33 G/DL (ref 0.2–0.5)
GAMMA GLOB ABNORMAL SERPL ELPH-MCNC: 1.07 G/DL (ref 0.5–1.6)
GAMMA GLOB MFR UR ELPH: 0 %
GAMMA GLOB SERPL ELPH-MCNC: 14.5 % (ref 12–22)
IGG/ALB SER: 1.48 {RATIO} (ref 1.1–1.8)
PROT PATTERN SERPL ELPH-IMP: NORMAL
PROT PATTERN UR ELPH-IMP: NORMAL
PROT SERPL-MCNC: 7.4 G/DL (ref 6.4–8.2)
PROT UR-MCNC: 10 MG/DL

## 2021-12-08 ENCOUNTER — OFFICE VISIT (OUTPATIENT)
Dept: OCCUPATIONAL THERAPY | Facility: HOSPITAL | Age: 71
End: 2021-12-08
Payer: MEDICARE

## 2021-12-08 ENCOUNTER — OFFICE VISIT (OUTPATIENT)
Dept: INTERNAL MEDICINE CLINIC | Facility: CLINIC | Age: 71
End: 2021-12-08
Payer: MEDICARE

## 2021-12-08 ENCOUNTER — OFFICE VISIT (OUTPATIENT)
Dept: OBGYN CLINIC | Facility: HOSPITAL | Age: 71
End: 2021-12-08
Payer: MEDICARE

## 2021-12-08 VITALS
OXYGEN SATURATION: 97 % | HEART RATE: 79 BPM | HEIGHT: 62 IN | SYSTOLIC BLOOD PRESSURE: 132 MMHG | DIASTOLIC BLOOD PRESSURE: 84 MMHG | BODY MASS INDEX: 28.37 KG/M2 | WEIGHT: 154.2 LBS | RESPIRATION RATE: 16 BRPM

## 2021-12-08 VITALS — HEIGHT: 64 IN | BODY MASS INDEX: 26.15 KG/M2

## 2021-12-08 DIAGNOSIS — M77.11 LATERAL EPICONDYLITIS OF RIGHT ELBOW: Primary | ICD-10-CM

## 2021-12-08 DIAGNOSIS — E78.2 MIXED HYPERLIPIDEMIA: ICD-10-CM

## 2021-12-08 DIAGNOSIS — M18.12 ARTHRITIS OF CARPOMETACARPAL (CMC) JOINT OF LEFT THUMB: ICD-10-CM

## 2021-12-08 DIAGNOSIS — R73.01 IMPAIRED FASTING BLOOD SUGAR: ICD-10-CM

## 2021-12-08 DIAGNOSIS — M65.332 TRIGGER MIDDLE FINGER OF LEFT HAND: ICD-10-CM

## 2021-12-08 DIAGNOSIS — N18.31 STAGE 3A CHRONIC KIDNEY DISEASE (HCC): ICD-10-CM

## 2021-12-08 DIAGNOSIS — Z00.00 MEDICARE ANNUAL WELLNESS VISIT, SUBSEQUENT: Primary | ICD-10-CM

## 2021-12-08 DIAGNOSIS — M25.521 RIGHT ELBOW PAIN: Primary | ICD-10-CM

## 2021-12-08 PROCEDURE — G0439 PPPS, SUBSEQ VISIT: HCPCS | Performed by: INTERNAL MEDICINE

## 2021-12-08 PROCEDURE — 20600 DRAIN/INJ JOINT/BURSA W/O US: CPT | Performed by: ORTHOPAEDIC SURGERY

## 2021-12-08 PROCEDURE — L3906 WHO W/O JOINTS CF: HCPCS

## 2021-12-08 PROCEDURE — 99213 OFFICE O/P EST LOW 20 MIN: CPT | Performed by: INTERNAL MEDICINE

## 2021-12-08 PROCEDURE — 20550 NJX 1 TENDON SHEATH/LIGAMENT: CPT | Performed by: ORTHOPAEDIC SURGERY

## 2021-12-08 PROCEDURE — 99214 OFFICE O/P EST MOD 30 MIN: CPT | Performed by: ORTHOPAEDIC SURGERY

## 2021-12-08 RX ORDER — LIDOCAINE HYDROCHLORIDE 10 MG/ML
1 INJECTION, SOLUTION INFILTRATION; PERINEURAL
Status: COMPLETED | OUTPATIENT
Start: 2021-12-08 | End: 2021-12-08

## 2021-12-08 RX ORDER — BETAMETHASONE SODIUM PHOSPHATE AND BETAMETHASONE ACETATE 3; 3 MG/ML; MG/ML
3 INJECTION, SUSPENSION INTRA-ARTICULAR; INTRALESIONAL; INTRAMUSCULAR; SOFT TISSUE
Status: COMPLETED | OUTPATIENT
Start: 2021-12-08 | End: 2021-12-08

## 2021-12-08 RX ORDER — LIDOCAINE HYDROCHLORIDE 10 MG/ML
0.5 INJECTION, SOLUTION INFILTRATION; PERINEURAL
Status: COMPLETED | OUTPATIENT
Start: 2021-12-08 | End: 2021-12-08

## 2021-12-08 RX ORDER — BETAMETHASONE SODIUM PHOSPHATE AND BETAMETHASONE ACETATE 3; 3 MG/ML; MG/ML
6 INJECTION, SUSPENSION INTRA-ARTICULAR; INTRALESIONAL; INTRAMUSCULAR; SOFT TISSUE
Status: COMPLETED | OUTPATIENT
Start: 2021-12-08 | End: 2021-12-08

## 2021-12-08 RX ADMIN — BETAMETHASONE SODIUM PHOSPHATE AND BETAMETHASONE ACETATE 3 MG: 3; 3 INJECTION, SUSPENSION INTRA-ARTICULAR; INTRALESIONAL; INTRAMUSCULAR; SOFT TISSUE at 10:37

## 2021-12-08 RX ADMIN — BETAMETHASONE SODIUM PHOSPHATE AND BETAMETHASONE ACETATE 6 MG: 3; 3 INJECTION, SUSPENSION INTRA-ARTICULAR; INTRALESIONAL; INTRAMUSCULAR; SOFT TISSUE at 10:37

## 2021-12-08 RX ADMIN — LIDOCAINE HYDROCHLORIDE 1 ML: 10 INJECTION, SOLUTION INFILTRATION; PERINEURAL at 10:37

## 2021-12-08 RX ADMIN — LIDOCAINE HYDROCHLORIDE 0.5 ML: 10 INJECTION, SOLUTION INFILTRATION; PERINEURAL at 10:37

## 2021-12-10 DIAGNOSIS — F41.8 DEPRESSION WITH ANXIETY: ICD-10-CM

## 2021-12-10 RX ORDER — SERTRALINE HYDROCHLORIDE 25 MG/1
TABLET, FILM COATED ORAL
Qty: 90 TABLET | Refills: 1 | Status: SHIPPED | OUTPATIENT
Start: 2021-12-10 | End: 2022-06-11

## 2021-12-12 PROBLEM — Z00.00 MEDICARE ANNUAL WELLNESS VISIT, SUBSEQUENT: Status: ACTIVE | Noted: 2021-12-12

## 2021-12-12 PROBLEM — N18.31 STAGE 3A CHRONIC KIDNEY DISEASE (HCC): Status: ACTIVE | Noted: 2021-12-12

## 2021-12-13 ENCOUNTER — EVALUATION (OUTPATIENT)
Dept: OCCUPATIONAL THERAPY | Age: 71
End: 2021-12-13
Payer: MEDICARE

## 2021-12-13 DIAGNOSIS — M25.521 RIGHT ELBOW PAIN: Primary | ICD-10-CM

## 2021-12-13 DIAGNOSIS — M77.11 LATERAL EPICONDYLITIS OF RIGHT ELBOW: ICD-10-CM

## 2021-12-13 PROCEDURE — 97140 MANUAL THERAPY 1/> REGIONS: CPT

## 2021-12-13 PROCEDURE — 97165 OT EVAL LOW COMPLEX 30 MIN: CPT

## 2021-12-17 ENCOUNTER — OFFICE VISIT (OUTPATIENT)
Dept: OCCUPATIONAL THERAPY | Age: 71
End: 2021-12-17
Payer: MEDICARE

## 2021-12-17 DIAGNOSIS — M77.11 LATERAL EPICONDYLITIS OF RIGHT ELBOW: ICD-10-CM

## 2021-12-17 DIAGNOSIS — M25.521 RIGHT ELBOW PAIN: Primary | ICD-10-CM

## 2021-12-17 PROCEDURE — 97110 THERAPEUTIC EXERCISES: CPT

## 2021-12-17 PROCEDURE — 97140 MANUAL THERAPY 1/> REGIONS: CPT

## 2021-12-20 ENCOUNTER — PATIENT MESSAGE (OUTPATIENT)
Dept: INTERNAL MEDICINE CLINIC | Facility: CLINIC | Age: 71
End: 2021-12-20

## 2021-12-20 ENCOUNTER — OFFICE VISIT (OUTPATIENT)
Dept: OCCUPATIONAL THERAPY | Age: 71
End: 2021-12-20
Payer: MEDICARE

## 2021-12-20 DIAGNOSIS — M25.521 RIGHT ELBOW PAIN: ICD-10-CM

## 2021-12-20 DIAGNOSIS — M77.11 LATERAL EPICONDYLITIS OF RIGHT ELBOW: Primary | ICD-10-CM

## 2021-12-20 DIAGNOSIS — F41.8 DEPRESSION WITH ANXIETY: ICD-10-CM

## 2021-12-20 PROCEDURE — 97140 MANUAL THERAPY 1/> REGIONS: CPT

## 2021-12-20 PROCEDURE — 97110 THERAPEUTIC EXERCISES: CPT

## 2021-12-20 RX ORDER — ALPRAZOLAM 0.25 MG/1
0.25 TABLET ORAL 3 TIMES DAILY PRN
Qty: 30 TABLET | Refills: 0 | Status: SHIPPED | OUTPATIENT
Start: 2021-12-20 | End: 2022-02-25 | Stop reason: SDUPTHER

## 2021-12-23 ENCOUNTER — OFFICE VISIT (OUTPATIENT)
Dept: OCCUPATIONAL THERAPY | Age: 71
End: 2021-12-23
Payer: MEDICARE

## 2021-12-23 DIAGNOSIS — M25.521 RIGHT ELBOW PAIN: ICD-10-CM

## 2021-12-23 DIAGNOSIS — M77.11 LATERAL EPICONDYLITIS OF RIGHT ELBOW: Primary | ICD-10-CM

## 2021-12-23 PROCEDURE — 97140 MANUAL THERAPY 1/> REGIONS: CPT

## 2021-12-23 PROCEDURE — 97110 THERAPEUTIC EXERCISES: CPT

## 2021-12-27 ENCOUNTER — OFFICE VISIT (OUTPATIENT)
Dept: OCCUPATIONAL THERAPY | Age: 71
End: 2021-12-27
Payer: MEDICARE

## 2021-12-27 DIAGNOSIS — M25.521 RIGHT ELBOW PAIN: ICD-10-CM

## 2021-12-27 DIAGNOSIS — M77.11 LATERAL EPICONDYLITIS OF RIGHT ELBOW: Primary | ICD-10-CM

## 2021-12-27 PROCEDURE — 97110 THERAPEUTIC EXERCISES: CPT

## 2021-12-27 PROCEDURE — 97140 MANUAL THERAPY 1/> REGIONS: CPT

## 2021-12-30 ENCOUNTER — OFFICE VISIT (OUTPATIENT)
Dept: PHYSICAL THERAPY | Age: 71
End: 2021-12-30
Payer: MEDICARE

## 2021-12-30 DIAGNOSIS — M77.11 LATERAL EPICONDYLITIS OF RIGHT ELBOW: ICD-10-CM

## 2021-12-30 DIAGNOSIS — M25.521 RIGHT ELBOW PAIN: Primary | ICD-10-CM

## 2021-12-30 PROCEDURE — 97140 MANUAL THERAPY 1/> REGIONS: CPT | Performed by: PHYSICAL THERAPIST

## 2021-12-30 PROCEDURE — 97110 THERAPEUTIC EXERCISES: CPT | Performed by: PHYSICAL THERAPIST

## 2022-01-04 ENCOUNTER — OFFICE VISIT (OUTPATIENT)
Dept: OCCUPATIONAL THERAPY | Age: 72
End: 2022-01-04
Payer: MEDICARE

## 2022-01-04 DIAGNOSIS — M25.521 RIGHT ELBOW PAIN: Primary | ICD-10-CM

## 2022-01-04 DIAGNOSIS — M77.11 LATERAL EPICONDYLITIS OF RIGHT ELBOW: ICD-10-CM

## 2022-01-04 PROCEDURE — 97140 MANUAL THERAPY 1/> REGIONS: CPT

## 2022-01-04 PROCEDURE — 97110 THERAPEUTIC EXERCISES: CPT

## 2022-01-04 NOTE — PROGRESS NOTES
Daily Note     Today's date: 2022  Patient name: Jose Iqbal  : 1950  MRN: [id number]  Referring provider: Pedro Booker MD  Dx:   Encounter Diagnosis     ICD-10-CM    1  Right elbow pain  M25 521    2  Lateral epicondylitis of right elbow  M77 11                   Subjective: It's been so sore, but it's more acute at my elbow      Objective: See treatment diary below      Assessment: Tolerated treatment well  Patient would benefit from continued OT  Tightness along triceps insertion  RE NV to include shoulder and neck  Plan: Continue per plan of care  Precautions: Universal      Manuals        STM 8' 8' 10' 10' 10' 10'       KT Auther Hinders Pattern  Auther Hinders Pattern NP D/C                                   Neuro Re-Ed                                                                                                        Ther Ex             MWM             WE Eccentrics   1# x15 1# x15         Pronation Eccentrics   NIKE x15 Blue Hammer x15 Blue Hammer NIKE x15       Digit Eccentrics              Isometrics   Supinated BFB 10x10 sec hold Supinter BFB 10x10 sec hold Supinated 10x 10 sec hold GFB Supinated 10x10 sec GFB       Triceps    Ext YTB 2x10 Ext YTB 2x10 Ext   YTB 2x10       ER      YTB 2x10       HEP: Nirschls, WE and digit Eccentrics, has WHO for at night Reveiwed            Ther Activity                                       Gait Training                                       Modalities             [facility]

## 2022-01-06 ENCOUNTER — EVALUATION (OUTPATIENT)
Dept: OCCUPATIONAL THERAPY | Age: 72
End: 2022-01-06
Payer: MEDICARE

## 2022-01-06 DIAGNOSIS — M25.521 RIGHT ELBOW PAIN: ICD-10-CM

## 2022-01-06 DIAGNOSIS — M77.11 LATERAL EPICONDYLITIS OF RIGHT ELBOW: Primary | ICD-10-CM

## 2022-01-06 PROCEDURE — 97110 THERAPEUTIC EXERCISES: CPT

## 2022-01-06 PROCEDURE — 97140 MANUAL THERAPY 1/> REGIONS: CPT

## 2022-01-06 NOTE — PROGRESS NOTES
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 and has had worsening elbow pain ever since  She would benefit from therapy 1-2x per week for the next 6-8 weeks to improve her pain and strength to allow her to meet her goals and return to ADL/IADLs      : [facility] presents with continued lateral elbow pain with improved strength  However, at this time [facility] is experiencing neck and shoulder pain with pain with palpation of her biceps origin, scalenes, levators and upper traps  She has pain with ROM and decreased strength  Recommend continued therapy which should also include her neck and shoulder, 2x per week for 6-8 weeks  Impairments: activity intolerance, impaired physical strength, lacks appropriate home exercise program and pain with function    Goals  STG 1: Decrease overall pain to 3/10 in 4-6 weeks  PROGRESSING  STG 2: Increase overall strength by 10% in 4-6 weeks  PROGRESSING  STG 3: Compliant with HEP in 2 weeks  MET    LTG 1: Complete all ADL/IADLs improved to prior level of function within 6-8 weeks  LTG 2: Leisure/social skills improved within 6-8 weeks  LTG 3: Work skills improved to prior level of function within 6-8 weeks    Patient Goal: To have less pain    Goals, plan of care and treatment condition discussed with patient  Patient expresses their understanding and questions regarding these isues were addressed        Plan  Patient would benefit from: custom splinting, skilled occupational therapy and OT eval  Planned modality interventions: thermotherapy: hydrocollator packs, thermotherapy: paraffin bath and fluidotherapy  Planned therapy interventions: IADL retraining, joint mobilization, manual therapy, Lopez taping, home exercise program, graded exercise, graded activity, functional ROM exercises, fine motor coordination training, therapeutic activities and therapeutic exercise  Frequency: 2x week  Treatment plan discussed with: patient        Subjective Evaluation    History of Present Illness  Onset date: a few months  Mechanism of injury: Soila Mackey began learning for the [employer] and started to note L elbow pain  She reports this has worsened over the last few months  Pain  Current pain ratin  At best pain ratin  At worst pain ratin  Location: Elbow and Shoulder  Quality: dull ache and burning (Stabbing)    Hand dominance: left          Objective     Palpation     Right   Tenderness of the biceps, infraspinatus, levator scapulae, supraspinatus and triceps  Trigger point to biceps, infraspinatus, levator scapulae, supraspinatus and triceps  Active Range of Motion   Cervical/Thoracic Spine       Cervical    Right rotation: 40 degrees           Right Shoulder   Flexion: WFL  Abduction: 135 degrees   External rotation 90°: WFL and with pain  Internal rotation 90°: WFL    Right Elbow   Flexion: WFL  Extension: WFL    Right Wrist   Wrist flexion: WFL and with pain  Wrist extension: [facility]    Strength/Myotome Testing     Right Shoulder     Planes of Motion   Flexion: 4   Abduction: 4     Right Elbow   Flexion: 5  Extension: 5    Left Wrist/Hand      (2nd hand position)     Trial 1: 56 2    Right Wrist/Hand   Wrist extension: 4  Wrist flexion: 5     (2nd hand position)     Trial 1: 42 7    Additional Strength Details  Pronated  R: 41 3    Supinated  R: 41 7    Supinated gripping causes increased discomfort post activity     Tests     Right Shoulder   Positive empty can  Right Elbow   Positive Cozen's  Right Wrist/Hand   Negative resisted middle finger  General Comments:      Wrist/Hand Comments  Cannot actively flex R SF DIP and PIP secondary to tendon injury as a kid             Precautions: Universal      Manuals 12/13 1/6           STM 8' 25'           KT Manpreet Ramires Pattern                                       Neuro Re-Ed                                                                                                        Ther Ex             MWM             WE Eccentrics             Pronation Eccentrics             Digit Eccentrics                                                    HEP: Nirschls, WE and digit Eccentrics, has WHO for at night Reveiwed Scalenes, Levators, Upper Trap Stretches           Ther Activity                                       Gait Training                                       Modalities             P

## 2022-01-10 ENCOUNTER — OFFICE VISIT (OUTPATIENT)
Dept: OCCUPATIONAL THERAPY | Age: 72
End: 2022-01-10
Payer: MEDICARE

## 2022-01-10 DIAGNOSIS — M25.521 RIGHT ELBOW PAIN: ICD-10-CM

## 2022-01-10 DIAGNOSIS — M77.11 LATERAL EPICONDYLITIS OF RIGHT ELBOW: Primary | ICD-10-CM

## 2022-01-10 PROCEDURE — 97140 MANUAL THERAPY 1/> REGIONS: CPT

## 2022-01-10 NOTE — PROGRESS NOTES
Daily Note     Today's date: 1/10/2022  Patient name: Bulmaro Jay  : 1950  MRN: [id number]  Referring provider: Petros Baeza MD  Dx:   Encounter Diagnosis     ICD-10-CM    1  Lateral epicondylitis of right elbow  M77 11    2  Right elbow pain  M25 521                   Subjective: I think it might be a little better      Objective: See treatment diary below      Assessment: Tolerated treatment well  Patient would benefit from continued OT      Plan: Continue per plan of care        Precautions: Universal      Manuals 12/13 1/6 1/10          STM 8' 25' 40'          KT Meka Peed Pattern                                       Neuro Re-Ed                                                                                                        Ther Ex             MWM             WE Eccentrics             Pronation Eccentrics             Digit Eccentrics             Pec Stretch   5' self, 3' therapist assist                                    HEP: Nirschls, WE and digit Eccentrics, has WHO for at night Reveiwed Scalenes, Levators, Upper Trap Stretches           Ther Activity                                       Gait Training                                       Modalities             P   10'

## 2022-01-14 ENCOUNTER — OFFICE VISIT (OUTPATIENT)
Dept: OCCUPATIONAL THERAPY | Age: 72
End: 2022-01-14
Payer: MEDICARE

## 2022-01-14 DIAGNOSIS — M77.11 LATERAL EPICONDYLITIS OF RIGHT ELBOW: Primary | ICD-10-CM

## 2022-01-14 DIAGNOSIS — M25.521 RIGHT ELBOW PAIN: ICD-10-CM

## 2022-01-14 PROCEDURE — 97140 MANUAL THERAPY 1/> REGIONS: CPT

## 2022-01-14 PROCEDURE — 97110 THERAPEUTIC EXERCISES: CPT

## 2022-01-14 NOTE — PROGRESS NOTES
Daily Note     Today's date: 2022  Patient name: Taqueria Miller  : 1950  MRN: [id number]  Referring provider: Vanessa Weeks MD  Dx:   Encounter Diagnosis     ICD-10-CM    1  Lateral epicondylitis of right elbow  M77 11    2  Right elbow pain  M25 521                   Subjective: I think it might be a little better      Objective: See treatment diary below      Assessment: Tolerated treatment well  Patient would benefit from continued OT      Plan: Continue per plan of care        Precautions: Universal      Manuals 12/13 1/6 1/10 1/14         STM 8' 25' 40' 30'         KT Lizette Pattern                                       Neuro Re-Ed                                                                                                        Ther Ex             MWM             WE Eccentrics             Pronation Eccentrics             Digit Eccentrics             Pec Stretch   5' self, 3' therapist assist 5' self, 3 therapist assist                                   HEP: Nirschls, WE and digit Eccentrics, has WHO for at night Reveiwed Scalenes, Levators, Upper Trap Stretches           Ther Activity                                       Gait Training                                       Modalities             [facility]   10'

## 2022-01-17 ENCOUNTER — APPOINTMENT (OUTPATIENT)
Dept: OCCUPATIONAL THERAPY | Age: 72
End: 2022-01-17
Payer: MEDICARE

## 2022-01-20 ENCOUNTER — OFFICE VISIT (OUTPATIENT)
Dept: OCCUPATIONAL THERAPY | Age: 72
End: 2022-01-20
Payer: MEDICARE

## 2022-01-20 DIAGNOSIS — M25.521 RIGHT ELBOW PAIN: Primary | ICD-10-CM

## 2022-01-20 DIAGNOSIS — M77.11 LATERAL EPICONDYLITIS OF RIGHT ELBOW: ICD-10-CM

## 2022-01-20 PROCEDURE — 97140 MANUAL THERAPY 1/> REGIONS: CPT

## 2022-01-20 PROCEDURE — 97110 THERAPEUTIC EXERCISES: CPT

## 2022-01-20 NOTE — PROGRESS NOTES
Daily Note     Today's date: 2022  Patient name: Autumn Fong  : 1950  MRN: [id number]  Referring provider: Romana Salgado MD  Dx:   Encounter Diagnosis     ICD-10-CM    1  Right elbow pain  M25 521    2  Lateral epicondylitis of right elbow  M77 11                   Subjective: I really killed it the other day      Objective: See treatment diary below      Assessment: Tolerated treatment well  Patient would benefit from continued OT  Doing well, upper traps decreasing in tightness, noted muscle knots in ECRB/L today  Plan: Continue per plan of care        Precautions: Universal      Manuals 12/13 1/6 1/10 1/14 1/20        STM 8' 25' 40' 30' 30'        KT Lizette Pattern                                       Neuro Re-Ed                                                                                                        Ther Ex             MWM             WE Eccentrics             Pronation Eccentrics             Digit Eccentrics             Pec Stretch   5' self, 3' therapist assist 5' self, 3 therapist assist 5' self, 3' therapist assist                                  HEP: Nirschls, WE and digit Eccentrics, has WHO for at night Reveiwed Scalenes, Levators, Upper Trap Stretches           Ther Activity                                       Gait Training                                       Modalities             [facility]   10'

## 2022-01-24 ENCOUNTER — OFFICE VISIT (OUTPATIENT)
Dept: OCCUPATIONAL THERAPY | Age: 72
End: 2022-01-24
Payer: MEDICARE

## 2022-01-24 DIAGNOSIS — M77.11 LATERAL EPICONDYLITIS OF RIGHT ELBOW: ICD-10-CM

## 2022-01-24 DIAGNOSIS — M25.521 RIGHT ELBOW PAIN: Primary | ICD-10-CM

## 2022-01-24 PROCEDURE — 97110 THERAPEUTIC EXERCISES: CPT

## 2022-01-24 PROCEDURE — 97140 MANUAL THERAPY 1/> REGIONS: CPT

## 2022-01-24 NOTE — PROGRESS NOTES
Daily Note     Today's date: 2022  Patient name: Patsy Donohue  : 1950  MRN: [id number]  Referring provider: Priscilla Saenz MD  Dx:   Encounter Diagnosis     ICD-10-CM    1  Right elbow pain  M25 521    2  Lateral epicondylitis of right elbow  M77 11                   Subjective: The elbow felt like a toothache yesterday      Objective: See treatment diary below      Assessment: Tolerated treatment well  Patient would benefit from continued OT  Doing well, noted scalene tightness and one trigger point in ECRB/L, Improving well, plan to upgrade to strengthening NV  Plan: Continue per plan of care        Precautions: Universal      Manuals 12/13 1/6 1/10 1/14 1/20 1/24       STM 8' 25' 40' 30' 30' 30'       KT Lizette Pattern                                       Neuro Re-Ed                                                                                                        Ther Ex             MWM             WE Eccentrics             Pronation Eccentrics             Digit Eccentrics             Pec Stretch   5' self, 3' therapist assist 5' self, 3 therapist assist 5' self, 3' therapist assist 5' self, 3' therapist assist                                 HEP: Nirschls, WE and digit Eccentrics, has WHO for at night Reveiwed Scalenes, Levators, Upper Trap Stretches           Ther Activity                                       Gait Training                                       Modalities             P   10'

## 2022-01-27 ENCOUNTER — OFFICE VISIT (OUTPATIENT)
Dept: OCCUPATIONAL THERAPY | Age: 72
End: 2022-01-27
Payer: MEDICARE

## 2022-01-27 DIAGNOSIS — M25.521 RIGHT ELBOW PAIN: Primary | ICD-10-CM

## 2022-01-27 DIAGNOSIS — M77.11 LATERAL EPICONDYLITIS OF RIGHT ELBOW: ICD-10-CM

## 2022-01-27 PROCEDURE — 97140 MANUAL THERAPY 1/> REGIONS: CPT

## 2022-01-27 NOTE — PROGRESS NOTES
Daily Note     Today's date: 2022  Patient name: Autumn Fong  : 1950  MRN: [id number]  Referring provider: Romana Salgado MD  Dx:   Encounter Diagnosis     ICD-10-CM    1  Right elbow pain  M25 521    2  Lateral epicondylitis of right elbow  M77 11                   Subjective: I had a hard time sleeping on Tuesday it was really bothering me      Objective: See treatment diary below      Assessment: Tolerated treatment well  Patient would benefit from continued OT  Focused on manuals secondary to increased pain  Pain noted in proximal biceps    Plan: Continue per plan of care        Precautions: Universal      Manuals 12/13 1/6 1/10 1/14 1/20 1/24 1/27      STM 8' 25' 40' 30' 30' 30' 40'      KT Lizette Pattern                                       Neuro Re-Ed                                                                                                        Ther Ex             MWM             WE Eccentrics             Pronation Eccentrics             Digit Eccentrics             Pec Stretch   5' self, 3' therapist assist 5' self, 3 therapist assist 5' self, 3' therapist assist 5' self, 3' therapist assist                                 HEP: Nirschls, WE and digit Eccentrics, has WHO for at night Reveiwed Scalenes, Levators, Upper Trap Stretches           Ther Activity                                       Gait Training                                       Modalities             [facility]   10'

## 2022-02-01 ENCOUNTER — APPOINTMENT (OUTPATIENT)
Dept: OCCUPATIONAL THERAPY | Age: 72
End: 2022-02-01
Payer: MEDICARE

## 2022-02-04 ENCOUNTER — OFFICE VISIT (OUTPATIENT)
Dept: OBGYN CLINIC | Facility: HOSPITAL | Age: 72
End: 2022-02-04
Payer: MEDICARE

## 2022-02-04 VITALS
DIASTOLIC BLOOD PRESSURE: 82 MMHG | SYSTOLIC BLOOD PRESSURE: 123 MMHG | BODY MASS INDEX: 28.2 KG/M2 | HEART RATE: 60 BPM | HEIGHT: 62 IN

## 2022-02-04 DIAGNOSIS — M77.11 LATERAL EPICONDYLITIS OF RIGHT ELBOW: ICD-10-CM

## 2022-02-04 DIAGNOSIS — M18.12 ARTHRITIS OF CARPOMETACARPAL (CMC) JOINT OF LEFT THUMB: ICD-10-CM

## 2022-02-04 DIAGNOSIS — M65.332 TRIGGER MIDDLE FINGER OF LEFT HAND: ICD-10-CM

## 2022-02-04 DIAGNOSIS — M25.511 ACUTE PAIN OF RIGHT SHOULDER: ICD-10-CM

## 2022-02-04 DIAGNOSIS — M18.11 ARTHRITIS OF CARPOMETACARPAL (CMC) JOINT OF RIGHT THUMB: Primary | ICD-10-CM

## 2022-02-04 PROCEDURE — 99214 OFFICE O/P EST MOD 30 MIN: CPT | Performed by: ORTHOPAEDIC SURGERY

## 2022-02-04 PROCEDURE — 20600 DRAIN/INJ JOINT/BURSA W/O US: CPT | Performed by: ORTHOPAEDIC SURGERY

## 2022-02-04 RX ORDER — LIDOCAINE HYDROCHLORIDE 10 MG/ML
0.5 INJECTION, SOLUTION INFILTRATION; PERINEURAL
Status: COMPLETED | OUTPATIENT
Start: 2022-02-04 | End: 2022-02-04

## 2022-02-04 RX ORDER — BETAMETHASONE SODIUM PHOSPHATE AND BETAMETHASONE ACETATE 3; 3 MG/ML; MG/ML
3 INJECTION, SUSPENSION INTRA-ARTICULAR; INTRALESIONAL; INTRAMUSCULAR; SOFT TISSUE
Status: COMPLETED | OUTPATIENT
Start: 2022-02-04 | End: 2022-02-04

## 2022-02-04 RX ADMIN — BETAMETHASONE SODIUM PHOSPHATE AND BETAMETHASONE ACETATE 3 MG: 3; 3 INJECTION, SUSPENSION INTRA-ARTICULAR; INTRALESIONAL; INTRAMUSCULAR; SOFT TISSUE at 09:29

## 2022-02-04 RX ADMIN — LIDOCAINE HYDROCHLORIDE 0.5 ML: 10 INJECTION, SOLUTION INFILTRATION; PERINEURAL at 09:29

## 2022-02-04 NOTE — PROGRESS NOTES
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  Thumb CMC arthritis right     Plan:   Patient will proceed with a right thumb CMC joint steroid injection today  She will continue with occupational therapy for right lateral epicondylitis and right shoulder pain  We will see her back in 3 months  Follow Up:  3  month(s)    To Do Next Visit:       General Discussions:     CMC Arthritis: The anatomy and physiology of carpometacarpal joint arthritis was discussed with the patient today in the office  Deterioration of the articular cartilage eventually leads to hypermobility at the thumb [facility] joint, resulting in joint subluxation, osteophyte formation, cystic changes within the trapezium and base of the first metacarpal, as well as subchondral sclerosis  Eventually, pain, limited mobility, and compensatory hyperextension at the metacarpophalangeal joint may develop  While normal activity and usage of the thumb joint may provide a painful experience to the patient, this typically does not result in damage to the thumb or hand  Treatment options include resting thumb spica splints to decreased joint edema, pain, and inflammation  Therapy exercises to strengthen the thenar musculature may relieve pain, but do not alter the overall continued development of osteoarthritis  Oral medications, topical medications, corticosteroid injections may decrease pain and increase overall function  Eventually, approximately 5% of patients may require surgical intervention  Lateral Epicondylitis: The anatomy and physiology of lateral epicondylitis was discussed with the patient today  Typically, a traumatic injury or repetitive use may cause a partial or complete tear of the extensor carpi radialis brevis muscle  This creates pain over the lateral epicondyle    This pain typically is made worse with palm down lifting activities as well as anything that involves strength and stability of the wrist   The pain may radiate from the wrist up to the elbow  At times, the shoulder may be weak as well which can predispose or cause continuation of the problem  Conservative treatment usually cures a majority of patients; however, this may take up to 6-9 months  Conservative treatment options typically include activity modification, therapy for strengthening of the shoulder and elbow, nocturnal wrist support splints, tennis elbow straps, and possible corticosteroid injections  Corticosteroid injections do not change the natural history of this process, may decrease the pain temporarily, and may increase the risk of recurrence  Surgery is required in fewer than 10% of patients  Operative Discussions:       _____________________________________________________  CHIEF COMPLAINT:  Chief Complaint   Patient presents with    Left Thumb - Follow-up     CMC injection- Beta     Left Middle Finger - Follow-up     TF injection- 12/8/21    Right Elbow - Follow-up     Lateral epicondylitis          SUBJECTIVE:  Taqueria Miller is a 73 y o  female who presents for follow up regarding Trigger Finger  left  long finger, Lateral Epicondylitis  Right and Thumb CMC Arthritis  left   Since last visit, Taqueria Miller has tried steroid injections into her left long trigger finger with relief  Today there is Pain  Moderate  Intermittant  Sharp and Aching to the right elbow and shoulder      Radiation: Yes to the  elbow and shoulder  Associated symptoms: No Complaints    PAST MEDICAL HISTORY:  Past Medical History:   Diagnosis Date    Arthritis     Basal cell carcinoma     Colon polyp     Depression with anxiety     Fibrocystic breast disease, unspecified laterality     GERD (gastroesophageal reflux disease)     High cholesterol     Irritable bowel syndrome     Osteopenia        PAST SURGICAL HISTORY:  Past Surgical History:   Procedure Laterality Date    BREAST CYST ASPIRATION      one performed-unsure which breast    BREAST EXCISIONAL BIOPSY Bilateral     one on each breast aprox 1980s    COLONOSCOPY      7/13/12, colon polyp biopsied, hemorrhoids - Dr Mirna Wells right ear  Precancerous      SEPTOPLASTY  05/04/2018    VAGINAL HYSTERECTOMY  1982    prolapse; about age 30       FAMILY HISTORY:  Family History   Problem Relation Age of Onset    Atrial fibrillation Mother     Cancer Mother [de-identified]        Bladder    Breast cancer Mother [de-identified]    COPD Mother    [facility] Kidney cancer Mother [de-identified]    Osteoporosis Mother     Transient ischemic attack Mother     Cancer Father         Bladder    Glaucoma Father     Hypertension Father     Coronary artery disease Maternal Grandfather     Coronary artery disease Maternal Aunt     No Known Problems Maternal Grandmother     No Known Problems Paternal Grandmother     No Known Problems Paternal Grandfather     No Known Problems Sister     No Known Problems Daughter     No Known Problems Son     No Known Problems Paternal Aunt     No Known Problems Paternal Aunt     No Known Problems Paternal Aunt        SOCIAL HISTORY:  Social History     Tobacco Use    Smoking status: Never Smoker    Smokeless tobacco: Never Used   Vaping Use    Vaping Use: Never used   Substance Use Topics    Alcohol use: Yes     Comment: rare glass of wine    Drug use: No       MEDICATIONS:    Current Outpatient Medications:     ALPRAZolam (XANAX) 0 25 mg tablet, Take 1 tablet (0 25 mg total) by mouth 3 (three) times a day as needed for anxiety, Disp: 30 tablet, Rfl: 0    Fluoxetine HCl, PMDD, 20 MG TABS, As needed , Disp: , Rfl:     fluticasone (FLONASE) 50 mcg/act nasal spray, 1 SPRAY INTO EACH NOSTRIL 2 (TWO) TIMES A DAY WITH MEALS (Patient taking differently: 1 spray into each nostril as needed ), Disp: 48 mL, Rfl: 1    Ibuprofen (ADVIL) 200 MG CAPS, Take by mouth as needed , Disp: , Rfl:     ipratropium (ATROVENT) 0 03 % nasal spray, 1 spray into each nostril 3 (three) times a day, Disp: 30 mL, Rfl: 1    levocetirizine (XYZAL) 5 MG tablet, TAKE 1 TABLET BY MOUTH EVERY DAY IN THE EVENING (Patient taking differently: As needed ), Disp: 90 tablet, Rfl: 1    omeprazole (PriLOSEC) 20 mg delayed release capsule, TAKE 1 CAPSULE (20 MG TOTAL) BY MOUTH DAILY BEFORE BREAKFAST, Disp: 90 capsule, Rfl: 1    rosuvastatin (CRESTOR) 5 mg tablet, Take 1 tablet (5 mg total) by mouth daily, Disp: 90 tablet, Rfl: 3    sertraline (ZOLOFT) 25 mg tablet, TAKE 1 TABLET BY MOUTH EVERY DAY, Disp: 90 tablet, Rfl: 1    ALLERGIES:  Allergies   Allergen Reactions    Penicillins Hives and Rash    Sulfa Antibiotics Hives and Rash       REVIEW OF SYSTEMS:  Pertinent items are noted in HPI      LABS:  HgA1c:   Lab Results   Component Value Date    HGBA1C 5 4 01/22/2021     BMP:   Lab Results   Component Value Date    CALCIUM 9 0 08/08/2021     05/09/2016    K 3 6 08/08/2021    CO2 26 08/08/2021     08/08/2021    BUN 24 08/08/2021    CREATININE 1 04 08/08/2021           _____________________________________________________  PHYSICAL EXAMINATION:  Vital signs: /82   Pulse 60   Ht 5' 2" (1 575 m)   BMI 28 20 kg/m²   General: well developed and well nourished, alert, oriented times 3 and appears comfortable  Psychiatric: Normal  HEENT: Trachea Midline, No torticollis  Cardiovascular: No discernable arrhythmia  Pulmonary: No wheezing or stridor  Abdomen: No rebound or guarding  Extremities: No peripheral edema  Skin: No masses, erythema, lacerations, fluctation, ulcerations  Neurovascular: Sensation Intact to the Median, Ulnar, Radial Nerve, Motor Intact to the Median, Ulnar, Radial Nerve and Pulses Intact    MUSCULOSKELETAL EXAMINATION:  LEFT SIDE:  CMC: No Instability, Positive tendnerness CMC and no hyperextension, minimal crepitation and Finger:  No Tenderness, instability, or ROM loss, No crepitation and No evidence of Nodules  long finger  RIGHT SIDE:  CMC: No Instability, Positive tendnerness CMC and positive crepitation with circumduction and Epicondylitis: ROM full, Positive tenderness Laterally and Positive pain on resisted wrist extension    _____________________________________________________  STUDIES REVIEWED:  No Studies to review      PROCEDURES PERFORMED:  Small joint arthrocentesis: R thumb CMC  Malcolm Protocol:  Consent: Verbal consent obtained    Risks and benefits: risks, benefits and alternatives were discussed  Consent given by: patient  Site marked: the operative site was marked  Supporting Documentation  Indications: pain   Procedure Details  Location: thumb - R thumb CMC  Medications administered: 3 mg betamethasone acetate-betamethasone sodium phosphate 6 (3-3) mg/mL; 0 5 mL lidocaine 1 %    Patient tolerance: patient tolerated the procedure well with no immediate complications  Dressing:  Sterile dressing applied            Scribe Attestation    I,:  Darin Salmeron am acting as a scribe while in the presence of the attending physician :       I,:  Reba Panchal MD personally performed the services described in this documentation    as scribed in my presence :

## 2022-02-11 ENCOUNTER — OFFICE VISIT (OUTPATIENT)
Dept: OCCUPATIONAL THERAPY | Age: 72
End: 2022-02-11
Payer: MEDICARE

## 2022-02-11 DIAGNOSIS — M77.11 LATERAL EPICONDYLITIS OF RIGHT ELBOW: ICD-10-CM

## 2022-02-11 DIAGNOSIS — M25.511 ACUTE PAIN OF RIGHT SHOULDER: ICD-10-CM

## 2022-02-11 PROCEDURE — 97110 THERAPEUTIC EXERCISES: CPT

## 2022-02-11 PROCEDURE — 97140 MANUAL THERAPY 1/> REGIONS: CPT

## 2022-02-11 NOTE — PROGRESS NOTES
Daily Note     Today's date: 2022  Patient name: Najma Conde  : 1950  MRN: [id number]  Referring provider: Lele Guerrero MD  Dx:   Encounter Diagnosis     ICD-10-CM    1  Lateral epicondylitis of right elbow  M77 11 Ambulatory Referral to PT/OT Hand Therapy   2  Acute pain of right shoulder  M25 511 Ambulatory Referral to PT/OT Hand Therapy                  Subjective: It's been a long week since       Objective: See treatment diary below      Assessment: Tolerated treatment well  Patient would benefit from continued OT  Decreased tightness in elbow and ECRB/L  Plan: Continue per plan of care        Precautions: Universal      Manuals 12/13 1/6 1/10 1/14 1/20 1/24 1/27 2/11     STM 8' 25' 40' 30' 30' 30' 40' 30'     KT Lizette Pattern                                       Neuro Re-Ed                                                                                                        Ther Ex             MWM             WE Eccentrics             Pronation Eccentrics             Digit Eccentrics             Pec Stretch   5' self, 3' therapist assist 5' self, 3 therapist assist 5' self, 3' therapist assist 5' self, 3' therapist assist 5' self, 3' therapist                                HEP: Nirschls, WE and digit Eccentrics, has WHO for at night Reveiwed Scalenes, Levators, Upper Trap Stretches           Ther Activity                                       Gait Training                                       Modalities             P   10'

## 2022-02-14 ENCOUNTER — OFFICE VISIT (OUTPATIENT)
Dept: OCCUPATIONAL THERAPY | Age: 72
End: 2022-02-14
Payer: MEDICARE

## 2022-02-14 DIAGNOSIS — M25.511 ACUTE PAIN OF RIGHT SHOULDER: ICD-10-CM

## 2022-02-14 DIAGNOSIS — M77.11 LATERAL EPICONDYLITIS OF RIGHT ELBOW: Primary | ICD-10-CM

## 2022-02-14 DIAGNOSIS — M25.521 RIGHT ELBOW PAIN: ICD-10-CM

## 2022-02-14 PROCEDURE — 97110 THERAPEUTIC EXERCISES: CPT

## 2022-02-14 PROCEDURE — 97140 MANUAL THERAPY 1/> REGIONS: CPT

## 2022-02-14 NOTE — PROGRESS NOTES
Daily Note     Today's date: 2022  Patient name: Vince Ross  : 1950  MRN: [id number]  Referring provider: Karthik Devine MD  Dx:   Encounter Diagnosis     ICD-10-CM    1  Lateral epicondylitis of right elbow  M77 11    2  Acute pain of right shoulder  M25 511    3  Right elbow pain  M25 521                   Subjective: Not great      Objective: See treatment diary below  5 min late to appt  Assessment: Tolerated treatment well  Patient would benefit from continued OT  Tightness noted in distal triceps, as well as scalenes  She presented today noting that everything was painful however post session she mentioned she felt better starting today than last session even when starting last session with no c/o pain or discomfort  Plan: Continue per plan of care        Precautions: Universal      Manuals 12/13 1/6 1/10 1/14 1/20 1/24 1/27 2/11 2/14                     STM 8' 25' 40' 30' 30' 30' 40' 30' 30'    KT Lizette Pattern                                       Neuro Re-Ed                                                                                                        Ther Ex             MWM             WE Eccentrics             Pronation Eccentrics             Digit Eccentrics             Pec Stretch   5' self, 3' therapist assist 5' self, 3 therapist assist 5' self, 3' therapist assist 5' self, 3' therapist assist 5' self, 3' therapist  5 self, 3' therapist                              HEP: Nirschls, WE and digit Eccentrics, has WHO for at night Reveiwed Scalenes, Levators, Upper Trap Stretches           Ther Activity                                       Gait Training                                       Modalities             [facility]   10'

## 2022-02-18 ENCOUNTER — OFFICE VISIT (OUTPATIENT)
Dept: OCCUPATIONAL THERAPY | Age: 72
End: 2022-02-18
Payer: MEDICARE

## 2022-02-18 DIAGNOSIS — M77.11 LATERAL EPICONDYLITIS OF RIGHT ELBOW: Primary | ICD-10-CM

## 2022-02-18 DIAGNOSIS — M25.511 ACUTE PAIN OF RIGHT SHOULDER: ICD-10-CM

## 2022-02-18 DIAGNOSIS — M25.521 RIGHT ELBOW PAIN: ICD-10-CM

## 2022-02-18 PROCEDURE — 97110 THERAPEUTIC EXERCISES: CPT | Performed by: OCCUPATIONAL THERAPIST

## 2022-02-18 PROCEDURE — 97140 MANUAL THERAPY 1/> REGIONS: CPT | Performed by: OCCUPATIONAL THERAPIST

## 2022-02-18 NOTE — PROGRESS NOTES
Daily Note     Today's date: 2022  Patient name: William Conley  : 1950  MRN: [id number]  Referring provider: Jaylin Helm MD  Dx:   Encounter Diagnosis     ICD-10-CM    1  Lateral epicondylitis of right elbow  M77 11    2  Acute pain of right shoulder  M25 511    3  Right elbow pain  M25 521                   Subjective: "It's a little tight today"      Objective: See treatment diary below      Assessment: Good tolerance today, reports decreasing pain, added light anconeus strengthening PRE today which was well received  Plan: Continue per plan of care        Precautions: Universal      Manuals 12/13 1/6 1/10 1/14 1/20 1/24 1/27 2/11 2/14                  2/18   STM 8' 25' 40' 30' 30' 30' 40' 30' 30' 30   KT Lizette Pattern                                       Neuro Re-Ed                                                                                                        Ther Ex             MWM             WE Eccentrics             Pronation Eccentrics             Digit Eccentrics             Pec Stretch   5' self, 3' therapist assist 5' self, 3 therapist assist 5' self, 3' therapist assist 5' self, 3' therapist assist 5' self, 3' therapist  5 self, 3' therapist 5 self, 3 therapist   EE          2x8 8 5 pounds                HEP: Nirschls, WE and digit Eccentrics, has WHO for at night Reveiwed Scalenes, Levators, Upper Trap Stretches           Ther Activity                                       Gait Training                                       Modalities             P   10'       5

## 2022-02-22 ENCOUNTER — OFFICE VISIT (OUTPATIENT)
Dept: OCCUPATIONAL THERAPY | Age: 72
End: 2022-02-22
Payer: MEDICARE

## 2022-02-22 DIAGNOSIS — M25.511 ACUTE PAIN OF RIGHT SHOULDER: ICD-10-CM

## 2022-02-22 DIAGNOSIS — M77.11 LATERAL EPICONDYLITIS OF RIGHT ELBOW: Primary | ICD-10-CM

## 2022-02-22 DIAGNOSIS — M25.521 RIGHT ELBOW PAIN: ICD-10-CM

## 2022-02-22 PROCEDURE — 97140 MANUAL THERAPY 1/> REGIONS: CPT

## 2022-02-22 PROCEDURE — 97110 THERAPEUTIC EXERCISES: CPT

## 2022-02-22 NOTE — PROGRESS NOTES
Daily Note     Today's date: 2022  Patient name: Patsy Donohue  : 1950  MRN: [id number]  Referring provider: Priscilla Saenz MD  Dx:   Encounter Diagnosis     ICD-10-CM    1  Lateral epicondylitis of right elbow  M77 11    2  Acute pain of right shoulder  M25 511    3  Right elbow pain  M25 521                    Subjective: "I got a new pillow and I think my neck is feeling better"      Objective: See treatment diary below      Assessment: Good tolerance today, reports decreasing pain, added light anconeus strengthening PRE today which was well received  Plan: Continue per plan of care        Precautions: Universal      Manuals 2/22 1/6 1/10 1/14 1/20 1/24 1/27 2/11 2/14                  2/18   STM 30' 25' 40' 30' 30' 30' 40' 30' 30' 30   KT                                       Neuro Re-Ed                                                                                                        Ther Ex             MWM             WE Eccentrics             Pronation Eccentrics             Digit Eccentrics             Pec Stretch 5' self, 3' therapist  5' self, 3' therapist assist 5' self, 3 therapist assist 5' self, 3' therapist assist 5' self, 3' therapist assist 5' self, 3' therapist  5 self, 3' therapist 5 self, 3 therapist   EE          2x8 8 5 pounds                HEP: Nirschls, WE and digit Eccentrics, has WHO for at night  Scalenes, Levators, Upper Trap Stretches           Ther Activity                                       Gait Training                                       Modalities             MHP   10'       5

## 2022-02-24 ENCOUNTER — OFFICE VISIT (OUTPATIENT)
Dept: OCCUPATIONAL THERAPY | Age: 72
End: 2022-02-24
Payer: MEDICARE

## 2022-02-24 DIAGNOSIS — M25.521 RIGHT ELBOW PAIN: Primary | ICD-10-CM

## 2022-02-24 PROCEDURE — 97110 THERAPEUTIC EXERCISES: CPT

## 2022-02-24 PROCEDURE — 97140 MANUAL THERAPY 1/> REGIONS: CPT

## 2022-02-24 NOTE — PROGRESS NOTES
Daily Note     Today's date: 2022  Patient name: Delbert Wing  : 1950  MRN: [id number]  Referring provider: Pinkey Canavan, MD  Dx:   Encounter Diagnosis     ICD-10-CM    1  Right elbow pain  M25 521                   Subjective: "I feel better because I am sleeping better"      Objective: See treatment diary below      Assessment: Tolerated treatment well  Patient would benefit from continued OT   Tesfaye Morales did not complain of any pain during STM of her elbow  Tesfaye Morales reported having less pain in her neck and shoulder because she is getting more restful sleep  Plan: Continue per plan of care        Precautions: Universal      Manuals 2/22 2/24 1/6 1/10 1/14 1/20 1/24 1/27 2/11 2/14                  2/18   STM 30' 25' 25' 40' 30' 30' 30' 40' 30' 30' 30   KT                                          Neuro Re-Ed                                                                                                                Ther Ex              MWM              WE Eccentrics              Pronation Eccentrics              Digit Eccentrics              Pec Stretch 5' self, 3' therapist 5' self, 3' therapist  5' self, 3' therapist assist 5' self, 3 therapist assist 5' self, 3' therapist assist 5' self, 3' therapist assist 5' self, 3' therapist  5 self, 3' therapist 5 self, 3 therapist   EE           2x8 8 5 pounds                 HEP: Nirschls, WE and digit Eccentrics, has WHO for at night   Scalenes, Levators, Upper Trap Stretches           Ther Activity                                          Gait Training                                          Modalities              MHP  10'  10'       5

## 2022-02-25 ENCOUNTER — APPOINTMENT (OUTPATIENT)
Dept: OCCUPATIONAL THERAPY | Age: 72
End: 2022-02-25
Payer: MEDICARE

## 2022-02-25 DIAGNOSIS — F41.8 DEPRESSION WITH ANXIETY: ICD-10-CM

## 2022-02-28 ENCOUNTER — APPOINTMENT (OUTPATIENT)
Dept: OCCUPATIONAL THERAPY | Age: 72
End: 2022-02-28
Payer: MEDICARE

## 2022-02-28 RX ORDER — ALPRAZOLAM 0.25 MG/1
0.25 TABLET ORAL 3 TIMES DAILY PRN
Qty: 30 TABLET | Refills: 0 | Status: SHIPPED | OUTPATIENT
Start: 2022-02-28 | End: 2022-07-11 | Stop reason: SDUPTHER

## 2022-03-01 ENCOUNTER — HOSPITAL ENCOUNTER (OUTPATIENT)
Dept: MAMMOGRAPHY | Facility: CLINIC | Age: 72
Discharge: HOME | End: 2022-03-01
Payer: MEDICARE

## 2022-03-01 ENCOUNTER — HOSPITAL ENCOUNTER (OUTPATIENT)
Dept: ULTRASOUND IMAGING | Facility: CLINIC | Age: 72
Discharge: HOME | End: 2022-03-01
Payer: MEDICARE

## 2022-03-01 DIAGNOSIS — R92.8 ABNORMAL MAMMOGRAM: ICD-10-CM

## 2022-03-01 DIAGNOSIS — R92.8 ABNORMAL MAMMOGRAM: Primary | ICD-10-CM

## 2022-03-01 DIAGNOSIS — N64.89 BREAST ASYMMETRY: ICD-10-CM

## 2022-03-01 PROCEDURE — G0279 TOMOSYNTHESIS, MAMMO: HCPCS

## 2022-03-01 PROCEDURE — 77066 DX MAMMO INCL CAD BI: CPT

## 2022-03-01 PROCEDURE — 76642 ULTRASOUND BREAST LIMITED: CPT

## 2022-03-02 ENCOUNTER — TELEPHONE (OUTPATIENT)
Dept: OBGYN CLINIC | Facility: CLINIC | Age: 72
End: 2022-03-02

## 2022-03-02 DIAGNOSIS — R92.2 DENSE BREAST TISSUE ON MAMMOGRAM: Primary | ICD-10-CM

## 2022-03-02 DIAGNOSIS — R92.2 DENSE BREAST TISSUE ON MAMMOGRAM: ICD-10-CM

## 2022-03-02 DIAGNOSIS — Z12.31 ENCOUNTER FOR SCREENING MAMMOGRAM FOR MALIGNANT NEOPLASM OF BREAST: Primary | ICD-10-CM

## 2022-03-02 NOTE — TELEPHONE ENCOUNTER
Pt informed of diag bilat mgram results - stable (L) breast asymmetry & small hypoechoic tissue focus stable x past 2 yrs - can resume annual screening mgram & can have ABUS f/u heterogenously dense breast tissue  MyChart message sent to pt with dx & CPT codes for ABUS to verify insur coverage  Rad orders placed in pt;s chart for screening mgram & ABUS

## 2022-03-02 NOTE — TELEPHONE ENCOUNTER
----- Message from Tonya Beltran, DO sent at 3/2/2022  7:45 AM EST -----  Inform patient mammogram normal, may resume annual 3D mammogram, also consider automated breast ultrasound annually due to dense breast   Have patient check see if this is covered by her insurance    Thank you

## 2022-03-03 ENCOUNTER — OFFICE VISIT (OUTPATIENT)
Dept: OCCUPATIONAL THERAPY | Age: 72
End: 2022-03-03
Payer: MEDICARE

## 2022-03-03 DIAGNOSIS — M25.521 RIGHT ELBOW PAIN: Primary | ICD-10-CM

## 2022-03-03 PROCEDURE — 97140 MANUAL THERAPY 1/> REGIONS: CPT

## 2022-03-03 PROCEDURE — 97110 THERAPEUTIC EXERCISES: CPT

## 2022-03-03 NOTE — PROGRESS NOTES
Daily Note     Today's date: 3/3/2022  Patient name: Jose Iqbal  : 1950  MRN: [id number]  Referring provider: Pedro Booker MD  Dx:   Encounter Diagnosis     ICD-10-CM    1  Right elbow pain  M25 521                   Subjective: "I am feeling achey because I missed a day of therapy"      Objective: See treatment diary below      Assessment: Tolerated treatment well  Patient would benefit from continued OT   Berenice Lopez did not complain of any pain during treatment but reported feeling achey  Plan: Continue per plan of care        Precautions: Universal      Manuals 2/22 2/24 3/3 1/6 1/10 1/14 1/20 1/24 1/27 2/11 2/14                  2/18   STM 30' 25' 25' 25' 40' 30' 30' 30' 40' 30' 30' 30   KT                                             Neuro Re-Ed                                                                                                                        Ther Ex               MWM               WE Eccentrics               Pronation Eccentrics               Digit Eccentrics               Pec Stretch 5' self, 3' therapist 5' self, 3' therapist 5' self, 3' therapist  5' self, 3' therapist assist 5' self, 3 therapist assist 5' self, 3' therapist assist 5' self, 3' therapist assist 5' self, 3' therapist  5 self, 3' therapist 5 self, 3 therapist   EE            2x8 8 5 pounds                  HEP: Nirschls, WE and digit Eccentrics, has WHO for at night    Scalenes, Levators, Upper Trap Stretches           Ther Activity                                             Gait Training                                             Modalities               MHP  10' 10'  10'       5

## 2022-03-08 ENCOUNTER — OFFICE VISIT (OUTPATIENT)
Dept: OCCUPATIONAL THERAPY | Age: 72
End: 2022-03-08
Payer: MEDICARE

## 2022-03-08 DIAGNOSIS — M25.521 RIGHT ELBOW PAIN: Primary | ICD-10-CM

## 2022-03-08 PROCEDURE — 97140 MANUAL THERAPY 1/> REGIONS: CPT

## 2022-03-08 PROCEDURE — 97110 THERAPEUTIC EXERCISES: CPT

## 2022-03-08 NOTE — PROGRESS NOTES
Daily Note     Today's date: 3/8/2022  Patient name: Mae Porter  : 1950  MRN: [id number]  Referring provider: Hammad Resendiz MD  Dx:   Encounter Diagnosis     ICD-10-CM    1  Right elbow pain  M25 521                   Subjective: "I feel like I'm getting a lot better  I don't really have that much pain anymore"      Objective: See treatment diary below      Assessment: Tolerated treatment well  Patient would benefit from continued OT   Bing Borrego did not have any complaints of pain during treatment and reported feeling much better  Plan: Continue per plan of care        Precautions: Universal      Manuals 2/22 2/24 3/3 3/8 1/6 1/10 1/14 1/20 1/24 1/27 2/11 2/14                  2/18   STM 30' 25' 25' 25' 25' 40' 30' 30' 30' 40' 30' 30' 30   KT                                                  Neuro Re-Ed                                                                                                                                Ther Ex                MWM                WE Eccentrics                Pronation Eccentrics                Digit Eccentrics                Pec Stretch 5' self, 3' therapist 5' self, 3' therapist 5' self, 3' therapist 5' self, 3' therapist  5' self, 3' therapist assist 5' self, 3 therapist assist 5' self, 3' therapist assist 5' self, 3' therapist assist 5' self, 3' therapist  5 self, 3' therapist 5 self, 3 therapist   EE             2x8 8 5 pounds                   HEP: Nirschls, WE and digit Eccentrics, has WHO for at night     Scalenes, Levators, Upper Trap Stretches           Ther Activity                                                Gait Training                                                Modalities                MHP  10' 10' 10'  10'       5

## 2022-03-11 ENCOUNTER — OFFICE VISIT (OUTPATIENT)
Dept: OCCUPATIONAL THERAPY | Age: 72
End: 2022-03-11
Payer: MEDICARE

## 2022-03-11 DIAGNOSIS — M25.521 RIGHT ELBOW PAIN: Primary | ICD-10-CM

## 2022-03-11 PROCEDURE — 97140 MANUAL THERAPY 1/> REGIONS: CPT

## 2022-03-11 PROCEDURE — 97110 THERAPEUTIC EXERCISES: CPT

## 2022-03-11 NOTE — PROGRESS NOTES
Daily Note     Today's date: 3/11/2022  Patient name: Fabiano Mendze  : 1950  MRN: [id number]  Referring provider: Tu Love MD  Dx:   Encounter Diagnosis     ICD-10-CM    1  Right elbow pain  M25 521                   Subjective: "I was pulling weeds and my elbow didn't hurt at all"      Objective: See treatment diary below      Assessment: Tolerated treatment well  Patient would benefit from continued OT   Radha Mart did not have any complaints of pain in her elbow or shoulder during treatment and reported feeling good after the session  Radha Mart reported lower back pain, had a discussion about calling PCP to maintain a PT script  Plan: Continue per plan of care        Precautions: Universal      Manuals 2/22 2/24 3/3 3/8 3/11 1/6 1/10 1/14 1/20 1/24 1/27 2/11 2/14                  2/18   STM 30' 25' 25' 25' 25' 25' 40' 30' 30' 30' 40' 30' 30' 30   KT                                                     Neuro Re-Ed                                                                                                                                        Ther Ex                 MWM                 WE Eccentrics                 Pronation Eccentrics                 Digit Eccentrics                 Pec Stretch 5' self, 3' therapist 5' self, 3' therapist 5' self, 3' therapist 5' self, 3' therapist 5' self, 3' therapist  5' self, 3' therapist assist 5' self, 3 therapist assist 5' self, 3' therapist assist 5' self, 3' therapist assist 5' self, 3' therapist  5 self, 3' therapist 5 self, 3 therapist   EE              2x8 8 5 pounds                    HEP: Nirschls, WE and digit Eccentrics, has WHO for at night      Scalenes, Levators, Upper Trap Stretches           Ther Activity                                                   Gait Training                                                   Modalities                 MHP  10' 10' 10' 10'  10'       5

## 2022-03-15 ENCOUNTER — OFFICE VISIT (OUTPATIENT)
Dept: OCCUPATIONAL THERAPY | Age: 72
End: 2022-03-15
Payer: MEDICARE

## 2022-03-15 DIAGNOSIS — M25.521 RIGHT ELBOW PAIN: Primary | ICD-10-CM

## 2022-03-15 PROCEDURE — 97140 MANUAL THERAPY 1/> REGIONS: CPT

## 2022-03-15 PROCEDURE — 97110 THERAPEUTIC EXERCISES: CPT

## 2022-03-15 NOTE — PROGRESS NOTES
Daily Note     Today's date: 3/15/2022  Patient name: Jaime Gupta  : 1950  MRN: [id number]  Referring provider: Umair Costa MD  Dx:   Encounter Diagnosis     ICD-10-CM    1  Right elbow pain  M25 521                   Subjective: "I had a pinging pain in my elbow last night in bed"      Objective: See treatment diary below      Assessment: Tolerated treatment well  Patient would benefit from continued OT   Mary Curtis did not report any pain during treatment and reported that the [facility] felt good on her elbow  Plan: Continue per plan of care        Precautions: Universal       Manuals 2/22 2/24 3/3 3/8 3/11 3/15 1/6 1/10 1/14 1/20 1/24 1/27 2/11 2/14                  2/18   STM 30' 25' 25' 25' 25' 25' 25' 40' 30' 30' 30' 40' 30' 30' 30   KT                                                        Neuro Re-Ed                                                                                                                                                Ther Ex                  MWM                  WE Eccentrics                  Pronation Eccentrics                  Digit Eccentrics                  Pec Stretch 5' self, 3' therapist 5' self, 3' therapist 5' self, 3' therapist 5' self, 3' therapist 5' self, 3' therapist 5' self, 3' therapist  5' self, 3' therapist assist 5' self, 3 therapist assist 5' self, 3' therapist assist 5' self, 3' therapist assist 5' self, 3' therapist  5 self, 3' therapist 5 self, 3 therapist   EE               2x8 8 5 pounds                     HEP: Nirschls, WE and digit Eccentrics, has WHO for at night       Scalenes, Levators, Upper Trap Stretches           Ther Activity                                                      Gait Training                                                      Modalities                  MHP  10' 10' 10' 10' 10'  10'       5

## 2022-03-17 ENCOUNTER — EVALUATION (OUTPATIENT)
Dept: PHYSICAL THERAPY | Age: 72
End: 2022-03-17
Payer: MEDICARE

## 2022-03-17 DIAGNOSIS — M54.16 LUMBAR RADICULOPATHY: Primary | ICD-10-CM

## 2022-03-17 PROCEDURE — 97110 THERAPEUTIC EXERCISES: CPT | Performed by: PHYSICAL THERAPIST

## 2022-03-17 PROCEDURE — 97161 PT EVAL LOW COMPLEX 20 MIN: CPT | Performed by: PHYSICAL THERAPIST

## 2022-03-17 NOTE — PROGRESS NOTES
PT Evaluation     Today's date: 3/17/2022  Patient name: Jose Iqbal  : 1950  MRN: [id number]  Referring provider: Jennifer Mortensen DO  Dx:   Encounter Diagnosis     ICD-10-CM    1  Lumbar radiculopathy  M54 16                   Assessment  Assessment details: Patient with L sided LBP with b/l radic sx's - extension bias noted  Patient presents with decreased AROM, flexibility, weakness, and pain  Patient is a good candidate for PT intervention  Impairments: abnormal muscle tone, abnormal or restricted ROM, impaired physical strength, lacks appropriate home exercise program and pain with function  Understanding of Dx/Px/POC: good   Prognosis: good    Goals  Short Term goals - 4 weeks  1  Patient will be independent HEP  2   Patient will report a 50% decrease in pain complaints  3   Increase strength 1/2 grade  4   Increase ROM 5-10 degrees  Long Term goals - 8 weeks  1  Patient will report elimination of pain complaints  2   Patient will return to all work related activities without restriction  3   Patient will return to all recreational activities without restriction  4   ROM WFL  5   Strength 5/5  Plan  Planned therapy interventions: manual therapy, strengthening and stretching  Frequency: 2x week  Duration in weeks: 6        Subjective Evaluation    History of Present Illness  Mechanism of injury: Patient reports suffering a fall in August onto her back primarily  Current sx's - L lower lumbar, L SI pain, b/l radiating sx's into lower buttock/post thigh region  Patient is retired  MEDS - none for LB at this time  Sx's aggravated by sitting, sit to stand movement, bending, and lifting     Quality of life: excellent    Pain  Current pain ratin  At best pain rating: 3  At worst pain ratin  Aggravating factors: sitting, stair climbing and lifting  Progression: no change    Patient Goals  Patient goals for therapy: increased strength, decreased pain, increased motion and independence with ADLs/IADLs          Objective     Active Range of Motion     Lumbar   Flexion:  with pain Restriction level: minimal  Extension:  Restriction level: moderate  Left lateral flexion:  Restriction level: moderate  Right lateral flexion:  Restriction level: moderate  Mechanical Assessment    Cervical      Thoracic      Lumbar    Standing flexion: repeated movements   Pain intensity: worse  Pain level: increased  Standing extension: repeated movements  Pain location: no change    Strength/Myotome Testing     Lumbar   Left   Normal strength    Right   Normal strength    Tests     Lumbar     Left   Positive passive SLR and slump test    Negative femoral stretch  Left Hip   Negative TAYLOR, FADIR and long sit                Precautions: None      Manuals                                                                 Neuro Re-Ed                                                                                                        Ther Ex             Assess HEP             Prone on elbows             Prone push ups             Supine sciatic                                                                 Ther Activity                                       Gait Training                                       Modalities

## 2022-03-21 ENCOUNTER — APPOINTMENT (OUTPATIENT)
Dept: PHYSICAL THERAPY | Age: 72
End: 2022-03-21
Payer: MEDICARE

## 2022-03-21 ENCOUNTER — APPOINTMENT (OUTPATIENT)
Dept: OCCUPATIONAL THERAPY | Age: 72
End: 2022-03-21
Payer: MEDICARE

## 2022-03-24 ENCOUNTER — OFFICE VISIT (OUTPATIENT)
Dept: PHYSICAL THERAPY | Age: 72
End: 2022-03-24
Payer: MEDICARE

## 2022-03-24 DIAGNOSIS — M54.16 LUMBAR RADICULOPATHY: Primary | ICD-10-CM

## 2022-03-24 PROCEDURE — 97110 THERAPEUTIC EXERCISES: CPT | Performed by: PHYSICAL THERAPIST

## 2022-03-24 NOTE — PROGRESS NOTES
Daily Note     Today's date: 3/24/2022  Patient name: Kendall Morris  : 1950  MRN: [id number]  Referring provider: Ramirez Cosby DO  Dx:   Encounter Diagnosis     ICD-10-CM    1  Lumbar radiculopathy  M54 16                   Subjective: No change in subjective sx's noted  Objective: See treatment diary below  Switched to flexion based HEP secondary to lack of progress  Assessment: Tolerated treatment well  Patient would benefit from continued PT      Plan: Continue per plan of care  Precautions: None      Manuals 3/24            LC x5 minutes                                                   Neuro Re-Ed                                                                                                        Ther Ex             Assess HEP             Supine piriformis stretch x5 reps            PT x10 reps hold 5 sec  DKC x10 reps hold 10 sec            Seated hamstring stretch x5 reps hold 20 sec              Seated trunk flexion  x10 reps                                                                                                                                 Ther Activity                                       Gait Training                                       Modalities

## 2022-03-30 ENCOUNTER — OFFICE VISIT (OUTPATIENT)
Dept: PHYSICAL THERAPY | Age: 72
End: 2022-03-30
Payer: MEDICARE

## 2022-03-30 DIAGNOSIS — M54.16 LUMBAR RADICULOPATHY: Primary | ICD-10-CM

## 2022-03-30 PROCEDURE — 97140 MANUAL THERAPY 1/> REGIONS: CPT | Performed by: PHYSICAL THERAPIST

## 2022-03-30 PROCEDURE — 97110 THERAPEUTIC EXERCISES: CPT | Performed by: PHYSICAL THERAPIST

## 2022-03-30 NOTE — PROGRESS NOTES
Daily Note     Today's date: 3/30/2022  Patient name: Libby Crocker  : 1950  MRN: [id number]  Referring provider: Yung Paredes DO  Dx:   Encounter Diagnosis     ICD-10-CM    1  Lumbar radiculopathy  M54 16                   Subjective: Patient same, no worse  Objective: See treatment diary below      Assessment: Tolerated treatment well  Patient would benefit from continued PT  Return to some extension today  Plan: Continue per plan of care  Precautions: None      Manuals 3/24 3/30           LC x5 minutes x10 minutes           PA mobs -  Lumbar segments                                     Neuro Re-Ed                                                                                                        Ther Ex             Prone on elbows  x10 reps hold 30 sec           Supine piriformis stretch x5 reps x5           PT x10 reps hold 5 sec   2x10           DKC x10 reps hold 10 sec nt           Seated hamstring stretch x5 reps hold 20 sec  nt           Seated trunk flexion  x10 reps nt                                                                                                                                Ther Activity                                       Gait Training                                       Modalities

## 2022-03-31 ENCOUNTER — APPOINTMENT (OUTPATIENT)
Dept: OCCUPATIONAL THERAPY | Age: 72
End: 2022-03-31
Payer: MEDICARE

## 2022-04-04 ENCOUNTER — OFFICE VISIT (OUTPATIENT)
Dept: PHYSICAL THERAPY | Age: 72
End: 2022-04-04
Payer: MEDICARE

## 2022-04-04 DIAGNOSIS — M54.16 LUMBAR RADICULOPATHY: Primary | ICD-10-CM

## 2022-04-04 PROCEDURE — 97110 THERAPEUTIC EXERCISES: CPT | Performed by: PHYSICAL THERAPIST

## 2022-04-04 NOTE — PROGRESS NOTES
Daily Note     Today's date: 2022  Patient name: Linda Wallace  : 1950  MRN: [id number]  Referring provider: Griffin Simeon DO  Dx:   Encounter Diagnosis     ICD-10-CM    1  Lumbar radiculopathy  M54 16                   Subjective: Patient reports no significant change in sx's noted  Objective: See treatment diary below  Patient continues to stay active - gardening and playing pickleball  Sx's increase after she is static after activity  Assessment: Tolerated treatment well  Patient would benefit from continued PT      Plan: Continue per plan of care  Precautions: None      Manuals 3/24 3/30 4/4          LC x5 minutes x10 minutes x10 minutes          PA mobs -  Lumbar segments nt                                    Neuro Re-Ed                                                                                                        Ther Ex             Prone on elbows  x10 reps hold 30 sec nt - shoulder           Supine piriformis stretch x5 reps x5 x5 reps          PT x10 reps hold 5 sec  2x10 2x10          DKC x10 reps hold 10 sec nt nt          Seated hamstring stretch x5 reps hold 20 sec  nt nt          Seated trunk flexion  x10 reps nt nt                       Physioball anterior crunch   2x10          Hip flexor stretch supine   x5 reps hold 20 sec            Bridging with hip flexion   2x10          Tila rotational stab   x10 each                                                              Ther Activity                                       Gait Training                                       Modalities

## 2022-04-06 ENCOUNTER — OFFICE VISIT (OUTPATIENT)
Dept: PHYSICAL THERAPY | Age: 72
End: 2022-04-06
Payer: MEDICARE

## 2022-04-06 DIAGNOSIS — M54.16 LUMBAR RADICULOPATHY: Primary | ICD-10-CM

## 2022-04-06 PROCEDURE — 97110 THERAPEUTIC EXERCISES: CPT | Performed by: PHYSICAL THERAPIST

## 2022-04-06 NOTE — PROGRESS NOTES
Daily Note     Today's date: 2022  Patient name: Gorge Rivera  : 1950  MRN: [id number]  Referring provider: Daryl Styles DO  Dx:   Encounter Diagnosis     ICD-10-CM    1  Lumbar radiculopathy  M54 16                   Subjective: Bridging with marching aggravated sx's  Objective: See treatment diary below      Assessment: Tolerated treatment well  Patient would benefit from continued PT      Plan: Continue per plan of care  Precautions: None      Manuals 3/24 3/30 4/4 4/6         LC x5 minutes x10 minutes x10 minutes x10 minutes         PA mobs -  Lumbar segments nt                                    Neuro Re-Ed                                                                                                        Ther Ex             Prone on elbows  x10 reps hold 30 sec nt - shoulder           Supine piriformis stretch x5 reps x5 x5 reps x5 reps         PT x10 reps hold 5 sec  2x10 2x10 2x10         DKC x10 reps hold 10 sec nt nt nt         Seated hamstring stretch x5 reps hold 20 sec  nt nt nt         Seated trunk flexion  x10 reps nt nt nt                      Physioball anterior crunch   2x10 2x10         Hip flexor stretch supine   x5 reps hold 20 sec   x5 reps         Bridging with hip flexion   2x10 2x10         Tila rotational stab   x10 each x10 each                                                             Ther Activity                                       Gait Training                                       Modalities

## 2022-04-13 ENCOUNTER — OFFICE VISIT (OUTPATIENT)
Dept: PHYSICAL THERAPY | Age: 72
End: 2022-04-13
Payer: MEDICARE

## 2022-04-13 DIAGNOSIS — M54.16 LUMBAR RADICULOPATHY: Primary | ICD-10-CM

## 2022-04-13 PROCEDURE — 97110 THERAPEUTIC EXERCISES: CPT | Performed by: PHYSICAL THERAPIST

## 2022-04-13 NOTE — PROGRESS NOTES
Daily Note     Today's date: 2022  Patient name: Ary Espinoza  : 1950  MRN: [id number]  Referring provider: Jesus Nguyen DO  Dx:   Encounter Diagnosis     ICD-10-CM    1  Lumbar radiculopathy  M54 16                   Subjective: Patient feeling modestly better with core stabilization program       Objective: See treatment diary below      Assessment: Tolerated treatment well  Patient would benefit from continued PT      Plan: Continue per plan of care  Precautions: None      Manuals 3/24 3/30 4/4 4/6 4/13        LC x5 minutes x10 minutes x10 minutes x10 minutes x10 minutes        PA mobs -  Lumbar segments nt                                    Neuro Re-Ed                                                                                                        Ther Ex             Prone on elbows  x10 reps hold 30 sec nt - shoulder           Supine piriformis stretch x5 reps x5 x5 reps x5 reps x5        PT x10 reps hold 5 sec  2x10 2x10 2x10 2x10        DKC x10 reps hold 10 sec nt nt nt         Seated hamstring stretch x5 reps hold 20 sec  nt nt nt         Seated trunk flexion  x10 reps nt nt nt                      Physioball anterior crunch   2x10 2x10 2x10        Hip flexor stretch supine   x5 reps hold 20 sec   x5 reps x5        Bridging with hip flexion   2x10 2x10 2x10        Tila rotational stab   x10 each x10 each x10 each                     PT with SLR lowering     2x10                                  Ther Activity                                       Gait Training                                       Modalities

## 2022-04-15 ENCOUNTER — APPOINTMENT (OUTPATIENT)
Dept: PHYSICAL THERAPY | Age: 72
End: 2022-04-15
Payer: MEDICARE

## 2022-05-06 ENCOUNTER — OFFICE VISIT (OUTPATIENT)
Dept: OBGYN CLINIC | Facility: HOSPITAL | Age: 72
End: 2022-05-06
Payer: MEDICARE

## 2022-05-06 VITALS
OXYGEN SATURATION: 85 % | DIASTOLIC BLOOD PRESSURE: 72 MMHG | SYSTOLIC BLOOD PRESSURE: 100 MMHG | HEART RATE: 83 BPM | BODY MASS INDEX: 28.16 KG/M2 | WEIGHT: 153 LBS | HEIGHT: 62 IN

## 2022-05-06 DIAGNOSIS — M18.12 ARTHRITIS OF CARPOMETACARPAL (CMC) JOINT OF LEFT THUMB: Primary | ICD-10-CM

## 2022-05-06 DIAGNOSIS — M65.332 TRIGGER FINGER, LEFT MIDDLE FINGER: ICD-10-CM

## 2022-05-06 PROCEDURE — 99214 OFFICE O/P EST MOD 30 MIN: CPT | Performed by: ORTHOPAEDIC SURGERY

## 2022-05-06 PROCEDURE — 20550 NJX 1 TENDON SHEATH/LIGAMENT: CPT | Performed by: ORTHOPAEDIC SURGERY

## 2022-05-06 PROCEDURE — 20600 DRAIN/INJ JOINT/BURSA W/O US: CPT | Performed by: ORTHOPAEDIC SURGERY

## 2022-05-06 RX ORDER — LIDOCAINE HYDROCHLORIDE 10 MG/ML
0.5 INJECTION, SOLUTION INFILTRATION; PERINEURAL
Status: COMPLETED | OUTPATIENT
Start: 2022-05-06 | End: 2022-05-06

## 2022-05-06 RX ORDER — LIDOCAINE HYDROCHLORIDE 10 MG/ML
1 INJECTION, SOLUTION INFILTRATION; PERINEURAL
Status: COMPLETED | OUTPATIENT
Start: 2022-05-06 | End: 2022-05-06

## 2022-05-06 RX ORDER — METHYLPREDNISOLONE ACETATE 40 MG/ML
0.5 INJECTION, SUSPENSION INTRA-ARTICULAR; INTRALESIONAL; INTRAMUSCULAR; SOFT TISSUE
Status: COMPLETED | OUTPATIENT
Start: 2022-05-06 | End: 2022-05-06

## 2022-05-06 RX ORDER — BETAMETHASONE SODIUM PHOSPHATE AND BETAMETHASONE ACETATE 3; 3 MG/ML; MG/ML
6 INJECTION, SUSPENSION INTRA-ARTICULAR; INTRALESIONAL; INTRAMUSCULAR; SOFT TISSUE
Status: COMPLETED | OUTPATIENT
Start: 2022-05-06 | End: 2022-05-06

## 2022-05-06 RX ADMIN — LIDOCAINE HYDROCHLORIDE 0.5 ML: 10 INJECTION, SOLUTION INFILTRATION; PERINEURAL at 12:21

## 2022-05-06 RX ADMIN — METHYLPREDNISOLONE ACETATE 0.5 ML: 40 INJECTION, SUSPENSION INTRA-ARTICULAR; INTRALESIONAL; INTRAMUSCULAR; SOFT TISSUE at 12:21

## 2022-05-06 RX ADMIN — LIDOCAINE HYDROCHLORIDE 1 ML: 10 INJECTION, SOLUTION INFILTRATION; PERINEURAL at 12:21

## 2022-05-06 RX ADMIN — BETAMETHASONE SODIUM PHOSPHATE AND BETAMETHASONE ACETATE 6 MG: 3; 3 INJECTION, SUSPENSION INTRA-ARTICULAR; INTRALESIONAL; INTRAMUSCULAR; SOFT TISSUE at 12:21

## 2022-05-06 NOTE — PROGRESS NOTES
ASSESSMENT/PLAN:    Assessment:   Trigger Finger  left  long finger and Thumb CMC Arthritis  left    Plan:   steroid injections  - second trigger finger injection provided to long finger  - depomedrol injection provided    Follow Up:  3  month(s)    To Do Next Visit:  Reassess current conditions    _____________________________________________________  CHIEF COMPLAINT:  Chief Complaint   Patient presents with    Right Elbow - Follow-up    Left Thumb - Follow-up     CMC- Beta     Right Thumb - Follow-up     CMC- Beta     Left Middle Finger - Follow-up, Triggering, Swelling     TF #1 injection 12/8/21         SUBJECTIVE:  Raul Sow is a 81 y o  female who presents for follow up regarding left long finger trigger finger and left CMC joint arthritis  Patient has been doing well with her hands, but the trigger finger did start up again about a month ago  PAST MEDICAL HISTORY:  Past Medical History:   Diagnosis Date    Arthritis     Basal cell carcinoma     Colon polyp     Depression with anxiety     Fibrocystic breast disease, unspecified laterality     GERD (gastroesophageal reflux disease)     High cholesterol     Irritable bowel syndrome     Osteopenia        PAST SURGICAL HISTORY:  Past Surgical History:   Procedure Laterality Date    BREAST CYST ASPIRATION      one performed-unsure which breast    BREAST EXCISIONAL BIOPSY Bilateral     one on each breast aprox 1980s    COLONOSCOPY      7/13/12, colon polyp biopsied, hemorrhoids - Dr Arturo Calvin right ear  Precancerous      SEPTOPLASTY  05/04/2018    VAGINAL HYSTERECTOMY  1982    prolapse; about age 30       FAMILY HISTORY:  Family History   Problem Relation Age of Onset    Atrial fibrillation Mother     Cancer Mother [de-identified]        Bladder    Breast cancer Mother [de-identified]    COPD Mother     Kidney cancer Mother [de-identified]    Osteoporosis Mother     Transient ischemic attack Mother    Cordell Caicedo Cancer Father         Bladder    Glaucoma Father     Hypertension Father     Coronary artery disease Maternal Grandfather     Coronary artery disease Maternal Aunt     No Known Problems Maternal Grandmother     No Known Problems Paternal Grandmother     No Known Problems Paternal Grandfather     No Known Problems Sister     No Known Problems Daughter     No Known Problems Son     No Known Problems Paternal Aunt     No Known Problems Paternal Aunt     No Known Problems Paternal Aunt        SOCIAL HISTORY:  Social History     Tobacco Use    Smoking status: Never Smoker    Smokeless tobacco: Never Used   Vaping Use    Vaping Use: Never used   Substance Use Topics    Alcohol use: Yes     Comment: rare glass of wine    Drug use: No       MEDICATIONS:    Current Outpatient Medications:     ALPRAZolam (XANAX) 0 25 mg tablet, Take 1 tablet (0 25 mg total) by mouth 3 (three) times a day as needed for anxiety, Disp: 30 tablet, Rfl: 0    Fluoxetine HCl, PMDD, 20 MG TABS, As needed , Disp: , Rfl:     fluticasone (FLONASE) 50 mcg/act nasal spray, 1 SPRAY INTO EACH NOSTRIL 2 (TWO) TIMES A DAY WITH MEALS (Patient taking differently: 1 spray into each nostril as needed ), Disp: 48 mL, Rfl: 1    Ibuprofen (ADVIL) 200 MG CAPS, Take by mouth as needed , Disp: , Rfl:     ipratropium (ATROVENT) 0 03 % nasal spray, 1 spray into each nostril 3 (three) times a day, Disp: 30 mL, Rfl: 1    levocetirizine (XYZAL) 5 MG tablet, TAKE 1 TABLET BY MOUTH EVERY DAY IN THE EVENING (Patient taking differently: As needed ), Disp: 90 tablet, Rfl: 1    omeprazole (PriLOSEC) 20 mg delayed release capsule, TAKE 1 CAPSULE (20 MG TOTAL) BY MOUTH DAILY BEFORE BREAKFAST, Disp: 90 capsule, Rfl: 1    rosuvastatin (CRESTOR) 5 mg tablet, Take 1 tablet (5 mg total) by mouth daily, Disp: 90 tablet, Rfl: 3    sertraline (ZOLOFT) 25 mg tablet, TAKE 1 TABLET BY MOUTH EVERY DAY, Disp: 90 tablet, Rfl: 1    ALLERGIES:  Allergies   Allergen Reactions    Penicillins Hives and Rash    Sulfa Antibiotics Hives and Rash       REVIEW OF SYSTEMS:  Pertinent items are noted in HPI  A comprehensive review of systems was negative  LABS:  HgA1c:   Lab Results   Component Value Date    HGBA1C 5 4 01/22/2021     BMP:   Lab Results   Component Value Date    CALCIUM 9 0 08/08/2021     05/09/2016    K 3 6 08/08/2021    CO2 26 08/08/2021     08/08/2021    BUN 24 08/08/2021    CREATININE 1 04 08/08/2021           _____________________________________________________  PHYSICAL EXAMINATION:  Vital signs: /72   Pulse 83   Ht 5' 2" (1 575 m)   Wt 69 4 kg (153 lb)   SpO2 (!) 85%   BMI 27 98 kg/m²   General: well developed and well nourished, alert, oriented times 3 and appears comfortable  Psychiatric: Normal  HEENT: Trachea Midline, No torticollis  Cardiovascular: No discernable arrhythmia  Pulmonary: No wheezing or stridor  Abdomen: No rebound or guarding  Extremities: No peripheral edema  Skin: No masses, erythema, lacerations, fluctation, ulcerations  Neurovascular: Sensation Intact to the Median, Ulnar, Radial Nerve, Motor Intact to the Median, Ulnar, Radial Nerve and Pulses Intact    MUSCULOSKELETAL EXAMINATION:  LEFT SIDE:  CMC: Positive grind, Positive tendnerness CMC and Positive Shoulder Sign and Finger:  Palpable clicking long finger, Crepitation long finger and Nodules  long finger    _____________________________________________________  STUDIES REVIEWED:  No Studies to review      PROCEDURES PERFORMED:  Small joint arthrocentesis: L thumb CMC  [place] Protocol:  Consent: Verbal consent obtained  Consent given by: patient  Time out: Immediately prior to procedure a "time out" was called to verify the correct patient, procedure, equipment, support staff and site/side marked as required    Supporting Documentation  Indications: pain   Procedure Details  Location: thumb - L thumb CMC  Needle size: 25 G  Medications administered: 0 5 mL lidocaine 1 %; 0 5 mL methylPREDNISolone acetate 40 mg/mL    Patient tolerance: patient tolerated the procedure well with no immediate complications  Dressing:  Sterile dressing applied    Hand/upper extremity injection: L long A1  Universal Protocol:  Consent: Verbal consent obtained  Consent given by: patient  Time out: Immediately prior to procedure a "time out" was called to verify the correct patient, procedure, equipment, support staff and site/side marked as required    Supporting Documentation  Indications: pain and therapeutic   Procedure Details  Condition:trigger finger Location: long finger - L long A1   Preparation: Patient was prepped and draped in the usual sterile fashion  Needle size: 25 G  Ultrasound guidance: no  Approach: volar  Medications administered: 1 mL lidocaine 1 %; 6 mg betamethasone acetate-betamethasone sodium phosphate 6 (3-3) mg/mL    Patient tolerance: patient tolerated the procedure well with no immediate complications  Dressing:  Sterile dressing applied              Scribe Attestation    I,:  Ceci Morales PA-C am acting as a scribe while in the presence of the attending physician :       I,:  Andrew Galvez MD personally performed the services described in this documentation    as scribed in my presence :

## 2022-06-11 DIAGNOSIS — F41.8 DEPRESSION WITH ANXIETY: ICD-10-CM

## 2022-06-11 RX ORDER — SERTRALINE HYDROCHLORIDE 25 MG/1
TABLET, FILM COATED ORAL
Qty: 90 TABLET | Refills: 1 | Status: SHIPPED | OUTPATIENT
Start: 2022-06-11

## 2022-07-11 DIAGNOSIS — F41.8 DEPRESSION WITH ANXIETY: ICD-10-CM

## 2022-07-11 RX ORDER — ALPRAZOLAM 0.25 MG/1
0.25 TABLET ORAL 3 TIMES DAILY PRN
Qty: 30 TABLET | Refills: 0 | Status: SHIPPED | OUTPATIENT
Start: 2022-07-11 | End: 2022-10-13 | Stop reason: SDUPTHER

## 2022-07-11 RX ORDER — ALPRAZOLAM 0.25 MG/1
0.25 TABLET ORAL 3 TIMES DAILY PRN
Qty: 30 TABLET | Refills: 0 | Status: CANCELLED | OUTPATIENT
Start: 2022-07-11

## 2022-09-06 ENCOUNTER — TELEPHONE (OUTPATIENT)
Dept: OBGYN CLINIC | Facility: MEDICAL CENTER | Age: 72
End: 2022-09-06

## 2022-09-06 NOTE — TELEPHONE ENCOUNTER
Patient sees Dr Ramon Sullivan  Patient is calling for a sooner appointment, she is looking for an injection in her trigger finger, her appointment is on 10/19/2022      Patient: Doris Corinne    MRN: [id number]    Phone: [phone]

## 2022-09-13 DIAGNOSIS — K21.9 GASTROESOPHAGEAL REFLUX DISEASE WITHOUT ESOPHAGITIS: ICD-10-CM

## 2022-09-13 RX ORDER — OMEPRAZOLE 20 MG/1
20 CAPSULE, DELAYED RELEASE ORAL
Qty: 90 CAPSULE | Refills: 1 | Status: SHIPPED | OUTPATIENT
Start: 2022-09-13

## 2022-10-12 PROBLEM — Z00.00 MEDICARE ANNUAL WELLNESS VISIT, SUBSEQUENT: Status: RESOLVED | Noted: 2021-12-12 | Resolved: 2022-10-12

## 2022-10-12 PROBLEM — Z12.11 SCREENING FOR MALIGNANT NEOPLASM OF COLON: Status: RESOLVED | Noted: 2020-11-22 | Resolved: 2022-10-12

## 2022-10-13 DIAGNOSIS — F41.8 DEPRESSION WITH ANXIETY: ICD-10-CM

## 2022-10-13 DIAGNOSIS — N39.0 URINARY TRACT INFECTION WITHOUT HEMATURIA, SITE UNSPECIFIED: Primary | ICD-10-CM

## 2022-10-13 RX ORDER — ALPRAZOLAM 0.25 MG/1
0.25 TABLET ORAL 3 TIMES DAILY PRN
Qty: 30 TABLET | Refills: 0 | Status: SHIPPED | OUTPATIENT
Start: 2022-10-13

## 2022-10-14 ENCOUNTER — APPOINTMENT (OUTPATIENT)
Dept: LAB | Age: 72
End: 2022-10-14
Payer: MEDICARE

## 2022-10-14 DIAGNOSIS — N39.0 URINARY TRACT INFECTION WITHOUT HEMATURIA, SITE UNSPECIFIED: ICD-10-CM

## 2022-10-14 LAB
BACTERIA UR QL AUTO: ABNORMAL /HPF
BILIRUB UR QL STRIP: NEGATIVE
CLARITY UR: CLEAR
COLOR UR: ABNORMAL
GLUCOSE UR STRIP-MCNC: NEGATIVE MG/DL
HGB UR QL STRIP.AUTO: NEGATIVE
HYALINE CASTS #/AREA URNS LPF: ABNORMAL /LPF
KETONES UR STRIP-MCNC: NEGATIVE MG/DL
LEUKOCYTE ESTERASE UR QL STRIP: NEGATIVE
MUCOUS THREADS UR QL AUTO: ABNORMAL
NITRITE UR QL STRIP: NEGATIVE
NON-SQ EPI CELLS URNS QL MICRO: ABNORMAL /HPF
PH UR STRIP.AUTO: 6 [PH]
PROT UR STRIP-MCNC: ABNORMAL MG/DL
RBC #/AREA URNS AUTO: ABNORMAL /HPF
SP GR UR STRIP.AUTO: 1.02 (ref 1–1.03)
UROBILINOGEN UR STRIP-ACNC: <2 MG/DL
WBC #/AREA URNS AUTO: ABNORMAL /HPF

## 2022-10-14 PROCEDURE — 81001 URINALYSIS AUTO W/SCOPE: CPT

## 2022-10-19 ENCOUNTER — OFFICE VISIT (OUTPATIENT)
Dept: OBGYN CLINIC | Facility: HOSPITAL | Age: 72
End: 2022-10-19
Payer: MEDICARE

## 2022-10-19 VITALS
SYSTOLIC BLOOD PRESSURE: 140 MMHG | DIASTOLIC BLOOD PRESSURE: 82 MMHG | HEIGHT: 62 IN | WEIGHT: 156.4 LBS | HEART RATE: 71 BPM | BODY MASS INDEX: 28.78 KG/M2

## 2022-10-19 DIAGNOSIS — M65.332 TRIGGER FINGER, LEFT MIDDLE FINGER: Primary | ICD-10-CM

## 2022-10-19 DIAGNOSIS — M18.12 ARTHRITIS OF CARPOMETACARPAL (CMC) JOINT OF LEFT THUMB: ICD-10-CM

## 2022-10-19 PROCEDURE — 99214 OFFICE O/P EST MOD 30 MIN: CPT | Performed by: ORTHOPAEDIC SURGERY

## 2022-10-19 PROCEDURE — 20600 DRAIN/INJ JOINT/BURSA W/O US: CPT | Performed by: ORTHOPAEDIC SURGERY

## 2022-10-19 RX ORDER — LIDOCAINE HYDROCHLORIDE AND EPINEPHRINE 10; 10 MG/ML; UG/ML
20 INJECTION, SOLUTION INFILTRATION; PERINEURAL ONCE
OUTPATIENT
Start: 2022-10-19 | End: 2022-10-19

## 2022-10-19 RX ORDER — LIDOCAINE HYDROCHLORIDE 10 MG/ML
0.5 INJECTION, SOLUTION EPIDURAL; INFILTRATION; INTRACAUDAL; PERINEURAL
Status: COMPLETED | OUTPATIENT
Start: 2022-10-19 | End: 2022-10-19

## 2022-10-19 RX ORDER — BETAMETHASONE SODIUM PHOSPHATE AND BETAMETHASONE ACETATE 3; 3 MG/ML; MG/ML
6 INJECTION, SUSPENSION INTRA-ARTICULAR; INTRALESIONAL; INTRAMUSCULAR; SOFT TISSUE
Status: COMPLETED | OUTPATIENT
Start: 2022-10-19 | End: 2022-10-19

## 2022-10-19 RX ADMIN — LIDOCAINE HYDROCHLORIDE 0.5 ML: 10 INJECTION, SOLUTION EPIDURAL; INFILTRATION; INTRACAUDAL; PERINEURAL at 10:08

## 2022-10-19 RX ADMIN — BETAMETHASONE SODIUM PHOSPHATE AND BETAMETHASONE ACETATE 6 MG: 3; 3 INJECTION, SUSPENSION INTRA-ARTICULAR; INTRALESIONAL; INTRAMUSCULAR; SOFT TISSUE at 10:08

## 2022-10-19 NOTE — PROGRESS NOTES
ASSESSMENT/PLAN:    Assessment:   Left long trigger finger  Left CMC OA    Plan:   Patient will be scheduled for a left long trigger finger release under local as she has failed 2 CS injections  Patient would like to continue to treat conservatively for the left CMC OA  She was provided with a repeat CS injection today    Follow Up: After Surgery    To Do Next Visit:  Sutures out    General Discussions:     Aia 16 Arthritis: The anatomy and physiology of carpometacarpal joint arthritis was discussed with the patient today in the office  Deterioration of the articular cartilage eventually leads to hypermobility at the thumb Aia 16 joint, resulting in joint subluxation, osteophyte formation, cystic changes within the trapezium and base of the first metacarpal, as well as subchondral sclerosis  Eventually, pain, limited mobility, and compensatory hyperextension at the metacarpophalangeal joint may develop  While normal activity and usage of the thumb joint may provide a painful experience to the patient, this typically does not result in damage to the thumb or hand  Treatment options include resting thumb spica splints to decreased joint edema, pain, and inflammation  Therapy exercises to strengthen the thenar musculature may relieve pain, but do not alter the overall continued development of osteoarthritis  Oral medications, topical medications, corticosteroid injections may decrease pain and increase overall function  Eventually, approximately 5% of patients may require surgical intervention  Operative Discussions:     Trigger Finger Release: The anatomy and physiology of trigger finger was discussed with the patient today in the office  Edema and increased contact pressure within the flexor tendons at the A1 pulley can cause pain, crepitation, and limitation of function    Treatment options include resting MP blocking splints to decrease edema, oral anti-inflammatory medications, home or formal therapy exercises, up to 2 steroid injections or surgical release  While majority of patients do respond to conservative treatment, up to 20% may require surgical release  The patient has elected release of the trigger finger  The patient has elected to undergo a release of the A1 pulley (trigger finger)  A small incision will be made over the palmar aspect of the hand, the tendon sheath holding the flexor tendons will be released  In the postoperative period, light activities are allowed immediately, driving is allowed when narcotic medication has stopped, and the incision may get wet after 2 days  Heavy activities (lifting more than approximately 10 pounds) will be allowed after the follow up appointment in 1-2 weeks  While the pain and discomfort within the wrist typically improves rapidly, some residual discomfort may be present for up to 6 weeks  The nodule that is typically palpable in the palmar aspect of the hand will not be removed, as this would necessitate removal of a portion of the flexor tendon, however the catching, clicking, and locking should resolve  Approximate success rate is 98%  The risks and benefits of the procedure were explained to the patient, which include, but are not limited to: Bleeding, infection, recurrence, pain, scar, damage to tendons, damage to nerves, and damage to blood vessels, need for future surgery and complications related to anesthesia  If bony work is done, risks also include malunion and nonunion  These risks, along with alternative conservative treatment options, and postoperative protocols were voiced back and understood by the patient  All questions were answered to the patient's satisfaction  The patient agrees to comply with a standard postoperative protocol, and is willing to proceed  Education was provided via written and auditory forms  There were no barriers to learning   Written handouts regarding wound care, incision and scar care, and general preoperative information, as well as risks and benefits were provided to the patient       _____________________________________________________  CHIEF COMPLAINT:  Chief Complaint   Patient presents with   • Left Hand - Follow-up     Left long TF #2 injection given 5/6/22 Discuss surgery   Left thumb CMC last injection beta given 12/8/21   • Right Thumb - Follow-up, CMC     Last injection Beta given 2/4/22   • Right Elbow - Follow-up     Lateral Epicondylitis  Right         SUBJECTIVE:  Jyoti Escobar is a 81 y o  female who presents for follow up regarding left CMC OA and left long trigger finger  Since last visit, Jyoti Escobar has tried steroid injections with only partial relief  Today patient reports persistent clicking and catching of the left long trigger finger along with CMC pain      PAST MEDICAL HISTORY:  Past Medical History:   Diagnosis Date   • Arthritis    • Basal cell carcinoma    • Colon polyp    • Depression with anxiety    • Fibrocystic breast disease, unspecified laterality    • GERD (gastroesophageal reflux disease)    • High cholesterol    • Irritable bowel syndrome    • Osteopenia        PAST SURGICAL HISTORY:  Past Surgical History:   Procedure Laterality Date   • BREAST CYST ASPIRATION      one performed-unsure which breast   • BREAST EXCISIONAL BIOPSY Bilateral     one on each breast aprox 1980s   • COLONOSCOPY      7/13/12, colon polyp biopsied, hemorrhoids - Dr Rock Dominguez   • Kimberly Preston right ear  Precancerous     • SEPTOPLASTY  05/04/2018   • VAGINAL HYSTERECTOMY  1982    prolapse; about age 30       FAMILY HISTORY:  Family History   Problem Relation Age of Onset   • Atrial fibrillation Mother    • Cancer Mother [de-identified]        Bladder   • Breast cancer Mother [de-identified]   • COPD Mother    • Kidney cancer Mother [de-identified]   • Osteoporosis Mother    • Transient ischemic attack Mother    • Cancer Father         Bladder   • Glaucoma Father    • Hypertension Father    • Coronary artery disease Maternal Grandfather    • Coronary artery disease Maternal Aunt    • No Known Problems Maternal Grandmother    • No Known Problems Paternal Grandmother    • No Known Problems Paternal Grandfather    • No Known Problems Sister    • No Known Problems Daughter    • No Known Problems Son    • No Known Problems Paternal Aunt    • No Known Problems Paternal Aunt    • No Known Problems Paternal Aunt        SOCIAL HISTORY:  Social History     Tobacco Use   • Smoking status: Never Smoker   • Smokeless tobacco: Never Used   Vaping Use   • Vaping Use: Never used   Substance Use Topics   • Alcohol use: Yes     Comment: rare glass of wine   • Drug use: No       MEDICATIONS:    Current Outpatient Medications:   •  ALPRAZolam (XANAX) 0 25 mg tablet, Take 1 tablet (0 25 mg total) by mouth 3 (three) times a day as needed for anxiety, Disp: 30 tablet, Rfl: 0  •  Fluoxetine HCl, PMDD, 20 MG TABS, As needed , Disp: , Rfl:   •  fluticasone (FLONASE) 50 mcg/act nasal spray, 1 SPRAY INTO EACH NOSTRIL 2 (TWO) TIMES A DAY WITH MEALS (Patient taking differently: 1 spray into each nostril as needed), Disp: 48 mL, Rfl: 1  •  Ibuprofen 200 MG CAPS, Take by mouth as needed , Disp: , Rfl:   •  ipratropium (ATROVENT) 0 03 % nasal spray, 1 spray into each nostril 3 (three) times a day, Disp: 30 mL, Rfl: 1  •  levocetirizine (XYZAL) 5 MG tablet, TAKE 1 TABLET BY MOUTH EVERY DAY IN THE EVENING (Patient taking differently: As needed), Disp: 90 tablet, Rfl: 1  •  omeprazole (PriLOSEC) 20 mg delayed release capsule, Take 1 capsule (20 mg total) by mouth daily before breakfast, Disp: 90 capsule, Rfl: 1  •  rosuvastatin (CRESTOR) 5 mg tablet, Take 1 tablet (5 mg total) by mouth daily, Disp: 90 tablet, Rfl: 3  •  sertraline (ZOLOFT) 25 mg tablet, TAKE 1 TABLET BY MOUTH EVERY DAY, Disp: 90 tablet, Rfl: 1    ALLERGIES:  Allergies   Allergen Reactions   • Clindamycin GI Intolerance   • Penicillins Hives and Rash   • Sulfa Antibiotics Hives and Rash       REVIEW OF SYSTEMS:  Pertinent items are noted in HPI  A comprehensive review of systems was negative  LABS:  HgA1c:   Lab Results   Component Value Date    HGBA1C 5 4 01/22/2021     BMP:   Lab Results   Component Value Date    CALCIUM 9 0 08/08/2021     05/09/2016    K 3 6 08/08/2021    CO2 26 08/08/2021     08/08/2021    BUN 24 08/08/2021    CREATININE 1 04 08/08/2021           _____________________________________________________  PHYSICAL EXAMINATION:  Vital signs: /82   Pulse 71   Ht 5' 2" (1 575 m)   Wt 70 9 kg (156 lb 6 4 oz)   BMI 28 61 kg/m²   General: well developed and well nourished, alert, oriented times 3 and appears comfortable  Psychiatric: Normal  HEENT: Trachea Midline, No torticollis  Cardiovascular: No discernable arrhythmia  Pulmonary: No wheezing or stridor  Abdomen: No rebound or guarding  Extremities: No peripheral edema  Skin: No masses, erythema, lacerations, fluctation, ulcerations  Neurovascular: Sensation Intact to the Median, Ulnar, Radial Nerve, Motor Intact to the Median, Ulnar, Radial Nerve and Pulses Intact    MUSCULOSKELETAL EXAMINATION:  left long finger:  Positive palpable nodule over the A1 pulley  Positive tenderness to palpation over A1 pulley  Positive catching  Positive clicking  left CMC Exam:  No adduction contracture  No hyperextension deformity of MCP joint  Positive localized tenderness over radial and dorsal aspect of thumb (CMC joint)  Grind test is Positive for pain and Positive for crepitus      _____________________________________________________  STUDIES REVIEWED:  No Studies to review      PROCEDURES PERFORMED:  Small joint arthrocentesis: L thumb CMC  [place] Protocol:  Consent: Verbal consent obtained    Consent given by: patient  Patient understanding: patient states understanding of the procedure being performed  Site marked: the operative site was marked  Patient identity confirmed: verbally with patient    Supporting Documentation  Indications: pain   Procedure Details  Location: thumb - L thumb CMC  Preparation: Patient was prepped and draped in the usual sterile fashion  Needle size: 25 G  Ultrasound guidance: no  Approach: radial  Medications administered: 6 mg betamethasone acetate-betamethasone sodium phosphate 6 (3-3) mg/mL; 0 5 mL lidocaine (PF) 1 %    Patient tolerance: patient tolerated the procedure well with no immediate complications  Dressing:  Sterile dressing applied            Scribe Attestation    I,:  Johanny Mccormack am acting as a scribe while in the presence of the attending physician :       I,:  Jearldine Goodell, MD personally performed the services described in this documentation    as scribed in my presence :

## 2022-10-21 DIAGNOSIS — R89.9 ABNORMAL LABORATORY TEST: Primary | ICD-10-CM

## 2022-10-23 DIAGNOSIS — E78.2 MIXED HYPERLIPIDEMIA: ICD-10-CM

## 2022-10-24 RX ORDER — ROSUVASTATIN CALCIUM 5 MG/1
TABLET, COATED ORAL
Qty: 90 TABLET | Refills: 3 | Status: SHIPPED | OUTPATIENT
Start: 2022-10-24

## 2022-10-31 ENCOUNTER — CLINICAL SUPPORT (OUTPATIENT)
Dept: INTERNAL MEDICINE CLINIC | Facility: CLINIC | Age: 72
End: 2022-10-31

## 2022-10-31 DIAGNOSIS — Z11.1 SCREENING FOR TUBERCULOSIS: Primary | ICD-10-CM

## 2022-11-02 ENCOUNTER — CLINICAL SUPPORT (OUTPATIENT)
Dept: INTERNAL MEDICINE CLINIC | Facility: CLINIC | Age: 72
End: 2022-11-02

## 2022-11-02 DIAGNOSIS — Z11.1 ENCOUNTER FOR PPD SKIN TEST READING: Primary | ICD-10-CM

## 2022-11-02 LAB
INDURATION: 0 MM
TB SKIN TEST: NEGATIVE

## 2022-11-17 ENCOUNTER — TELEPHONE (OUTPATIENT)
Dept: OBGYN CLINIC | Facility: HOSPITAL | Age: 72
End: 2022-11-17

## 2022-11-17 NOTE — TELEPHONE ENCOUNTER
Caller: Soila Mackey    Doctor: Mana Akbar    Reason for call: Patient called she needs to r/s 12/13 surgery       Call back#: [phone]

## 2022-12-06 ENCOUNTER — APPOINTMENT (OUTPATIENT)
Dept: LAB | Age: 72
End: 2022-12-06

## 2022-12-06 DIAGNOSIS — E78.2 MIXED HYPERLIPIDEMIA: ICD-10-CM

## 2022-12-06 DIAGNOSIS — R73.01 IMPAIRED FASTING BLOOD SUGAR: ICD-10-CM

## 2022-12-06 LAB
ALBUMIN SERPL BCP-MCNC: 4 G/DL (ref 3.5–5)
ALP SERPL-CCNC: 77 U/L (ref 46–116)
ALT SERPL W P-5'-P-CCNC: 27 U/L (ref 12–78)
ANION GAP SERPL CALCULATED.3IONS-SCNC: 4 MMOL/L (ref 4–13)
AST SERPL W P-5'-P-CCNC: 21 U/L (ref 5–45)
BILIRUB SERPL-MCNC: 0.56 MG/DL (ref 0.2–1)
BUN SERPL-MCNC: 20 MG/DL (ref 5–25)
CALCIUM SERPL-MCNC: 9.9 MG/DL (ref 8.3–10.1)
CHLORIDE SERPL-SCNC: 106 MMOL/L (ref 96–108)
CHOLEST SERPL-MCNC: 177 MG/DL
CO2 SERPL-SCNC: 29 MMOL/L (ref 21–32)
CREAT SERPL-MCNC: 1.04 MG/DL (ref 0.6–1.3)
EST. AVERAGE GLUCOSE BLD GHB EST-MCNC: 111 MG/DL
GFR SERPL CREATININE-BSD FRML MDRD: 54 ML/MIN/1.73SQ M
GLUCOSE P FAST SERPL-MCNC: 102 MG/DL (ref 65–99)
HBA1C MFR BLD: 5.5 %
HDLC SERPL-MCNC: 85 MG/DL
LDLC SERPL CALC-MCNC: 82 MG/DL (ref 0–100)
POTASSIUM SERPL-SCNC: 4 MMOL/L (ref 3.5–5.3)
PROT SERPL-MCNC: 7.7 G/DL (ref 6.4–8.4)
SODIUM SERPL-SCNC: 139 MMOL/L (ref 135–147)
TRIGL SERPL-MCNC: 51 MG/DL

## 2022-12-07 ENCOUNTER — RA CDI HCC (OUTPATIENT)
Dept: OTHER | Facility: HOSPITAL | Age: 72
End: 2022-12-07

## 2022-12-07 NOTE — PROGRESS NOTES
[address]  coding opportunities       Chart reviewed, no opportunity found: CHART REVIEWED, NO OPPORTUNITY FOUND        Patients Insurance     Medicare Insurance: Medicare

## 2022-12-10 DIAGNOSIS — F41.8 DEPRESSION WITH ANXIETY: ICD-10-CM

## 2022-12-10 RX ORDER — SERTRALINE HYDROCHLORIDE 25 MG/1
TABLET, FILM COATED ORAL
Qty: 30 TABLET | Refills: 0 | Status: SHIPPED | OUTPATIENT
Start: 2022-12-10

## 2022-12-14 ENCOUNTER — OFFICE VISIT (OUTPATIENT)
Dept: INTERNAL MEDICINE CLINIC | Facility: CLINIC | Age: 72
End: 2022-12-14

## 2022-12-14 VITALS
RESPIRATION RATE: 16 BRPM | WEIGHT: 154 LBS | HEIGHT: 62 IN | DIASTOLIC BLOOD PRESSURE: 82 MMHG | BODY MASS INDEX: 28.34 KG/M2 | SYSTOLIC BLOOD PRESSURE: 122 MMHG | HEART RATE: 67 BPM | OXYGEN SATURATION: 100 %

## 2022-12-14 DIAGNOSIS — Z13.6 SCREENING FOR CARDIOVASCULAR CONDITION: ICD-10-CM

## 2022-12-14 DIAGNOSIS — N18.31 STAGE 3A CHRONIC KIDNEY DISEASE (HCC): ICD-10-CM

## 2022-12-14 DIAGNOSIS — J01.00 ACUTE NON-RECURRENT MAXILLARY SINUSITIS: ICD-10-CM

## 2022-12-14 DIAGNOSIS — K21.9 GASTROESOPHAGEAL REFLUX DISEASE WITHOUT ESOPHAGITIS: ICD-10-CM

## 2022-12-14 DIAGNOSIS — F41.8 DEPRESSION WITH ANXIETY: ICD-10-CM

## 2022-12-14 DIAGNOSIS — E78.2 MIXED HYPERLIPIDEMIA: ICD-10-CM

## 2022-12-14 DIAGNOSIS — F32.4 MAJOR DEPRESSIVE DISORDER WITH SINGLE EPISODE, IN PARTIAL REMISSION (HCC): ICD-10-CM

## 2022-12-14 DIAGNOSIS — Z00.00 MEDICARE ANNUAL WELLNESS VISIT, SUBSEQUENT: Primary | ICD-10-CM

## 2022-12-14 RX ORDER — HYDROCODONE BITARTRATE AND ACETAMINOPHEN 5; 325 MG/1; MG/1
TABLET ORAL
COMMUNITY
Start: 2022-12-12

## 2022-12-14 RX ORDER — ALPRAZOLAM 0.25 MG/1
0.25 TABLET ORAL 3 TIMES DAILY PRN
Qty: 30 TABLET | Refills: 0 | Status: SHIPPED | OUTPATIENT
Start: 2022-12-14

## 2022-12-14 RX ORDER — AZITHROMYCIN 250 MG/1
TABLET, FILM COATED ORAL
COMMUNITY
Start: 2022-12-12

## 2022-12-14 RX ORDER — FLUTICASONE PROPIONATE 50 MCG
2 SPRAY, SUSPENSION (ML) NASAL DAILY
Qty: 1 G | Refills: 0 | Status: SHIPPED | OUTPATIENT
Start: 2022-12-14

## 2022-12-14 RX ORDER — NALOXONE HYDROCHLORIDE 4 MG/.1ML
SPRAY NASAL
Qty: 1 EACH | Refills: 1 | Status: SHIPPED | OUTPATIENT
Start: 2022-12-14

## 2022-12-14 NOTE — PATIENT INSTRUCTIONS
Medicare Preventive Visit Patient Instructions  Thank you for completing your Welcome to Medicare Visit or Medicare Annual Wellness Visit today  Your next wellness visit will be due in one year (12/15/2023)  The screening/preventive services that you may require over the next 5-10 years are detailed below  Some tests may not apply to you based off risk factors and/or age  Screening tests ordered at today's visit but not completed yet may show as past due  Also, please note that scanned in results may not display below  Preventive Screenings:  Service Recommendations Previous Testing/Comments   Colorectal Cancer Screening  * Colonoscopy    * Fecal Occult Blood Test (FOBT)/Fecal Immunochemical Test (FIT)  * Fecal DNA/Cologuard Test  * Flexible Sigmoidoscopy Age: 39-70 years old   Colonoscopy: every 10 years (may be performed more frequently if at higher risk)  OR  FOBT/FIT: every 1 year  OR  Cologuard: every 3 years  OR  Sigmoidoscopy: every 5 years  Screening may be recommended earlier than age 39 if at higher risk for colorectal cancer  Also, an individualized decision between you and your healthcare provider will decide whether screening between the ages of 74-80 would be appropriate  Colonoscopy: 05/06/2021  FOBT/FIT: Not on file  Cologuard: Not on file  Sigmoidoscopy: Not on file          Breast Cancer Screening Age: 36 years old  Frequency: every 1-2 years  Not required if history of left and right mastectomy Mammogram: 03/01/2022        Cervical Cancer Screening Between the ages of 21-29, pap smear recommended once every 3 years  Between the ages of 33-67, can perform pap smear with HPV co-testing every 5 years     Recommendations may differ for women with a history of total hysterectomy, cervical cancer, or abnormal pap smears in past  Pap Smear: 11/19/2019        Hepatitis C Screening Once for adults born between 1945 and 1965  More frequently in patients at high risk for Hepatitis C Hep C Antibody: 04/18/2019        Diabetes Screening 1-2 times per year if you're at risk for diabetes or have pre-diabetes Fasting glucose: 102 mg/dL (12/6/2022)  A1C: 5 5 % (12/6/2022)      Cholesterol Screening Once every 5 years if you don't have a lipid disorder  May order more often based on risk factors  Lipid panel: 12/06/2022          Other Preventive Screenings Covered by Medicare:  1  Abdominal Aortic Aneurysm (AAA) Screening: covered once if your at risk  You're considered to be at risk if you have a family history of AAA  2  Lung Cancer Screening: covers low dose CT scan once per year if you meet all of the following conditions: (1) Age 50-69; (2) No signs or symptoms of lung cancer; (3) Current smoker or have quit smoking within the last 15 years; (4) You have a tobacco smoking history of at least 20 pack years (packs per day multiplied by number of years you smoked); (5) You get a written order from a healthcare provider  3  Glaucoma Screening: covered annually if you're considered high risk: (1) You have diabetes OR (2) Family history of glaucoma OR (3)  aged 48 and older OR (3)  American aged 72 and older  3  Osteoporosis Screening: covered every 2 years if you meet one of the following conditions: (1) You're estrogen deficient and at risk for osteoporosis based off medical history and other findings; (2) Have a vertebral abnormality; (3) On glucocorticoid therapy for more than 3 months; (4) Have primary hyperparathyroidism; (5) On osteoporosis medications and need to assess response to drug therapy  · Last bone density test (DXA Scan): 06/06/2016   5  HIV Screening: covered annually if you're between the age of 15-65  Also covered annually if you are younger than 13 and older than 72 with risk factors for HIV infection  For pregnant patients, it is covered up to 3 times per pregnancy      Immunizations:  Immunization Recommendations   Influenza Vaccine Annual influenza vaccination during flu season is recommended for all persons aged >= 6 months who do not have contraindications   Pneumococcal Vaccine   * Pneumococcal conjugate vaccine = PCV13 (Prevnar 13), PCV15 (Vaxneuvance), PCV20 (Prevnar 20)  * Pneumococcal polysaccharide vaccine = PPSV23 (Pneumovax) Adults 25-60 years old: 1-3 doses may be recommended based on certain risk factors  Adults 72 years old: 1-2 doses may be recommended based off what pneumonia vaccine you previously received   Hepatitis B Vaccine 3 dose series if at intermediate or high risk (ex: diabetes, end stage renal disease, liver disease)   Tetanus (Td) Vaccine - COST NOT COVERED BY MEDICARE PART B Following completion of primary series, a booster dose should be given every 10 years to maintain immunity against tetanus  Td may also be given as tetanus wound prophylaxis  Tdap Vaccine - COST NOT COVERED BY MEDICARE PART B Recommended at least once for all adults  For pregnant patients, recommended with each pregnancy  Shingles Vaccine (Shingrix) - COST NOT COVERED BY MEDICARE PART B  2 shot series recommended in those aged 48 and above     Health Maintenance Due:      Topic Date Due   • Breast Cancer Screening: Mammogram  03/01/2023   • Colorectal Cancer Screening  05/06/2031   • Hepatitis C Screening  Completed     Immunizations Due:      Topic Date Due   • Hepatitis B Vaccine (1 of 3 - 3-dose series) Never done   • COVID-19 Vaccine (4 - Booster for Moderna series) 02/28/2022   • Influenza Vaccine (1) 09/01/2022     Advance Directives   What are advance directives? Advance directives are legal documents that state your wishes and plans for medical care  These plans are made ahead of time in case you lose your ability to make decisions for yourself  Advance directives can apply to any medical decision, such as the treatments you want, and if you want to donate organs  What are the types of advance directives?   There are many types of advance directives, and each state has rules about how to use them  You may choose a combination of any of the following:  · Living will: This is a written record of the treatment you want  You can also choose which treatments you do not want, which to limit, and which to stop at a certain time  This includes surgery, medicine, IV fluid, and tube feedings  · Durable power of  for healthcare [place] [facility]): This is a written record that states who you want to make healthcare choices for you when you are unable to make them for yourself  This person, called a proxy, is usually a family member or a friend  You may choose more than 1 proxy  · Do not resuscitate (DNR) order:  A DNR order is used in case your heart stops beating or you stop breathing  It is a request not to have certain forms of treatment, such as CPR  A DNR order may be included in other types of advance directives  · Medical directive: This covers the care that you want if you are in a coma, near death, or unable to make decisions for yourself  You can list the treatments you want for each condition  Treatment may include pain medicine, surgery, blood transfusions, dialysis, IV or tube feedings, and a ventilator (breathing machine)  · Values history: This document has questions about your views, beliefs, and how you feel and think about life  This information can help others choose the care that you would choose  Why are advance directives important? An advance directive helps you control your care  Although spoken wishes may be used, it is better to have your wishes written down  Spoken wishes can be misunderstood, or not followed  Treatments may be given even if you do not want them  An advance directive may make it easier for your family to make difficult choices about your care     Weight Management   Why it is important to manage your weight:  Being overweight increases your risk of health conditions such as heart disease, high blood pressure, type 2 diabetes, and certain types of cancer  It can also increase your risk for osteoarthritis, sleep apnea, and other respiratory problems  Aim for a slow, steady weight loss  Even a small amount of weight loss can lower your risk of health problems  How to lose weight safely:  A safe and healthy way to lose weight is to eat fewer calories and get regular exercise  You can lose up about 1 pound a week by decreasing the number of calories you eat by 500 calories each day  Healthy meal plan for weight management:  A healthy meal plan includes a variety of foods, contains fewer calories, and helps you stay healthy  A healthy meal plan includes the following:  · Eat whole-grain foods more often  A healthy meal plan should contain fiber  Fiber is the part of grains, fruits, and vegetables that is not broken down by your body  Whole-grain foods are healthy and provide extra fiber in your diet  Some examples of whole-grain foods are whole-wheat breads and pastas, oatmeal, brown rice, and bulgur  · Eat a variety of vegetables every day  Include dark, leafy greens such as spinach, kale, bryan greens, and mustard greens  Eat yellow and orange vegetables such as carrots, sweet potatoes, and winter squash  · Eat a variety of fruits every day  Choose fresh or canned fruit (canned in its own juice or light syrup) instead of juice  Fruit juice has very little or no fiber  · Eat low-fat dairy foods  Drink fat-free (skim) milk or 1% milk  Eat fat-free yogurt and low-fat cottage cheese  Try low-fat cheeses such as mozzarella and other reduced-fat cheeses  · Choose meat and other protein foods that are low in fat  Choose beans or other legumes such as split peas or lentils  Choose fish, skinless poultry (chicken or turkey), or lean cuts of red meat (beef or pork)  Before you cook meat or poultry, cut off any visible fat  · Use less fat and oil  Try baking foods instead of frying them   Add less fat, such as margarine, sour cream, regular salad dressing and mayonnaise to foods  Eat fewer high-fat foods  Some examples of high-fat foods include french fries, doughnuts, ice cream, and cakes  · Eat fewer sweets  Limit foods and drinks that are high in sugar  This includes candy, cookies, regular soda, and sweetened drinks  Exercise:  Exercise at least 30 minutes per day on most days of the week  Some examples of exercise include walking, biking, dancing, and swimming  You can also fit in more physical activity by taking the stairs instead of the elevator or parking farther away from stores  Ask your healthcare provider about the best exercise plan for you  © Copyright [employer] 2018 Information is for End User's use only and may not be sold, redistributed or otherwise used for commercial purposes   All illustrations and images included in CareNotes® are the copyrighted property of [employer]  or [address] [place] [address] [facility]

## 2022-12-14 NOTE — PROGRESS NOTES
Assessment and Plan:     Problem List Items Addressed This Visit        Digestive    Gastroesophageal reflux disease without esophagitis     Recent EGD did show hiatal hernia patient does have ongoing symptoms for now we will have her continue the omeprazole 20 mg once daily, ulcer free diet, try to avoid eating 3 hours prior to going to bed and elevate the head of the bed at nighttime for sleep; we will reevaluate her symptoms in 3 months we did discuss different treatment options for the hiatal hernia including the Nissen's fundoplication we will have further discussion down the road            Respiratory    Acute non-recurrent maxillary sinusitis     Right maxillary sinus infection following extraction of a wisdom tooth the patient has been started on Zithromax day #2 she reports any ongoing symptoms will prescribe Flonase 2 sprays each nostril once a day if her symptoms are not improving the next 48 to 72 hours she is to contact me for further treatment  Relevant Medications    fluticasone (FLONASE) 50 mcg/act nasal spray       Genitourinary    Stage 3a chronic kidney disease (HCC)     Drink adequate amount of water, avoid anti-inflammatories, reduce sodium in the diet and will continue to monitor the kidney function            Other    Depression with anxiety     Clinically stable and doing well continue the current medical regiment will continue monitor  Patient did request refill of Xanax 0 25 mg 1 tablet 3 times daily as needed tolerates well and very helpful for her anxiety and sleep PDMP checked         Relevant Medications    ALPRAZolam (XANAX) 0 25 mg tablet    naloxone (NARCAN) 4 mg/0 1 mL nasal spray    Hyperlipidemia     Hyperlipidemia controlled continue with current medical regiment recommend a low-cholesterol diet and recommend routine exercise we will continue to monitor the progress    Continue Crestor 5 mg once daily         Major depressive disorder with single episode, in partial remission ([address] )     Clinically stable and doing well continue the current medical regiment will continue monitor  No SI continue Zoloft 25 mg once daily         Relevant Medications    ALPRAZolam (XANAX) 0 25 mg tablet    Medicare annual wellness visit, subsequent - Primary     Assessment and plan 1  Medicare subsequent annual wellness examination overall the patient is clinically stable and doing well, we encouraged the patient to follow a healthy and balanced diet  We recommend that the patient exercise routinely approximately 30 minutes 5 times per week   We have reviewed the patient's vaccines and have made recommendations for updates if necessary        We will be ordering screening laboratories which are age appropriate  Return to the office in   6 months   call if any problems  Screening for cardiovascular condition    Relevant Orders    Comprehensive metabolic panel    Lipid Panel with Direct LDL reflex     BMI Counseling: Body mass index is 28 17 kg/m²  The BMI is above normal  Nutrition recommendations include decreasing portion sizes and moderation in carbohydrate intake  Exercise recommendations include exercising 3-5 times per week  Rationale for BMI follow-up plan is due to patient being overweight or obese  pt reports sinus pain since extraction of the wisdom tooth right upper wisdom  Pm z-pack on monday  Preventive health issues were discussed with patient, and age appropriate screening tests were ordered as noted in patient's After Visit Summary  Personalized health advice and appropriate referrals for health education or preventive services given if needed, as noted in patient's After Visit Summary       History of Present Illness:     Patient presents for a Medicare Wellness Visit    HPI 78-year old female coming in for a follow up office visit regarding anxiety/depression, acute right maxillary sinus infection, stage IIIa kidney disease and hyperlipidemia; the patient reports me compliant taking medications without untoward side effects the  The patient is here to review his medical condition, update me on the medical condition and the patient reports me no hospitalizations and no ER visits  No injuries no illnesses she reports me recent extraction of the right upper wisdom tooth subsequently she has developed pain and pressure of the right maxillary sinus she has been started on azithromycin and she is currently on day #2 with persistent symptoms  She does report to me nasal congestion  She has been using antihistamine no fevers no chills  Reports was getting severe esop burning was taking  The omeprazole tried to stop and gerd returned  Reports me emotionally doing well she is a light sleeper she does use Xanax for anxiety and insomnia  Patient Care Team:  Murphy Vuong DO as PCP - General (Internal Medicine)  Feliciano Bowman MD     Review of Systems:     Review of Systems   Constitutional: Negative for activity change, appetite change and unexpected weight change  HENT: Negative for congestion and postnasal drip  Eyes: Negative for visual disturbance  Respiratory: Negative for cough and shortness of breath  Cardiovascular: Negative for chest pain  Gastrointestinal: Negative for abdominal pain, diarrhea, nausea and vomiting  Neurological: Negative for dizziness, light-headedness and headaches          Problem List:     Patient Active Problem List   Diagnosis   • Aortic valve insufficiency   • Allergic rhinitis   • Depression with anxiety   • GERD without esophagitis   • Hyperlipidemia   • Irritable bowel syndrome   • Vitamin D deficiency   • Impaired fasting blood sugar   • Subclinical hypothyroidism   • Arthritis of carpometacarpal (CMC) joint of both thumbs   • Major depressive disorder with single episode, in partial remission (HCC)   • Vitiligo   • Gastroesophageal reflux disease without esophagitis   • Depression   • Benign paroxysmal positional vertigo of right ear   • Benign paroxysmal positional vertigo   • Tinnitus   • Abnormal laboratory test   • Pulsatile tinnitus of left ear   • Other specified symptoms and signs involving the circulatory and respiratory systems    • Fall   • Back pain   • Neck pain   • Stage 3a chronic kidney disease ([facility] [address] )   • Medicare annual wellness visit, subsequent   • Acute non-recurrent maxillary sinusitis   • Screening for cardiovascular condition      Past Medical and Surgical History:     Past Medical History:   Diagnosis Date   • Arthritis    • Basal cell carcinoma    • Colon polyp    • Depression with anxiety    • Fibrocystic breast disease, unspecified laterality    • GERD (gastroesophageal reflux disease)    • High cholesterol    • Irritable bowel syndrome    • Osteopenia      Past Surgical History:   Procedure Laterality Date   • BREAST CYST ASPIRATION      one performed-unsure which breast   • BREAST EXCISIONAL BIOPSY Bilateral     one on each breast aprox 1980s   • COLONOSCOPY      7/13/12, colon polyp biopsied, hemorrhoids - Dr Gillian Meadows   • SCALP LESION REMOVAL W/ FLAP AND SKIN GRAFT      Behind right ear  Precancerous     • SEPTOPLASTY  05/04/2018   • VAGINAL HYSTERECTOMY  1982    prolapse; about age 30      Family History:     Family History   Problem Relation Age of Onset   • Atrial fibrillation Mother    • Cancer Mother [de-identified]        Bladder   • Breast cancer Mother [de-identified]   • COPD Mother    • Kidney cancer Mother [de-identified]   • Osteoporosis Mother    • Transient ischemic attack Mother    • Cancer Father         Bladder   • Glaucoma Father    • Hypertension Father    • Coronary artery disease Maternal Grandfather    • Coronary artery disease Maternal Aunt    • No Known Problems Maternal Grandmother    • No Known Problems Paternal Grandmother    • No Known Problems Paternal Grandfather    • No Known Problems Sister    • No Known Problems Daughter    • No Known Problems Son    • No Known Problems Paternal Aunt    • No Known Problems Paternal Aunt    • No Known Problems Paternal Aunt       Social History:     Social History     Socioeconomic History   • Marital status: /Civil Union     Spouse name: None   • Number of children: None   • Years of education: None   • Highest education level: None   Occupational History   • None   Tobacco Use   • Smoking status: Never   • Smokeless tobacco: Never   Vaping Use   • Vaping Use: Never used   Substance and Sexual Activity   • Alcohol use: Yes     Comment: rare glass of wine   • Drug use: No   • Sexual activity: Yes     Partners: Male   Other Topics Concern   • None   Social History Narrative    Advance directive declined by patient     Social Determinants of Health     Financial Resource Strain: Low Risk    • Difficulty of Paying Living Expenses: Not hard at all   Food Insecurity: Not on file   Transportation Needs: No Transportation Needs   • Lack of Transportation (Medical): No   • Lack of Transportation (Non-Medical):  No   Physical Activity: Not on file   Stress: Not on file   Social Connections: Not on file   Intimate Partner Violence: Not on file   Housing Stability: Not on file      Medications and Allergies:     Current Outpatient Medications   Medication Sig Dispense Refill   • ALPRAZolam (XANAX) 0 25 mg tablet Take 1 tablet (0 25 mg total) by mouth 3 (three) times a day as needed for anxiety 30 tablet 0   • azithromycin (ZITHROMAX) 250 mg tablet      • Fluoxetine HCl, PMDD, 20 MG TABS As needed      • fluticasone (FLONASE) 50 mcg/act nasal spray 1 SPRAY INTO EACH NOSTRIL 2 (TWO) TIMES A DAY WITH MEALS (Patient taking differently: 1 spray into each nostril as needed) 48 mL 1   • fluticasone (FLONASE) 50 mcg/act nasal spray 2 sprays into each nostril daily 1 g 0   • HYDROcodone-acetaminophen (NORCO) 5-325 mg per tablet      • Ibuprofen 200 MG CAPS Take by mouth as needed      • ipratropium (ATROVENT) 0 03 % nasal spray 1 spray into each nostril 3 (three) times a day 30 mL 1   • levocetirizine (XYZAL) 5 MG tablet TAKE 1 TABLET BY MOUTH EVERY DAY IN THE EVENING (Patient taking differently: As needed) 90 tablet 1   • naloxone (NARCAN) 4 mg/0 1 mL nasal spray Administer 1 spray into a nostril  If no response after 2-3 minutes, give another dose in the other nostril using a new spray  1 each 1   • omeprazole (PriLOSEC) 20 mg delayed release capsule Take 1 capsule (20 mg total) by mouth daily before breakfast 90 capsule 1   • rosuvastatin (CRESTOR) 5 mg tablet TAKE 1 TABLET BY MOUTH EVERY DAY 90 tablet 3   • sertraline (ZOLOFT) 25 mg tablet TAKE 1 TABLET BY MOUTH EVERY DAY 30 tablet 0     No current facility-administered medications for this visit       Allergies   Allergen Reactions   • Clindamycin GI Intolerance   • Penicillins Hives and Rash   • Sulfa Antibiotics Hives and Rash      Immunizations:     Immunization History   Administered Date(s) Administered   • COVID-19 MODERNA VACC 0 5 ML IM 02/04/2021, 03/04/2021, 10/28/2021   • COVID-19 Moderna Vac BIVALENT 18 Yr+ Im (BOOSTER ONLY) 09/13/2022   • H1N1, All Formulations 12/30/2009   • INFLUENZA 10/30/2015, 10/31/2016, 10/31/2017, 11/01/2018   • Influenza Quadrivalent Preservative Free 3 years and older IM 09/30/2014   • Influenza Quadrivalent, 6-35 Months IM 10/30/2015   • Influenza Split High Dose Preservative Free IM 10/31/2017, 11/15/2018, 09/13/2022   • Influenza, high dose seasonal 0 7 mL 10/31/2019, 10/02/2021   • Influenza, seasonal, injectable 10/26/2012, 10/22/2013, 10/31/2016   • Influenza, seasonal, injectable, preservative free 09/16/2020   • Pneumococcal Conjugate 13-Valent 10/31/2017   • Pneumococcal Polysaccharide PPV23 12/03/2018   • Tdap 06/25/2015   • Tuberculin Skin Test-PPD Intradermal 10/31/2022   • Zoster 06/26/2012   • Zoster Vaccine Recombinant 07/27/2020, 11/12/2020      Health Maintenance:         Topic Date Due   • Breast Cancer Screening: Mammogram  03/01/2023   • Colorectal Cancer Screening  05/06/2031   • Hepatitis C Screening  Completed         Topic Date Due   • Hepatitis B Vaccine (1 of 3 - 3-dose series) Never done   • COVID-19 Vaccine (4 - Booster for Link Faheemold series) 02/28/2022      Medicare Screening Tests and Risk Assessments:     Yanet Bruno is here for her Subsequent Wellness visit  Health Risk Assessment:   Patient rates overall health as very good  Patient feels that their physical health rating is same  Patient is satisfied with their life  Eyesight was rated as slightly worse  Hearing was rated as same  Patient feels that their emotional and mental health rating is same  Patients states they are never, rarely angry  Patient states they are never, rarely unusually tired/fatigued  Pain experienced in the last 7 days has been none  Patient states that she has experienced no weight loss or gain in last 6 months  Reports has glasses , cataract    Depression Screening:   PHQ-9 Score: 3      Fall Risk Screening: In the past year, patient has experienced: no history of falling in past year      Urinary Incontinence Screening:   Patient has not leaked urine accidently in the last six months  Home Safety:  Patient does not have trouble with stairs inside or outside of their home  Patient has working smoke alarms and has working carbon monoxide detector  Home safety hazards include: none  Nutrition:   Current diet is Regular  Medications:   Patient is not currently taking any over-the-counter supplements  Patient is able to manage medications  Activities of Daily Living (ADLs)/Instrumental Activities of Daily Living (IADLs):   Walk and transfer into and out of bed and chair?: Yes  Dress and groom yourself?: Yes    Bathe or shower yourself?: Yes    Feed yourself?  Yes  Do your laundry/housekeeping?: Yes  Manage your money, pay your bills and track your expenses?: Yes  Make your own meals?: Yes    Do your own shopping?: Yes    Previous Hospitalizations:   Any hospitalizations or ED visits within the last 12 months?: No      Advance Care Planning:   Living will: Yes    Durable POA for healthcare: Yes    Advanced directive: Yes      Cognitive Screening:   Provider or family/friend/caregiver concerned regarding cognition?: No    PREVENTIVE SCREENINGS      Cardiovascular Screening:    General: Screening Not Indicated and History Lipid Disorder      Diabetes Screening:     General: Screening Current      Colorectal Cancer Screening:     General: Screening Current      Breast Cancer Screening:     General: Screening Current      Cervical Cancer Screening:    General: Screening Not Indicated      Lung Cancer Screening:     General: Screening Not Indicated      Hepatitis C Screening:    General: Screening Current    Screening, Brief Intervention, and Referral to Treatment (SBIRT)    Screening  Typical number of drinks in a day: 0  Typical number of drinks in a week: 0  Interpretation: Low risk drinking behavior  AUDIT-C Screenin) How often did you have a drink containing alcohol in the past year? monthly or less  2) How many drinks did you have on a typical day when you were drinking in the past year? 0  3) How often did you have 6 or more drinks on one occasion in the past year? never    AUDIT-C Score: 1  Interpretation: Score 0-2 (female): Negative screen for alcohol misuse    Single Item Drug Screening:  How often have you used an illegal drug (including marijuana) or a prescription medication for non-medical reasons in the past year? never    Single Item Drug Screen Score: 0  Interpretation: Negative screen for possible drug use disorder    No results found  Physical Exam:     /82   Pulse 67   Resp 16   Ht 5' 2" (1 575 m)   Wt 69 9 kg (154 lb)   SpO2 100%   BMI 28 17 kg/m²     Physical Exam  Vitals and nursing note reviewed  Constitutional:       General: She is not in acute distress  Appearance: Normal appearance  She is well-developed and normal weight   She is not ill-appearing, toxic-appearing or diaphoretic  HENT:      Head: Normocephalic and atraumatic  Right Ear: External ear normal       Left Ear: External ear normal       Nose: Nose normal    Eyes:      Pupils: Pupils are equal, round, and reactive to light  Cardiovascular:      Rate and Rhythm: Normal rate and regular rhythm  Heart sounds: Normal heart sounds  No murmur heard  Pulmonary:      Effort: Pulmonary effort is normal       Breath sounds: Normal breath sounds  Abdominal:      General: There is no distension  Palpations: Abdomen is soft  Tenderness: There is no abdominal tenderness  There is no guarding  Neurological:      Mental Status: She is alert  Psychiatric:         Mood and Affect: Mood is anxious  Mood is not depressed  Thought Content: Thought content does not include suicidal ideation       Mild tenderness of the right maxillary sinus, nasal congestion examination of the gums no redness no pus no swelling    Aquiles Saldaña DO

## 2022-12-15 ENCOUNTER — TELEMEDICINE (OUTPATIENT)
Dept: INTERNAL MEDICINE CLINIC | Facility: CLINIC | Age: 72
End: 2022-12-15

## 2022-12-15 ENCOUNTER — NURSE TRIAGE (OUTPATIENT)
Dept: OTHER | Facility: OTHER | Age: 72
End: 2022-12-15

## 2022-12-15 DIAGNOSIS — K08.409 HISTORY OF TOOTH EXTRACTION, UNSPECIFIED EDENTULISM CLASS: Primary | ICD-10-CM

## 2022-12-15 PROBLEM — Z00.00 MEDICARE ANNUAL WELLNESS VISIT, SUBSEQUENT: Status: ACTIVE | Noted: 2022-12-15

## 2022-12-15 PROBLEM — Z13.6 SCREENING FOR CARDIOVASCULAR CONDITION: Status: ACTIVE | Noted: 2022-12-15

## 2022-12-15 PROBLEM — J01.00 ACUTE NON-RECURRENT MAXILLARY SINUSITIS: Status: ACTIVE | Noted: 2022-12-15

## 2022-12-15 RX ORDER — DOXYCYCLINE 100 MG/1
100 TABLET ORAL 2 TIMES DAILY
Qty: 8 TABLET | Refills: 0 | Status: SHIPPED | OUTPATIENT
Start: 2022-12-15 | End: 2022-12-19

## 2022-12-15 NOTE — ASSESSMENT & PLAN NOTE
Assessment and plan 1  Medicare subsequent annual wellness examination overall the patient is clinically stable and doing well, we encouraged the patient to follow a healthy and balanced diet  We recommend that the patient exercise routinely approximately 30 minutes 5 times per week   We have reviewed the patient's vaccines and have made recommendations for updates if necessary        We will be ordering screening laboratories which are age appropriate  Return to the office in   6 months   call if any problems

## 2022-12-15 NOTE — ASSESSMENT & PLAN NOTE
Clinically stable and doing well continue the current medical regiment will continue monitor    No SI continue Zoloft 25 mg once daily

## 2022-12-15 NOTE — ASSESSMENT & PLAN NOTE
Clinically stable and doing well continue the current medical regiment will continue monitor    Patient did request refill of Xanax 0 25 mg 1 tablet 3 times daily as needed tolerates well and very helpful for her anxiety and sleep PDMP checked

## 2022-12-15 NOTE — TELEPHONE ENCOUNTER
Regarding: mouth swollen ear pain  ----- Message from [facility] sent at 12/15/2022  6:38 AM EST -----  "I work up at 3 am and my mouth is swollen and my right ear is hurting    I had a tooth extracted on 12/12/2022 "

## 2022-12-15 NOTE — ASSESSMENT & PLAN NOTE
Recent EGD did show hiatal hernia patient does have ongoing symptoms for now we will have her continue the omeprazole 20 mg once daily, ulcer free diet, try to avoid eating 3 hours prior to going to bed and elevate the head of the bed at nighttime for sleep; we will reevaluate her symptoms in 3 months we did discuss different treatment options for the hiatal hernia including the Nissen's fundoplication we will have further discussion down the road

## 2022-12-15 NOTE — TELEPHONE ENCOUNTER
Reason for Disposition  • [1] Caller has NON-URGENT question AND [2] triager unable to answer question    Answer Assessment - Initial Assessment Questions  1  SYMPTOM: "What's the main symptom you're concerned about?" (e g , bleeding, pain, fever)      Swelling right cheek, towards jaw    2  ONSET: "When did the *No Answer*  start?"      12/14    3  DATE of TOOTH EXTRACTION: "When was surgery performed?"       12/12    4  BLEEDING: "Is there any bleeding?" If Yes, ask: "How much?"       Denies    5  PAIN: "Is there any pain?" If Yes, ask: "How bad is it?"  (Scale 1-10; or mild, moderate, severe)      Right ear pain- moderate    6  FEVER: "Do you have a fever?" If Yes, ask: "What is your temperature, how was it measured, and when did it start?"      Denies    7   OTHER SYMPTOMS: "Do you have any other symptoms?" (e g , face swelling)      Denies    Protocols used: TOOTH EXTRACTION-ADULT-

## 2022-12-15 NOTE — ASSESSMENT & PLAN NOTE
Right maxillary sinus infection following extraction of a wisdom tooth the patient has been started on Zithromax day #2 she reports any ongoing symptoms will prescribe Flonase 2 sprays each nostril once a day if her symptoms are not improving the next 48 to 72 hours she is to contact me for further treatment

## 2022-12-15 NOTE — TELEPHONE ENCOUNTER
Patient with recent tooth extraction  Patient was seen in PCP office yesterday and discussed possibly switching antibiotics  She’s calling in stating her mouth and cheek area are more swollen, she doesn’t think the antibiotic is working and would like to switch to something else  TT sent to on call provider, per provider nurse from the office will follow up with patient

## 2022-12-16 PROBLEM — K08.409 H/O TOOTH EXTRACTION: Status: ACTIVE | Noted: 2022-12-16

## 2022-12-16 NOTE — ASSESSMENT & PLAN NOTE
- H/o tooth extraction on day 3 azithromycin therapy reported some nasal and ear fullness  - (-) fever, chills or purulent discharge  - will discontinue azithro, doxycicline initiated  - continue tylenol and motrin as needed  - oral hydration, warm compresses and rest   - instructed to call the office if worsening symptoms

## 2022-12-16 NOTE — PROGRESS NOTES
Virtual visit      Assessment/Plan:    H/O tooth extraction  - H/o tooth extraction on day 3 azithromycin therapy reported some nasal and ear fullness  - (-) fever, chills or purulent discharge  - will discontinue azithro, doxycicline initiated  - continue tylenol and motrin as needed  - oral hydration, warm compresses and rest   - instructed to call the office if worsening symptoms      Diagnoses and all orders for this visit:    History of tooth extraction, unspecified edentulism class  -     doxycycline (ADOXA) 100 MG tablet; Take 1 tablet (100 mg total) by mouth 2 (two) times a day for 4 days        Subjective:      Patient ID: Juliette Rondon is a 81 y o  female   [facility] here today for a virtual visit, she recently got a tooth extracted , despite antibiotic and antiinflamatory therapy pain is not improving, she would like to discuss an alternative therapy      The following portions of the patient's history were reviewed and updated as appropriate: allergies, current medications, past family history, past medical history, past social history, past surgical history and problem list     Review of Systems   HENT: Positive for dental problem, ear pain and facial swelling  All other systems reviewed and are negative  Objective: There were no vitals taken for this visit  Physical Exam  Constitutional:       General: She is not in acute distress  Appearance: Normal appearance  She is not ill-appearing, toxic-appearing or diaphoretic  HENT:      Head: Normocephalic and atraumatic  Comments: Mild right side swellling, no erythema noted  Neurological:      Mental Status: She is alert  Rest of the physical exam limited due to the nature of the encounter      Nutrition Assessment and Intervention:     Reviewed food recall journal      Physical Activity Assessment and Intervention:    Activity journal reviewed      Emotional and Mental Well-being, Sleep, Connectedness Assessment and Intervention:    Sleep/stress assessment performed      Tobacco and Toxic Substance Assessment and Intervention:     Tobacco use screening performed    Alcohol and drug use screening performed

## 2023-01-05 ENCOUNTER — TELEPHONE (OUTPATIENT)
Dept: OBGYN CLINIC | Facility: CLINIC | Age: 73
End: 2023-01-05

## 2023-01-05 NOTE — TELEPHONE ENCOUNTER
Caller: Patient     Doctor: Eb Reynolds     Reason for call: Patient states the injection helped her trigger finger and she would like to cancel her surgery for now with Eb Reynolds     Call back#: [phone]

## 2023-01-08 DIAGNOSIS — J01.00 ACUTE NON-RECURRENT MAXILLARY SINUSITIS: ICD-10-CM

## 2023-01-08 RX ORDER — FLUTICASONE PROPIONATE 50 MCG
SPRAY, SUSPENSION (ML) NASAL
Qty: 48 ML | Refills: 1 | Status: SHIPPED | OUTPATIENT
Start: 2023-01-08

## 2023-01-22 DIAGNOSIS — F41.8 DEPRESSION WITH ANXIETY: ICD-10-CM

## 2023-01-22 RX ORDER — SERTRALINE HYDROCHLORIDE 25 MG/1
TABLET, FILM COATED ORAL
Qty: 90 TABLET | Refills: 1 | Status: SHIPPED | OUTPATIENT
Start: 2023-01-22

## 2023-02-13 PROBLEM — J01.00 ACUTE NON-RECURRENT MAXILLARY SINUSITIS: Status: RESOLVED | Noted: 2022-12-15 | Resolved: 2023-02-13

## 2023-02-13 PROBLEM — Z13.6 SCREENING FOR CARDIOVASCULAR CONDITION: Status: RESOLVED | Noted: 2022-12-15 | Resolved: 2023-02-13

## 2023-02-13 PROBLEM — Z00.00 MEDICARE ANNUAL WELLNESS VISIT, SUBSEQUENT: Status: RESOLVED | Noted: 2022-12-15 | Resolved: 2023-02-13

## 2023-02-14 ENCOUNTER — OFFICE VISIT (OUTPATIENT)
Dept: INTERNAL MEDICINE CLINIC | Facility: CLINIC | Age: 73
End: 2023-02-14

## 2023-02-14 ENCOUNTER — APPOINTMENT (OUTPATIENT)
Dept: RADIOLOGY | Age: 73
End: 2023-02-14

## 2023-02-14 VITALS
OXYGEN SATURATION: 98 % | SYSTOLIC BLOOD PRESSURE: 120 MMHG | RESPIRATION RATE: 16 BRPM | HEIGHT: 62 IN | WEIGHT: 154.4 LBS | HEART RATE: 97 BPM | BODY MASS INDEX: 28.41 KG/M2 | DIASTOLIC BLOOD PRESSURE: 84 MMHG

## 2023-02-14 DIAGNOSIS — K21.9 GASTROESOPHAGEAL REFLUX DISEASE WITHOUT ESOPHAGITIS: ICD-10-CM

## 2023-02-14 DIAGNOSIS — R07.81 RIB PAIN ON RIGHT SIDE: Primary | ICD-10-CM

## 2023-02-14 DIAGNOSIS — R07.81 RIB PAIN ON RIGHT SIDE: ICD-10-CM

## 2023-02-14 DIAGNOSIS — K21.9 ESOPHAGEAL REFLUX: ICD-10-CM

## 2023-02-14 RX ORDER — PREDNISOLONE ACETATE 10 MG/ML
SUSPENSION/ DROPS OPHTHALMIC
COMMUNITY
Start: 2023-01-19

## 2023-02-14 RX ORDER — ATROPINE SULFATE 10 MG/ML
SOLUTION/ DROPS OPHTHALMIC
COMMUNITY
Start: 2023-01-20

## 2023-02-14 RX ORDER — KETOROLAC TROMETHAMINE 5 MG/ML
SOLUTION OPHTHALMIC
COMMUNITY
Start: 2023-01-19

## 2023-02-14 RX ORDER — GATIFLOXACIN 5 MG/ML
SOLUTION/ DROPS OPHTHALMIC
COMMUNITY
Start: 2023-01-19

## 2023-02-14 NOTE — PROGRESS NOTES
Assessment/Plan:    Esophageal reflux  · Reports GERD symptoms of burning, controlled with daily Omeprazole  · + nausea, intermittent right sided chest wall pain after eating for many years, recent worsening, also occasional LLQ abd discomfort, takes Bentyl  · Colonoscopy 03/2021- normal  · EGD 03/2021- large hiatal hernia in distal esophagus  · Had CT A/P in 08/2021 s/p fall however patient reports having same symptoms at that time as well  CT with no abnormalities  · Recommended follow up with GI for further evaluation and management    Rib pain on right side  · intermittent right sided chest wall pain for many years, recently more persistent and longer lasting  · No recent falls or trauma  No chest pain or SOB  · Had CT A/P in 08/2021 s/p fall however patient reports having same symptoms at that time as well  CT with no abnormalities  · Mammogram and colonoscopy up to date  · Etiology possible MSK vs GI etiology  · Check right rib xray to evaluate for any bony lesions  · Lidocaine patch to affected area for pain         Problem List Items Addressed This Visit        Digestive    Esophageal reflux     · Reports GERD symptoms of burning, controlled with daily Omeprazole  · + nausea, intermittent right sided chest wall pain after eating for many years, recent worsening, also occasional LLQ abd discomfort, takes Bentyl  · Colonoscopy 03/2021- normal  · EGD 03/2021- large hiatal hernia in distal esophagus  · Had CT A/P in 08/2021 s/p fall however patient reports having same symptoms at that time as well  CT with no abnormalities  · Recommended follow up with GI for further evaluation and management         Relevant Orders    Ambulatory Referral to Gastroenterology       Other    Rib pain on right side - Primary     · intermittent right sided chest wall pain for many years, recently more persistent and longer lasting  · No recent falls or trauma  No chest pain or SOB    · Had CT A/P in 08/2021 s/p fall however patient reports having same symptoms at that time as well  CT with no abnormalities  · Mammogram and colonoscopy up to date  · Etiology possible MSK vs GI etiology  · Check right rib xray to evaluate for any bony lesions  · Lidocaine patch to affected area for pain         Relevant Orders    XR ribs 2 vw right         Subjective:      Patient ID: Sharyle Bolognese is a 81 y o  female  Patient is a 68-year-old female with history of GERD who presents today for right-sided rib pain  She reports some radiation to right upper abdominal quadrant and around to her back  The pain normally occurs after eating, does not wake her up at night, and is associated with nausea  She reports that she has had the symptoms for many years however has had recent worsening  Over the weekend, experienced more frequent pain that lasted for hours  Endorses discomfort with pressure, like with her bra strap  Denies any falls or trauma to the area  She denies vomiting, diarrhea, constipation, chest pain or SOB  Endorses occasional left lower sided abdominal pain  She has tried Bentyl, simethicone and Tylenol without much relief  She reports that she has been undergoing lots of stress due to her 's medical condition  The following portions of the patient's history were reviewed and updated as appropriate: allergies, current medications, past family history, past medical history, past social history, past surgical history and problem list     Review of Systems   Constitutional: Negative for appetite change, chills, fatigue and fever  Respiratory: Negative for cough and shortness of breath  Cardiovascular: Negative for chest pain and palpitations  Gastrointestinal: Positive for nausea  Negative for abdominal distention, blood in stool, constipation, diarrhea and vomiting  LLQ intermittent pain   Genitourinary: Negative for dysuria and hematuria  Skin: Negative for rash           Objective:    Return for Next scheduled follow up  No results found  Allergies   Allergen Reactions   • Clindamycin GI Intolerance   • Penicillins Hives and Rash   • Sulfa Antibiotics Hives and Rash       Past Medical History:   Diagnosis Date   • Anxiety 2014   • Arthritis    • Basal cell carcinoma    • Colon polyp    • Depression long time   • Depression with anxiety    • Eating disorder     overeating   • Fibrocystic breast disease, unspecified laterality    • GERD (gastroesophageal reflux disease)    • High cholesterol    • Irritable bowel syndrome    • Osteopenia      Past Surgical History:   Procedure Laterality Date   • BREAST CYST ASPIRATION      one performed-unsure which breast   • BREAST EXCISIONAL BIOPSY Bilateral     one on each breast aprox 1980s   • COLONOSCOPY      7/13/12, colon polyp biopsied, hemorrhoids - Dr Barrios Been   • Mala Bhatt right ear  Precancerous     • SEPTOPLASTY  05/04/2018   • VAGINAL HYSTERECTOMY  1982    prolapse; about age 30     Current Outpatient Medications on File Prior to Visit   Medication Sig Dispense Refill   • ALPRAZolam (XANAX) 0 25 mg tablet Take 1 tablet (0 25 mg total) by mouth 3 (three) times a day as needed for anxiety 30 tablet 0   • fluticasone (FLONASE) 50 mcg/act nasal spray 1 SPRAY INTO EACH NOSTRIL 2 (TWO) TIMES A DAY WITH MEALS (Patient taking differently: 1 spray into each nostril as needed) 48 mL 1   • levocetirizine (XYZAL) 5 MG tablet TAKE 1 TABLET BY MOUTH EVERY DAY IN THE EVENING (Patient taking differently: As needed) 90 tablet 1   • omeprazole (PriLOSEC) 20 mg delayed release capsule Take 1 capsule (20 mg total) by mouth daily before breakfast 90 capsule 1   • rosuvastatin (CRESTOR) 5 mg tablet TAKE 1 TABLET BY MOUTH EVERY DAY 90 tablet 3   • sertraline (ZOLOFT) 25 mg tablet TAKE 1 TABLET BY MOUTH EVERY DAY 90 tablet 1   • atropine (ISOPTO ATROPINE) 1 % ophthalmic solution ADMINISTER 1 DROP INTO THE LEFT EYE 2 (TWO) TIMES A DAY FOR 3 DAYS  START 3 DAYS PRIOR TO SURGERY (Patient not taking: Reported on 2/14/2023)     • azithromycin (ZITHROMAX) 250 mg tablet      • Fluoxetine HCl, PMDD, 20 MG TABS As needed  (Patient not taking: Reported on 2/14/2023)     • fluticasone (FLONASE) 50 mcg/act nasal spray SPRAY 2 SPRAYS INTO EACH NOSTRIL EVERY DAY 48 mL 1   • gatifloxacin (ZYMAXID) 0 5 % ADMINISTER 1 DROP INTO THE LEFT EYE 4 TIMES DAILY  START 3 DAYS PRIOR TO SURGERY (Patient not taking: Reported on 2/14/2023)     • HYDROcodone-acetaminophen (NORCO) 5-325 mg per tablet      • Ibuprofen 200 MG CAPS Take by mouth as needed  (Patient not taking: Reported on 2/14/2023)     • ipratropium (ATROVENT) 0 03 % nasal spray 1 spray into each nostril 3 (three) times a day (Patient not taking: Reported on 2/14/2023) 30 mL 1   • ketorolac (ACULAR) 0 5 % ophthalmic solution INSTILL 1 DROP INTO LEFT EYE 4 TIMES A DAY  START 3 DAYS PRIOR TO SURGERY  (Patient not taking: Reported on 2/14/2023)     • naloxone (NARCAN) 4 mg/0 1 mL nasal spray Administer 1 spray into a nostril  If no response after 2-3 minutes, give another dose in the other nostril using a new spray  1 each 1   • prednisoLONE acetate (PRED FORTE) 1 % ophthalmic suspension INSTILL ONE DROP INTO RIGHT EYE 4 TIMES A DAY  START AFTER SURGERY AND TAPER AS DIRECTED  (Patient not taking: Reported on 2/14/2023)       No current facility-administered medications on file prior to visit       Family History   Problem Relation Age of Onset   • Atrial fibrillation Mother    • Cancer Mother         kidney, bladder,breast   • Breast cancer Mother    • COPD Mother         mild   • Kidney cancer Mother [de-identified]   • Osteoporosis Mother    • Transient ischemic attack Mother    • Stroke Mother         TIA   • Arthritis Mother    • Cancer Father         prostate, skin   • Glaucoma Father    • Hypertension Father    • Arthritis Father    • Coronary artery disease Maternal Grandfather    • Arthritis Maternal Grandfather    • Coronary artery disease Maternal Aunt    • No Known Problems Maternal Grandmother    • No Known Problems Paternal Grandmother    • No Known Problems Paternal Grandfather    • No Known Problems Sister    • No Known Problems Daughter    • No Known Problems Son    • No Known Problems Paternal Aunt    • No Known Problems Paternal Aunt    • No Known Problems Paternal Aunt    • Cancer Brother         leukemia     Social History     Socioeconomic History   • Marital status: /Civil Union     Spouse name: Not on file   • Number of children: Not on file   • Years of education: Not on file   • Highest education level: Not on file   Occupational History   • Not on file   Tobacco Use   • Smoking status: Never     Passive exposure: Never   • Smokeless tobacco: Never   Vaping Use   • Vaping Use: Never used   Substance and Sexual Activity   • Alcohol use: Yes     Comment: very rare glass of wine   • Drug use: No   • Sexual activity: Not Currently     Partners: Male     Birth control/protection: Post-menopausal, Surgical   Other Topics Concern   • Not on file   Social History Narrative    Advance directive declined by patient     Social Determinants of Health     Financial Resource Strain: Low Risk    • Difficulty of Paying Living Expenses: Not hard at all   Food Insecurity: Not on file   Transportation Needs: No Transportation Needs   • Lack of Transportation (Medical): No   • Lack of Transportation (Non-Medical):  No   Physical Activity: Not on file   Stress: Not on file   Social Connections: Not on file   Intimate Partner Violence: Not on file   Housing Stability: Not on file     Vitals:    02/14/23 0828   BP: 120/84   BP Location: Left arm   Patient Position: Sitting   Cuff Size: Standard   Pulse: 97   Resp: 16   SpO2: 98%   Weight: 70 kg (154 lb 6 4 oz)   Height: 5' 2" (1 575 m)     Results for orders placed or performed in visit on 12/06/22   Comprehensive metabolic panel   Result Value Ref Range    Sodium 139 135 - 147 mmol/L    Potassium 4 0 3 5 - 5 3 mmol/L    Chloride 106 96 - 108 mmol/L    CO2 29 21 - 32 mmol/L    ANION GAP 4 4 - 13 mmol/L    BUN 20 5 - 25 mg/dL    Creatinine 1 04 0 60 - 1 30 mg/dL    Glucose, Fasting 102 (H) 65 - 99 mg/dL    Calcium 9 9 8 3 - 10 1 mg/dL    AST 21 5 - 45 U/L    ALT 27 12 - 78 U/L    Alkaline Phosphatase 77 46 - 116 U/L    Total Protein 7 7 6 4 - 8 4 g/dL    Albumin 4 0 3 5 - 5 0 g/dL    Total Bilirubin 0 56 0 20 - 1 00 mg/dL    eGFR 54 ml/min/1 73sq m   Lipid Panel with Direct LDL reflex   Result Value Ref Range    Cholesterol 177 See Comment mg/dL    Triglycerides 51 See Comment mg/dL    HDL, Direct 85 >=50 mg/dL    LDL Calculated 82 0 - 100 mg/dL   Hemoglobin A1C   Result Value Ref Range    Hemoglobin A1C 5 5 Normal 3 8-5 6%; PreDiabetic 5 7-6 4%; Diabetic >=6 5%; Glycemic control for adults with diabetes <7 0% %     mg/dl     Weight (last 2 days)     Date/Time Weight    02/14/23 0828 70 (154 4)        Body mass index is 28 24 kg/m²  BP      Temp      Pulse     Resp      SpO2        Vitals:    02/14/23 0828   Weight: 70 kg (154 lb 6 4 oz)     Vitals:    02/14/23 0828   Weight: 70 kg (154 lb 6 4 oz)       /84 (BP Location: Left arm, Patient Position: Sitting, Cuff Size: Standard)   Pulse 97   Resp 16   Ht 5' 2" (1 575 m)   Wt 70 kg (154 lb 6 4 oz)   SpO2 98%   BMI 28 24 kg/m²          Physical Exam  Vitals reviewed  Constitutional:       General: She is not in acute distress  Appearance: She is not ill-appearing  HENT:      Head: Normocephalic and atraumatic  Mouth/Throat:      Mouth: Mucous membranes are moist       Pharynx: Oropharynx is clear  Eyes:      Extraocular Movements: Extraocular movements intact  Conjunctiva/sclera: Conjunctivae normal    Cardiovascular:      Rate and Rhythm: Normal rate and regular rhythm  Pulses: Normal pulses  Pulmonary:      Effort: Pulmonary effort is normal  No respiratory distress        Breath sounds: Normal breath sounds  Abdominal:      General: Bowel sounds are normal  There is no distension  Palpations: Abdomen is soft  There is no mass  Tenderness: There is no guarding or rebound  Comments: LLQ mild discomfort to palpation   Musculoskeletal:         General: No swelling, tenderness or deformity  Normal range of motion  Cervical back: Normal range of motion and neck supple  Comments: Right lateral chest wall with mild tenderness to palpation, no external bruising, erythema or visible deformity   Skin:     General: Skin is warm and dry  Findings: No bruising, lesion or rash  Neurological:      Mental Status: She is alert and oriented to person, place, and time

## 2023-02-14 NOTE — ASSESSMENT & PLAN NOTE
· intermittent right sided chest wall pain for many years, recently more persistent and longer lasting  · No recent falls or trauma  No chest pain or SOB  · Had CT A/P in 08/2021 s/p fall however patient reports having same symptoms at that time as well   CT with no abnormalities  · Mammogram and colonoscopy up to date  · Etiology possible MSK vs GI etiology  · Check right rib xray to evaluate for any bony lesions  · Lidocaine patch to affected area for pain

## 2023-02-14 NOTE — ASSESSMENT & PLAN NOTE
· Reports GERD symptoms of burning, controlled with daily Omeprazole  · + nausea, intermittent right sided chest wall pain after eating for many years, recent worsening, also occasional LLQ abd discomfort, takes Bentyl  · Colonoscopy 03/2021- normal  · EGD 03/2021- large hiatal hernia in distal esophagus  · Had CT A/P in 08/2021 s/p fall however patient reports having same symptoms at that time as well   CT with no abnormalities  · Recommended follow up with GI for further evaluation and management

## 2023-02-21 ENCOUNTER — TELEPHONE (OUTPATIENT)
Dept: INTERNAL MEDICINE CLINIC | Facility: CLINIC | Age: 73
End: 2023-02-21

## 2023-02-22 ENCOUNTER — VBI (OUTPATIENT)
Dept: ADMINISTRATIVE | Facility: OTHER | Age: 73
End: 2023-02-22

## 2023-03-01 DIAGNOSIS — K21.9 GASTROESOPHAGEAL REFLUX DISEASE WITHOUT ESOPHAGITIS: ICD-10-CM

## 2023-03-01 RX ORDER — OMEPRAZOLE 20 MG/1
CAPSULE, DELAYED RELEASE ORAL
Qty: 90 CAPSULE | Refills: 1 | Status: SHIPPED | OUTPATIENT
Start: 2023-03-01 | End: 2023-03-09

## 2023-03-09 ENCOUNTER — OFFICE VISIT (OUTPATIENT)
Dept: GASTROENTEROLOGY | Facility: CLINIC | Age: 73
End: 2023-03-09

## 2023-03-09 VITALS
DIASTOLIC BLOOD PRESSURE: 70 MMHG | TEMPERATURE: 96.9 F | HEIGHT: 62 IN | WEIGHT: 154.2 LBS | BODY MASS INDEX: 28.37 KG/M2 | SYSTOLIC BLOOD PRESSURE: 124 MMHG

## 2023-03-09 DIAGNOSIS — K21.9 GERD WITHOUT ESOPHAGITIS: ICD-10-CM

## 2023-03-09 DIAGNOSIS — R10.11 RIGHT UPPER QUADRANT PAIN: Primary | ICD-10-CM

## 2023-03-09 DIAGNOSIS — Z12.11 COLON CANCER SCREENING: ICD-10-CM

## 2023-03-09 RX ORDER — OMEPRAZOLE 40 MG/1
40 CAPSULE, DELAYED RELEASE ORAL DAILY
Qty: 30 CAPSULE | Refills: 2 | Status: SHIPPED | OUTPATIENT
Start: 2023-03-09

## 2023-03-09 NOTE — PROGRESS NOTES
[facility] - Outpatient Follow-up Note  Maria Arshad 81 y o  female MRN: [id number]  Encounter: [id number]      Assessment and Plan    1  GERD  Well controlled on omeprazole 20mg daily  Last EGD 5/6/21 for barretts screening with a large hiatal hernia in the distal esophagus otherwise normal  -Continue omeprazole     2  RUQ pain  The patient has been having right upper quadrant pain radiating to her back and associated with nausea, bloating, and an overall feeling of being unwell typically occurring after eating  Not improved with omeprazole  Eventually improved with Bentyl  She denies any NSAID use or changes in her bowel habits  Last EGD 5/6/21 for barretts screening with a large hiatal hernia in the distal esophagus otherwise normal   -Obtain RUQ U/S to rule out biliary disease  -Increase omeprazole to 40mg   -If RUQ U/S negative and increased dosage of omeprazole without benefit proceed with repeat EGD  -Avoid NSAIDs    3  Colon cancer screening  Colonoscopy 5/6/21 normal, repeat recommended 10 years  -Repeat colonoscopy 5/2031 or sooner if clinically indicated     If EGD is needed will follow up after EGD    ______________________________________________________________________    History of Present Illness  Maria Arshad is a 81 y o  female here for evaluation of GERD and right upper quadrant pain  The patient states that at night she was having substernal chest burning/pain for which she started omeprazole 20 mg once daily with complete resolution of her symptoms  She presents that she does also have some right upper quadrant pain  The patient states that this is deep and under her ribs and can radiate to her back  It is worse after eating especially at night  When is occurs she feels nauseous and bloated and overall unwell  She has tried taking Bentyl for this as she does have a history of IBS and she states that eventually this does provide her some relief    She denies any significant NSAID use and or changes in her bowel habits  Thus far the patient had an x-ray of her ribs which was normal  Last EGD 5/6/21 for barretts screening with a large hiatal hernia in the distal esophagus otherwise normal  Last colonoscopy 5/6/21 normal with repeat recommended 10 years later  Review of Systems   Constitutional: Negative for fever  Gastrointestinal: Positive for nausea  Negative for constipation, diarrhea and vomiting  Genitourinary: Negative for dysuria, frequency and hematuria  Musculoskeletal: Negative for arthralgias and myalgias  Neurological: Negative for headaches  Past Medical History  Past Medical History:   Diagnosis Date   • Anxiety 2014   • Arthritis    • Basal cell carcinoma    • Colon polyp    • Depression long time   • Depression with anxiety    • Eating disorder     overeating   • Fibrocystic breast disease, unspecified laterality    • GERD (gastroesophageal reflux disease)    • High cholesterol    • Irritable bowel syndrome    • Osteopenia        Past Social history  Past Surgical History:   Procedure Laterality Date   • BREAST CYST ASPIRATION      one performed-unsure which breast   • BREAST EXCISIONAL BIOPSY Bilateral     one on each breast aprox 1980s   • COLONOSCOPY      7/13/12, colon polyp biopsied, hemorrhoids - Dr Rober Rowland   • Job Chen      Behind right ear  Precancerous     • SEPTOPLASTY  05/04/2018   • VAGINAL HYSTERECTOMY  1982    prolapse; about age 30     Social History     Socioeconomic History   • Marital status: /Civil Union     Spouse name: Not on file   • Number of children: Not on file   • Years of education: Not on file   • Highest education level: Not on file   Occupational History   • Not on file   Tobacco Use   • Smoking status: Never     Passive exposure: Never   • Smokeless tobacco: Never   Vaping Use   • Vaping Use: Never used   Substance and Sexual Activity   • Alcohol use: Yes     Comment: very rare glass of wine   • Drug use: No   • Sexual activity: Not Currently     Partners: Male     Birth control/protection: Post-menopausal, Surgical   Other Topics Concern   • Not on file   Social History Narrative    Advance directive declined by patient     Social Determinants of Health     Financial Resource Strain: Low Risk    • Difficulty of Paying Living Expenses: Not hard at all   Food Insecurity: Not on file   Transportation Needs: No Transportation Needs   • Lack of Transportation (Medical): No   • Lack of Transportation (Non-Medical):  No   Physical Activity: Not on file   Stress: Not on file   Social Connections: Not on file   Intimate Partner Violence: Not on file   Housing Stability: Not on file     Social History     Substance and Sexual Activity   Alcohol Use Yes    Comment: very rare glass of wine     Social History     Substance and Sexual Activity   Drug Use No     Social History     Tobacco Use   Smoking Status Never   • Passive exposure: Never   Smokeless Tobacco Never       Past Family History  Family History   Problem Relation Age of Onset   • Atrial fibrillation Mother    • Cancer Mother         kidney, bladder,breast   • Breast cancer Mother    • COPD Mother         mild   • Kidney cancer Mother [de-identified]   • Osteoporosis Mother    • Transient ischemic attack Mother    • Stroke Mother         TIA   • Arthritis Mother    • Cancer Father         prostate, skin   • Glaucoma Father    • Hypertension Father    • Arthritis Father    • Coronary artery disease Maternal Grandfather    • Arthritis Maternal Grandfather    • Coronary artery disease Maternal Aunt    • No Known Problems Maternal Grandmother    • No Known Problems Paternal Grandmother    • No Known Problems Paternal Grandfather    • No Known Problems Sister    • No Known Problems Daughter    • No Known Problems Son    • No Known Problems Paternal Aunt    • No Known Problems Paternal Aunt    • No Known Problems Paternal Aunt    • Cancer Brother         leukemia       Current Medications  Current Outpatient Medications   Medication Sig Dispense Refill   • ALPRAZolam (XANAX) 0 25 mg tablet Take 1 tablet (0 25 mg total) by mouth 3 (three) times a day as needed for anxiety 30 tablet 0   • atropine (ISOPTO ATROPINE) 1 % ophthalmic solution      • Fluoxetine HCl, PMDD, 20 MG TABS As needed     • fluticasone (FLONASE) 50 mcg/act nasal spray 1 SPRAY INTO EACH NOSTRIL 2 (TWO) TIMES A DAY WITH MEALS (Patient taking differently: 1 spray into each nostril as needed) 48 mL 1   • gatifloxacin (ZYMAXID) 0 5 %      • ipratropium (ATROVENT) 0 03 % nasal spray 1 spray into each nostril 3 (three) times a day 30 mL 1   • ketorolac (ACULAR) 0 5 % ophthalmic solution      • levocetirizine (XYZAL) 5 MG tablet TAKE 1 TABLET BY MOUTH EVERY DAY IN THE EVENING (Patient taking differently: As needed) 90 tablet 1   • omeprazole (PriLOSEC) 20 mg delayed release capsule TAKE 1 CAPSULE BY MOUTH DAILY BEFORE BREAKFAST  90 capsule 1   • prednisoLONE acetate (PRED FORTE) 1 % ophthalmic suspension      • rosuvastatin (CRESTOR) 5 mg tablet TAKE 1 TABLET BY MOUTH EVERY DAY 90 tablet 3   • sertraline (ZOLOFT) 25 mg tablet TAKE 1 TABLET BY MOUTH EVERY DAY 90 tablet 1   • Ibuprofen 200 MG CAPS Take by mouth as needed  (Patient not taking: Reported on 3/9/2023)       No current facility-administered medications for this visit         Allergies  Allergies   Allergen Reactions   • Clindamycin GI Intolerance   • Penicillins Hives and Rash   • Sulfa Antibiotics Hives and Rash         The following portions of the patient's history were reviewed and updated as appropriate: allergies, current medications, past medical history, past social history, past surgical history and problem list       Vitals  Vitals:    03/09/23 0923   BP: 124/70   BP Location: Right arm   Patient Position: Sitting   Cuff Size: Standard   Temp: (!) 96 9 °F (36 1 °C)   TempSrc: Tympanic   Weight: 69 9 kg (154 lb 3 2 oz)   Height: 5' 2" (1 575 m)         Physical Exam  Constitutional   General appearance: Patient is seated and in no acute distress, well appearing and well nourished  Head and Face   Head and face: Normal     Eyes   Conjunctiva and lids: No erythema, swelling or discharge  Anicteric  Ears, Nose, Mouth, and Throat   Hearing: Normal     Neck: Supple, trachea midline  Pulmonary   Respiratory effort: No increased work of breathing or signs of respiratory distress  Cardiovascular   Examination of extremities for edema and/or varicosities: Normal     Abdomen   Abdomen: Soft, RUQ-tender, no masses, no organomegaly  Musculoskeletal   Gait and station: Normal   Skin   Skin and subcutaneous tissue: Warm, dry, and intact  No visible jaundice, lesions or rashes  Psychiatric   Judgment and insight: Normal  Recent and remote memory:  Normal  Mood and affect: Normal      Results  No visits with results within 1 Day(s) from this visit  Latest known visit with results is:   Appointment on 12/06/2022   Component Date Value   • Sodium 12/06/2022 139    • Potassium 12/06/2022 4 0    • Chloride 12/06/2022 106    • CO2 12/06/2022 29    • ANION GAP 12/06/2022 4    • BUN 12/06/2022 20    • Creatinine 12/06/2022 1 04    • Glucose, Fasting 12/06/2022 102 (H)    • Calcium 12/06/2022 9 9    • AST 12/06/2022 21    • ALT 12/06/2022 27    • Alkaline Phosphatase 12/06/2022 77    • Total Protein 12/06/2022 7 7    • Albumin 12/06/2022 4 0    • Total Bilirubin 12/06/2022 0 56    • eGFR 12/06/2022 54    • Cholesterol 12/06/2022 177    • Triglycerides 12/06/2022 51    • HDL, Direct 12/06/2022 85    • LDL Calculated 12/06/2022 82    • Hemoglobin A1C 12/06/2022 5 5    • EAG 12/06/2022 111        Radiology Results  XR ribs right w pa chest min 3 views    Result Date: 2/18/2023  Narrative: RIGHT RIBS INDICATION:   R07 81: Pleurodynia  Right pain under breast, right flank pain  No known injury  COMPARISON:  CXR and abdomen CT 08/08/2021  VIEWS:  XR RIBS RIGHT W PA CHEST MIN 3 VIEWS DUAL ENERGY SUBTRACTION  FINDINGS: No acute displaced rib fracture or pathologic bone lesion  Right hemithorax and included left hemithorax unremarkable  No pneumothorax or pleural effusion/hemothorax  Lungs clear  Soft tissues normal  Cardiomediastinal silhouette normal      Impression: No acute displaced rib fracture or pathologic bone lesion  No acute cardiopulmonary disease  Workstation performed: [id number]       Orders  No orders of the defined types were placed in this encounter  Answers for HPI/ROS submitted by the patient on 3/2/2023  Chronicity: recurrent  Onset: more than 1 year ago  Onset quality: gradual  Frequency: daily  Episode duration: 3 Hours  Progression since onset: gradually worsening  Pain location: RUQ  Pain - numeric: 7/10  Pain quality: aching, burning, dull, sharp  Radiates to: LUQ, back  anorexia: No  belching: Yes  flatus: No  hematochezia: No  melena:  No  weight loss: No  Aggravated by: eating  Relieved by: nothing, being still, belching, passing flatus, recumbency  Diagnostic workup: lower endoscopy, upper endoscopy

## 2023-03-09 NOTE — PATIENT INSTRUCTIONS
Scheduled date of EGD(as of today): 05/09/2023  Physician performing EGD: Dr Greg Hawkins   Location of EGD: [facility]  Instructions reviewed with patient by: Jo-Ann Garcia   Clearances:  N/A

## 2023-03-13 ENCOUNTER — HOSPITAL ENCOUNTER (OUTPATIENT)
Dept: RADIOLOGY | Age: 73
Discharge: HOME | End: 2023-03-13

## 2023-03-13 ENCOUNTER — CONSULT (OUTPATIENT)
Dept: INTERNAL MEDICINE CLINIC | Facility: CLINIC | Age: 73
End: 2023-03-13

## 2023-03-13 VITALS
WEIGHT: 155.6 LBS | DIASTOLIC BLOOD PRESSURE: 84 MMHG | OXYGEN SATURATION: 96 % | HEIGHT: 62 IN | SYSTOLIC BLOOD PRESSURE: 124 MMHG | BODY MASS INDEX: 28.63 KG/M2 | HEART RATE: 65 BPM | RESPIRATION RATE: 16 BRPM

## 2023-03-13 DIAGNOSIS — E03.8 SUBCLINICAL HYPOTHYROIDISM: ICD-10-CM

## 2023-03-13 DIAGNOSIS — R10.11 RIGHT UPPER QUADRANT PAIN: ICD-10-CM

## 2023-03-13 DIAGNOSIS — K21.9 GERD WITHOUT ESOPHAGITIS: Primary | ICD-10-CM

## 2023-03-13 DIAGNOSIS — K80.20 CALCULUS OF GALLBLADDER WITHOUT CHOLECYSTITIS WITHOUT OBSTRUCTION: ICD-10-CM

## 2023-03-13 DIAGNOSIS — F41.8 DEPRESSION WITH ANXIETY: ICD-10-CM

## 2023-03-13 DIAGNOSIS — R10.11 RIGHT UPPER QUADRANT PAIN: Primary | ICD-10-CM

## 2023-04-03 ENCOUNTER — PREP FOR PROCEDURE (OUTPATIENT)
Dept: SURGERY | Facility: CLINIC | Age: 73
End: 2023-04-03

## 2023-04-03 ENCOUNTER — CONSULT (OUTPATIENT)
Dept: SURGERY | Facility: CLINIC | Age: 73
End: 2023-04-03

## 2023-04-03 DIAGNOSIS — K80.20 CALCULUS OF GALLBLADDER WITHOUT CHOLECYSTITIS WITHOUT OBSTRUCTION: ICD-10-CM

## 2023-04-03 DIAGNOSIS — K80.10 CHRONIC CALCULOUS CHOLECYSTITIS: Primary | ICD-10-CM

## 2023-04-03 NOTE — PROGRESS NOTES
Assessment/Plan: Patient presents with symptomatic cholelithiasis  She has pain almost daily  This is usually low-grade  On occasion it is severe  Imaging reveals cholelithiasis  I recommended laparoscopic cholecystectomy  Risks and benefits explained  Patient is agreeable  Diagnoses and all orders for this visit:    Calculus of gallbladder without cholecystitis without obstruction  -     Ambulatory referral to General Surgery        Subjective:      Patient ID: Annie Hinson is a 81 y o  female  Patient presents for gallbladder consult  Ultrasound RUQ 3/12/2023   IMPRESSION:  1  Layering small stones versus sludge in the gallbladder  The gallbladder wall is upper limits of normal thickness at 3 mm  No pericholecystic fluid or sonographic Tripp sign  Findings are nonspecific for cholecystitis  Consider follow up with   HIDA scan as warranted  2    8 mm nodular echogenic focus in the right lobe of the liver posteriorly, nonspecific but could represent a small hemangioma        Abdominal Pain  The current episode started more than 1 month ago  The problem occurs daily  The problem has been unchanged  The pain is located in the RUQ  The abdominal pain does not radiate  The pain is aggravated by eating  The pain is relieved by nothing  She has tried nothing for the symptoms  Prior diagnostic workup includes ultrasound  Her past medical history is significant for gallstones  The following portions of the patient's history were reviewed and updated as appropriate:     She  has a past medical history of Anxiety (2014), Arthritis, Basal cell carcinoma, Colon polyp, Depression (long time), Depression with anxiety, Eating disorder, Fibrocystic breast disease, unspecified laterality, GERD (gastroesophageal reflux disease), High cholesterol, Irritable bowel syndrome, and Osteopenia  She  has a past surgical history that includes Colonoscopy; Septoplasty (05/04/2018);  Scalp lesion removal w/ flap and skin graft; Vaginal hysterectomy (1982); Breast excisional biopsy (Bilateral); and Breast cyst aspiration  Her family history includes Arthritis in her father, maternal grandfather, and mother; Atrial fibrillation in her mother; Breast cancer in her mother; COPD in her mother; Cancer in her brother, father, and mother; Coronary artery disease in her maternal aunt and maternal grandfather; Glaucoma in her father; Hypertension in her father; Kidney cancer (age of onset: [de-identified]) in her mother; No Known Problems in her daughter, maternal grandmother, paternal aunt, paternal aunt, paternal aunt, paternal grandfather, paternal grandmother, sister, and son; Osteoporosis in her mother; Stroke in her mother; Transient ischemic attack in her mother  She  reports that she has never smoked  She has never been exposed to tobacco smoke  She has never used smokeless tobacco  She reports current alcohol use  She reports that she does not use drugs    Current Outpatient Medications   Medication Sig Dispense Refill   • ALPRAZolam (XANAX) 0 25 mg tablet Take 1 tablet (0 25 mg total) by mouth 3 (three) times a day as needed for anxiety 30 tablet 0   • atropine (ISOPTO ATROPINE) 1 % ophthalmic solution      • Fluoxetine HCl, PMDD, 20 MG TABS As needed     • fluticasone (FLONASE) 50 mcg/act nasal spray 1 SPRAY INTO EACH NOSTRIL 2 (TWO) TIMES A DAY WITH MEALS (Patient taking differently: 1 spray into each nostril as needed) 48 mL 1   • gatifloxacin (ZYMAXID) 0 5 %      • ipratropium (ATROVENT) 0 03 % nasal spray 1 spray into each nostril 3 (three) times a day 30 mL 1   • ketorolac (ACULAR) 0 5 % ophthalmic solution      • levocetirizine (XYZAL) 5 MG tablet TAKE 1 TABLET BY MOUTH EVERY DAY IN THE EVENING (Patient taking differently: As needed) 90 tablet 1   • omeprazole (PriLOSEC) 40 MG capsule Take 1 capsule (40 mg total) by mouth daily 30 capsule 2   • prednisoLONE acetate (PRED FORTE) 1 % ophthalmic suspension      • rosuvastatin (CRESTOR) 5 mg tablet TAKE 1 TABLET BY MOUTH EVERY DAY 90 tablet 3   • sertraline (ZOLOFT) 25 mg tablet TAKE 1 TABLET BY MOUTH EVERY DAY 90 tablet 1     No current facility-administered medications for this visit  She is allergic to clindamycin, penicillins, and sulfa antibiotics       Review of Systems   Constitutional: Negative  Negative for activity change  HENT: Negative  Eyes: Negative  Respiratory: Negative  Cardiovascular: Negative  Gastrointestinal: Positive for abdominal pain  Endocrine: Negative  Genitourinary: Negative  Musculoskeletal: Negative  Skin: Negative  Allergic/Immunologic: Negative  Neurological: Negative  Psychiatric/Behavioral: Negative for agitation, behavioral problems and confusion  The patient is not nervous/anxious  Objective: There were no vitals taken for this visit  Physical Exam  Constitutional:       Appearance: Normal appearance  She is well-developed  HENT:      Head: Normocephalic and atraumatic  Nose: Nose normal    Eyes:      Extraocular Movements: Extraocular movements intact  Conjunctiva/sclera: Conjunctivae normal    Cardiovascular:      Rate and Rhythm: Normal rate and regular rhythm  Heart sounds: Normal heart sounds  Pulmonary:      Effort: Pulmonary effort is normal       Breath sounds: Normal breath sounds  Abdominal:      General: Abdomen is flat  Musculoskeletal:      Right lower leg: No edema  Left lower leg: No edema  Skin:     General: Skin is warm and dry  Neurological:      Mental Status: She is alert and oriented to person, place, and time     Psychiatric:         Mood and Affect: Mood normal

## 2023-05-02 DIAGNOSIS — F41.8 DEPRESSION WITH ANXIETY: ICD-10-CM

## 2023-05-02 RX ORDER — ALPRAZOLAM 0.25 MG/1
0.25 TABLET ORAL 3 TIMES DAILY PRN
Qty: 30 TABLET | Refills: 0 | Status: SHIPPED | OUTPATIENT
Start: 2023-05-02

## 2023-06-30 DIAGNOSIS — K21.9 GERD WITHOUT ESOPHAGITIS: Primary | ICD-10-CM

## 2023-06-30 RX ORDER — OMEPRAZOLE 20 MG/1
20 CAPSULE, DELAYED RELEASE ORAL DAILY
Qty: 30 CAPSULE | Refills: 5 | Status: SHIPPED | OUTPATIENT
Start: 2023-06-30

## 2023-07-17 ENCOUNTER — APPOINTMENT (OUTPATIENT)
Dept: LAB | Age: 73
End: 2023-07-17
Payer: MEDICARE

## 2023-07-17 ENCOUNTER — OFFICE VISIT (OUTPATIENT)
Dept: INTERNAL MEDICINE CLINIC | Facility: CLINIC | Age: 73
End: 2023-07-17
Payer: MEDICARE

## 2023-07-17 ENCOUNTER — NURSE TRIAGE (OUTPATIENT)
Dept: OTHER | Facility: OTHER | Age: 73
End: 2023-07-17

## 2023-07-17 VITALS
OXYGEN SATURATION: 98 % | WEIGHT: 139.2 LBS | HEART RATE: 72 BPM | HEIGHT: 62 IN | DIASTOLIC BLOOD PRESSURE: 74 MMHG | BODY MASS INDEX: 25.62 KG/M2 | SYSTOLIC BLOOD PRESSURE: 128 MMHG

## 2023-07-17 DIAGNOSIS — F32.4 MAJOR DEPRESSIVE DISORDER WITH SINGLE EPISODE, IN PARTIAL REMISSION (HCC): ICD-10-CM

## 2023-07-17 DIAGNOSIS — N18.31 STAGE 3A CHRONIC KIDNEY DISEASE (HCC): ICD-10-CM

## 2023-07-17 DIAGNOSIS — M25.50 POLYARTHRALGIA: Primary | ICD-10-CM

## 2023-07-17 DIAGNOSIS — M25.50 POLYARTHRALGIA: ICD-10-CM

## 2023-07-17 DIAGNOSIS — H81.10 BENIGN PAROXYSMAL POSITIONAL VERTIGO, UNSPECIFIED LATERALITY: ICD-10-CM

## 2023-07-17 LAB
BASOPHILS # BLD AUTO: 0.04 THOUSANDS/ÂΜL (ref 0–0.1)
BASOPHILS NFR BLD AUTO: 1 % (ref 0–1)
CRP SERPL QL: 8.6 MG/L
EOSINOPHIL # BLD AUTO: 0.14 THOUSAND/ÂΜL (ref 0–0.61)
EOSINOPHIL NFR BLD AUTO: 3 % (ref 0–6)
ERYTHROCYTE [DISTWIDTH] IN BLOOD BY AUTOMATED COUNT: 13 % (ref 11.6–15.1)
ERYTHROCYTE [SEDIMENTATION RATE] IN BLOOD: 17 MM/HOUR (ref 0–29)
HCT VFR BLD AUTO: 39.5 % (ref 34.8–46.1)
HGB BLD-MCNC: 13 G/DL (ref 11.5–15.4)
IMM GRANULOCYTES # BLD AUTO: 0.01 THOUSAND/UL (ref 0–0.2)
IMM GRANULOCYTES NFR BLD AUTO: 0 % (ref 0–2)
LYMPHOCYTES # BLD AUTO: 0.62 THOUSANDS/ÂΜL (ref 0.6–4.47)
LYMPHOCYTES NFR BLD AUTO: 13 % (ref 14–44)
MCH RBC QN AUTO: 29.8 PG (ref 26.8–34.3)
MCHC RBC AUTO-ENTMCNC: 32.9 G/DL (ref 31.4–37.4)
MCV RBC AUTO: 91 FL (ref 82–98)
MONOCYTES # BLD AUTO: 0.49 THOUSAND/ÂΜL (ref 0.17–1.22)
MONOCYTES NFR BLD AUTO: 11 % (ref 4–12)
NEUTROPHILS # BLD AUTO: 3.34 THOUSANDS/ÂΜL (ref 1.85–7.62)
NEUTS SEG NFR BLD AUTO: 72 % (ref 43–75)
NRBC BLD AUTO-RTO: 0 /100 WBCS
PLATELET # BLD AUTO: 218 THOUSANDS/UL (ref 149–390)
PMV BLD AUTO: 11.9 FL (ref 8.9–12.7)
RBC # BLD AUTO: 4.36 MILLION/UL (ref 3.81–5.12)
WBC # BLD AUTO: 4.64 THOUSAND/UL (ref 4.31–10.16)

## 2023-07-17 PROCEDURE — 85025 COMPLETE CBC W/AUTO DIFF WBC: CPT

## 2023-07-17 PROCEDURE — 86618 LYME DISEASE ANTIBODY: CPT

## 2023-07-17 PROCEDURE — 86430 RHEUMATOID FACTOR TEST QUAL: CPT

## 2023-07-17 PROCEDURE — 86140 C-REACTIVE PROTEIN: CPT

## 2023-07-17 PROCEDURE — 86038 ANTINUCLEAR ANTIBODIES: CPT

## 2023-07-17 PROCEDURE — 85652 RBC SED RATE AUTOMATED: CPT

## 2023-07-17 PROCEDURE — 99214 OFFICE O/P EST MOD 30 MIN: CPT | Performed by: INTERNAL MEDICINE

## 2023-07-17 PROCEDURE — 86200 CCP ANTIBODY: CPT

## 2023-07-17 PROCEDURE — 36415 COLL VENOUS BLD VENIPUNCTURE: CPT

## 2023-07-17 NOTE — PROGRESS NOTES
Name: Andrez Yanes      : 1950      MRN: [id number]  Encounter Provider: Bk Cruz MD  Encounter Date: 2023   Encounter department: [facility]     1. Polyarthralgia  Assessment & Plan:  -Given the diffuse nature of the patient's joint pains and he reported being outdoors a fair amount I will check a Lyme profile.  -To evaluate for an inflammatory arthropathy such as rheumatoid arthritis and ESR, CRP, RF, CCP and АНДРЕЙ have been ordered. -Tylenol 1000mg TID prn for pain     Orders:  -     Lyme Total AB W Reflex to IGM/IGG; Future  -     Sedimentation rate, automated; Future  -     C-reactive protein; Future  -     RF Screen w/ Reflex to Titer; Future  -     Cyclic citrul peptide antibody, IgG; Future  -     АНДРЕЙ Screen w/ Reflex to Titer/Pattern; Future  -     CBC and differential; Future    2. Major depressive disorder with single episode, in partial remission [facility])  Assessment & Plan:  -Managed by PCP       3. Benign paroxysmal positional vertigo, unspecified laterality  Assessment & Plan:  -Acute episode of dizziness last night due to Vertigo   -Vertigo has mostly resolved. Today she endorses only mild lightheadedness. Continue to monitor. 4. Stage 3a chronic kidney disease [facility])  Assessment & Plan:  Lab Results   Component Value Date    EGFR 54 2022    EGFR 55 2021    EGFR 66 2021    CREATININE 1.04 2022    CREATININE 1.04 2021    CREATININE 0.90 2021     -GFR stable  -Avoid nephrotoxins              Subjective      HPI  Patient presents today as an acute visit complaining of joint pain and dizziness over the past week. She complains of diffuse joint pain involving her elbows, hands, low back, knees and ankles. She reports she has also been experiencing intermittent dizziness. She reports she had a brief episode of vertigo yesterday after she rolled over in bed.   She was able to resolve it by rolling in the opposite direction. She denies any fevers or chills. She reports her brother has a history of polymyalgia rheumatica she had a grandmother that was diagnosed with lupus. She reports she spends a fair amount of time outdoors but has not recently noticed a tick on her body.       ROS as per HPI    Current Outpatient Medications on File Prior to Visit   Medication Sig   • ALPRAZolam (XANAX) 0.25 mg tablet Take 1 tablet (0.25 mg total) by mouth 3 (three) times a day as needed for anxiety   • Fluoxetine HCl, PMDD, 20 MG TABS As needed   • fluticasone (FLONASE) 50 mcg/act nasal spray 1 SPRAY INTO EACH NOSTRIL 2 (TWO) TIMES A DAY WITH MEALS (Patient taking differently: 1 spray into each nostril as needed)   • ipratropium (ATROVENT) 0.03 % nasal spray 1 spray into each nostril 3 (three) times a day   • levocetirizine (XYZAL) 5 MG tablet TAKE 1 TABLET BY MOUTH EVERY DAY IN THE EVENING (Patient taking differently: As needed)   • omeprazole (PriLOSEC) 20 mg delayed release capsule Take 1 capsule (20 mg total) by mouth daily   • rosuvastatin (CRESTOR) 5 mg tablet TAKE 1 TABLET BY MOUTH EVERY DAY   • sertraline (ZOLOFT) 25 mg tablet TAKE 1 TABLET BY MOUTH EVERY DAY   • atropine (ISOPTO ATROPINE) 1 % ophthalmic solution    • gatifloxacin (ZYMAXID) 0.5 %        Objective     /74   Pulse 72   Ht 5' 2" (1.575 m)   Wt 63.1 kg (139 lb 3.2 oz)   SpO2 98%   BMI 25.46 kg/m²     BP Readings from Last 3 Encounters:   07/17/23 128/74   03/13/23 124/84   03/09/23 124/70        Wt Readings from Last 3 Encounters:   07/17/23 63.1 kg (139 lb 3.2 oz)   03/13/23 70.6 kg (155 lb 9.6 oz)   03/09/23 69.9 kg (154 lb 3.2 oz)       Physical Exam    General: NAD, Alert and oriented x3   HEENT: NCAT, EOMI, normal conjunctiva  Cardiovascular: RRR, normal S1 and S2, no m/r/g  Pulmonary: Normal respiratory effort, no wheezes, rales or rhonchi  MSK: Normal bulk and tone, tenderness to palpation over bilateral MCPs and PIPs, bilateral SI joint tenderness, tenderness over lateral joint line of right knee, tenderness bilaterally over the superior and inferior aspects of lateral malleolus  Extremities: No lower extremity edema  Skin: Normal skin color, no rashes       Josue Cook MD

## 2023-07-17 NOTE — TELEPHONE ENCOUNTER
Regarding: dizziness/ joint pain  ----- Message from Lizeth Phan sent at 7/17/2023  7:47 AM EDT -----  " I have been experiencing a lot of joint pain and dizziness over the past week and would like to be seen today if possible."

## 2023-07-17 NOTE — ASSESSMENT & PLAN NOTE
-Acute episode of dizziness last night due to Vertigo   -Vertigo has mostly resolved. Today she endorses only mild lightheadedness. Continue to monitor.

## 2023-07-17 NOTE — ASSESSMENT & PLAN NOTE
Lab Results   Component Value Date    EGFR 54 12/06/2022    EGFR 55 08/08/2021    EGFR 66 03/01/2021    CREATININE 1.04 12/06/2022    CREATININE 1.04 08/08/2021    CREATININE 0.90 03/01/2021     -GFR stable  -Avoid nephrotoxins

## 2023-07-17 NOTE — ASSESSMENT & PLAN NOTE
-Given the diffuse nature of the patient's joint pains and he reported being outdoors a fair amount I will check a Lyme profile.  -To evaluate for an inflammatory arthropathy such as rheumatoid arthritis and ESR, CRP, RF, CCP and АНДРЕЙ have been ordered.   -Tylenol 1000mg TID prn for pain

## 2023-07-17 NOTE — TELEPHONE ENCOUNTER
Patient reports joint pain and dizziness at times. She would like to be seen today in the office. Appointment made for 1100. Reason for Disposition  • Patient wants to be seen    Answer Assessment - Initial Assessment Questions  1. DESCRIPTION: "Describe your dizziness."      Some dizziness, feels off balanced   2. LIGHTHEADED: "Do you feel lightheaded?" (e.g., somewhat faint, woozy, weak upon standing)      At times   3. VERTIGO: "Do you feel like either you or the room is spinning or tilting?" (i.e. vertigo)     At times when laying on R side   4. SEVERITY: "How bad is it?"  "Do you feel like you are going to faint?" "Can you stand and walk?"    - MILD: Feels slightly dizzy, but walking normally. - MODERATE: Feels very unsteady when walking, but not falling; interferes with normal activities (e.g., school, work) . - SEVERE: Unable to walk without falling, or requires assistance to walk without falling; feels like passing out now. Mild-moderate   5. ONSET:  "When did the dizziness begin?"      1 week ago   6. AGGRAVATING FACTORS: "Does anything make it worse?" (e.g., standing, change in head position)      Getting up in the morning   7. HEART RATE: "Can you tell me your heart rate?" "How many beats in 15 seconds?"  (Note: not all patients can do this)        Seems WNL, palpitations at times   8. CAUSE: "What do you think is causing the dizziness?"      Unaware   9. RECURRENT SYMPTOM: "Have you had dizziness before?" If Yes, ask: "When was the last time?" "What happened that time?"      Denies   10. OTHER SYMPTOMS: "Do you have any other symptoms?" (e.g., fever, chest pain, vomiting, diarrhea, bleeding)       Back pain, ankle pain, joint pain, swelling in fingers   11.  PREGNANCY: "Is there any chance you are pregnant?" "When was your last menstrual period?"        N/A    Protocols used: [id number]

## 2023-07-18 LAB
ANA SER QL IA: NEGATIVE
B BURGDOR IGG+IGM SER QL IA: NEGATIVE
RHEUMATOID FACT SER QL LA: NEGATIVE

## 2023-07-19 ENCOUNTER — APPOINTMENT (OUTPATIENT)
Dept: RADIOLOGY | Age: 73
End: 2023-07-19
Payer: MEDICARE

## 2023-07-19 ENCOUNTER — TELEPHONE (OUTPATIENT)
Dept: INTERNAL MEDICINE CLINIC | Facility: CLINIC | Age: 73
End: 2023-07-19

## 2023-07-19 ENCOUNTER — OFFICE VISIT (OUTPATIENT)
Dept: INTERNAL MEDICINE CLINIC | Facility: CLINIC | Age: 73
End: 2023-07-19
Payer: MEDICARE

## 2023-07-19 ENCOUNTER — TELEPHONE (OUTPATIENT)
Dept: OTHER | Facility: OTHER | Age: 73
End: 2023-07-19

## 2023-07-19 ENCOUNTER — LAB (OUTPATIENT)
Dept: LAB | Age: 73
End: 2023-07-19
Payer: MEDICARE

## 2023-07-19 VITALS
HEIGHT: 62 IN | WEIGHT: 139.4 LBS | SYSTOLIC BLOOD PRESSURE: 132 MMHG | RESPIRATION RATE: 16 BRPM | BODY MASS INDEX: 25.65 KG/M2 | OXYGEN SATURATION: 100 % | HEART RATE: 80 BPM | DIASTOLIC BLOOD PRESSURE: 84 MMHG

## 2023-07-19 DIAGNOSIS — M65.9 SYNOVITIS: ICD-10-CM

## 2023-07-19 DIAGNOSIS — M65.9 SYNOVITIS: Primary | ICD-10-CM

## 2023-07-19 LAB — CCP AB SER IA-ACNC: 1

## 2023-07-19 PROCEDURE — 86666 EHRLICHIA ANTIBODY: CPT

## 2023-07-19 PROCEDURE — 73130 X-RAY EXAM OF HAND: CPT

## 2023-07-19 PROCEDURE — 99214 OFFICE O/P EST MOD 30 MIN: CPT | Performed by: INTERNAL MEDICINE

## 2023-07-19 PROCEDURE — 86747 PARVOVIRUS ANTIBODY: CPT

## 2023-07-19 PROCEDURE — 73562 X-RAY EXAM OF KNEE 3: CPT

## 2023-07-19 PROCEDURE — 86753 PROTOZOA ANTIBODY NOS: CPT

## 2023-07-19 PROCEDURE — 36415 COLL VENOUS BLD VENIPUNCTURE: CPT

## 2023-07-19 RX ORDER — ACETAMINOPHEN 500 MG
1000 TABLET ORAL EVERY 6 HOURS PRN
COMMUNITY

## 2023-07-19 RX ORDER — METHYLPREDNISOLONE 4 MG/1
TABLET ORAL
Qty: 21 EACH | Refills: 0 | Status: SHIPPED | OUTPATIENT
Start: 2023-07-19

## 2023-07-19 NOTE — TELEPHONE ENCOUNTER
Pt was seen by Dr Vinnie Roberto but her symptoms have gotten worst and she would like to be seen by her PCP. She has sent a message to Dr Tammie Quintero in [address] [place] [address] as well. Please call for an apt.

## 2023-07-19 NOTE — TELEPHONE ENCOUNTER
----- Message from Mani Morton sent at 7/18/2023  7:40 AM EDT -----  Regarding: FW: Symptoms   Contact: [phone]    ----- Message -----  From: Gertrudis Wall  Sent: 7/18/2023   7:34 AM EDT  To: [facility]  Subject: Symptoms                                         Hoping for results of tests. Symptoms worse this morning. Hard to use my hands to lift or .  Right knee more painful

## 2023-07-19 NOTE — PROGRESS NOTES
Assessment/Plan:    Synovitis  Rapid onset polyarthralgia with synovitis of the MCP joints suspect seronegative RA versus parvovirus versus tickborne vector we will check x-rays of bilateral hands check Parvovirus B19 IgG, IgM, Lyme's, erlichiosis, Babesia and x-rays of bilateral knees we will refer to rheumatology Dr. Jude Chaparro call if any change or worsening the patient's symptoms are relatively significant patient would like treatment to help with the arthralgias Rx for Medrol Dosepak No. 1 as directed reviewed the risk benefits and side effects RTO in 4 weeks call if any problems         Problem List Items Addressed This Visit        Musculoskeletal and Integument    Synovitis - Primary     Rapid onset polyarthralgia with synovitis of the MCP joints suspect seronegative RA versus parvovirus versus tickborne vector we will check x-rays of bilateral hands check Parvovirus B19 IgG, IgM, Lyme's, erlichiosis, Babesia and x-rays of bilateral knees we will refer to rheumatology Dr. Jude Chaparro call if any change or worsening the patient's symptoms are relatively significant patient would like treatment to help with the arthralgias Rx for Medrol Dosepak No. 1 as directed reviewed the risk benefits and side effects RTO in 4 weeks call if any problems         Relevant Medications    methylPREDNISolone 4 MG tablet therapy pack    Other Relevant Orders    LYME INDEX IGG/IGM, CSF    XR hand 3+ vw left    XR hand 3+ vw right    Ehrlichia chaffeensis (IgG,IgM)    Babesia microti antibody, IgG & Igm    XR knee 3 vw right non injury    Ambulatory Referral to Rheumatology    Parvovirus B19 antibody, IgG and IgM (Completed)       Length of visit 30 minutes, differential diagnosis, medical management review of laboratories time of counseling greater than 50%  Subjective:      Patient ID: Andrez Yanes is a 72 y.o. female.     HPI 81-year-old female recent examination by Dr. Willoughby Issanty complaint of diffuse arthralgias of the elbow and lower back knees and ankle. No fever no chills does have a grandmother with lupus she recently went for laboratory testing with Dr. Whit Corral found to have a elevated C-reactive protein she reports me she has been outdoors in [place] but does not notice a recent rash or tick bite. Patient does report to me her symptoms are keeping her awake. No fever no chills    The following portions of the patient's history were reviewed and updated as appropriate: allergies, current medications, past family history, past medical history, past social history, past surgical history and problem list.    Review of Systems   Constitutional: Positive for fatigue. Negative for activity change, appetite change and unexpected weight change. HENT: Negative for congestion. Eyes: Negative for visual disturbance. Respiratory: Negative for cough and shortness of breath. Cardiovascular: Negative for chest pain. Gastrointestinal: Negative for abdominal pain, diarrhea, nausea and vomiting. Musculoskeletal: Positive for arthralgias. Neurological: Negative for dizziness, light-headedness and headaches. No rash    Objective:    Return in about 4 weeks (around 8/16/2023), or if symptoms worsen or fail to improve. No results found.       Allergies   Allergen Reactions   • Clindamycin GI Intolerance   • Penicillins Hives and Rash   • Sulfa Antibiotics Hives and Rash       Past Medical History:   Diagnosis Date   • Anxiety 2014   • Arthritis    • Basal cell carcinoma    • Colon polyp    • Depression long time   • Depression with anxiety    • Eating disorder     overeating   • Fibrocystic breast disease, unspecified laterality    • GERD (gastroesophageal reflux disease)    • High cholesterol    • Irritable bowel syndrome    • Osteopenia      Past Surgical History:   Procedure Laterality Date   • BREAST CYST ASPIRATION      one performed-unsure which breast   • BREAST EXCISIONAL BIOPSY Bilateral     one on each breast aprox 1980s   • COLONOSCOPY      7/13/12, colon polyp biopsied, hemorrhoids - Dr. Anna Ozuna   • Leila Pickle right ear. Precancerous. • SEPTOPLASTY  05/04/2018   • VAGINAL HYSTERECTOMY  1982    prolapse; about age 30     Current Outpatient Medications on File Prior to Visit   Medication Sig Dispense Refill   • acetaminophen (TYLENOL) 500 mg tablet Take 1,000 mg by mouth every 6 (six) hours as needed     • ALPRAZolam (XANAX) 0.25 mg tablet Take 1 tablet (0.25 mg total) by mouth 3 (three) times a day as needed for anxiety 30 tablet 0   • Fluoxetine HCl, PMDD, 20 MG TABS As needed     • fluticasone (FLONASE) 50 mcg/act nasal spray 1 SPRAY INTO EACH NOSTRIL 2 (TWO) TIMES A DAY WITH MEALS (Patient taking differently: 1 spray into each nostril as needed) 48 mL 1   • ipratropium (ATROVENT) 0.03 % nasal spray 1 spray into each nostril 3 (three) times a day 30 mL 1   • levocetirizine (XYZAL) 5 MG tablet TAKE 1 TABLET BY MOUTH EVERY DAY IN THE EVENING (Patient taking differently: As needed) 90 tablet 1   • omeprazole (PriLOSEC) 20 mg delayed release capsule Take 1 capsule (20 mg total) by mouth daily 30 capsule 5   • rosuvastatin (CRESTOR) 5 mg tablet TAKE 1 TABLET BY MOUTH EVERY DAY 90 tablet 3   • sertraline (ZOLOFT) 25 mg tablet TAKE 1 TABLET BY MOUTH EVERY DAY 90 tablet 1     No current facility-administered medications on file prior to visit.      Family History   Problem Relation Age of Onset   • Atrial fibrillation Mother    • Cancer Mother         kidney, bladder,breast   • Breast cancer Mother    • COPD Mother         mild   • Kidney cancer Mother 80   • Osteoporosis Mother    • Transient ischemic attack Mother    • Stroke Mother         TIA   • Arthritis Mother    • Cancer Father         prostate, skin   • Glaucoma Father    • Hypertension Father    • Arthritis Father    • Coronary artery disease Maternal Grandfather    • Arthritis Maternal Grandfather    • Coronary artery disease Maternal Aunt    • No Known Problems Maternal Grandmother    • No Known Problems Paternal Grandmother    • No Known Problems Paternal Grandfather    • No Known Problems Sister    • No Known Problems Daughter    • No Known Problems Son    • No Known Problems Paternal Aunt    • No Known Problems Paternal Aunt    • No Known Problems Paternal Aunt    • Cancer Brother         leukemia   • Cancer Brother         Polymyalgia rheumatica     Social History     Socioeconomic History   • Marital status: /Civil Union     Spouse name: Not on file   • Number of children: Not on file   • Years of education: Not on file   • Highest education level: Not on file   Occupational History   • Not on file   Tobacco Use   • Smoking status: Never     Passive exposure: Never   • Smokeless tobacco: Never   Vaping Use   • Vaping Use: Never used   Substance and Sexual Activity   • Alcohol use: Yes     Comment: very rare glass of wine   • Drug use: No   • Sexual activity: Not Currently     Partners: Male     Birth control/protection: Post-menopausal, Surgical   Other Topics Concern   • Not on file   Social History Narrative    Advance directive declined by patient     Social Determinants of Health     Financial Resource Strain: Low Risk  (12/7/2022)    Overall Financial Resource Strain (CARDIA)    • Difficulty of Paying Living Expenses: Not hard at all   Food Insecurity: Not on file   Transportation Needs: No Transportation Needs (12/7/2022)    PRAPARE - Transportation    • Lack of Transportation (Medical): No    • Lack of Transportation (Non-Medical): No   Physical Activity: Insufficiently Active (5/21/2020)    Exercise Vital Sign    • Days of Exercise per Week: 3 days    • Minutes of Exercise per Session: 30 min   Stress: Stress Concern Present (5/22/2020)    [facility]    • Feeling of Stress :  To some extent   Social Connections: Not on file   Intimate Partner Violence: Not on file   Housing Stability: Not on file     Vitals:    07/19/23 1659   BP: 132/84   Pulse: 80   Resp: 16   SpO2: 100%   Weight: 63.2 kg (139 lb 6.4 oz)   Height: 5' 2" (1.575 m)     Results for orders placed or performed in visit on 07/17/23   Lyme Total AB W Reflex to IGM/IGG   Result Value Ref Range    Lyme Total Antibodies Negative Negative   Sedimentation rate, automated   Result Value Ref Range    Sed Rate 17 0 - 29 mm/hour   C-reactive protein   Result Value Ref Range    CRP 8.6 (H) <3.0 mg/L   RF Screen w/ Reflex to Titer   Result Value Ref Range    Rheumatoid Factor Negative Negative   Cyclic citrul peptide antibody, IgG   Result Value Ref Range    Cyclic Citrullinated Peptide Ab  1.0 See comment   АНДРЕЙ Screen w/ Reflex to Titer/Pattern   Result Value Ref Range    АНДРЕЙ Negative Negative   CBC and differential   Result Value Ref Range    WBC 4.64 4.31 - 10.16 Thousand/uL    RBC 4.36 3.81 - 5.12 Million/uL    Hemoglobin 13.0 11.5 - 15.4 g/dL    Hematocrit 39.5 34.8 - 46.1 %    MCV 91 82 - 98 fL    MCH 29.8 26.8 - 34.3 pg    MCHC 32.9 31.4 - 37.4 g/dL    RDW 13.0 11.6 - 15.1 %    MPV 11.9 8.9 - 12.7 fL    Platelets 337 085 - 788 Thousands/uL    nRBC 0 /100 WBCs    Neutrophils Relative 72 43 - 75 %    Immat GRANS % 0 0 - 2 %    Lymphocytes Relative 13 (L) 14 - 44 %    Monocytes Relative 11 4 - 12 %    Eosinophils Relative 3 0 - 6 %    Basophils Relative 1 0 - 1 %    Neutrophils Absolute 3.34 1.85 - 7.62 Thousands/µL    Immature Grans Absolute 0.01 0.00 - 0.20 Thousand/uL    Lymphocytes Absolute 0.62 0.60 - 4.47 Thousands/µL    Monocytes Absolute 0.49 0.17 - 1.22 Thousand/µL    Eosinophils Absolute 0.14 0.00 - 0.61 Thousand/µL    Basophils Absolute 0.04 0.00 - 0.10 Thousands/µL     Weight (last 2 days)     None        Body mass index is 25.5 kg/m².   BP      Temp      Pulse     Resp      SpO2        Vitals:    07/19/23 1659   Weight: 63.2 kg (139 lb 6.4 oz)     Vitals:    07/19/23 1659   Weight: 63.2 kg (139 lb 6.4 oz)       /84   Pulse 80   Resp 16   Ht 5' 2" (1.575 m)   Wt 63.2 kg (139 lb 6.4 oz)   SpO2 100%   BMI 25.50 kg/m²          Physical Exam  Vitals and nursing note reviewed. Constitutional:       General: She is not in acute distress. Appearance: Normal appearance. She is well-developed. She is not ill-appearing, toxic-appearing or diaphoretic. HENT:      Head: Normocephalic. Eyes:      General: No scleral icterus. Right eye: No discharge. Left eye: No discharge. Conjunctiva/sclera: Conjunctivae normal.      Pupils: Pupils are equal, round, and reactive to light. Cardiovascular:      Rate and Rhythm: Normal rate and regular rhythm. Heart sounds: Normal heart sounds. No murmur heard. No friction rub. No gallop. Pulmonary:      Effort: No respiratory distress. Breath sounds: Normal breath sounds. No wheezing or rales. Abdominal:      General: Bowel sounds are normal. There is no distension. Palpations: Abdomen is soft. There is no mass. Tenderness: There is no abdominal tenderness. There is no guarding or rebound. Musculoskeletal:         General: No deformity. Cervical back: Neck supple. Lymphadenopathy:      Cervical: No cervical adenopathy. Neurological:      Mental Status: She is alert. Coordination: Coordination normal.       Synovitis bilateral MCP joints of the hand?   Capillaritis

## 2023-07-19 NOTE — TELEPHONE ENCOUNTER
Notify patient her labs were notable only for an elevated CRP. The remainder of her tests checking for rheumatoid arthritis and Lyme were negative. Please have the patient follow-up with me tomorrow in office.

## 2023-07-21 LAB
B19V IGG SER IA-ACNC: 4.6 INDEX (ref 0–0.8)
B19V IGM SER IA-ACNC: 0.1 INDEX (ref 0–0.8)

## 2023-07-22 PROBLEM — M65.90 SYNOVITIS: Status: ACTIVE | Noted: 2023-07-22

## 2023-07-22 PROBLEM — M65.9 SYNOVITIS: Status: ACTIVE | Noted: 2023-07-22

## 2023-07-22 NOTE — ASSESSMENT & PLAN NOTE
Rapid onset polyarthralgia with synovitis of the MCP joints suspect seronegative RA versus parvovirus versus tickborne vector we will check x-rays of bilateral hands check Parvovirus B19 IgG, IgM, Lyme's, erlichiosis, Babesia and x-rays of bilateral knees we will refer to rheumatology Dr. Carina Waller call if any change or worsening the patient's symptoms are relatively significant patient would like treatment to help with the arthralgias Rx for Medrol Dosepak No. 1 as directed reviewed the risk benefits and side effects RTO in 4 weeks call if any problems

## 2023-07-24 ENCOUNTER — TELEPHONE (OUTPATIENT)
Dept: INTERNAL MEDICINE CLINIC | Facility: CLINIC | Age: 73
End: 2023-07-24

## 2023-07-24 DIAGNOSIS — F41.8 DEPRESSION WITH ANXIETY: ICD-10-CM

## 2023-07-24 RX ORDER — SERTRALINE HYDROCHLORIDE 25 MG/1
TABLET, FILM COATED ORAL
Qty: 90 TABLET | Refills: 1 | Status: SHIPPED | OUTPATIENT
Start: 2023-07-24

## 2023-07-24 NOTE — TELEPHONE ENCOUNTER
Called patient to advise of results and she stated that she is still having aches and pain. Yesterday it was minimal but today it have progressively gotten worse with taking the medication. She tried to ween down on it and then that is when the pain came back. She would like to know if there is anything she can do to help with the pain and aches she's having.

## 2023-07-25 ENCOUNTER — RA CDI HCC (OUTPATIENT)
Dept: OTHER | Facility: HOSPITAL | Age: 73
End: 2023-07-25

## 2023-07-25 DIAGNOSIS — K21.9 GERD WITHOUT ESOPHAGITIS: ICD-10-CM

## 2023-07-25 RX ORDER — OMEPRAZOLE 20 MG/1
20 CAPSULE, DELAYED RELEASE ORAL DAILY
Qty: 90 CAPSULE | Refills: 2 | Status: SHIPPED | OUTPATIENT
Start: 2023-07-25

## 2023-07-26 LAB
A PHAGOCYTOPH IGG TITR SER IF: NEGATIVE {TITER}
A PHAGOCYTOPH IGM TITR SER IF: NEGATIVE {TITER}
E CHAFFEENSIS IGG TITR SER IF: NEGATIVE {TITER}
E CHAFFEENSIS IGM TITR SER IF: NEGATIVE {TITER}
RESULT/COMMENT: NORMAL

## 2023-07-27 LAB
B MICROTI IGG TITR SER: NORMAL {TITER}
B MICROTI IGM TITR SER: NORMAL {TITER}
RESULT/COMMENT: NORMAL

## 2023-07-28 ENCOUNTER — HOSPITAL ENCOUNTER (EMERGENCY)
Facility: HOSPITAL | Age: 73
Discharge: HOME | End: 2023-07-28
Attending: EMERGENCY MEDICINE
Payer: MEDICARE

## 2023-07-28 ENCOUNTER — TELEPHONE (OUTPATIENT)
Dept: RHEUMATOLOGY | Facility: CLINIC | Age: 73
End: 2023-07-28

## 2023-07-28 VITALS
RESPIRATION RATE: 16 BRPM | OXYGEN SATURATION: 98 % | TEMPERATURE: 98 F | SYSTOLIC BLOOD PRESSURE: 161 MMHG | DIASTOLIC BLOOD PRESSURE: 78 MMHG | HEART RATE: 60 BPM

## 2023-07-28 DIAGNOSIS — M25.50 JOINT PAIN: ICD-10-CM

## 2023-07-28 DIAGNOSIS — M25.50 POLYARTHRALGIA: Primary | ICD-10-CM

## 2023-07-28 LAB
ALBUMIN SERPL BCP-MCNC: 4.4 G/DL (ref 3.5–5)
ALP SERPL-CCNC: 77 U/L (ref 34–104)
ALT SERPL W P-5'-P-CCNC: 16 U/L (ref 7–52)
ANION GAP SERPL CALCULATED.3IONS-SCNC: 7 MMOL/L
AST SERPL W P-5'-P-CCNC: 18 U/L (ref 13–39)
BACTERIA UR QL AUTO: ABNORMAL /HPF
BASOPHILS # BLD AUTO: 0.06 THOUSANDS/ÂΜL (ref 0–0.1)
BASOPHILS NFR BLD AUTO: 1 % (ref 0–1)
BILIRUB SERPL-MCNC: 0.55 MG/DL (ref 0.2–1)
BILIRUB UR QL STRIP: NEGATIVE
BUN SERPL-MCNC: 21 MG/DL (ref 5–25)
CALCIUM SERPL-MCNC: 9.8 MG/DL (ref 8.4–10.2)
CHLORIDE SERPL-SCNC: 103 MMOL/L (ref 96–108)
CK SERPL-CCNC: 41 U/L (ref 26–192)
CLARITY UR: CLEAR
CO2 SERPL-SCNC: 29 MMOL/L (ref 21–32)
COLOR UR: COLORLESS
CREAT SERPL-MCNC: 0.95 MG/DL (ref 0.6–1.3)
CRP SERPL QL: 17 MG/L
EOSINOPHIL # BLD AUTO: 0.19 THOUSAND/ÂΜL (ref 0–0.61)
EOSINOPHIL NFR BLD AUTO: 3 % (ref 0–6)
ERYTHROCYTE [DISTWIDTH] IN BLOOD BY AUTOMATED COUNT: 13.2 % (ref 11.6–15.1)
ERYTHROCYTE [SEDIMENTATION RATE] IN BLOOD: 26 MM/HOUR (ref 0–29)
GFR SERPL CREATININE-BSD FRML MDRD: 60 ML/MIN/1.73SQ M
GLUCOSE SERPL-MCNC: 93 MG/DL (ref 65–140)
GLUCOSE UR STRIP-MCNC: NEGATIVE MG/DL
HCT VFR BLD AUTO: 42.2 % (ref 34.8–46.1)
HGB BLD-MCNC: 13.6 G/DL (ref 11.5–15.4)
HGB UR QL STRIP.AUTO: NEGATIVE
IMM GRANULOCYTES # BLD AUTO: 0.05 THOUSAND/UL (ref 0–0.2)
IMM GRANULOCYTES NFR BLD AUTO: 1 % (ref 0–2)
KETONES UR STRIP-MCNC: NEGATIVE MG/DL
LEUKOCYTE ESTERASE UR QL STRIP: ABNORMAL
LYMPHOCYTES # BLD AUTO: 0.97 THOUSANDS/ÂΜL (ref 0.6–4.47)
LYMPHOCYTES NFR BLD AUTO: 16 % (ref 14–44)
MCH RBC QN AUTO: 29.3 PG (ref 26.8–34.3)
MCHC RBC AUTO-ENTMCNC: 32.2 G/DL (ref 31.4–37.4)
MCV RBC AUTO: 91 FL (ref 82–98)
MONOCYTES # BLD AUTO: 0.53 THOUSAND/ÂΜL (ref 0.17–1.22)
MONOCYTES NFR BLD AUTO: 9 % (ref 4–12)
NEUTROPHILS # BLD AUTO: 4.2 THOUSANDS/ÂΜL (ref 1.85–7.62)
NEUTS SEG NFR BLD AUTO: 70 % (ref 43–75)
NITRITE UR QL STRIP: NEGATIVE
NON-SQ EPI CELLS URNS QL MICRO: ABNORMAL /HPF
NRBC BLD AUTO-RTO: 0 /100 WBCS
PH UR STRIP.AUTO: 7 [PH]
PLATELET # BLD AUTO: 279 THOUSANDS/UL (ref 149–390)
PMV BLD AUTO: 10.4 FL (ref 8.9–12.7)
POTASSIUM SERPL-SCNC: 3.9 MMOL/L (ref 3.5–5.3)
PROT SERPL-MCNC: 7.5 G/DL (ref 6.4–8.4)
PROT UR STRIP-MCNC: NEGATIVE MG/DL
RBC # BLD AUTO: 4.64 MILLION/UL (ref 3.81–5.12)
RBC #/AREA URNS AUTO: ABNORMAL /HPF
SODIUM SERPL-SCNC: 139 MMOL/L (ref 135–147)
SP GR UR STRIP.AUTO: 1.01 (ref 1–1.03)
T4 FREE SERPL-MCNC: 0.82 NG/DL (ref 0.61–1.12)
TSH SERPL DL<=0.05 MIU/L-ACNC: 8.88 UIU/ML (ref 0.45–4.5)
UROBILINOGEN UR STRIP-ACNC: <2 MG/DL
WBC # BLD AUTO: 6 THOUSAND/UL (ref 4.31–10.16)
WBC #/AREA URNS AUTO: ABNORMAL /HPF

## 2023-07-28 PROCEDURE — 86140 C-REACTIVE PROTEIN: CPT

## 2023-07-28 PROCEDURE — 82550 ASSAY OF CK (CPK): CPT

## 2023-07-28 PROCEDURE — 85652 RBC SED RATE AUTOMATED: CPT

## 2023-07-28 PROCEDURE — 85025 COMPLETE CBC W/AUTO DIFF WBC: CPT

## 2023-07-28 PROCEDURE — 99283 EMERGENCY DEPT VISIT LOW MDM: CPT

## 2023-07-28 PROCEDURE — 96374 THER/PROPH/DIAG INJ IV PUSH: CPT

## 2023-07-28 PROCEDURE — 81001 URINALYSIS AUTO W/SCOPE: CPT

## 2023-07-28 PROCEDURE — 84443 ASSAY THYROID STIM HORMONE: CPT

## 2023-07-28 PROCEDURE — 84439 ASSAY OF FREE THYROXINE: CPT

## 2023-07-28 PROCEDURE — 36415 COLL VENOUS BLD VENIPUNCTURE: CPT

## 2023-07-28 PROCEDURE — 96361 HYDRATE IV INFUSION ADD-ON: CPT

## 2023-07-28 PROCEDURE — 80053 COMPREHEN METABOLIC PANEL: CPT

## 2023-07-28 PROCEDURE — 99284 EMERGENCY DEPT VISIT MOD MDM: CPT

## 2023-07-28 RX ORDER — PREDNISONE 10 MG/1
TABLET ORAL
Qty: 22 TABLET | Refills: 0 | Status: SHIPPED | OUTPATIENT
Start: 2023-07-29 | End: 2023-08-26

## 2023-07-28 RX ORDER — ACETAMINOPHEN 325 MG/1
650 TABLET ORAL ONCE
Status: COMPLETED | OUTPATIENT
Start: 2023-07-28 | End: 2023-07-28

## 2023-07-28 RX ORDER — LIDOCAINE 50 MG/G
1 PATCH TOPICAL ONCE
Status: DISCONTINUED | OUTPATIENT
Start: 2023-07-28 | End: 2023-07-28 | Stop reason: HOSPADM

## 2023-07-28 RX ORDER — METHYLPREDNISOLONE SODIUM SUCCINATE 125 MG/2ML
80 INJECTION, POWDER, LYOPHILIZED, FOR SOLUTION INTRAMUSCULAR; INTRAVENOUS ONCE
Status: COMPLETED | OUTPATIENT
Start: 2023-07-28 | End: 2023-07-28

## 2023-07-28 RX ADMIN — SODIUM CHLORIDE 1000 ML: 0.9 INJECTION, SOLUTION INTRAVENOUS at 05:46

## 2023-07-28 RX ADMIN — METHYLPREDNISOLONE SODIUM SUCCINATE 80 MG: 125 INJECTION, POWDER, FOR SOLUTION INTRAMUSCULAR; INTRAVENOUS at 10:23

## 2023-07-28 RX ADMIN — DICLOFENAC SODIUM 2 G: 10 GEL TOPICAL at 06:09

## 2023-07-28 RX ADMIN — LIDOCAINE 5% 1 PATCH: 700 PATCH TOPICAL at 06:08

## 2023-07-28 RX ADMIN — ACETAMINOPHEN 650 MG: 325 TABLET, FILM COATED ORAL at 06:18

## 2023-07-28 NOTE — TELEPHONE ENCOUNTER
Patient was in ED for polyarthritis flare; please get in with Dr. Debra Way for ASAP new patient visit, lives in [facility]

## 2023-07-28 NOTE — DISCHARGE INSTRUCTIONS
-Please follow-up with your primary care provider and rheumatology.  -Course of steroids have been given. -Please return to the emergency department if you begin develop high fevers, chills, inability to walk or move, chest pains, shortness of breath, cold sweats, faintness, confusion, altered mental status, continued or worsening symptoms.

## 2023-07-28 NOTE — ED PROVIDER NOTES
History  Chief Complaint   Patient presents with   • Joint Pain     Pt reports worsening joint pain starting in July. Earlier this week pt had some episodes of "minor dizziness". Patient is a 79-year-old female with PMH of anxiety, depression, arthritis, GERD, HLD, and basal cell carcinoma presenting for evaluation of approximately 3 weeks of multiple joint pains. The patient notes during the weekend after 4th of July (3 weeks PTA) with diffuse joint pain. She notes it predominantly affects the right index finger, the left middle finger, bilateral hands radiating to the forearms, bilateral sides of neck with the right side worse than the left, right knee, and right great toe. She notes seeing her primary care provider and having blood work and x-ray imaging done. She notes she was told by her primary care provider that she was negative for Lyme, babesiosis, and parvovirus B19. She had an elevated CRP but normal ESR. She was started on Medrol Dosepak and notes 2 days into that treatment she had notable improvement in her symptoms. However, upon finishing out the course and the days after that steroid regimen having an acute return of her diffuse joint pains. She notes it is very difficult to use her hands for activities of daily living. 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 She expresses concern for rheumatologic disease and notes she is unable to see rheumatology outpatient until next April (9 months away). She notes intermittent headaches but otherwise denies vision changes. She denies associated fevers, chills, lightheadedness/dizziness, CP/SOB, abd pain, N/V/D, dysuria, urinary urgency/frequency, and hematuria. History provided by:  Patient   used: No        Prior to Admission Medications   Prescriptions Last Dose Informant Patient Reported? Taking?    ALPRAZolam (XANAX) 0.25 mg tablet   No No   Sig: Take 1 tablet (0.25 mg total) by mouth 3 (three) times a day as needed for anxiety   Fluoxetine HCl, PMDD, 20 MG TABS  Self Yes No   Sig: As needed   acetaminophen (TYLENOL) 500 mg tablet   Yes No   Sig: Take 1,000 mg by mouth every 6 (six) hours as needed   fluticasone (FLONASE) 50 mcg/act nasal spray  Self No No   Si SPRAY INTO EACH NOSTRIL 2 (TWO) TIMES A DAY WITH MEALS   Patient taking differently: 1 spray into each nostril as needed   ipratropium (ATROVENT) 0.03 % nasal spray  Self No No   Si spray into each nostril 3 (three) times a day   levocetirizine (XYZAL) 5 MG tablet  Self No No   Sig: TAKE 1 TABLET BY MOUTH EVERY DAY IN THE EVENING   Patient taking differently: As needed   methylPREDNISolone 4 MG tablet therapy pack   No No   Sig: Use as directed on package   omeprazole (PriLOSEC) 20 mg delayed release capsule   No No   Sig: TAKE 1 CAPSULE BY MOUTH EVERY DAY   rosuvastatin (CRESTOR) 5 mg tablet  Self No No   Sig: TAKE 1 TABLET BY MOUTH EVERY DAY   sertraline (ZOLOFT) 25 mg tablet   No No   Sig: TAKE 1 TABLET BY MOUTH EVERY DAY      Facility-Administered Medications: None       Past Medical History:   Diagnosis Date   • Anxiety    • Arthritis    • Basal cell carcinoma    • Colon polyp    • Depression long time   • Depression with anxiety    • Eating disorder     overeating   • Fibrocystic breast disease, unspecified laterality    • GERD (gastroesophageal reflux disease)    • High cholesterol    • Irritable bowel syndrome    • Osteopenia        Past Surgical History:   Procedure Laterality Date   • BREAST CYST ASPIRATION      one performed-unsure which breast   • BREAST EXCISIONAL BIOPSY Bilateral     one on each breast aprox    • COLONOSCOPY      14, colon polyp biopsied, hemorrhoids - Dr. Ramsey Linker   • SCALP LESION REMOVAL W/ FLAP AND SKIN GRAFT      Behind right ear. Precancerous.    • SEPTOPLASTY  2018   • VAGINAL HYSTERECTOMY  1982    prolapse; about age 30       Family History   Problem Relation Age of Onset   • Atrial fibrillation Mother    • Cancer Mother         kidney, bladder,breast   • Breast cancer Mother    • COPD Mother         mild   • Kidney cancer Mother 80   • Osteoporosis Mother    • Transient ischemic attack Mother    • Stroke Mother         TIA   • Arthritis Mother    • Cancer Father         prostate, skin   • Glaucoma Father    • Hypertension Father    • Arthritis Father    • Coronary artery disease Maternal Grandfather    • Arthritis Maternal Grandfather    • Coronary artery disease Maternal Aunt    • No Known Problems Maternal Grandmother    • No Known Problems Paternal Grandmother    • No Known Problems Paternal Grandfather    • No Known Problems Sister    • No Known Problems Daughter    • No Known Problems Son    • No Known Problems Paternal Aunt    • No Known Problems Paternal Aunt    • No Known Problems Paternal Aunt    • Cancer Brother         leukemia   • Cancer Brother         Polymyalgia rheumatica     I have reviewed and agree with the history as documented. E-Cigarette/Vaping   • E-Cigarette Use Never User      E-Cigarette/Vaping Substances   • Nicotine No    • THC No    • CBD No    • Flavoring No    • Other No    • Unknown No      Social History     Tobacco Use   • Smoking status: Never     Passive exposure: Never   • Smokeless tobacco: Never   Vaping Use   • Vaping Use: Never used   Substance Use Topics   • Alcohol use: Yes     Comment: very rare glass of wine   • Drug use: No       Review of Systems   Constitutional: Positive for fatigue. Negative for chills and fever. HENT: Negative for congestion, sore throat and trouble swallowing. Eyes: Negative for photophobia, pain and visual disturbance. Respiratory: Negative for cough and shortness of breath. Cardiovascular: Negative for chest pain, palpitations and leg swelling. Gastrointestinal: Negative for abdominal pain, diarrhea, nausea and vomiting.    Genitourinary: Negative for decreased urine volume, difficulty urinating, dysuria, flank pain, frequency, hematuria and urgency. Malodorous urine intermittently   Musculoskeletal: Positive for arthralgias (Digits of hands, bilateral hands, bilateral sides of neck, right knee, right big toe), joint swelling (Swelling to joints of digits of hands) and neck pain. Negative for back pain, gait problem, myalgias and neck stiffness. Skin: Negative for color change, pallor, rash and wound. Neurological: Positive for weakness (Bilateral hands feel weak and less strength in  secondary to pain) and headaches. Negative for dizziness, tremors, seizures, syncope, facial asymmetry, speech difficulty, light-headedness and numbness. All other systems reviewed and are negative. Physical Exam  Physical Exam  Vitals and nursing note reviewed. Constitutional:       General: She is awake. She is in acute distress (Evidencing moderate discomfort and distress). Appearance: Normal appearance. She is well-developed. She is not ill-appearing, toxic-appearing or diaphoretic. HENT:      Head: Normocephalic and atraumatic. Jaw: There is normal jaw occlusion. No trismus. Right Ear: Tympanic membrane, ear canal and external ear normal.      Left Ear: Tympanic membrane, ear canal and external ear normal.      Nose: Nose normal.      Mouth/Throat:      Lips: Pink. No lesions. Mouth: Mucous membranes are moist.      Tongue: No lesions. Palate: No lesions. Pharynx: Oropharynx is clear. Uvula midline. Posterior oropharyngeal erythema present. No pharyngeal swelling, oropharyngeal exudate or uvula swelling. Tonsils: No tonsillar exudate or tonsillar abscesses. Eyes:      General: Lids are normal. Vision grossly intact. Gaze aligned appropriately. No visual field deficit. Extraocular Movements: Extraocular movements intact. Right eye: Normal extraocular motion. Left eye: Normal extraocular motion.       Conjunctiva/sclera: Conjunctivae normal.      Pupils: Pupils are equal, round, and reactive to light. Neck:      Trachea: Phonation normal. No abnormal tracheal secretions. Cardiovascular:      Rate and Rhythm: Normal rate. Pulses:           Radial pulses are 2+ on the right side and 2+ on the left side. Dorsalis pedis pulses are 2+ on the right side and 2+ on the left side. Posterior tibial pulses are 2+ on the right side and 2+ on the left side. Heart sounds: Normal heart sounds, S1 normal and S2 normal. No murmur heard. Pulmonary:      Effort: Pulmonary effort is normal. No tachypnea or respiratory distress. Breath sounds: Normal breath sounds and air entry. No stridor, decreased air movement or transmitted upper airway sounds. No decreased breath sounds. Abdominal:      Palpations: Abdomen is soft. Tenderness: There is no abdominal tenderness. There is no guarding or rebound. Musculoskeletal:      Right shoulder: Normal. No swelling or deformity. Left shoulder: Normal. No swelling or deformity. Right upper arm: Normal.      Left upper arm: Normal.      Right elbow: Normal.      Left elbow: Normal.      Right forearm: Normal.      Left forearm: Normal.      Right wrist: Normal. No swelling, deformity or bony tenderness. Normal range of motion. Normal pulse. Left wrist: Normal. No swelling, deformity or bony tenderness. Normal range of motion. Normal pulse. Right hand: Swelling (Swelling to the PIP of the second and third digits) and bony tenderness present. Decreased range of motion (Secondary to pain). Normal sensation. Normal capillary refill. Normal pulse. Left hand: Swelling (Swelling and tenderness to the PIP of the second digit) and bony tenderness present. Decreased range of motion (Secondary to pain). Normal sensation. Normal capillary refill. Normal pulse. Cervical back: Normal range of motion and neck supple. Tenderness present.  No swelling, edema, deformity, erythema, rigidity, bony tenderness or crepitus. Pain with movement present. Normal range of motion. Thoracic back: Normal. No swelling, edema, deformity, signs of trauma or bony tenderness. Normal range of motion. Lumbar back: Normal. No swelling, edema, deformity, signs of trauma or bony tenderness. Normal range of motion. Back:       Right hip: Normal.      Left hip: Normal.      Right upper leg: Normal.      Left upper leg: Normal.      Right knee: No swelling, deformity, erythema or crepitus. Normal range of motion. Tenderness (Diffuse) present. Normal alignment. Left knee: No swelling, deformity, erythema or crepitus. Normal range of motion. Tenderness (Diffuse) present. Normal alignment. Right lower leg: Normal. No edema. Left lower leg: Normal. No edema. Right ankle: Normal. Normal range of motion. Left ankle: Normal. Normal range of motion. Right foot: Normal range of motion and normal capillary refill. Bony tenderness (proximal first toe, distal first metatarsal) present. No swelling or deformity. Normal pulse. Left foot: Normal range of motion and normal capillary refill. No swelling, deformity or bony tenderness. Normal pulse. Comments: ARAUJO, 5/5 strength throughout, sensation intact, no focal joint swelling. Ambulatory with steady gait. Skin:     General: Skin is warm and dry. Capillary Refill: Capillary refill takes less than 2 seconds. Findings: No rash or wound. Neurological:      General: No focal deficit present. Mental Status: She is alert and oriented to person, place, and time. Mental status is at baseline. GCS: GCS eye subscore is 4. GCS verbal subscore is 5. GCS motor subscore is 6. Cranial Nerves: Cranial nerves 2-12 are intact. Sensory: Sensation is intact. Motor: Motor function is intact. Gait: Gait is intact. Psychiatric:         Behavior: Behavior is cooperative.          Vital Signs  ED Triage Vitals   Temperature Pulse Respirations Blood Pressure SpO2   07/28/23 0451 07/28/23 0451 07/28/23 0451 07/28/23 0451 07/28/23 0451   98 °F (36.7 °C) 82 18 (!) 199/81 100 %      Temp Source Heart Rate Source Patient Position - Orthostatic VS BP Location FiO2 (%)   07/28/23 0451 07/28/23 0451 -- 07/28/23 0451 --   Oral Monitor  Right arm       Pain Score       07/28/23 0618       6           Vitals:    07/28/23 0451 07/28/23 0611 07/28/23 0800   BP: (!) 199/81 (!) 171/79 161/78   Pulse: 82 72 60         Visual Acuity      ED Medications  Medications   lidocaine (LIDODERM) 5 % patch 1 patch (1 patch Topical Medication Applied 7/28/23 0608)   sodium chloride 0.9 % bolus 1,000 mL (1,000 mL Intravenous New Bag 7/28/23 0546)   Diclofenac Sodium (VOLTAREN) 1 % topical gel 2 g (2 g Topical Given 7/28/23 0609)   acetaminophen (TYLENOL) tablet 650 mg (650 mg Oral Given 7/28/23 0618)       Diagnostic Studies  Results Reviewed     Procedure Component Value Units Date/Time    TSH, 3rd generation with Free T4 reflex [[id number]]     Lab Status: No result Specimen: Blood     CK [[id number]]     Lab Status: No result Specimen: Blood     Sedimentation rate, automated [[id number]]  (Normal) Collected: 07/28/23 0545    Lab Status: Final result Specimen: Blood from Arm, Right Updated: 07/28/23 0622     Sed Rate 26 mm/hour     Urine Microscopic [[id number]]  (Abnormal) Collected: 07/28/23 0603    Lab Status: Final result Specimen: Urine, Clean Catch Updated: 07/28/23 0613     RBC, UA None Seen /hpf      WBC, UA 2-4 /hpf      Epithelial Cells None Seen /hpf      Bacteria, UA None Seen /hpf     UA w Reflex to Microscopic w Reflex to Culture [[id number]]  (Abnormal) Collected: 07/28/23 0603    Lab Status: Final result Specimen: Urine, Clean Catch Updated: 07/28/23 0611     Color, UA Colorless     Clarity, UA Clear     Specific Gravity, UA 1.011     pH, UA 7.0     Leukocytes, UA Small     Nitrite, UA Negative     Protein, UA Negative mg/dl      Glucose, UA Negative mg/dl      Ketones, UA Negative mg/dl      Urobilinogen, UA <2.0 mg/dl      Bilirubin, UA Negative     Occult Blood, UA Negative    Comprehensive metabolic panel [[id number]] Collected: 07/28/23 0545    Lab Status: Final result Specimen: Blood from Arm, Right Updated: 07/28/23 0606     Sodium 139 mmol/L      Potassium 3.9 mmol/L      Chloride 103 mmol/L      CO2 29 mmol/L      ANION GAP 7 mmol/L      BUN 21 mg/dL      Creatinine 0.95 mg/dL      Glucose 93 mg/dL      Calcium 9.8 mg/dL      AST 18 U/L      ALT 16 U/L      Alkaline Phosphatase 77 U/L      Total Protein 7.5 g/dL      Albumin 4.4 g/dL      Total Bilirubin 0.55 mg/dL      eGFR 60 ml/min/1.73sq m     Narrative:      National Kidney Disease Foundation guidelines for Chronic Kidney Disease (CKD):   •  Stage 1 with normal or high GFR (GFR > 90 mL/min/1.73 square meters)  •  Stage 2 Mild CKD (GFR = 60-89 mL/min/1.73 square meters)  •  Stage 3A Moderate CKD (GFR = 45-59 mL/min/1.73 square meters)  •  Stage 3B Moderate CKD (GFR = 30-44 mL/min/1.73 square meters)  •  Stage 4 Severe CKD (GFR = 15-29 mL/min/1.73 square meters)  •  Stage 5 End Stage CKD (GFR <15 mL/min/1.73 square meters)  Note: GFR calculation is accurate only with a steady state creatinine    C-reactive protein [[id number]]  (Abnormal) Collected: 07/28/23 0545    Lab Status: Final result Specimen: Blood from Arm, Right Updated: 07/28/23 0606     CRP 17.0 mg/L     CBC and differential [[id number]] Collected: 07/28/23 0545    Lab Status: Final result Specimen: Blood from Arm, Right Updated: 07/28/23 0556     WBC 6.00 Thousand/uL      RBC 4.64 Million/uL      Hemoglobin 13.6 g/dL      Hematocrit 42.2 %      MCV 91 fL      MCH 29.3 pg      MCHC 32.2 g/dL      RDW 13.2 %      MPV 10.4 fL      Platelets 950 Thousands/uL      nRBC 0 /100 WBCs      Neutrophils Relative 70 %      Immat GRANS % 1 %      Lymphocytes Relative 16 %      Monocytes Relative 9 % Eosinophils Relative 3 %      Basophils Relative 1 %      Neutrophils Absolute 4.20 Thousands/µL      Immature Grans Absolute 0.05 Thousand/uL      Lymphocytes Absolute 0.97 Thousands/µL      Monocytes Absolute 0.53 Thousand/µL      Eosinophils Absolute 0.19 Thousand/µL      Basophils Absolute 0.06 Thousands/µL                  No orders to display              Procedures  Procedures         ED Course  ED Course as of 07/28/23 0849   Fri Jul 28, 2023   0459 Triage vital signs notable for elevated BP of 199/81, all others are within normal limits and stable   0604 WBC: 6.00  No leukocytosis   0604 CBC and differential  No gross anemia   0610 C-REACTIVE PROTEIN(!): 17.0  Elevated beyond last measure 11 days ago since medrol dose pack completion   0610 Comprehensive metabolic panel  No electrolyte derangement, no BALBIR, normal random glucose, no transaminitis   0611 UA w Reflex to Microscopic w Reflex to Culture(!)  Small leukocytes, will await urine micro   0617 Urine Microscopic(!)  No evidence of UTI   0633 Sed Rate: 26  WNL   0705 Patient updated on CMP, CBC, CRP, ESR, UA, and urine micro results. She notes some relief of her pain with the topical therapy, lidocaine, and Tylenol dosing. [id number] Dr. Nereyda Hernandez from rheumatology notified of patient case by [facility] text. [id number] No answer from on-call rheumatologist.  Message Dr. Megan Corbin from inpatient to see if patient may be a candidate for observation stay for PT OT evaluation as well as rheum consult inpatient. [id number] Patient signed out to oncoming resident Dr. Daniel Vasquez. Dr. Nereyda Hernandez notes she will review the case and send recommendations. TSH and CK added on after further discussion with Dr. Monty Hardy. Patient updated on plan and in agreement with plan. SBIRT 22yo+    Flowsheet Row Most Recent Value   Initial Alcohol Screen: US AUDIT-C     1. How often do you have a drink containing alcohol? 1 Filed at: 07/28/2023 0511   2.  How many drinks containing alcohol do you have on a typical day you are drinking? 0 Filed at: 07/28/2023 0511   3b. FEMALE Any Age, or MALE 65+: How often do you have 4 or more drinks on one occassion? 0 Filed at: 07/28/2023 0511   Audit-C Score 1 Filed at: 07/28/2023 9611   ESA: How many times in the past year have you. .. Used an illegal drug or used a prescription medication for non-medical reasons? Never Filed at: 07/28/2023 8567                    Medical Decision Making  Differential diagnosis including but not limited to: Viral polyarthritis, SLE, Sjogren's syndrome, stenosing tenosynovitis, reactive arthritis, polymyalgia rheumatica, fibromyalgia    Patient is a 81-year-old female presenting for evaluation of 3 weeks of diffuse and worsening joint pains. She notes it predominantly affects the all fingers but most notably right index finger, the left middle finger, bilateral hands radiating to the forearms, bilateral sides of neck with the right side worse than the left, right knee, left knee, and right great toe. She was seen by her PCP and had lyme, ESR, CRP, rheumatoid factor, cyclic citrul peptide antibody, АНДРЕЙ screen, Babesia, Parvovirus U53, and Ehrlichia testing done. Of note she had these labs including CRP 11 days ago, CRP then 8.6 and today is 17.0. ESR 11 days ago and today both negative. RF, АНДРЕЙ both negative. Parvovirus B19 antibody IgG positive, however IgM negative. Remainder of testing negative done outpatient unremarkable. She was given Medrol steroid Dosepak by PCP and had notable improvement in symptoms, however since completion she has had an acute returning of her polyarthralgias with significant affect on ability to perform ADLs. I wanted to reach out to you as she is unable to be seen by rheumatology until next April. Her CMP and CBC are unremarkable here. Her physical exam is with slight swelling of PIPs of hands but otherwise no erythema or warmth to suggest underlying infectious process.  She has had some response to Tylenol and topical diclofenac and lidocaine patch. Plan made for CMP, CBC, ESR, CRP, UA, and consults in room. ESR negative, however CRP uptrending despite Medrol Dosepak outpatient. UA unremarkable. Remainder of labs stable. Patient notes that she cares for her  and does not want any strong pain medication such as oxycodone or morphine. Patient with some response to Tylenol, diclofenac sodium, and topical lidocaine patch. Disposition pending rheumatology recommendations. Patient overall stable and in no acute distress. See ED course for further MDM and disposition discussion. Amount and/or Complexity of Data Reviewed  Labs: ordered. Decision-making details documented in ED Course. Risk  OTC drugs. Prescription drug management. Disposition  Final diagnoses:   None     ED Disposition     None      Follow-up Information    None         Patient's Medications   Discharge Prescriptions    No medications on file       No discharge procedures on file.     PDMP Review       Value Time User    PDMP Reviewed  Yes 7/19/2023  5:25 PM Magdalena Ramos DO          ED Provider  Electronically Signed by           Brianne Goldstein, [address] [place] [address]  07/28/23 5977

## 2023-07-28 NOTE — TELEPHONE ENCOUNTER
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 I placed her on my wait list and as soon as I have a cancellation I will call her.

## 2023-07-30 NOTE — TELEPHONE ENCOUNTER
Please keep on rudy and Charanjit's new patient cancellation lists to get in ASAP for uncontrolled inflammatory polyarthritis

## 2023-08-01 ENCOUNTER — OFFICE VISIT (OUTPATIENT)
Dept: INTERNAL MEDICINE CLINIC | Facility: CLINIC | Age: 73
End: 2023-08-01
Payer: MEDICARE

## 2023-08-01 VITALS
WEIGHT: 139.2 LBS | RESPIRATION RATE: 16 BRPM | SYSTOLIC BLOOD PRESSURE: 134 MMHG | HEART RATE: 62 BPM | BODY MASS INDEX: 25.62 KG/M2 | HEIGHT: 62 IN | DIASTOLIC BLOOD PRESSURE: 74 MMHG | OXYGEN SATURATION: 97 %

## 2023-08-01 DIAGNOSIS — R73.01 IMPAIRED FASTING BLOOD SUGAR: ICD-10-CM

## 2023-08-01 DIAGNOSIS — M25.50 POLYARTHRALGIA: ICD-10-CM

## 2023-08-01 DIAGNOSIS — E03.8 SUBCLINICAL HYPOTHYROIDISM: Primary | ICD-10-CM

## 2023-08-01 DIAGNOSIS — E78.2 MIXED HYPERLIPIDEMIA: ICD-10-CM

## 2023-08-01 PROCEDURE — 99214 OFFICE O/P EST MOD 30 MIN: CPT | Performed by: INTERNAL MEDICINE

## 2023-08-01 NOTE — PROGRESS NOTES
Assessment/Plan:    Subclinical hypothyroidism  Suspect subclinical hypothyroidism suspect her current symptoms are primarily related to inflammatory arthritis likely seronegative rheumatoid arthritis at this point in time we have decided to hold off on treatment with levothyroxine but the patient will discuss this further with the rheumatologist next week we will check third-generation TSH and free T4 in 6 months    Impaired fasting blood sugar  Reduce carbs and sweets    Polyarthralgia  Elevated C-reactive protein reviewed laboratories, ER visit x-rays in detail with the patient suspect seronegative rheumatoid arthritis she is currently on prednisone and doing better explained to her this is more for the short-term and to see rheumatology for consideration of DMARD therapy likely consideration of methotrexate/folic acid and tapering of the prednisone via rheumatology.     Hyperlipidemia  Low-cholesterol diet         Problem List Items Addressed This Visit        Endocrine    Impaired fasting blood sugar     Reduce carbs and sweets         Subclinical hypothyroidism - Primary     Suspect subclinical hypothyroidism suspect her current symptoms are primarily related to inflammatory arthritis likely seronegative rheumatoid arthritis at this point in time we have decided to hold off on treatment with levothyroxine but the patient will discuss this further with the rheumatologist next week we will check third-generation TSH and free T4 in 6 months         Relevant Orders    TSH, 3rd generation with Free T4 reflex       Other    Hyperlipidemia     Low-cholesterol diet         Polyarthralgia     Elevated C-reactive protein reviewed laboratories, ER visit x-rays in detail with the patient suspect seronegative rheumatoid arthritis she is currently on prednisone and doing better explained to her this is more for the short-term and to see rheumatology for consideration of DMARD therapy likely consideration of methotrexate/folic acid and tapering of the prednisone via rheumatology. RTO in 4 months call if any problems; length of visit 30 minutes reviewing laboratories, differential diagnosis and treatment plan ER visit labs obtained during ER visit  Subjective:      Patient ID: Agnes Oseguera is a 72 y.o. female. HPI 69-year old female coming in for a follow up office visit regarding subclinical hypothyroidism, impaired fasting glucose, polyarthralgia likely seronegative rheumatoid arthritis, elevated C-reactive protein and hyperlipidemia; the patient reports me compliant taking medications without untoward side effects the. The patient is here to review his medical condition, update me on the medical condition and the patient reports me no hospitalizations and 1 ER visits. Patient was in the ER regarding worsening arthralgias and swelling she has subsequently been able to get into see rheumatology next week she is currently on prednisone she has had significant improvements in her swelling arthritis and stiffness. Overall doing well here to review laboratories in detail along with the ER report and x-rays. No sign or symptom of hypothyroidism no constipation no unexplained weight loss no cold intolerance no difficulty with hair loss no constipation    The following portions of the patient's history were reviewed and updated as appropriate: allergies, current medications, past family history, past medical history, past social history, past surgical history and problem list.    Review of Systems   Constitutional: Negative for activity change, appetite change and unexpected weight change. HENT: Negative for congestion and postnasal drip. Eyes: Negative for visual disturbance. Respiratory: Negative for cough and shortness of breath. Cardiovascular: Negative for chest pain. Gastrointestinal: Negative for abdominal pain, diarrhea, nausea and vomiting.    Musculoskeletal: Positive for arthralgias. Neurological: Negative for dizziness, light-headedness and headaches. Objective:    Return in about 3 months (around 11/1/2023). Procedure: XR knee 3 vw right non injury    Result Date: 7/28/2023  Narrative: RIGHT KNEE INDICATION:   M65.9: Synovitis and tenosynovitis, unspecified. COMPARISON: 9/26/2013 VIEWS:  XR KNEE 3 VW RIGHT NON INJURY FINDINGS: There is no acute fracture or dislocation. There is no joint effusion. No significant degenerative changes. No lytic or blastic osseous lesion. Soft tissues are unremarkable. Impression: No acute osseous abnormality. Workstation performed: [id number]     Procedure: XR hand 3+ vw right    Result Date: 7/28/2023  Narrative: RIGHT HAND INDICATION:   M65.9: Synovitis and tenosynovitis, unspecified. COMPARISON: 8/30/2018 VIEWS:  XR HAND 3+ VW RIGHT For the purposes of institution wide universal language the following terms will apply: (thumb=1st digit/finger, index finger=2nd digit/finger, long finger=3rd digit/finger, ring=4th digit/finger and small finger=5th digit/finger) FINDINGS: There is no acute fracture or dislocation. Moderate degenerative changes of the 1st carpal metacarpal [place]) articulation. Joint space narrowing and articular surface irregularity of the third DIP joint. No lytic or blastic osseous lesion. Soft tissues are unremarkable. Impression: No acute osseous abnormality. Degenerative changes as described. Workstation performed: [id number]     Procedure: XR hand 3+ vw left    Result Date: 7/28/2023  Narrative: LEFT HAND INDICATION:   M65.9: Synovitis and tenosynovitis, unspecified. COMPARISON: 8/30/2018 VIEWS:  XR HAND 3+ VW LEFT For the purposes of institution wide universal language the following terms will apply: (thumb=1st digit/finger, index finger=2nd digit/finger, long finger=3rd digit/finger, ring=4th digit/finger and small finger=5th digit/finger) FINDINGS: There is no acute fracture or dislocation.  Moderate degenerative changes of the 1st carpal metacarpal [place]) articulation. Osteoarthritic change of the second and third DIP joints with central erosions suggestive of inflammatory arthropathy component. No lytic or blastic osseous lesion. Soft tissues are unremarkable. 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 Workstation performed: [id number]          Allergies   Allergen Reactions   • Clindamycin GI Intolerance   • Penicillins Hives and Rash   • Sulfa Antibiotics Hives and Rash       Past Medical History:   Diagnosis Date   • Anxiety 2014   • Arthritis    • Basal cell carcinoma    • Colon polyp    • Depression long time   • Depression with anxiety    • Eating disorder     overeating   • Fibrocystic breast disease, unspecified laterality    • GERD (gastroesophageal reflux disease)    • High cholesterol    • Irritable bowel syndrome    • Osteopenia      Past Surgical History:   Procedure Laterality Date   • BREAST CYST ASPIRATION      one performed-unsure which breast   • BREAST EXCISIONAL BIOPSY Bilateral     one on each breast aprox 1980s   • COLONOSCOPY      7/13/12, colon polyp biopsied, hemorrhoids - Dr. Teja Jason   • SCALP LESION REMOVAL W/ FLAP AND SKIN GRAFT      Behind right ear. Precancerous.    • SEPTOPLASTY  05/04/2018   • VAGINAL HYSTERECTOMY  1982    prolapse; about age [address]     Current Outpatient Medications on File Prior to Visit   Medication Sig Dispense Refill   • acetaminophen (TYLENOL) 500 mg tablet Take 1,000 mg by mouth every 6 (six) hours as needed     • ALPRAZolam (XANAX) 0.25 mg tablet Take 1 tablet (0.25 mg total) by mouth 3 (three) times a day as needed for anxiety 30 tablet 0   • Fluoxetine HCl, PMDD, 20 MG TABS As needed     • fluticasone (FLONASE) 50 mcg/act nasal spray 1 SPRAY INTO EACH NOSTRIL 2 (TWO) TIMES A DAY WITH MEALS (Patient taking differently: 1 spray into each nostril as needed) 48 mL 1   • ipratropium (ATROVENT) 0.03 % nasal spray 1 spray into each nostril 3 (three) times a day 30 mL 1   • levocetirizine (XYZAL) 5 MG tablet TAKE 1 TABLET BY MOUTH EVERY DAY IN THE EVENING (Patient taking differently: As needed) 90 tablet 1   • methylPREDNISolone 4 MG tablet therapy pack Use as directed on package 21 each 0   • omeprazole (PriLOSEC) 20 mg delayed release capsule TAKE 1 CAPSULE BY MOUTH EVERY DAY 90 capsule 2   • predniSONE 10 mg tablet Take 2 tablets (20 mg total) by mouth daily for 7 days, THEN 1.5 tablets (15 mg total) daily for 7 days, THEN 1 tablet (10 mg total) daily for 7 days, THEN 0.5 tablets (5 mg total) daily for 7 days. Do not start before July 29, 2023. 22 tablet 0   • rosuvastatin (CRESTOR) 5 mg tablet TAKE 1 TABLET BY MOUTH EVERY DAY 90 tablet 3   • sertraline (ZOLOFT) 25 mg tablet TAKE 1 TABLET BY MOUTH EVERY DAY 90 tablet 1     No current facility-administered medications on file prior to visit.      Family History   Problem Relation Age of Onset   • Atrial fibrillation Mother    • Cancer Mother         kidney, bladder,breast   • Breast cancer Mother    • COPD Mother         mild   • Kidney cancer Mother 80   • Osteoporosis Mother    • Transient ischemic attack Mother    • Stroke Mother         TIA   • Arthritis Mother    • Cancer Father         prostate, skin   • Glaucoma Father    • Hypertension Father    • Arthritis Father    • Coronary artery disease Maternal Grandfather    • Arthritis Maternal Grandfather    • Coronary artery disease Maternal Aunt    • No Known Problems Maternal Grandmother    • No Known Problems Paternal Grandmother    • No Known Problems Paternal Grandfather    • No Known Problems Sister    • No Known Problems Daughter    • No Known Problems Son    • No Known Problems Paternal Aunt    • No Known Problems Paternal Aunt    • No Known Problems Paternal Aunt    • Cancer Brother         leukemia   • Cancer Brother         Polymyalgia rheumatica     Social History     Socioeconomic History   • Marital status: /Civil Union     Spouse name: Not on file   • Number of children: Not on file   • Years of education: Not on file   • Highest education level: Not on file   Occupational History   • Not on file   Tobacco Use   • Smoking status: Never     Passive exposure: Never   • Smokeless tobacco: Never   Vaping Use   • Vaping Use: Never used   Substance and Sexual Activity   • Alcohol use: Yes     Comment: very rare glass of wine   • Drug use: No   • Sexual activity: Not Currently     Partners: Male     Birth control/protection: Post-menopausal, Surgical   Other Topics Concern   • Not on file   Social History Narrative    Advance directive declined by patient     Social Determinants of Health     Financial Resource Strain: Low Risk  (12/7/2022)    Overall Financial Resource Strain (CARDIA)    • Difficulty of Paying Living Expenses: Not hard at all   Food Insecurity: Not on file   Transportation Needs: No Transportation Needs (12/7/2022)    PRAPARE - Transportation    • Lack of Transportation (Medical): No    • Lack of Transportation (Non-Medical): No   Physical Activity: Insufficiently Active (5/21/2020)    Exercise Vital Sign    • Days of Exercise per Week: 3 days    • Minutes of Exercise per Session: 30 min   Stress: Stress Concern Present (5/22/2020)    [address] [place] [address]    • Feeling of Stress :  To some extent   Social Connections: Not on file   Intimate Partner Violence: Not on file   Housing Stability: Not on file     Vitals:    08/01/23 1102   BP: 134/74   Pulse: 62   Resp: 16   SpO2: 97%   Weight: 63.1 kg (139 lb 3.2 oz)   Height: 5' 2" (1.575 m)     Results for orders placed or performed during the hospital encounter of 07/28/23   Comprehensive metabolic panel   Result Value Ref Range    Sodium 139 135 - 147 mmol/L    Potassium 3.9 3.5 - 5.3 mmol/L    Chloride 103 96 - 108 mmol/L    CO2 29 21 - 32 mmol/L    ANION GAP 7 mmol/L    BUN 21 5 - 25 mg/dL    Creatinine 0.95 0.60 - 1.30 mg/dL Glucose 93 65 - 140 mg/dL    Calcium 9.8 8.4 - 10.2 mg/dL    AST 18 13 - 39 U/L    ALT 16 7 - 52 U/L    Alkaline Phosphatase 77 34 - 104 U/L    Total Protein 7.5 6.4 - 8.4 g/dL    Albumin 4.4 3.5 - 5.0 g/dL    Total Bilirubin 0.55 0.20 - 1.00 mg/dL    eGFR 60 ml/min/1.73sq m   Sedimentation rate, automated   Result Value Ref Range    Sed Rate 26 0 - 29 mm/hour   C-reactive protein   Result Value Ref Range    CRP 17.0 (H) <3.0 mg/L   CBC and differential   Result Value Ref Range    WBC 6.00 4.31 - 10.16 Thousand/uL    RBC 4.64 3.81 - 5.12 Million/uL    Hemoglobin 13.6 11.5 - 15.4 g/dL    Hematocrit 42.2 34.8 - 46.1 %    MCV 91 82 - 98 fL    MCH 29.3 26.8 - 34.3 pg    MCHC 32.2 31.4 - 37.4 g/dL    RDW 13.2 11.6 - 15.1 %    MPV 10.4 8.9 - 12.7 fL    Platelets 707 151 - 527 Thousands/uL    nRBC 0 /100 WBCs    Neutrophils Relative 70 43 - 75 %    Immat GRANS % 1 0 - 2 %    Lymphocytes Relative 16 14 - 44 %    Monocytes Relative 9 4 - 12 %    Eosinophils Relative 3 0 - 6 %    Basophils Relative 1 0 - 1 %    Neutrophils Absolute 4.20 1.85 - 7.62 Thousands/µL    Immature Grans Absolute 0.05 0.00 - 0.20 Thousand/uL    Lymphocytes Absolute 0.97 0.60 - 4.47 Thousands/µL    Monocytes Absolute 0.53 0.17 - 1.22 Thousand/µL    Eosinophils Absolute 0.19 0.00 - 0.61 Thousand/µL    Basophils Absolute 0.06 0.00 - 0.10 Thousands/µL   UA w Reflex to Microscopic w Reflex to Culture    Specimen: Urine, Clean Catch   Result Value Ref Range    Color, UA Colorless     Clarity, UA Clear     Specific Gravity, UA 1.011 1.003 - 1.030    pH, UA 7.0 4.5, 5.0, 5.5, 6.0, 6.5, 7.0, 7.5, 8.0    Leukocytes, UA Small (A) Negative    Nitrite, UA Negative Negative    Protein, UA Negative Negative mg/dl    Glucose, UA Negative Negative mg/dl    Ketones, UA Negative Negative mg/dl    Urobilinogen, UA <2.0 <2.0 mg/dl mg/dl    Bilirubin, UA Negative Negative    Occult Blood, UA Negative Negative   Urine Microscopic   Result Value Ref Range    RBC, UA None Seen None Seen, 1-2 /hpf    WBC, UA 2-4 (A) None Seen, 1-2 /hpf    Epithelial Cells None Seen None Seen, Occasional /hpf    Bacteria, UA None Seen None Seen, Occasional /hpf   TSH, 3rd generation with Free T4 reflex   Result Value Ref Range    TSH 3RD GENERATON 8.877 (H) 0.450 - 4.500 uIU/mL   CK   Result Value Ref Range    Total CK 41 26 - 192 U/L   T4, free   Result Value Ref Range    Free T4 0.82 0.61 - 1.12 ng/dL     Weight (last 2 days)     Date/Time Weight    08/01/23 1102 63.1 (139.2)        Body mass index is 25.46 kg/m². BP      Temp      Pulse     Resp      SpO2        Vitals:    08/01/23 1102   Weight: 63.1 kg (139 lb 3.2 oz)     Vitals:    08/01/23 1102   Weight: 63.1 kg (139 lb 3.2 oz)       /74   Pulse 62   Resp 16   Ht 5' 2" (1.575 m)   Wt 63.1 kg (139 lb 3.2 oz)   SpO2 97%   BMI 25.46 kg/m²          Physical Exam  Vitals and nursing note reviewed. Constitutional:       General: She is not in acute distress. Appearance: Normal appearance. She is well-developed. She is not ill-appearing, toxic-appearing or diaphoretic. HENT:      Head: Normocephalic. Eyes:      General: No scleral icterus. Right eye: No discharge. Left eye: No discharge. Conjunctiva/sclera: Conjunctivae normal.      Pupils: Pupils are equal, round, and reactive to light. Cardiovascular:      Rate and Rhythm: Normal rate and regular rhythm. Heart sounds: Normal heart sounds. No murmur heard. No friction rub. No gallop. Pulmonary:      Effort: No respiratory distress. Breath sounds: Normal breath sounds. No wheezing or rales. Abdominal:      General: Bowel sounds are normal. There is no distension. Palpations: Abdomen is soft. There is no mass. Tenderness: There is no abdominal tenderness. There is no guarding or rebound. Musculoskeletal:         General: No deformity. Cervical back: Neck supple. Lymphadenopathy:      Cervical: No cervical adenopathy. Neurological:      Mental Status: She is alert.       Coordination: Coordination normal.       Minimal synovitis of the MCP joints significantly improved compared to last visit

## 2023-08-01 NOTE — ASSESSMENT & PLAN NOTE
Elevated C-reactive protein reviewed laboratories, ER visit x-rays in detail with the patient suspect seronegative rheumatoid arthritis she is currently on prednisone and doing better explained to her this is more for the short-term and to see rheumatology for consideration of DMARD therapy likely consideration of methotrexate/folic acid and tapering of the prednisone via rheumatology.

## 2023-08-01 NOTE — ASSESSMENT & PLAN NOTE
Suspect subclinical hypothyroidism suspect her current symptoms are primarily related to inflammatory arthritis likely seronegative rheumatoid arthritis at this point in time we have decided to hold off on treatment with levothyroxine but the patient will discuss this further with the rheumatologist next week we will check third-generation TSH and free T4 in 6 months

## 2023-08-08 ENCOUNTER — OFFICE VISIT (OUTPATIENT)
Dept: RHEUMATOLOGY | Facility: CLINIC | Age: 73
End: 2023-08-08
Payer: MEDICARE

## 2023-08-08 ENCOUNTER — APPOINTMENT (OUTPATIENT)
Dept: LAB | Facility: CLINIC | Age: 73
End: 2023-08-08
Payer: MEDICARE

## 2023-08-08 VITALS
HEIGHT: 62 IN | BODY MASS INDEX: 25.51 KG/M2 | DIASTOLIC BLOOD PRESSURE: 70 MMHG | WEIGHT: 138.6 LBS | SYSTOLIC BLOOD PRESSURE: 120 MMHG

## 2023-08-08 DIAGNOSIS — M35.3 POLYMYALGIA RHEUMATICA (HCC): Primary | ICD-10-CM

## 2023-08-08 DIAGNOSIS — M25.50 POLYARTHRALGIA: ICD-10-CM

## 2023-08-08 DIAGNOSIS — M65.9 SYNOVITIS: ICD-10-CM

## 2023-08-08 LAB
CRP SERPL QL: 3.2 MG/L
ERYTHROCYTE [SEDIMENTATION RATE] IN BLOOD: 8 MM/HOUR (ref 0–29)

## 2023-08-08 PROCEDURE — 36415 COLL VENOUS BLD VENIPUNCTURE: CPT | Performed by: INTERNAL MEDICINE

## 2023-08-08 PROCEDURE — 86140 C-REACTIVE PROTEIN: CPT | Performed by: INTERNAL MEDICINE

## 2023-08-08 PROCEDURE — 85652 RBC SED RATE AUTOMATED: CPT | Performed by: INTERNAL MEDICINE

## 2023-08-08 PROCEDURE — 99204 OFFICE O/P NEW MOD 45 MIN: CPT | Performed by: INTERNAL MEDICINE

## 2023-08-08 NOTE — PATIENT INSTRUCTIONS
Continue the prednisone taper. Continue to keep track of and monitor your symptoms. Please have your blood work done today and then every 4 weeks going forward or should your symptoms worsen. Take calcium 1200mg minus (diet) equals what you supplement. Calcium citrate is the best calcium formulation as it is the gentlest on your stomach and best absorbed.

## 2023-08-08 NOTE — PROGRESS NOTES
Rheumatology Consult   Gertrudis Wall 72 y.o. female 1950    DATE: 8/8/2023    Reason for Consult: hand arthritis    Assessment and Plan:  Hand arthritis and elevated CRP--likely a variant of PMR  Continue prednisone taper  Check esr/crp today and every 4 weeks thereafter  Anti-inflammatory diet/exercise/weight control  Patient to continue to monitor symptoms  Topical NSAIDs/analgesics PRN joint pain  F/u in 6 mos. or sooner if necessary    History of Present Illness:  Gertrudis Wall is a 72 y.o. female Here for initial eval.of arthritis, malaise, elevated CRP and shoulder pain. Symptoms improved on Medrol/Solumedrol and now on a prednisone taper. Also with headaches which are now improving. Review of Systems  Review of Systems   Constitutional: Positive for fatigue. HENT: Negative for mouth sores. Respiratory: Negative for cough and shortness of breath. Cardiovascular: Negative for chest pain and leg swelling. Gastrointestinal: Negative for abdominal pain, constipation and diarrhea. Musculoskeletal: Positive for arthralgias. Negative for back pain, joint swelling and myalgias. Skin: Negative for color change and rash. Neurological: Negative for weakness. Hematological: Negative for adenopathy. Psychiatric/Behavioral: Negative for sleep disturbance.        Allergies  Allergies   Allergen Reactions   • Clindamycin GI Intolerance   • Penicillins Hives and Rash   • Sulfa Antibiotics Hives and Rash       Current Medications      Past Medical History  Past Medical History:   Diagnosis Date   • Anxiety 2014   • Arthritis    • Basal cell carcinoma    • Colon polyp    • Depression long time   • Depression with anxiety    • Eating disorder     overeating   • Fibrocystic breast disease, unspecified laterality    • GERD (gastroesophageal reflux disease)    • High cholesterol    • Irritable bowel syndrome    • Osteopenia        Past Surgical History  Past Surgical History:   Procedure Laterality Date   • BREAST CYST ASPIRATION      one performed-unsure which breast   • BREAST EXCISIONAL BIOPSY Bilateral     one on each breast aprox 1980s   • COLONOSCOPY      7/13/12, colon polyp biopsied, hemorrhoids - Dr. Hammad Schmidt   • [place] Cec[place] right ear. Precancerous. • SEPTOPLASTY  05/04/2018   • VAGINAL HYSTERECTOMY  1982    prolapse; about age 30       Family History  No known autoimmune or inflammatory diseases in the family. Family History   Problem Relation Age of Onset   • Atrial fibrillation Mother    • Cancer Mother         kidney, bladder,breast   • Breast cancer Mother    • COPD Mother         mild   • Kidney cancer Mother 80   • Osteoporosis Mother    • Transient ischemic attack Mother    • Stroke Mother         TIA   • Arthritis Mother    • Cancer Father         prostate, skin   • Glaucoma Father    • Hypertension Father    • Arthritis Father    • Coronary artery disease Maternal Grandfather    • Arthritis Maternal Grandfather    • Coronary artery disease Maternal Aunt    • No Known Problems Maternal Grandmother    • No Known Problems Paternal Grandmother    • No Known Problems Paternal Grandfather    • No Known Problems Sister    • No Known Problems Daughter    • No Known Problems Son    • No Known Problems Paternal Aunt    • No Known Problems Paternal Aunt    • No Known Problems Paternal Aunt    • Cancer Brother         leukemia   • Cancer Brother         Polymyalgia rheumatica       Social History  Occupation: nurse  Social History     Substance and Sexual Activity   Alcohol Use Yes    Comment: very rare glass of wine     Social History     Substance and Sexual Activity   Drug Use No     Social History     Tobacco Use   Smoking Status Never   • Passive exposure: Never   Smokeless Tobacco Never        Objective: There were no vitals taken for this visit. Physical Exam  Constitutional:       General: She is not in acute distress.   HENT: Head: Normocephalic and atraumatic. Eyes:      Conjunctiva/sclera: Conjunctivae normal.   Cardiovascular:      Rate and Rhythm: Normal rate and regular rhythm. Heart sounds: S1 normal and S2 normal.      No friction rub. Pulmonary:      Effort: Pulmonary effort is normal. No respiratory distress. Breath sounds: Normal breath sounds. No wheezing, rhonchi or rales. Musculoskeletal:      Cervical back: Neck supple. Skin:     General: Skin is warm and dry. Coloration: Skin is not pale. Findings: No rash. Neurological:      Mental Status: She is alert. Mental status is at baseline. Psychiatric:         Mood and Affect: Mood normal.         Behavior: Behavior normal.            Lab Results: I have personally reviewed pertinent reports.       CBC:   , Chemistry Profile:       Invalid input(s): "ALBUMIN", Coagulation Studies:   , Cardiac Studies:   , Additional Labs:   , iSTAT CHEM 8:       Invalid input(s): "POTASSIUMIS", ABG:   , Toxicology:   , Last A1C/Lipid Panel/Thyroid Panel:   Lab Results   Component Value Date    HGBA1C 5.5 12/06/2022    HGBA1C 5.4 01/22/2021    TRIG 51 12/06/2022    TRIG 53 03/01/2021    CHOL 267 (H) 05/09/2016    HDL 85 12/06/2022    HDL 68 03/01/2021    LDLCALC 82 12/06/2022    LDLCALC 84 03/01/2021    [id number] 8.877 (H) 07/28/2023    FREET4 0.82 07/28/2023     Lab Results   Component Value Date    CRP 17.0 (H) 07/28/2023    АНДРЕЙ Negative 07/17/2023    RF Negative 07/17/2023    HEPCAB Non-reactive 04/18/2019           Invalid input(s): "Maddie Banda"         Imaging: I have personally reviewed pertinent films in PACS

## 2023-08-11 ENCOUNTER — HOSPITAL ENCOUNTER (OUTPATIENT)
Dept: RADIOLOGY | Age: 73
Discharge: HOME | End: 2023-08-11
Payer: MEDICARE

## 2023-08-11 VITALS — WEIGHT: 138 LBS | BODY MASS INDEX: 25.4 KG/M2 | HEIGHT: 62 IN

## 2023-08-11 DIAGNOSIS — Z12.31 ENCOUNTER FOR SCREENING MAMMOGRAM FOR MALIGNANT NEOPLASM OF BREAST: ICD-10-CM

## 2023-08-11 PROCEDURE — 77063 BREAST TOMOSYNTHESIS BI: CPT

## 2023-08-11 PROCEDURE — 77067 SCR MAMMO BI INCL CAD: CPT

## 2023-08-16 ENCOUNTER — TELEPHONE (OUTPATIENT)
Dept: RHEUMATOLOGY | Facility: CLINIC | Age: 73
End: 2023-08-16

## 2023-08-16 DIAGNOSIS — M35.3 POLYMYALGIA RHEUMATICA (HCC): Primary | ICD-10-CM

## 2023-08-16 RX ORDER — PREDNISONE 5 MG/1
15 TABLET ORAL DAILY
Qty: 90 TABLET | Refills: 0 | Status: SHIPPED | OUTPATIENT
Start: 2023-08-16

## 2023-08-16 NOTE — TELEPHONE ENCOUNTER
Patient called rx refill line. States she needs a new script for the prednisone 15 mg daily, was advised today by Dr Shree Carrasquillo to increase to 15 mg daily. Patient states she will be out of medication by 8/20 and she is leaving to go out of town on Friday 8/18. She would like a script sent to [id number] [facility].

## 2023-08-22 ENCOUNTER — LAB (OUTPATIENT)
Dept: LAB | Age: 73
End: 2023-08-22
Payer: MEDICARE

## 2023-08-22 DIAGNOSIS — M65.9 SYNOVITIS: ICD-10-CM

## 2023-08-22 LAB — B BURGDOR IGG+IGM SER QL IA: NEGATIVE

## 2023-08-22 PROCEDURE — 36415 COLL VENOUS BLD VENIPUNCTURE: CPT

## 2023-08-22 PROCEDURE — 86618 LYME DISEASE ANTIBODY: CPT

## 2023-08-26 DIAGNOSIS — F41.8 DEPRESSION WITH ANXIETY: ICD-10-CM

## 2023-08-28 RX ORDER — ALPRAZOLAM 0.25 MG/1
0.25 TABLET ORAL 3 TIMES DAILY PRN
Qty: 30 TABLET | Refills: 0 | Status: SHIPPED | OUTPATIENT
Start: 2023-08-28

## 2023-09-08 ENCOUNTER — APPOINTMENT (OUTPATIENT)
Dept: LAB | Age: 73
End: 2023-09-08
Payer: MEDICARE

## 2023-09-13 DIAGNOSIS — M35.3 POLYMYALGIA RHEUMATICA (HCC): Primary | ICD-10-CM

## 2023-09-13 DIAGNOSIS — M35.3 POLYMYALGIA RHEUMATICA (HCC): ICD-10-CM

## 2023-09-13 RX ORDER — PREDNISONE 5 MG/1
15 TABLET ORAL DAILY
Qty: 90 TABLET | Refills: 0 | Status: SHIPPED | OUTPATIENT
Start: 2023-09-13

## 2023-09-25 ENCOUNTER — HOSPITAL ENCOUNTER (EMERGENCY)
Facility: HOSPITAL | Age: 73
Discharge: LEFT BEFORE DISCHARGE | End: 2023-09-25
Payer: MEDICARE

## 2023-09-25 ENCOUNTER — NURSE TRIAGE (OUTPATIENT)
Age: 73
End: 2023-09-25

## 2023-09-25 VITALS
TEMPERATURE: 97.5 F | OXYGEN SATURATION: 100 % | SYSTOLIC BLOOD PRESSURE: 173 MMHG | DIASTOLIC BLOOD PRESSURE: 77 MMHG | RESPIRATION RATE: 16 BRPM | HEART RATE: 71 BPM

## 2023-09-25 LAB
ATRIAL RATE: 67 BPM
P AXIS: 72 DEGREES
PR INTERVAL: 172 MS
QRS AXIS: 85 DEGREES
QRSD INTERVAL: 64 MS
QT INTERVAL: 394 MS
QTC INTERVAL: 416 MS
T WAVE AXIS: 64 DEGREES
VENTRICULAR RATE: 67 BPM

## 2023-09-25 PROCEDURE — 93010 ELECTROCARDIOGRAM REPORT: CPT | Performed by: INTERNAL MEDICINE

## 2023-09-25 PROCEDURE — 93005 ELECTROCARDIOGRAM TRACING: CPT

## 2023-09-25 NOTE — TELEPHONE ENCOUNTER
Patient states she had a syncopal episode on Saturday, she fell and hit her head on the toilet paper roll while she was in the bathroom. Patient states over the weekend she was vomiting, had chills, and she had brain fogginess. She states today she feels better but she is having pain on the right side of her head, neck, and right shoulder. Patient is advised to be seen in the ED for her symptoms. Patient refuses disposition and states she wants to know what Dr. Berkley Gonzalez suggests, please follow up. Reason for Disposition  • Vomiting once or more    Answer Assessment - Initial Assessment Questions  1. MECHANISM: "How did the injury happen?" For falls, ask: "What height did you fall from?" and "What surface did you fall against?"       Patient states she had an episode of syncope on 9/23, she fell and hit her head on the toilet paper roll    2. ONSET: "When did the injury happen?" (Minutes or hours ago)       9/23    3. NEUROLOGIC SYMPTOMS: "Was there any loss of consciousness?" "Are there any other neurological symptoms?"      Brain fogginess     4. MENTAL STATUS: "Does the person know who he is, who you are, and where he is?"       A+Ox4    5. LOCATION: "What part of the head was hit?"       Right side of the head    6. SCALP APPEARANCE: "What does the scalp look like? Is it bleeding now?" If Yes, ask: "Is it difficult to stop?"      Denies bleeding or cuts     7. SIZE: For cuts, bruises, or swelling, ask: "How large is it?" (e.g., inches or centimeters)       Denies    8. PAIN: "Is there any pain?" If Yes, ask: "How bad is it?"  (e.g., Scale 1-10; or mild, moderate, severe)      Moderate pain    9. TETANUS: For any breaks in the skin, ask: "When was the last tetanus booster?"      Unknown    10. OTHER SYMPTOMS: "Do you have any other symptoms?" (e.g., neck pain, vomiting)        Vomiting, chills, pain on the right side of body    11.  PREGNANCY: "Is there any chance you are pregnant?" "When was your last menstrual period?"        N/A    Protocols used: HEAD INJURY-ADULT-AH

## 2023-10-07 ENCOUNTER — IMMUNIZATIONS (OUTPATIENT)
Dept: INTERNAL MEDICINE CLINIC | Facility: CLINIC | Age: 73
End: 2023-10-07
Payer: MEDICARE

## 2023-10-07 DIAGNOSIS — Z23 ENCOUNTER FOR IMMUNIZATION: Primary | ICD-10-CM

## 2023-10-07 PROCEDURE — G0008 ADMIN INFLUENZA VIRUS VAC: HCPCS

## 2023-10-07 PROCEDURE — 90662 IIV NO PRSV INCREASED AG IM: CPT

## 2023-10-18 ENCOUNTER — TELEPHONE (OUTPATIENT)
Dept: OBGYN CLINIC | Facility: MEDICAL CENTER | Age: 73
End: 2023-10-18

## 2023-10-18 ENCOUNTER — APPOINTMENT (OUTPATIENT)
Dept: LAB | Age: 73
End: 2023-10-18
Payer: MEDICARE

## 2023-10-18 ENCOUNTER — TELEPHONE (OUTPATIENT)
Dept: RHEUMATOLOGY | Facility: CLINIC | Age: 73
End: 2023-10-18

## 2023-10-18 DIAGNOSIS — Z13.6 SCREENING FOR CARDIOVASCULAR CONDITION: ICD-10-CM

## 2023-10-18 DIAGNOSIS — M35.3 POLYMYALGIA RHEUMATICA (HCC): ICD-10-CM

## 2023-10-18 LAB
ALBUMIN SERPL BCP-MCNC: 4 G/DL (ref 3.5–5)
ALP SERPL-CCNC: 59 U/L (ref 34–104)
ALT SERPL W P-5'-P-CCNC: 12 U/L (ref 7–52)
ANION GAP SERPL CALCULATED.3IONS-SCNC: 3 MMOL/L
AST SERPL W P-5'-P-CCNC: 16 U/L (ref 13–39)
BILIRUB SERPL-MCNC: 0.46 MG/DL (ref 0.2–1)
BUN SERPL-MCNC: 28 MG/DL (ref 5–25)
CALCIUM SERPL-MCNC: 9.7 MG/DL (ref 8.4–10.2)
CHLORIDE SERPL-SCNC: 105 MMOL/L (ref 96–108)
CHOLEST SERPL-MCNC: 258 MG/DL
CO2 SERPL-SCNC: 32 MMOL/L (ref 21–32)
CREAT SERPL-MCNC: 0.94 MG/DL (ref 0.6–1.3)
CRP SERPL QL: 3.4 MG/L
ERYTHROCYTE [SEDIMENTATION RATE] IN BLOOD: 11 MM/HOUR (ref 0–29)
GFR SERPL CREATININE-BSD FRML MDRD: 61 ML/MIN/1.73SQ M
GLUCOSE P FAST SERPL-MCNC: 87 MG/DL (ref 65–99)
HDLC SERPL-MCNC: 71 MG/DL
LDLC SERPL CALC-MCNC: 166 MG/DL (ref 0–100)
POTASSIUM SERPL-SCNC: 4.6 MMOL/L (ref 3.5–5.3)
PROT SERPL-MCNC: 6.8 G/DL (ref 6.4–8.4)
SODIUM SERPL-SCNC: 140 MMOL/L (ref 135–147)
TRIGL SERPL-MCNC: 107 MG/DL

## 2023-10-18 PROCEDURE — 80061 LIPID PANEL: CPT

## 2023-10-18 PROCEDURE — 80053 COMPREHEN METABOLIC PANEL: CPT

## 2023-10-18 RX ORDER — PREDNISONE 5 MG/1
15 TABLET ORAL DAILY
Qty: 90 TABLET | Refills: 0 | Status: SHIPPED | OUTPATIENT
Start: 2023-10-18

## 2023-10-18 NOTE — TELEPHONE ENCOUNTER
Caller:  Jim Calzada     Doctor:  Dr Randa Bhakta    Reason for call: The patient is calling due to having labs done today and how she should take her medication? Continue the alternating dose of prednisone ? She has an appt to see DR Angy Velasquez on 11/7.     Call back#: [phone]

## 2023-10-18 NOTE — TELEPHONE ENCOUNTER
Patient was called and given her lab results. She was told as per Dr. Arnulfo Johnston to continue taking her prednisone until her next appointment.

## 2023-10-20 DIAGNOSIS — E78.2 MIXED HYPERLIPIDEMIA: ICD-10-CM

## 2023-10-20 RX ORDER — ROSUVASTATIN CALCIUM 5 MG/1
TABLET, COATED ORAL
Qty: 90 TABLET | Refills: 1 | Status: SHIPPED | OUTPATIENT
Start: 2023-10-20

## 2023-10-25 DIAGNOSIS — E78.2 MIXED HYPERLIPIDEMIA: Primary | ICD-10-CM

## 2023-11-06 NOTE — PROGRESS NOTES
Rheumatology Follow-up Visit  11/7/2023     CC: routine follow up    Permanent History: First seen by Dr. Saurav Olmstead in Aug 2023 for hand arthritis and elevated CRP; was dxd with PMR and continued on prednisone taper. Assessment: 72 y.o. female here for/with the above. Her symptoms are not at all consistent with PMR. Her blood tests at the time of dx showed only mildly elevated CRP and totally normal ESR. Her primary symptoms started in her hands and feet; PMR involving the hands necessarily involves the shoulder girdle. PMR also does not affect the body below the knees. The fact that she is getting breakthrough symptoms on an avg of 7.5 mg prednisone daily also goes against PMR. Some of her symptoms do sound somewhat inflammatory. The acute onset sounds almost like a robustus presentation. Her previous hand roentgenograms don't show any evidence of long-term inflammatory sequelae. Could be all OA, could also be an inflammatory component, but need to do more investigation. Low suspicion for a myositis but given her muscle aches do want to look into this as well    I think her syncopal episode is unrelated; sounds like a vasovagal episode    Plan:  -Decrease prednisone to 5 mg daily, check CK/ESR/CRP after a week  -Roentgenograms wrists and feet  -[facility] bilateral hands and wrists  -DXA  -RTC 6 weeks to discuss results of above    Ed Carlos DO, ROSETTE Carlos DO, ROSETTE  [facility]– [place][note 2] Rheumatology      Interval History: Symptoms started about three months ago. Symptoms had started in the feet; severe pain, then went to her hands. She is very active normally but was feeling a lot of malaise. Does feel pain the shoulders and neck. Just generalized achiness; nothing specific in the shoulders. Hands are the worst.    Initially, pain was worse in the mornings. On the prednisone, it seemed like they were getting better, but hands are getting worse again.  She has been alternating between 5 and 10 mg daily. Had no trouble raising arms above head. Had her last COVID booster July 13th. Symptoms started 3-4 weeks later. Has been constipated since starting prednisone; has been taking metamucil which helps    A couple months ago, had an episode where she had stomach pain and then passed out. Hasn't happened since. Sounds like a vasovagal episode    Denies:  Fever  Rash  Oral/nasal/genital ulcers  Muscle weakness  Uveitis  Dysphagia/odynophagia  CP  PARK  SOB at rest  Pleurisy  Stomach pain  Hematochezia  Hematuria  Foamy urine  Numbness/tingling  Joint issues other than noted above    Past medical history, allergies, and family history reviewed. Active medications and social history as below. Outpatient Medications Marked as Taking for the 11/7/23 encounter (Office Visit) with Ketty DO Mandi   Medication    acetaminophen (TYLENOL) 500 mg tablet    ALPRAZolam (XANAX) 0.25 mg tablet    Fluoxetine HCl, PMDD, 20 MG TABS    fluticasone (FLONASE) 50 mcg/act nasal spray    ipratropium (ATROVENT) 0.03 % nasal spray    levocetirizine (XYZAL) 5 MG tablet    omeprazole (PriLOSEC) 20 mg delayed release capsule    predniSONE 5 mg tablet    rosuvastatin (CRESTOR) 5 mg tablet    sertraline (ZOLOFT) 25 mg tablet       Social History     Tobacco Use    Smoking status: Never     Passive exposure: Never    Smokeless tobacco: Never   Vaping Use    Vaping Use: Never used   Substance Use Topics    Alcohol use: Yes     Comment: very rare glass of wine    Drug use: No       Review of Systems:  Pertinent findings documented in HPI  ___________________________________    Physical Exam:    /82   Ht 5' 2" (1.575 m)   Wt 66 kg (145 lb 6.4 oz)   BMI 26.59 kg/m²     General: Well appearing, in no distress. Eyes: Sclera non-icteric. EOMI  HENT: No oral ulcers. MMM. Heart: Regular rate and rhythm, no obvious murmurs. Lungs: Lungs clear bilaterally without obvious wheezes or crackles.    Extremities: Warm, well perfused, no edema. Neuro: Alert and oriented. No gross focal neurological deficits. Skin: No rashes. MSK exam: tenderness to palpation bilateral 1st IPs, few Heberden/Yaay nodes, no synovitis of any joint. Mild tenderness to palpation R knee. No muscle tenderness to palpation. ____________________________    Lab Results: relevant labs reviewed    Imaging Results: relevant images reviewed  I have independently reviewed the following images.   Recent hand roentgenograms wo convincing evidence of inflammatory arthritis, no evidence of chondrocalcinosis in TFCC or hook osteophytes to suggest CPPD

## 2023-11-07 ENCOUNTER — OFFICE VISIT (OUTPATIENT)
Dept: RHEUMATOLOGY | Facility: CLINIC | Age: 73
End: 2023-11-07
Payer: MEDICARE

## 2023-11-07 VITALS
HEIGHT: 62 IN | BODY MASS INDEX: 26.76 KG/M2 | DIASTOLIC BLOOD PRESSURE: 82 MMHG | WEIGHT: 145.4 LBS | SYSTOLIC BLOOD PRESSURE: 126 MMHG

## 2023-11-07 DIAGNOSIS — M85.80 DECREASED BONE DENSITY: ICD-10-CM

## 2023-11-07 DIAGNOSIS — M19.90 INFLAMMATORY ARTHRITIS: Primary | ICD-10-CM

## 2023-11-07 DIAGNOSIS — R55 VASOVAGAL SYNCOPE: ICD-10-CM

## 2023-11-07 DIAGNOSIS — M35.3 POLYMYALGIA RHEUMATICA (HCC): ICD-10-CM

## 2023-11-07 DIAGNOSIS — N95.9 UNSPECIFIED MENOPAUSAL AND PERIMENOPAUSAL DISORDER: ICD-10-CM

## 2023-11-07 DIAGNOSIS — Z91.89 AT RISK FOR DECREASED BONE DENSITY: ICD-10-CM

## 2023-11-07 PROCEDURE — 99214 OFFICE O/P EST MOD 30 MIN: CPT | Performed by: STUDENT IN AN ORGANIZED HEALTH CARE EDUCATION/TRAINING PROGRAM

## 2023-11-07 RX ORDER — PREDNISONE 5 MG/1
5 TABLET ORAL DAILY
Qty: 90 TABLET | Refills: 0
Start: 2023-11-07

## 2023-11-07 NOTE — PATIENT INSTRUCTIONS
Please take your prednisone just 5 mg a day starting tomorrow. Please get blood work (CK, CRP, ESR, Vitamin D) after you have been on the lower dose of prednisone for one week.     Please get wrist and foot x-rays    Please schedule an ultrasound for your hands    Please schedule your bone density test

## 2023-11-10 ENCOUNTER — APPOINTMENT (OUTPATIENT)
Dept: RADIOLOGY | Age: 73
End: 2023-11-10
Payer: MEDICARE

## 2023-11-10 ENCOUNTER — HOSPITAL ENCOUNTER (OUTPATIENT)
Dept: RADIOLOGY | Age: 73
Discharge: HOME | End: 2023-11-10
Payer: MEDICARE

## 2023-11-10 DIAGNOSIS — N95.9 UNSPECIFIED MENOPAUSAL AND PERIMENOPAUSAL DISORDER: ICD-10-CM

## 2023-11-10 DIAGNOSIS — M19.90 INFLAMMATORY ARTHRITIS: ICD-10-CM

## 2023-11-10 PROCEDURE — 73110 X-RAY EXAM OF WRIST: CPT

## 2023-11-10 PROCEDURE — 77080 DXA BONE DENSITY AXIAL: CPT

## 2023-11-10 PROCEDURE — 73630 X-RAY EXAM OF FOOT: CPT

## 2023-11-10 PROCEDURE — 71046 X-RAY EXAM CHEST 2 VIEWS: CPT

## 2023-11-14 ENCOUNTER — APPOINTMENT (OUTPATIENT)
Dept: LAB | Age: 73
End: 2023-11-14
Payer: MEDICARE

## 2023-11-14 DIAGNOSIS — M85.80 DECREASED BONE DENSITY: ICD-10-CM

## 2023-11-14 DIAGNOSIS — M19.90 INFLAMMATORY ARTHRITIS: ICD-10-CM

## 2023-11-14 DIAGNOSIS — Z91.89 AT RISK FOR DECREASED BONE DENSITY: ICD-10-CM

## 2023-11-14 LAB
25(OH)D3 SERPL-MCNC: 32.8 NG/ML (ref 30–100)
CK SERPL-CCNC: 46 U/L (ref 26–192)
CRP SERPL QL: 3.9 MG/L
ERYTHROCYTE [SEDIMENTATION RATE] IN BLOOD: 16 MM/HOUR (ref 0–29)

## 2023-11-14 PROCEDURE — 82550 ASSAY OF CK (CPK): CPT

## 2023-11-14 PROCEDURE — 36415 COLL VENOUS BLD VENIPUNCTURE: CPT

## 2023-11-14 PROCEDURE — 82306 VITAMIN D 25 HYDROXY: CPT

## 2023-11-14 PROCEDURE — 86140 C-REACTIVE PROTEIN: CPT

## 2023-11-14 PROCEDURE — 85652 RBC SED RATE AUTOMATED: CPT

## 2023-11-17 ENCOUNTER — TELEPHONE (OUTPATIENT)
Age: 73
End: 2023-11-17

## 2023-11-17 ENCOUNTER — NURSE TRIAGE (OUTPATIENT)
Dept: OTHER | Facility: OTHER | Age: 73
End: 2023-11-17

## 2023-11-17 NOTE — TELEPHONE ENCOUNTER
Caller: Patient    Doctor: Dr. Mook Munoz    Reason for call: Patient calling stating that she has increased pain and stiffness in B/L hands and cervical spine. She sent message through portal but is requesting phone note be sent. She has currently finished the 5 MG of Prednisone and is looking for some guidance as to what she should do next. She would like a phone call before the weekend to discuss. She can be reached at the number below.        Call back#: [phone]

## 2023-11-18 NOTE — TELEPHONE ENCOUNTER
Diagnosis of Polymyalgia rheumatica. Patient was on PredniSONE 5 MG QD. Stopped on 11/14/23 with noticeable worsening of pain since stoppage of medication. c/o worsening pain of hand, wrist, back of neck, with difficulty gripping items with fingers. States worse during the night, and mornings. Currently rated 7/10. Tylenol 1000 mg , and Voltaren cream, heating pad not effective. Would like to know how to proceed until she is seen in next office visit with you. Also states 10 MG PredniSONE was more effective than the 5 MG QD. No additional Symptoms      On call provider contacted and informed of patient's concerns and status. Provider advises as follow: can go back on PredniSONE 10 mg daily      Informed of provider's recommendation, along with care advice. Verbalized understanding. Agreeable with disposition. No further questions.

## 2023-11-18 NOTE — TELEPHONE ENCOUNTER
Regarding: Pain management for Hands, Wrist, Back of Neck. Rheumatology did not call her back today.  ----- Message from Jean Paul Wright sent at 11/17/2023  8:30 PM EST -----  " I called my Rheumatology Office twice today asking for Advise for Pain Management for my Hands, Wrist, Back of Neck, I can't  things with my Fingers.  I never heard back from the Office, I need advise for the next Step. "

## 2023-11-18 NOTE — TELEPHONE ENCOUNTER
Reason for Disposition  • [1] Caller has URGENT medicine question about med that PCP or specialist prescribed AND [2] triager unable to answer question    Answer Assessment - Initial Assessment Questions  1. ONSET: "When did the pain start?"     Worsening since 11/7  2. LOCATION: "Where is the pain located?"     Bilateral Hands  3. PAIN: "How bad is the pain?" (Scale 1-10; or mild, moderate, severe)    - MILD (1-3): doesn't interfere with normal activities    - MODERATE (4-7): interferes with normal activities (e.g., work or school) or awakens from sleep    - SEVERE (8-10): excruciating pain, unable to use hand at all      7/10  4. WORK OR EXERCISE: "Has there been any recent work or exercise that involved this part of the body?"     Diagnosis Polymyalgia rheumatica  5. CAUSE: "What do you think is causing the pain?"      Polymyalgia rheumatica  6. AGGRAVATING FACTORS: "What makes the pain worse?" (e.g., using computer)      No medication   7. OTHER SYMPTOMS: "Do you have any other symptoms?" (e.g., neck pain, swelling, rash, numbness, fever)      Back of neck pain, difficulty in gripping.     Protocols used: Hand and Wrist Pain-ADULT-, Medication Question Call-ADULT-

## 2023-11-21 NOTE — TELEPHONE ENCOUNTER
Addressed in separate encounter, advised to go back on 10 mg prednisone daily until she sees me next    Please call to offer sooner follow up with me if she desires

## 2023-11-29 ENCOUNTER — TELEPHONE (OUTPATIENT)
Dept: RHEUMATOLOGY | Facility: CLINIC | Age: 73
End: 2023-11-29

## 2023-11-29 DIAGNOSIS — M35.3 POLYMYALGIA RHEUMATICA (HCC): ICD-10-CM

## 2023-11-29 RX ORDER — PREDNISONE 5 MG/1
10 TABLET ORAL DAILY
Qty: 90 TABLET | Refills: 0 | Status: SHIPPED | OUTPATIENT
Start: 2023-11-29

## 2023-11-29 RX ORDER — PREDNISONE 5 MG/1
5 TABLET ORAL DAILY
Qty: 90 TABLET | Refills: 0 | Status: SHIPPED | OUTPATIENT
Start: 2023-11-29 | End: 2023-11-29

## 2023-11-29 NOTE — TELEPHONE ENCOUNTER
Talked to patient, back on 10 mg, hands and wrists are the main issue, better on 10 mg    Will keep her on 10 mg until our upcoming follow up    Suspect we will be treating for a peripheral inflammatory arthritis, still not convinced of PMR dx     Sushma Brower expressed understanding and all questions were answered to their satisfaction.

## 2023-12-11 ENCOUNTER — RA CDI HCC (OUTPATIENT)
Dept: OTHER | Facility: HOSPITAL | Age: 73
End: 2023-12-11

## 2023-12-11 DIAGNOSIS — F41.8 DEPRESSION WITH ANXIETY: ICD-10-CM

## 2023-12-11 RX ORDER — ALPRAZOLAM 0.25 MG/1
0.25 TABLET ORAL 3 TIMES DAILY PRN
Qty: 30 TABLET | Refills: 0 | Status: SHIPPED | OUTPATIENT
Start: 2023-12-11

## 2023-12-13 ENCOUNTER — APPOINTMENT (OUTPATIENT)
Dept: LAB | Age: 73
End: 2023-12-13
Payer: MEDICARE

## 2023-12-13 DIAGNOSIS — E78.2 MIXED HYPERLIPIDEMIA: ICD-10-CM

## 2023-12-13 LAB
CHOLEST SERPL-MCNC: 195 MG/DL
HDLC SERPL-MCNC: 71 MG/DL
LDLC SERPL CALC-MCNC: 108 MG/DL (ref 0–100)
TRIGL SERPL-MCNC: 80 MG/DL

## 2023-12-13 PROCEDURE — 36415 COLL VENOUS BLD VENIPUNCTURE: CPT

## 2023-12-13 PROCEDURE — 80061 LIPID PANEL: CPT

## 2023-12-18 ENCOUNTER — OFFICE VISIT (OUTPATIENT)
Dept: INTERNAL MEDICINE CLINIC | Facility: CLINIC | Age: 73
End: 2023-12-18
Payer: MEDICARE

## 2023-12-18 VITALS
SYSTOLIC BLOOD PRESSURE: 122 MMHG | HEART RATE: 77 BPM | WEIGHT: 145.8 LBS | OXYGEN SATURATION: 97 % | BODY MASS INDEX: 26.83 KG/M2 | HEIGHT: 62 IN | DIASTOLIC BLOOD PRESSURE: 78 MMHG | RESPIRATION RATE: 16 BRPM

## 2023-12-18 DIAGNOSIS — F32.89 OTHER DEPRESSION: ICD-10-CM

## 2023-12-18 DIAGNOSIS — M25.50 POLYARTHRALGIA: ICD-10-CM

## 2023-12-18 DIAGNOSIS — Z00.00 MEDICARE ANNUAL WELLNESS VISIT, SUBSEQUENT: Primary | ICD-10-CM

## 2023-12-18 DIAGNOSIS — E78.2 MIXED HYPERLIPIDEMIA: ICD-10-CM

## 2023-12-18 DIAGNOSIS — F51.01 PRIMARY INSOMNIA: ICD-10-CM

## 2023-12-18 DIAGNOSIS — R55 SYNCOPE, UNSPECIFIED SYNCOPE TYPE: ICD-10-CM

## 2023-12-18 PROBLEM — K59.03 DRUG-INDUCED CONSTIPATION: Status: ACTIVE | Noted: 2023-12-18

## 2023-12-18 PROCEDURE — G0439 PPPS, SUBSEQ VISIT: HCPCS | Performed by: INTERNAL MEDICINE

## 2023-12-18 PROCEDURE — 99214 OFFICE O/P EST MOD 30 MIN: CPT | Performed by: INTERNAL MEDICINE

## 2023-12-18 RX ORDER — ROSUVASTATIN CALCIUM 10 MG/1
10 TABLET, COATED ORAL DAILY
Qty: 30 TABLET | Refills: 5 | Status: SHIPPED | OUTPATIENT
Start: 2023-12-18

## 2023-12-18 NOTE — PATIENT INSTRUCTIONS
Medicare Preventive Visit Patient Instructions  Thank you for completing your Welcome to Medicare Visit or Medicare Annual Wellness Visit today. Your next wellness visit will be due in one year (12/18/2024).  The screening/preventive services that you may require over the next 5-10 years are detailed below. Some tests may not apply to you based off risk factors and/or age. Screening tests ordered at today's visit but not completed yet may show as past due. Also, please note that scanned in results may not display below.  Preventive Screenings:  Service Recommendations Previous Testing/Comments   Colorectal Cancer Screening  * Colonoscopy    * Fecal Occult Blood Test (FOBT)/Fecal Immunochemical Test (FIT)  * Fecal DNA/Cologuard Test  * Flexible Sigmoidoscopy Age: 45-75 years old   Colonoscopy: every 10 years (may be performed more frequently if at higher risk)  OR  FOBT/FIT: every 1 year  OR  Cologuard: every 3 years  OR  Sigmoidoscopy: every 5 years  Screening may be recommended earlier than age 45 if at higher risk for colorectal cancer. Also, an individualized decision between you and your healthcare provider will decide whether screening between the ages of 76-85 would be appropriate. Colonoscopy: 05/06/2021  FOBT/FIT: Not on file  Cologuard: Not on file  Sigmoidoscopy: Not on file          Breast Cancer Screening Age: 40+ years old  Frequency: every 1-2 years  Not required if history of left and right mastectomy Mammogram: 08/11/2023        Cervical Cancer Screening Between the ages of 21-29, pap smear recommended once every 3 years.   Between the ages of 30-65, can perform pap smear with HPV co-testing every 5 years.   Recommendations may differ for women with a history of total hysterectomy, cervical cancer, or abnormal pap smears in past. Pap Smear: 11/19/2019        Hepatitis C Screening Once for adults born between 1945 and 1965  More frequently in patients at high risk for Hepatitis C Hep C Antibody:  04/18/2019        Diabetes Screening 1-2 times per year if you're at risk for diabetes or have pre-diabetes Fasting glucose: 87 mg/dL (10/18/2023)  A1C: 5.5 % (12/6/2022)      Cholesterol Screening Once every 5 years if you don't have a lipid disorder. May order more often based on risk factors. Lipid panel: 12/13/2023          Other Preventive Screenings Covered by Medicare:  Abdominal Aortic Aneurysm (AAA) Screening: covered once if your at risk. You're considered to be at risk if you have a family history of AAA.  Lung Cancer Screening: covers low dose CT scan once per year if you meet all of the following conditions: (1) Age 55-77; (2) No signs or symptoms of lung cancer; (3) Current smoker or have quit smoking within the last 15 years; (4) You have a tobacco smoking history of at least 20 pack years (packs per day multiplied by number of years you smoked); (5) You get a written order from a healthcare provider.  Glaucoma Screening: covered annually if you're considered high risk: (1) You have diabetes OR (2) Family history of glaucoma OR (3)  aged 50 and older OR (4)  American aged 65 and older  Osteoporosis Screening: covered every 2 years if you meet one of the following conditions: (1) You're estrogen deficient and at risk for osteoporosis based off medical history and other findings; (2) Have a vertebral abnormality; (3) On glucocorticoid therapy for more than 3 months; (4) Have primary hyperparathyroidism; (5) On osteoporosis medications and need to assess response to drug therapy.   Last bone density test (DXA Scan): 11/10/2023.  HIV Screening: covered annually if you're between the age of 15-65. Also covered annually if you are younger than 15 and older than 65 with risk factors for HIV infection. For pregnant patients, it is covered up to 3 times per pregnancy.    Immunizations:  Immunization Recommendations   Influenza Vaccine Annual influenza vaccination during flu season is  recommended for all persons aged >= 6 months who do not have contraindications   Pneumococcal Vaccine   * Pneumococcal conjugate vaccine = PCV13 (Prevnar 13), PCV15 (Vaxneuvance), PCV20 (Prevnar 20)  * Pneumococcal polysaccharide vaccine = PPSV23 (Pneumovax) Adults 19-63 yo with certain risk factors or if 65+ yo  If never received any pneumonia vaccine: recommend Prevnar 20 (PCV20)  Give PCV20 if previously received 1 dose of PCV13 or PPSV23   Hepatitis B Vaccine 3 dose series if at intermediate or high risk (ex: diabetes, end stage renal disease, liver disease)   Respiratory syncytial virus (RSV) Vaccine - COVERED BY MEDICARE PART D  * RSVPreF3 (Arexvy) CDC recommends that adults 60 years of age and older may receive a single dose of RSV vaccine using shared clinical decision-making (SCDM)   Tetanus (Td) Vaccine - COST NOT COVERED BY MEDICARE PART B Following completion of primary series, a booster dose should be given every 10 years to maintain immunity against tetanus. Td may also be given as tetanus wound prophylaxis.   Tdap Vaccine - COST NOT COVERED BY MEDICARE PART B Recommended at least once for all adults. For pregnant patients, recommended with each pregnancy.   Shingles Vaccine (Shingrix) - COST NOT COVERED BY MEDICARE PART B  2 shot series recommended in those 19 years and older who have or will have weakened immune systems or those 50 years and older     Health Maintenance Due:      Topic Date Due   • Breast Cancer Screening: Mammogram  08/11/2024   • Colorectal Cancer Screening  05/06/2031   • Hepatitis C Screening  Completed     Immunizations Due:      Topic Date Due   • COVID-19 Vaccine (5 - 2023-24 season) 09/01/2023     Advance Directives   What are advance directives?  Advance directives are legal documents that state your wishes and plans for medical care. These plans are made ahead of time in case you lose your ability to make decisions for yourself. Advance directives can apply to any medical  decision, such as the treatments you want, and if you want to donate organs.   What are the types of advance directives?  There are many types of advance directives, and each state has rules about how to use them. You may choose a combination of any of the following:  Living will:  This is a written record of the treatment you want. You can also choose which treatments you do not want, which to limit, and which to stop at a certain time. This includes surgery, medicine, IV fluid, and tube feedings.   Durable power of  for healthcare (DPAHC):  This is a written record that states who you want to make healthcare choices for you when you are unable to make them for yourself. This person, called a proxy, is usually a family member or a friend. You may choose more than 1 proxy.  Do not resuscitate (DNR) order:  A DNR order is used in case your heart stops beating or you stop breathing. It is a request not to have certain forms of treatment, such as CPR. A DNR order may be included in other types of advance directives.  Medical directive:  This covers the care that you want if you are in a coma, near death, or unable to make decisions for yourself. You can list the treatments you want for each condition. Treatment may include pain medicine, surgery, blood transfusions, dialysis, IV or tube feedings, and a ventilator (breathing machine).  Values history:  This document has questions about your views, beliefs, and how you feel and think about life. This information can help others choose the care that you would choose.  Why are advance directives important?  An advance directive helps you control your care. Although spoken wishes may be used, it is better to have your wishes written down. Spoken wishes can be misunderstood, or not followed. Treatments may be given even if you do not want them. An advance directive may make it easier for your family to make difficult choices about your care.   Weight Management   Why  it is important to manage your weight:  Being overweight increases your risk of health conditions such as heart disease, high blood pressure, type 2 diabetes, and certain types of cancer. It can also increase your risk for osteoarthritis, sleep apnea, and other respiratory problems. Aim for a slow, steady weight loss. Even a small amount of weight loss can lower your risk of health problems.  How to lose weight safely:  A safe and healthy way to lose weight is to eat fewer calories and get regular exercise. You can lose up about 1 pound a week by decreasing the number of calories you eat by 500 calories each day.   Healthy meal plan for weight management:  A healthy meal plan includes a variety of foods, contains fewer calories, and helps you stay healthy. A healthy meal plan includes the following:  Eat whole-grain foods more often.  A healthy meal plan should contain fiber. Fiber is the part of grains, fruits, and vegetables that is not broken down by your body. Whole-grain foods are healthy and provide extra fiber in your diet. Some examples of whole-grain foods are whole-wheat breads and pastas, oatmeal, brown rice, and bulgur.  Eat a variety of vegetables every day.  Include dark, leafy greens such as spinach, kale, bryan greens, and mustard greens. Eat yellow and orange vegetables such as carrots, sweet potatoes, and winter squash.   Eat a variety of fruits every day.  Choose fresh or canned fruit (canned in its own juice or light syrup) instead of juice. Fruit juice has very little or no fiber.  Eat low-fat dairy foods.  Drink fat-free (skim) milk or 1% milk. Eat fat-free yogurt and low-fat cottage cheese. Try low-fat cheeses such as mozzarella and other reduced-fat cheeses.  Choose meat and other protein foods that are low in fat.  Choose beans or other legumes such as split peas or lentils. Choose fish, skinless poultry (chicken or turkey), or lean cuts of red meat (beef or pork). Before you cook meat or  poultry, cut off any visible fat.   Use less fat and oil.  Try baking foods instead of frying them. Add less fat, such as margarine, sour cream, regular salad dressing and mayonnaise to foods. Eat fewer high-fat foods. Some examples of high-fat foods include french fries, doughnuts, ice cream, and cakes.  Eat fewer sweets.  Limit foods and drinks that are high in sugar. This includes candy, cookies, regular soda, and sweetened drinks.  Exercise:  Exercise at least 30 minutes per day on most days of the week. Some examples of exercise include walking, biking, dancing, and swimming. You can also fit in more physical activity by taking the stairs instead of the elevator or parking farther away from stores. Ask your healthcare provider about the best exercise plan for you.      © Copyright [employer] 2018 Information is for End User's use only and may not be sold, redistributed or otherwise used for commercial purposes. All illustrations and images included in CareNotes® are the copyrighted property of [employer] or [employer]

## 2023-12-18 NOTE — PROGRESS NOTES
Assessment and Plan:     Problem List Items Addressed This Visit        Other    Hyperlipidemia     Suboptimal control increase Crestor to 10 mg once daily low-cholesterol diet check lipid profile and CMP         Relevant Medications    rosuvastatin (CRESTOR) 10 MG tablet    Other Relevant Orders    Comprehensive metabolic panel    Lipid Panel with Direct LDL reflex    Depression     Clinically stable and doing well continue the current medical regiment will continue monitor.  Continue Zoloft 25 mg once daily         Medicare annual wellness visit, subsequent - Primary     Assessment and plan 1.  Medicare subsequent annual wellness examination overall the patient is clinically stable and doing well, we encouraged the patient to follow a healthy and balanced diet.  We recommend that the patient exercise routinely approximately 30 minutes 5 times per week .  We have reviewed the patient's vaccines and have made recommendations for updates if necessary   consider COVID booster, RSV vaccine   .  We will be ordering screening laboratories which are age appropriate.  Return to the office in 6 months    call if any problems.         Polyarthralgia     Symptom improvement with prednisone working with rheumatology suspect seronegative rheumatoid arthritis I would like the patient discussed with rheumatology starting DMARD possibly methotrexate         Syncope     Likely vasovagal the patient does report to me intermittent palpitations did review the ER report she has not had any further subsequent episodes since September.  EKG  no QTc prolongation she did not stay in the ER for evaluation I will check echocardiogram and a Holter monitor for further evaluation         Relevant Orders    Echo complete w/ contrast if indicated    Holter monitor    Primary insomnia     Patient does report to me she infrequently uses Xanax 0.25 mg 1 p.o. 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 The BMI is above normal. Nutrition recommendations include decreasing portion sizes and moderation in carbohydrate intake. Exercise recommendations include exercising 3-5 times per week. Rationale for BMI follow-up plan is due to patient being overweight or obese.       Preventive health issues were discussed with patient, and age appropriate screening tests were ordered as noted in patient's After Visit Summary.  Personalized health advice and appropriate referrals for health education or preventive services given if needed, as noted in patient's After Visit Summary.     History of Present Illness:     Patient presents for a Medicare Wellness Visit    HPI 73-year old female coming in for a follow up office visit regarding polyarthralgia, hyperlipidemia, subclinical hypothyroidism, insomnia, syncopal episodes, impaired fasting glucose and hyperlipidemia; the pt reports with going down on pred increase joint sx and tighness of the throat which improved with higher dose of prednisone show this indra this with rheumatology at the upcoming appointment; patient reports me in September syncopal episode went to ER but did not stay for full evaluation.  No further symptoms since that point in time she was sitting on the toilet while this occurred she vomited afterwards no seizure activity.  No heart racing preceding.  Constipation , insomnia with pred.  Patient also reports me difficulty with insomnia related to 's dream reenactment.  She reports me if she does not sleep for several nights she uses a low-dose of Xanax to help her fall back to sleep which is very helpful and she tolerates very well without any side effects.  Patient Care Team:  Sukhi Rossi DO as PCP - General (Internal Medicine)  Vaibhav Patel MD     Review  of Systems:     Review of Systems   Constitutional:  Negative for activity change, appetite change and unexpected weight change.   HENT:  Negative for congestion and postnasal drip.    Eyes:  Negative for visual disturbance.   Respiratory:  Negative for cough and shortness of breath.    Cardiovascular:  Negative for chest pain.   Gastrointestinal:  Negative for abdominal pain, diarrhea, nausea and vomiting.   Neurological:  Negative for dizziness, light-headedness and headaches.   Hematological:  Negative for adenopathy.   Insomnia, syncopal episode in September     Problem List:     Patient Active Problem List   Diagnosis   • Aortic valve insufficiency   • Allergic rhinitis   • Depression with anxiety   • GERD without esophagitis   • Hyperlipidemia   • Irritable bowel syndrome   • Vitamin D deficiency   • Impaired fasting blood sugar   • Subclinical hypothyroidism   • Arthritis of carpometacarpal (CMC) joint of both thumbs   • Major depressive disorder with single episode, in partial remission (HCC)   • Vitiligo   • Esophageal reflux   • Depression   • Benign paroxysmal positional vertigo of right ear   • Benign paroxysmal positional vertigo   • Tinnitus   • Abnormal laboratory test   • Pulsatile tinnitus of left ear   • Other specified symptoms and signs involving the circulatory and respiratory systems    • Fall   • Back pain   • Neck pain   • Medicare annual wellness visit, subsequent   • Stage 3a chronic kidney disease (HCC)   • H/O tooth extraction   • Rib pain on right side   • Calculus of gallbladder without cholecystitis without obstruction   • Polyarthralgia   • Synovitis   • Syncope   • Drug-induced constipation   • Primary insomnia      Past Medical and Surgical History:     Past Medical History:   Diagnosis Date   • Anxiety 2014   • Arthritis    • Basal cell carcinoma    • Colon polyp    • Depression long time   • Depression with anxiety    • Eating disorder     overeating   • Fibrocystic breast  disease, unspecified laterality    • GERD (gastroesophageal reflux disease)    • High cholesterol    • Irritable bowel syndrome    • Osteopenia      Past Surgical History:   Procedure Laterality Date   • BREAST CYST ASPIRATION      one performed-unsure which breast   • BREAST EXCISIONAL BIOPSY Bilateral     one on each breast aprox 1980s   • COLONOSCOPY      7/13/12, colon polyp biopsied, hemorrhoids - Dr. Garzon   • SCALP LESION REMOVAL W/ FLAP AND SKIN GRAFT      Behind right ear. Precancerous.   • SEPTOPLASTY  05/04/2018   • VAGINAL HYSTERECTOMY  1982    prolapse; about age 37      Family History:     Family History   Problem Relation Age of Onset   • Atrial fibrillation Mother    • Cancer Mother 80        kidney, bladder,breast   • Breast cancer Mother 80   • COPD Mother         mild   • Kidney cancer Mother 80   • Osteoporosis Mother    • Transient ischemic attack Mother    • Stroke Mother         TIA   • Arthritis Mother    • Cancer Father         prostate, skin   • Glaucoma Father    • Hypertension Father    • Arthritis Father    • Coronary artery disease Maternal Grandfather    • Arthritis Maternal Grandfather    • Coronary artery disease Maternal Aunt    • No Known Problems Maternal Grandmother    • No Known Problems Paternal Grandmother    • No Known Problems Paternal Grandfather    • No Known Problems Sister    • No Known Problems Daughter    • No Known Problems Son    • No Known Problems Paternal Aunt    • No Known Problems Paternal Aunt    • No Known Problems Paternal Aunt    • Cancer Brother         leukemia   • Cancer Brother         Polymyalgia rheumatica      Social History:     Social History     Socioeconomic History   • Marital status: /Civil Union     Spouse name: None   • Number of children: None   • Years of education: None   • Highest education level: None   Occupational History   • None   Tobacco Use   • Smoking status: Never     Passive exposure: Never   • Smokeless tobacco:  Never   Vaping Use   • Vaping status: Never Used   Substance and Sexual Activity   • Alcohol use: Yes     Comment: very rare glass of wine   • Drug use: No   • Sexual activity: Not Currently     Partners: Male     Birth control/protection: Post-menopausal, Surgical   Other Topics Concern   • None   Social History Narrative    Advance directive declined by patient     Social Determinants of Health     Financial Resource Strain: Low Risk  (12/13/2023)    Overall Financial Resource Strain (CARDIA)    • Difficulty of Paying Living Expenses: Not hard at all   Food Insecurity: Not on file   Transportation Needs: No Transportation Needs (12/13/2023)    PRAPARE - Transportation    • Lack of Transportation (Medical): No    • Lack of Transportation (Non-Medical): No   Physical Activity: Insufficiently Active (5/21/2020)    Exercise Vital Sign    • Days of Exercise per Week: 3 days    • Minutes of Exercise per Session: 30 min   Stress: Stress Concern Present (5/22/2020)    [employer] - Occupational Stress Questionnaire    • Feeling of Stress : To some extent   Social Connections: Not on file   Intimate Partner Violence: Not on file   Housing Stability: Not on file      Medications and Allergies:     Current Outpatient Medications   Medication Sig Dispense Refill   • acetaminophen (TYLENOL) 500 mg tablet Take 1,000 mg by mouth every 6 (six) hours as needed     • ALPRAZolam (XANAX) 0.25 mg tablet Take 1 tablet (0.25 mg total) by mouth 3 (three) times a day as needed for anxiety 30 tablet 0   • fluticasone (FLONASE) 50 mcg/act nasal spray 1 SPRAY INTO EACH NOSTRIL 2 (TWO) TIMES A DAY WITH MEALS (Patient taking differently: 1 spray into each nostril as needed) 48 mL 1   • ipratropium (ATROVENT) 0.03 % nasal spray 1 spray into each nostril 3 (three) times a day 30 mL 1   • levocetirizine (XYZAL) 5 MG tablet TAKE 1 TABLET BY MOUTH EVERY DAY IN THE EVENING (Patient taking differently: As needed) 90  tablet 1   • omeprazole (PriLOSEC) 20 mg delayed release capsule TAKE 1 CAPSULE BY MOUTH EVERY DAY 90 capsule 2   • predniSONE 5 mg tablet Take 2 tablets (10 mg total) by mouth daily 90 tablet 0   • rosuvastatin (CRESTOR) 10 MG tablet Take 1 tablet (10 mg total) by mouth daily 30 tablet 5   • sertraline (ZOLOFT) 25 mg tablet TAKE 1 TABLET BY MOUTH EVERY DAY 90 tablet 1     No current facility-administered medications for this visit.     Allergies   Allergen Reactions   • Clindamycin GI Intolerance and Other (See Comments)   • Penicillin G Rash   • Sulfa Antibiotics Hives and Rash   • Penicillins Hives and Rash      Immunizations:     Immunization History   Administered Date(s) Administered   • COVID-19 MODERNA VACC 0.5 ML IM 02/04/2021, 03/04/2021, 10/28/2021   • COVID-19 Moderna Vac BIVALENT 12 Yr+ IM 0.5 ML 09/13/2022   • H1N1, All Formulations 12/30/2009   • INFLUENZA 10/30/2015, 10/31/2016, 10/31/2017, 11/01/2018   • Influenza Quadrivalent Preservative Free 3 years and older IM 09/30/2014   • Influenza Quadrivalent, 6-35 Months IM 10/30/2015   • Influenza Split High Dose Preservative Free IM 10/31/2017, 11/15/2018, 09/13/2022   • Influenza, high dose seasonal 0.7 mL 10/31/2019, 10/02/2021, 10/07/2023   • Influenza, seasonal, injectable 10/26/2012, 10/22/2013, 10/31/2016   • Influenza, seasonal, injectable, preservative free 09/16/2020   • Pneumococcal Conjugate 13-Valent 10/31/2017   • Pneumococcal Polysaccharide PPV23 12/03/2018   • Tdap 06/25/2015   • Tuberculin Skin Test-PPD Intradermal 10/31/2022   • Zoster 06/26/2012   • Zoster Vaccine Recombinant 07/27/2020, 11/12/2020      Health Maintenance:         Topic Date Due   • Breast Cancer Screening: Mammogram  08/11/2024   • Colorectal Cancer Screening  05/06/2031   • Hepatitis C Screening  Completed         Topic Date Due   • COVID-19 Vaccine (5 - 2023-24 season) 09/01/2023      Medicare Screening Tests and Risk Assessments:     Katharine is here for her  Subsequent Wellness visit.     Health Risk Assessment:   Patient rates overall health as very good. Patient feels that their physical health rating is slightly worse. Patient is satisfied with their life. Eyesight was rated as same. Hearing was rated as same. Patient feels that their emotional and mental health rating is same. Patients states they are never, rarely angry. Patient states they are never, rarely unusually tired/fatigued. Pain experienced in the last 7 days has been some. Patient's pain rating has been 2/10. Patient states that she has experienced weight loss or gain in last 6 months.     Fall Risk Screening:   In the past year, patient has experienced: history of falling in past year      Urinary Incontinence Screening:   Patient has leaked urine accidently in the last six months.     Home Safety:  Patient does not have trouble with stairs inside or outside of their home. Patient has working smoke alarms and has working carbon monoxide detector. Home safety hazards include: none.     Nutrition:   Current diet is Regular.     Medications:   Patient is currently taking over-the-counter supplements. OTC medications include: see medication list. Patient is able to manage medications.     Activities of Daily Living (ADLs)/Instrumental Activities of Daily Living (IADLs):   Walk and transfer into and out of bed and chair?: Yes  Dress and groom yourself?: Yes    Bathe or shower yourself?: Yes    Feed yourself? Yes  Do your laundry/housekeeping?: Yes  Manage your money, pay your bills and track your expenses?: Yes  Make your own meals?: Yes    Do your own shopping?: Yes    Previous Hospitalizations:   Any hospitalizations or ED visits within the last 12 months?: No      Advance Care Planning:   Living will: Yes    Durable POA for healthcare: Yes    Advanced directive: Yes      PREVENTIVE SCREENINGS      Cardiovascular Screening:    General: Screening Not Indicated and History Lipid Disorder      Diabetes  "Screening:     General: Screening Current      Colorectal Cancer Screening:     General: Screening Current      Breast Cancer Screening:     General: Screening Current      Cervical Cancer Screening:    General: Screening Not Indicated      Lung Cancer Screening:     General: Screening Not Indicated      Hepatitis C Screening:    General: Screening Current    Screening, Brief Intervention, and Referral to Treatment (SBIRT)    Screening  Typical number of drinks in a day: 0  Typical number of drinks in a week: 0  Interpretation: Low risk drinking behavior.    AUDIT-C Screenin) How often did you have a drink containing alcohol in the past year? monthly or less  2) How many drinks did you have on a typical day when you were drinking in the past year? 1 to 2  3) How often did you have 6 or more drinks on one occasion in the past year? never    AUDIT-C Score: 1  Interpretation: Score 0-2 (female): Negative screen for alcohol misuse    Single Item Drug Screening:  How often have you used an illegal drug (including marijuana) or a prescription medication for non-medical reasons in the past year? never    Single Item Drug Screen Score: 0  Interpretation: Negative screen for possible drug use disorder    No results found.     Physical Exam:     /78   Pulse 77   Resp 16   Ht 5' 2\" (1.575 m)   Wt 66.1 kg (145 lb 12.8 oz)   SpO2 97%   BMI 26.67 kg/m²     Physical Exam  Vitals and nursing note reviewed.   Constitutional:       General: She is not in acute distress.     Appearance: Normal appearance. She is well-developed. She is not ill-appearing, toxic-appearing or diaphoretic.   HENT:      Head: Normocephalic and atraumatic.      Right Ear: External ear normal.      Left Ear: External ear normal.      Nose: Nose normal.   Eyes:      Pupils: Pupils are equal, round, and reactive to light.   Cardiovascular:      Rate and Rhythm: Normal rate and regular rhythm.      Heart sounds: Normal heart sounds. No murmur " heard.  Pulmonary:      Effort: Pulmonary effort is normal.      Breath sounds: Normal breath sounds.   Abdominal:      General: There is no distension.      Palpations: Abdomen is soft.      Tenderness: There is no abdominal tenderness. There is no guarding.   Musculoskeletal:      Right lower leg: No edema.      Left lower leg: No edema.   Psychiatric:         Mood and Affect: Mood is depressed. Mood is not anxious.         Thought Content: Thought content does not include suicidal ideation.          Sukhi Rossi, DO

## 2023-12-19 NOTE — ASSESSMENT & PLAN NOTE
Symptom improvement with prednisone working with rheumatology suspect seronegative rheumatoid arthritis I would like the patient discussed with rheumatology starting DMARD possibly methotrexate

## 2023-12-19 NOTE — ASSESSMENT & PLAN NOTE
Likely vasovagal the patient does report to me intermittent palpitations did review the ER report she has not had any further subsequent episodes since September.  EKG  no QTc prolongation she did not stay in the ER for evaluation I will check echocardiogram and a Holter monitor for further evaluation

## 2023-12-19 NOTE — ASSESSMENT & PLAN NOTE
Lab Results   Component Value Date    EGFR 61 10/18/2023    EGFR 60 07/28/2023    EGFR 54 12/06/2022    CREATININE 0.94 10/18/2023    CREATININE 0.95 07/28/2023    CREATININE 1.04 12/06/2022

## 2023-12-19 NOTE — ASSESSMENT & PLAN NOTE
Suboptimal control increase Crestor to 10 mg once daily low-cholesterol diet check lipid profile and CMP

## 2023-12-19 NOTE — ASSESSMENT & PLAN NOTE
Clinically stable and doing well continue the current medical regiment will continue monitor.  Continue Zoloft 25 mg once daily

## 2023-12-19 NOTE — ASSESSMENT & PLAN NOTE
Patient does report to me she infrequently uses Xanax 0.25 mg 1 p.o. nightly as needed insomnia she reports me tolerates well and uses very infrequently she reports me that her sleep is interrupted secondary to her 's dream reenactment

## 2023-12-19 NOTE — PROGRESS NOTES
Rheumatology Follow-up Visit  12/20/2023     CC: routine follow up    Permanent History: First seen by Dr. Whitney in Aug 2023 for hand arthritis and elevated CRP; was dxd with PMR and continued on prednisone taper.    PMH vitiligo around the arms and neck and legs.    Assessment: 73 y.o. female here for/with the above.    Still not consistent with PMR. High suspicion for PsA based on peripheral inflammatory symptoms not controlled on 5 mg prednisone, history what sounds like dactylitis, nail pitting (though granted only one pit appreciable on exam today).    Swallowing difficulties - not sure what to make of them but as it is a new issue I think a swallow study at the very least is indicated. Didn't improve on prednisone so doubt that it's inflammatory    Encounter Diagnosis     ICD-10-CM    1. Dysphagia, unspecified type  R13.10 FL barium swallow ROUTINE esophagus      2. Psoriatic arthritis (HCC)  L40.50 predniSONE 5 mg tablet     leflunomide (ARAVA) 10 MG tablet     leflunomide (ARAVA) 20 MG tablet      3. High risk medication use  Z79.899 Comprehensive metabolic panel     CBC and differential     CBC and differential     Comprehensive metabolic panel     Comprehensive metabolic panel     CBC and differential     CBC and differential     Comprehensive metabolic panel        Plan:  -Continue 10 mg prednisone daily  -Start LEF  -Rest as above  -RTC 3 mos, by then will have hand US done and should have been on LEF for a reasonable amount of time    Ed Carlos DO, [facility] DO, [facility]      Interval History: Hands got more painful with stopping prednisone. Restarted 10 mg daily.    Decreased to 5 mg prednisone daily for the past ten days. Feels some tightness when she swallows sometimes but no dysphagia/odynophagia. Noticed even on the 10 mg prednisone. This has been going on intermittently for about a month.     Some tightness around the shoulders. More aches and pains  "particularly in the hands. Was having some swelling the toes. Arms are painful, knees are painful.    Does have stiffness in the joints now.    No muscle weakness, just achiness.    No rashes, fingernail pits.    No family history psoriasis.    MGM had SLE. Brother has PMR. No other known AI disease.    Past medical history, allergies, and family history reviewed. Active medications and social history as below.     Outpatient Medications Marked as Taking for the 12/20/23 encounter (Office Visit) with Ed Carlos,    Medication    leflunomide (ARAVA) 10 MG tablet    leflunomide (ARAVA) 20 MG tablet    predniSONE 5 mg tablet       Social History     Tobacco Use    Smoking status: Never     Passive exposure: Never    Smokeless tobacco: Never   Vaping Use    Vaping status: Never Used   Substance Use Topics    Alcohol use: Yes     Comment: very rare glass of wine    Drug use: No       Review of Systems:  Pertinent findings documented in HPI  ___________________________________    Physical Exam:    /78   Ht 5' 2\" (1.575 m)   Wt 66.1 kg (145 lb 12.8 oz)   BMI 26.67 kg/m²     General: Well appearing, in no distress.   Eyes: Sclera non-icteric. EOMI  HENT: No oral ulcers. MMM.   Extremities: Warm, well perfused, no edema.   Neuro: Alert and oriented. No gross focal neurological deficits.   Skin: No rashes. One nail pit on L 5th finger  MSK exam: mild tenderness to palpation L 4th MCP, R knee; no obvious synovitis. R 2nd toe swollen  ____________________________    Lab Results: relevant labs reviewed    Imaging Results: relevant images reviewed  I have independently reviewed the following images.  Wrist and foot roentgenograms do not show obvious signs of inflammatory damage such as erosions, juxtaarticular new bone formation  "

## 2023-12-19 NOTE — ASSESSMENT & PLAN NOTE
Assessment and plan 1.  Medicare subsequent annual wellness examination overall the patient is clinically stable and doing well, we encouraged the patient to follow a healthy and balanced diet.  We recommend that the patient exercise routinely approximately 30 minutes 5 times per week .  We have reviewed the patient's vaccines and have made recommendations for updates if necessary   consider COVID booster, RSV vaccine   .  We will be ordering screening laboratories which are age appropriate.  Return to the office in 6 months    call if any problems.

## 2023-12-19 NOTE — ASSESSMENT & PLAN NOTE
Constipation related to prednisone patient is working towards reducing the dose with rheumatology for now she can increase Colace to 100 mg twice daily, Metamucil 1 teaspoon 8 ounce water once daily if no bowel movement in 2 days may use MiraLAX

## 2023-12-20 ENCOUNTER — OFFICE VISIT (OUTPATIENT)
Dept: RHEUMATOLOGY | Facility: CLINIC | Age: 73
End: 2023-12-20
Payer: MEDICARE

## 2023-12-20 VITALS
BODY MASS INDEX: 26.83 KG/M2 | DIASTOLIC BLOOD PRESSURE: 78 MMHG | HEIGHT: 62 IN | SYSTOLIC BLOOD PRESSURE: 120 MMHG | WEIGHT: 145.8 LBS

## 2023-12-20 DIAGNOSIS — L40.50 PSORIATIC ARTHRITIS (HCC): ICD-10-CM

## 2023-12-20 DIAGNOSIS — R13.10 DYSPHAGIA, UNSPECIFIED TYPE: Primary | ICD-10-CM

## 2023-12-20 DIAGNOSIS — Z79.899 HIGH RISK MEDICATION USE: ICD-10-CM

## 2023-12-20 PROCEDURE — 99205 OFFICE O/P NEW HI 60 MIN: CPT | Performed by: STUDENT IN AN ORGANIZED HEALTH CARE EDUCATION/TRAINING PROGRAM

## 2023-12-20 RX ORDER — PREDNISONE 5 MG/1
TABLET ORAL
Qty: 90 TABLET | Refills: 2 | Status: SHIPPED | OUTPATIENT
Start: 2023-12-20

## 2023-12-20 RX ORDER — LEFLUNOMIDE 10 MG/1
10 TABLET ORAL DAILY
Qty: 14 TABLET | Refills: 0 | Status: SHIPPED | OUTPATIENT
Start: 2023-12-20

## 2023-12-20 RX ORDER — LEFLUNOMIDE 20 MG/1
20 TABLET ORAL DAILY
Qty: 30 TABLET | Refills: 2 | Status: SHIPPED | OUTPATIENT
Start: 2023-12-20

## 2023-12-20 NOTE — PATIENT INSTRUCTIONS
Please start Arava 10 mg once a day for 14 days, then start Arava 20 mg once a day.    Please get blood work (CBC, CMP) 2 weeks after starting Arava, 4 weeks after starting Arava, and every 3 months thereafter

## 2023-12-24 ENCOUNTER — NURSE TRIAGE (OUTPATIENT)
Dept: OTHER | Facility: OTHER | Age: 73
End: 2023-12-24

## 2023-12-24 NOTE — TELEPHONE ENCOUNTER
"Regarding: Reaction to Leflunomide ( Arava ) Queasy, Itching, Burning  ----- Message from Adrianne Antunez sent at 12/24/2023 10:08 AM EST -----  \" I started a new Medication leflunomide (ARAVA) 10 MG tablet on December 22, after taking it I felt Queasy and Lightheaded, My head felt weird, then yesterday , was my second dose, I felt the same then later in the day, I started itching on my Neck, when I put on my Moisturizer it burned.\"    "

## 2023-12-24 NOTE — TELEPHONE ENCOUNTER
"Reason for Disposition   Taking prescription medication that could cause itching (e.g., codeine/morphine/other opiates, aspirin)    Answer Assessment - Initial Assessment Questions  1. DESCRIPTION: \"Describe the itching you are having.\"      Generalized itching     2. SEVERITY: \"How bad is it?\"     - MILD - doesn't interfere with normal activities    - MODERATE-SEVERE: interferes with work, school, sleep, or other activities       Moderate    3. SCRATCHING: \"Are there any scratch marks? Bleeding?\"      Denies    4. ONSET: \"When did this begin?\"       Last night    5. CAUSE: \"What do you think is causing the itching?\" (ask about swimming pools, pollen, animals, soaps, etc.)      New medication: Avara    6. OTHER SYMPTOMS: \"Do you have any other symptoms?\"       Nausea, lightheadedness, head \"feels weird\"    Protocols used: Itching - Widespread-ADULT-AH      On call provider paged. Advised to stop taking Arava and begin Prednisone taper. 15mg daily x 3 days and then back to 10mg daily.   "

## 2023-12-28 NOTE — TELEPHONE ENCOUNTER
Left patient a message asking her to hold medication/call office back when Dr. RICE is in next week. Will follow up on call in the morning.

## 2024-01-03 DIAGNOSIS — L40.50 PSORIATIC ARTHRITIS (HCC): ICD-10-CM

## 2024-01-04 RX ORDER — LEFLUNOMIDE 20 MG/1
TABLET ORAL
Qty: 90 TABLET | Refills: 1 | Status: SHIPPED | OUTPATIENT
Start: 2024-01-04

## 2024-01-08 ENCOUNTER — HOSPITAL ENCOUNTER (OUTPATIENT)
Dept: NON INVASIVE DIAGNOSTICS | Facility: CLINIC | Age: 74
Discharge: HOME | End: 2024-01-08
Payer: MEDICARE

## 2024-01-08 VITALS
WEIGHT: 145 LBS | BODY MASS INDEX: 26.68 KG/M2 | SYSTOLIC BLOOD PRESSURE: 120 MMHG | HEART RATE: 77 BPM | HEIGHT: 62 IN | DIASTOLIC BLOOD PRESSURE: 78 MMHG

## 2024-01-08 DIAGNOSIS — R55 SYNCOPE, UNSPECIFIED SYNCOPE TYPE: ICD-10-CM

## 2024-01-08 LAB
AORTIC ROOT: 2.7 CM
AORTIC VALVE MEAN VELOCITY: 8.6 M/S
APICAL FOUR CHAMBER EJECTION FRACTION: 66 %
ASCENDING AORTA: 3 CM
AV LVOT MEAN GRADIENT: 3 MMHG
AV LVOT PEAK GRADIENT: 5 MMHG
AV MEAN GRADIENT: 4 MMHG
AV PEAK GRADIENT: 8 MMHG
AV REGURGITATION PRESSURE HALF TIME: 616 MS
AV VELOCITY RATIO: 0.8
DOP CALC AO PEAK VEL: 1.37 M/S
DOP CALC AO VTI: 30.28 CM
DOP CALC LVOT PEAK VEL VTI: 25.14 CM
DOP CALC LVOT PEAK VEL: 1.09 M/S
E WAVE DECELERATION TIME: 205 MS
E/A RATIO: 0.83
FRACTIONAL SHORTENING: 35 (ref 28–44)
INTERVENTRICULAR SEPTUM IN DIASTOLE (PARASTERNAL SHORT AXIS VIEW): 1.2 CM
INTERVENTRICULAR SEPTUM: 1.2 CM (ref 0.6–1.1)
IVC: 15 MM
LAAS-AP2: 21.5 CM2
LAAS-AP4: 19.3 CM2
LEFT ATRIUM SIZE: 4 CM
LEFT ATRIUM VOLUME (MOD BIPLANE): 63 ML
LEFT ATRIUM VOLUME INDEX (MOD BIPLANE): 37.7 ML/M2
LEFT INTERNAL DIMENSION IN SYSTOLE: 2.6 CM (ref 2.1–4)
LEFT VENTRICULAR INTERNAL DIMENSION IN DIASTOLE: 4 CM (ref 3.5–6)
LEFT VENTRICULAR POSTERIOR WALL IN END DIASTOLE: 0.8 CM
LEFT VENTRICULAR STROKE VOLUME: 44 ML
LVSV (TEICH): 44 ML
MV E'TISSUE VEL-SEP: 9 CM/S
MV PEAK A VEL: 1.3 M/S
MV PEAK E VEL: 108 CM/S
MV STENOSIS PRESSURE HALF TIME: 60 MS
MV VALVE AREA P 1/2 METHOD: 3.67
RA PRESSURE ESTIMATED: 8 MMHG
RIGHT VENTRICLE ID DIMENSION: 3.2 CM
RV PSP: 36 MMHG
SL CV AV DECELERATION TIME RETROGRADE: 2123 MS
SL CV AV PEAK GRADIENT RETROGRADE: 71 MMHG
SL CV LEFT ATRIUM LENGTH A2C: 5.1 CM
SL CV LV EF: 65
SL CV PED ECHO LEFT VENTRICLE DIASTOLIC VOLUME (MOD BIPLANE) 2D: 69 ML
SL CV PED ECHO LEFT VENTRICLE SYSTOLIC VOLUME (MOD BIPLANE) 2D: 25 ML
TR MAX PG: 28 MMHG
TR PEAK VELOCITY: 2.6 M/S
TRICUSPID ANNULAR PLANE SYSTOLIC EXCURSION: 2.4 CM
TRICUSPID VALVE PEAK REGURGITATION VELOCITY: 2.64 M/S

## 2024-01-08 PROCEDURE — 93225 XTRNL ECG REC<48 HRS REC: CPT

## 2024-01-08 PROCEDURE — 93306 TTE W/DOPPLER COMPLETE: CPT

## 2024-01-08 PROCEDURE — 93306 TTE W/DOPPLER COMPLETE: CPT | Performed by: INTERNAL MEDICINE

## 2024-01-08 PROCEDURE — 93226 XTRNL ECG REC<48 HR SCAN A/R: CPT

## 2024-01-11 DIAGNOSIS — R93.1 ABNORMAL ECHOCARDIOGRAM: Primary | ICD-10-CM

## 2024-01-12 DIAGNOSIS — E78.2 MIXED HYPERLIPIDEMIA: ICD-10-CM

## 2024-01-12 RX ORDER — ROSUVASTATIN CALCIUM 10 MG/1
10 TABLET, COATED ORAL DAILY
Qty: 90 TABLET | Refills: 1 | Status: SHIPPED | OUTPATIENT
Start: 2024-01-12

## 2024-01-16 ENCOUNTER — HOSPITAL ENCOUNTER (EMERGENCY)
Dept: RADIOLOGY | Facility: HOSPITAL | Age: 74
Discharge: HOME | End: 2024-01-16
Attending: STUDENT IN AN ORGANIZED HEALTH CARE EDUCATION/TRAINING PROGRAM
Payer: MEDICARE

## 2024-01-16 ENCOUNTER — HOSPITAL ENCOUNTER (OUTPATIENT)
Dept: ULTRASOUND IMAGING | Facility: HOSPITAL | Age: 74
Discharge: HOME | End: 2024-01-16
Attending: STUDENT IN AN ORGANIZED HEALTH CARE EDUCATION/TRAINING PROGRAM
Payer: MEDICARE

## 2024-01-16 DIAGNOSIS — M19.90 INFLAMMATORY ARTHRITIS: ICD-10-CM

## 2024-01-16 DIAGNOSIS — R13.10 DYSPHAGIA, UNSPECIFIED TYPE: ICD-10-CM

## 2024-01-16 PROCEDURE — 74220 X-RAY XM ESOPHAGUS 1CNTRST: CPT

## 2024-01-16 PROCEDURE — 76882 US LMTD JT/FCL EVL NVASC XTR: CPT

## 2024-01-17 PROCEDURE — 93227 XTRNL ECG REC<48 HR R&I: CPT | Performed by: INTERNAL MEDICINE

## 2024-01-18 DIAGNOSIS — R55 SYNCOPE, UNSPECIFIED SYNCOPE TYPE: Primary | ICD-10-CM

## 2024-01-19 DIAGNOSIS — F41.8 DEPRESSION WITH ANXIETY: ICD-10-CM

## 2024-01-19 RX ORDER — SERTRALINE HYDROCHLORIDE 25 MG/1
TABLET, FILM COATED ORAL
Qty: 90 TABLET | Refills: 1 | Status: SHIPPED | OUTPATIENT
Start: 2024-01-19

## 2024-01-25 NOTE — RESULT ENCOUNTER NOTE
Normal motility, some GERD which may be causing dysphagia. As mentioned in my previous notes, unlikely to have a rheumatologic etiology. Would let patient's PCP decide if EGD would be indicated, will route result to him as well

## 2024-02-11 ENCOUNTER — RA CDI HCC (OUTPATIENT)
Dept: OTHER | Facility: HOSPITAL | Age: 74
End: 2024-02-11

## 2024-02-12 ENCOUNTER — TELEPHONE (OUTPATIENT)
Age: 74
End: 2024-02-12

## 2024-02-12 ENCOUNTER — CONSULT (OUTPATIENT)
Dept: CARDIOLOGY CLINIC | Facility: CLINIC | Age: 74
End: 2024-02-12
Payer: MEDICARE

## 2024-02-12 VITALS
HEIGHT: 62 IN | HEART RATE: 79 BPM | DIASTOLIC BLOOD PRESSURE: 78 MMHG | SYSTOLIC BLOOD PRESSURE: 130 MMHG | BODY MASS INDEX: 27.18 KG/M2 | WEIGHT: 147.7 LBS

## 2024-02-12 DIAGNOSIS — R55 SYNCOPE, UNSPECIFIED SYNCOPE TYPE: ICD-10-CM

## 2024-02-12 DIAGNOSIS — R93.1 ABNORMAL ECHOCARDIOGRAM: ICD-10-CM

## 2024-02-12 PROCEDURE — 99213 OFFICE O/P EST LOW 20 MIN: CPT | Performed by: STUDENT IN AN ORGANIZED HEALTH CARE EDUCATION/TRAINING PROGRAM

## 2024-02-12 PROCEDURE — 93000 ELECTROCARDIOGRAM COMPLETE: CPT | Performed by: STUDENT IN AN ORGANIZED HEALTH CARE EDUCATION/TRAINING PROGRAM

## 2024-02-12 NOTE — TELEPHONE ENCOUNTER
FYI: Pt wants to reschedule appt with PCP. However, he is busy until June 2024. Therefore, pt was transferred to office staff Vanda and she kindly assisted pt.

## 2024-02-12 NOTE — PROGRESS NOTES
HEART AND VASCULAR  CARDIAC ELECTROPHYSIOLOGY   HEART RHYTHM CENTER  [facility]    Outpatient New Consult evaluation and management of unexplained syncope  Today's Date: 02/12/24        Patient name: Katharine Chatman  YOB: 1950  Sex: female         Chief Complaint: Unexplained syncope      ASSESSMENT:  Problem List Items Addressed This Visit       Syncope    Relevant Orders    POCT ECG     Other Visit Diagnoses       Abnormal echocardiogram                      PLAN:  Syncope, unexplained  -Holter monitor revealed no electrophysiologic abnormalities that could explain syncope although she had no symptoms during this time  -Echocardiogram revealed no abnormalities that would explain syncope.  -Discussed placing a 30-day monitor versus loop recorder versus nothing at this time-patient chose to proceed conservatively with no monitor placed at this time.  -She will contact the office should she have another event at which time we will begin with longer-term monitors.      Follow up in: 6 months    Orders Placed This Encounter   Procedures    POCT ECG     Medications Discontinued During This Encounter   Medication Reason    predniSONE 5 mg tablet Therapy completed         .............................................................................................    HPI/Subjective:      Ms. Katharine Chatman is a 73 year old female with a past medical history of polyarthralgia, primary insomnia, and syncope for which a Holter monitor was performed revealing rare PVCs, rare PACs with 3 short nonsustained runs of SVT, no atrial fibrillation/atrial flutter, no high risk bradycardia, no AV block.  She had an echocardiogram performed on January 8, 2024 revealing normal LV size with an ejection fraction of 65%, mild right atrial dilation, mild aortic regurgitation, mild mitral regurgitation, and mild to moderate tricuspid regurgitation.  She was referred to electrophysiology for further  evaluation of syncope.    Today, Ms. Chatman notes that her first episode of syncope occurred in September 23.  She states the day prior, she drove for about 10 hours as she was traveling with her  who has Parkinson's and cannot drive.  She got home that evening and mow the lawn and felt fine.  The next morning, when she got up, she noted her head felt different.  She walked to the restroom to have a bowel movement, and when sitting on the toilet she began to feel bit worse.  She put her head down and then passed out waking up on the floor.  She does not remember straining during using the bathroom.  She eventually was able to get off the floor with the assistance of her , but noted that she was confused for several hours after.  She then had a second episode in the middle of January.  She was on prednisone, but was weaned off.  She was transition to an alternative med for rheumatoid arthritis.  She was playing pickle ball when she got dizzy and her head felt funny, she kneeled down, and felt as if she was going to pass out, but did not pass out.    Is difficult to say what the cause of these events was.  While her Holter showed no arrhythmias, she did not have any symptoms while wearing it. We discussed the results of her Holter which showed no arrhythmias that could explain her symptoms.  We also reviewed her echocardiogram which revealed nothing that could explain her symptoms as well.  She notes she is continue to be active, and aside from those 2 episodes, has had no issue.    We also discussed options moving forward.  We discussed being conservative and doing nothing from now as she has not had episodes since January, we discussed placing a 30-day Holter monitor at this point, we discussed placing an implantable loop recorder.  After discussion, she noted that she would like to proceed conservatively with no monitor being placed at this time.  She will contact the office should she have another  event at which time we will begin with a 30-day monitor with possible escalation to implantable loop recorder.        Complete 12 point ROS reviewed and otherwise non pertinent or negative except as per HPI pertinent positives in Cardiovascular and Respiratory emphasized. Please see paper chart for outpatient clinic patients where the patient completed the 12 point ROS survey.           Past Medical History:   Diagnosis Date    Anxiety 2014    Arthritis     Basal cell carcinoma     Colon polyp     Depression long time    Depression with anxiety     Eating disorder     overeating    Fibrocystic breast disease, unspecified laterality     GERD (gastroesophageal reflux disease)     High cholesterol     Irritable bowel syndrome     Osteopenia        Allergies   Allergen Reactions    Clindamycin GI Intolerance and Other (See Comments)    Penicillin G Rash    Sulfa Antibiotics Hives and Rash    Penicillins Hives and Rash     I reviewed the Home Medication list and Allergies in the chart.   Scheduled Meds:  Current Outpatient Medications   Medication Sig Dispense Refill    acetaminophen (TYLENOL) 500 mg tablet Take 1,000 mg by mouth every 6 (six) hours as needed      ALPRAZolam (XANAX) 0.25 mg tablet Take 1 tablet (0.25 mg total) by mouth 3 (three) times a day as needed for anxiety 30 tablet 0    fluticasone (FLONASE) 50 mcg/act nasal spray 1 SPRAY INTO EACH NOSTRIL 2 (TWO) TIMES A DAY WITH MEALS (Patient taking differently: 1 spray into each nostril as needed) 48 mL 1    ipratropium (ATROVENT) 0.03 % nasal spray 1 spray into each nostril 3 (three) times a day 30 mL 1    levocetirizine (XYZAL) 5 MG tablet TAKE 1 TABLET BY MOUTH EVERY DAY IN THE EVENING (Patient taking differently: As needed) 90 tablet 1    omeprazole (PriLOSEC) 20 mg delayed release capsule TAKE 1 CAPSULE BY MOUTH EVERY DAY 90 capsule 2    rosuvastatin (CRESTOR) 10 MG tablet TAKE 1 TABLET BY MOUTH EVERY DAY (Patient taking differently: Take 20 mg by mouth  daily) 90 tablet 1    sertraline (ZOLOFT) 25 mg tablet TAKE 1 TABLET BY MOUTH EVERY DAY 90 tablet 1    leflunomide (ARAVA) 10 MG tablet Take 1 tablet (10 mg total) by mouth daily 14 tablet 0    leflunomide (ARAVA) 20 MG tablet TAKE 1 TABLET (20 MG) BY MOUTH DAILY. DO NOT START UNTIL AFTER FINISHING 14 DAYS OF THE 10 MG TABS 90 tablet 1     No current facility-administered medications for this visit.     PRN Meds:.        Family History   Problem Relation Age of Onset    Atrial fibrillation Mother     Cancer Mother 80        kidney, bladder,breast    Breast cancer Mother 80    COPD Mother         mild    Kidney cancer Mother 80    Osteoporosis Mother     Transient ischemic attack Mother     Stroke Mother         TIA    Arthritis Mother     Cancer Father         prostate, skin    Glaucoma Father     Hypertension Father     Arthritis Father     Coronary artery disease Maternal Grandfather     Arthritis Maternal Grandfather     Coronary artery disease Maternal Aunt     No Known Problems Maternal Grandmother     No Known Problems Paternal Grandmother     No Known Problems Paternal Grandfather     No Known Problems Sister     No Known Problems Daughter     No Known Problems Son     No Known Problems Paternal Aunt     No Known Problems Paternal Aunt     No Known Problems Paternal Aunt     Cancer Brother         leukemia    Cancer Brother         Polymyalgia rheumatica        Social History     Socioeconomic History    Marital status: /Civil Union     Spouse name: Not on file    Number of children: Not on file    Years of education: Not on file    Highest education level: Not on file   Occupational History    Not on file   Tobacco Use    Smoking status: Never     Passive exposure: Never    Smokeless tobacco: Never   Vaping Use    Vaping status: Never Used   Substance and Sexual Activity    Alcohol use: Yes     Comment: very rare glass of wine    Drug use: No    Sexual activity: Not Currently     Partners: Male      "Birth control/protection: Post-menopausal, Surgical   Other Topics Concern    Not on file   Social History Narrative    Advance directive declined by patient     Social Determinants of Health     Financial Resource Strain: Low Risk  (12/13/2023)    Overall Financial Resource Strain (CARDIA)     Difficulty of Paying Living Expenses: Not hard at all   Food Insecurity: Not on file   Transportation Needs: No Transportation Needs (12/13/2023)    PRAPARE - Transportation     Lack of Transportation (Medical): No     Lack of Transportation (Non-Medical): No   Physical Activity: Insufficiently Active (5/21/2020)    Exercise Vital Sign     Days of Exercise per Week: 3 days     Minutes of Exercise per Session: 30 min   Stress: Stress Concern Present (5/22/2020)    [employer] - Occupational Stress Questionnaire     Feeling of Stress : To some extent   Social Connections: Not on file   Intimate Partner Violence: Not on file   Housing Stability: Not on file         OBJECTIVE:    /78 (BP Location: Right arm, Patient Position: Sitting, Cuff Size: Standard)   Pulse 79   Ht 5' 2\" (1.575 m)   Wt 67 kg (147 lb 11.2 oz)   BMI 27.01 kg/m²   Vitals:    02/12/24 1444   Weight: 67 kg (147 lb 11.2 oz)     GEN: No acute distress, Alert and oriented, well appearing  HEENT: Normocephalic, atraumatic.  Mucous membranes moist.  CARDIOVASCULAR:  RRR, No murmur, rub, gallops S1,S2  LUNGS: Clear To auscultation bilaterally, normal effort, no rales, rhonchi, crackles   ABDOMEN: Soft, nontender, nondistended,    EXTREMITIES/VASCULAR:  No edema. warm an well perfused.  PSYCH: Normal Affect,  linear speech pattern without evidence of psychosis.   NEURO: Grossly intact, moving all extremiteis equal, face symmetric, alert and responsive, no obvious focal defecits    GAIT:   Ambulates normally without difficulty  HEME: No bleeding, bruising, petechia, purpura    SKIN: No significant rashes on visibile skin, warm, no " "diaphoresis or pallor.     Lab Results:       LABS:      Chemistry        Component Value Date/Time     05/09/2016 0907    K 4.6 10/18/2023 0821    K 4.1 05/09/2016 0907     10/18/2023 0821     05/09/2016 0907    CO2 32 10/18/2023 0821    CO2 29 05/09/2016 0907    BUN 28 (H) 10/18/2023 0821    BUN 18 05/09/2016 0907    CREATININE 0.94 10/18/2023 0821    CREATININE 0.87 05/09/2016 0907        Component Value Date/Time    CALCIUM 9.7 10/18/2023 0821    CALCIUM 9.8 05/09/2016 0907    ALKPHOS 59 10/18/2023 0821    ALKPHOS 59 05/09/2016 0907    AST 16 10/18/2023 0821    AST 21 05/09/2016 0907    ALT 12 10/18/2023 0821    ALT 13 05/09/2016 0907    BILITOT 0.5 05/09/2016 0907            Lab Results   Component Value Date    CHOL 267 (H) 05/09/2016     Lab Results   Component Value Date    HDL 71 12/13/2023    HDL 71 10/18/2023    HDL 85 12/06/2022     Lab Results   Component Value Date    LDLCALC 108 (H) 12/13/2023    LDLCALC 166 (H) 10/18/2023    LDLCALC 82 12/06/2022     Lab Results   Component Value Date    TRIG 80 12/13/2023    TRIG 107 10/18/2023    TRIG 51 12/06/2022     No results found for: \"CHOLHDL\"    IMAGING: FL barium swallow ROUTINE esophagus    Result Date: 1/16/2024  Narrative: BARIUM SWALLOW-ESOPHAGRAM INDICATION:   R13.10: Dysphagia, unspecified. COMPARISON:  None IMAGES:    29 FLUOROSCOPY TIME:   1.9 minutes TECHNIQUE: The patient was given effervescent granules and barium by mouth and images of the esophagus were obtained. FINDINGS: The esophagus is normal in caliber.  Esophageal motility is normal and emptying of contrast from the esophagus is prompt.  No mucosal lesion, ulceration or evidence of fold thickening is seen. Mild spontaneous gastroesophageal reflux was observed. There is no hiatal hernia.     Impression: Mild spontaneous gastroesophageal reflux. Workstation performed: [id number]     [employer] limited    Result Date: 1/16/2024  Narrative: ULTRASOUND bilateral hands and " wrists TECHNIQUE:   Using a high frequency transducer, the bilateral hands and wrists was scanned to evaluate for evidence of inflammatory arthritis. Grayscale and power doppler ultrasound was used to survey the joints of the hand and wrist. INDICATION:   M19.90: Unspecified osteoarthritis, unspecified site. COMPARISON: Correlation is made with the prior radiographs dated 2023. FINDINGS: RIGHT SIDE: 1st metacarpophalangeal joint: Mild osteophyte formations. There is no evidence of inflammatory arthritis. 2nd metacarpophalangeal joint: Normal. 2nd PIP joint: Normal. 3rd metacarpophalangeal joint: Normal. 3rd PIP joint: Normal. 4th metacarpophalangeal joint: Normal. 4th PIP joint: Normal. 5th metacarpophalangeal joint: Normal. 5th PIP joint: Normal. Radiocarpal/Intercarpal joints: Normal. Distal radioulnar joint: Normal. LEFT SIDE: 1st metacarpophalangeal joint: Mild osteophyte formations. There is no evidence of inflammatory arthritis. 2nd metacarpophalangeal joint: Normal. 2nd PIP joint: Normal. 3rd metacarpophalangeal joint: Normal. 3rd PIP joint: Normal. 4th metacarpophalangeal joint: Normal. 4th PIP joint: Normal. 5th metacarpophalangeal joint: Normal. 5th PIP joint: Normal. Radiocarpal/intercarpal joints: Normal. Distal radioulnar joint: Normal.     Impression: No sonographic evidence for inflammatory arthropathy. 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 Workstation performed: [id number]        Cardiac testing:   Results for orders placed during the hospital encounter of 21    Echo complete with contrast if indicated    Narrative  [address] [address] [phone]    Transthoracic Echocardiogram  2D, M-mode, Doppler, and Color Doppler    Study date:  2021    Patient: DARIA EARL  MR number: [id number]  Account number: [id number]  : 1950  Age: 70 years  Gender: Female  Status:  Outpatient  Location: [address] [place] [address]  Height: 62 in  Weight: 155.8 lb  BP: 126/ 77 mmHg    Indications: Non-Rheumatic AI.    Diagnoses: I35.1 - Nonrheumatic aortic (valve) insufficiency    Sonographer:  FERN Newman  Primary Physician:  Sukhi Rossi DO  Referring Physician:  Sukhi Rossi DO  Group:  [facility]  Cardiology Fellow:  Benny Pfeiffer MD  Interpreting Physician:  Ronal Villalpando MD    SUMMARY    LEFT VENTRICLE:  Size was normal.  Systolic function was normal. Ejection fraction was estimated to be 55 %.  Wall thickness was at the upper limits of normal.  Doppler parameters were consistent with abnormal left ventricular relaxation (grade 1 diastolic dysfunction).    RIGHT VENTRICLE:  The size was normal.  Systolic function was normal.    MITRAL VALVE:  There was mild regurgitation.    AORTIC VALVE:  There was mild regurgitation.    COMPARISONS:  There has been no significant interval change. Comparison was made with the previous study of 20-Dec-2018.    HISTORY: PRIOR HISTORY: GERD;HLD;AI.    PROCEDURE: The study was performed in the [address] [place] [address]. This was a routine study. The transthoracic approach was used. The study included complete 2D imaging, M-mode, complete spectral Doppler, and color Doppler. The  heart rate was 71 bpm, at the start of the study. Images were obtained from the parasternal, apical, subcostal, and suprasternal notch acoustic windows. Image quality was adequate.    LEFT VENTRICLE: Size was normal. Systolic function was normal. Ejection fraction was estimated to be 55 %. There were no regional wall motion abnormalities. Wall thickness was at the upper limits of normal. The changes were consistent with  concentric remodeling (increased wall thickness with normal wall mass). DOPPLER: Doppler parameters were consistent with abnormal left ventricular relaxation (grade 1 diastolic dysfunction).    RIGHT VENTRICLE:  The size was normal. Systolic function was normal.    LEFT ATRIUM: Size was normal.    RIGHT ATRIUM: Size was normal.    MITRAL VALVE: Valve structure was normal. There was normal leaflet separation. DOPPLER: The transmitral velocity was within the normal range. There was no evidence for stenosis. There was mild regurgitation.    AORTIC VALVE: The valve was probably trileaflet. Leaflets exhibited normal thickness and normal cuspal separation. The valve was not well visualized. DOPPLER: Transaortic velocity was within the normal range. There was no evidence for  stenosis. There was mild regurgitation.    TRICUSPID VALVE: The valve structure was normal. There was normal leaflet separation. DOPPLER: The transtricuspid velocity was within the normal range. There was no evidence for stenosis. There was trace regurgitation. Pulmonary artery  systolic pressure was within the normal range. Estimated peak PA pressure was 25 mmHg.    PULMONIC VALVE: DOPPLER: The transpulmonic velocity was within the normal range. There was trace regurgitation.    PERICARDIUM: There was no pericardial effusion.    AORTA: The root exhibited normal size. The ascending aorta was normal in size.    PULMONARY VEINS: DOPPLER: Doppler flow pattern was normal in the pulmonary vein(s).    SYSTEM MEASUREMENT TABLES    2D  %FS: 41.65 %  Ao Diam: 2.19 cm  EDV(Teich): 76.83 ml  EF(Teich): 73 %  ESV(Teich): 20.74 ml  IVSd: 0.95 cm  LA Area: 9.77 cm2  LA Diam: 3.58 cm  LVEDV MOD A4C: 62.52 ml  LVEF MOD A4C: 59.57 %  LVESV MOD A4C: 25.28 ml  LVIDd: 4.16 cm  LVIDs: 2.43 cm  LVLd A4C: 6.79 cm  LVLs A4C: 5.33 cm  LVPWd: 1.02 cm  RA Area: 10.53 cm2  RVIDd: 2.99 cm  SV MOD A4C: 37.24 ml  SV(Teich): 56.09 ml    CW  AR Dec [place]: 2.18 m/s2  AR Dec Time: 1725.52 ms  AR PHT: 500.4 ms  AR Vmax: 3.74 m/s  AR maxP.87 mmHg  AV Vmax: 1.51 m/s  AV maxP.07 mmHg  TR Vmax: 2.36 m/s  TR maxP.21 mmHg    MM  TAPSE: 2.08 cm    PW  E' Sept: 0.09 m/s  E/E' Sept:  9.67  MV A [place]: 1.06 m/s  MV Dec [place]: 4.04 m/s2  MV DecT: 215.41 ms  MV E [place]: 0.86 m/s  MV E/A Ratio: 0.81  MV PHT: 62.47 ms  MVA By PHT: 3.52 cm2    [facility] Accredited Echocardiography Laboratory    Prepared and electronically signed by    Ronal Villalpando MD  Signed 08-Jan-2021 11:19:46        Current EKG performed at today's visit and read by me personally reveals normal sinus rhythm with no abnormalities noted.

## 2024-02-16 PROBLEM — Z00.00 MEDICARE ANNUAL WELLNESS VISIT, SUBSEQUENT: Status: RESOLVED | Noted: 2021-12-12 | Resolved: 2024-02-16

## 2024-02-20 ENCOUNTER — OFFICE VISIT (OUTPATIENT)
Dept: INTERNAL MEDICINE CLINIC | Facility: CLINIC | Age: 74
End: 2024-02-20
Payer: MEDICARE

## 2024-02-20 ENCOUNTER — APPOINTMENT (OUTPATIENT)
Dept: LAB | Age: 74
End: 2024-02-20
Payer: MEDICARE

## 2024-02-20 VITALS
HEART RATE: 80 BPM | DIASTOLIC BLOOD PRESSURE: 82 MMHG | SYSTOLIC BLOOD PRESSURE: 118 MMHG | WEIGHT: 148 LBS | OXYGEN SATURATION: 99 % | HEIGHT: 62 IN | BODY MASS INDEX: 27.23 KG/M2

## 2024-02-20 DIAGNOSIS — K21.9 GASTROESOPHAGEAL REFLUX DISEASE WITHOUT ESOPHAGITIS: Primary | ICD-10-CM

## 2024-02-20 DIAGNOSIS — E78.2 MIXED HYPERLIPIDEMIA: ICD-10-CM

## 2024-02-20 DIAGNOSIS — R94.6 THYROID FUNCTION TEST ABNORMAL: ICD-10-CM

## 2024-02-20 DIAGNOSIS — L40.50 PSORIATIC ARTHRITIS (HCC): ICD-10-CM

## 2024-02-20 DIAGNOSIS — N18.2 STAGE 2 CHRONIC KIDNEY DISEASE: ICD-10-CM

## 2024-02-20 PROBLEM — F32.4 MAJOR DEPRESSIVE DISORDER WITH SINGLE EPISODE, IN PARTIAL REMISSION (HCC): Status: RESOLVED | Noted: 2019-05-10 | Resolved: 2024-02-20

## 2024-02-20 LAB
CRP SERPL QL: 4.7 MG/L
ERYTHROCYTE [SEDIMENTATION RATE] IN BLOOD: 16 MM/HOUR (ref 0–29)
T4 FREE SERPL-MCNC: 0.68 NG/DL (ref 0.61–1.12)
TSH SERPL DL<=0.05 MIU/L-ACNC: 4.81 UIU/ML (ref 0.45–4.5)

## 2024-02-20 PROCEDURE — 86140 C-REACTIVE PROTEIN: CPT

## 2024-02-20 PROCEDURE — 84443 ASSAY THYROID STIM HORMONE: CPT

## 2024-02-20 PROCEDURE — 86430 RHEUMATOID FACTOR TEST QUAL: CPT

## 2024-02-20 PROCEDURE — 84439 ASSAY OF FREE THYROXINE: CPT

## 2024-02-20 PROCEDURE — 86200 CCP ANTIBODY: CPT

## 2024-02-20 PROCEDURE — 85652 RBC SED RATE AUTOMATED: CPT

## 2024-02-20 PROCEDURE — 36415 COLL VENOUS BLD VENIPUNCTURE: CPT

## 2024-02-20 PROCEDURE — 99214 OFFICE O/P EST MOD 30 MIN: CPT | Performed by: INTERNAL MEDICINE

## 2024-02-20 RX ORDER — FAMOTIDINE 20 MG/1
20 TABLET, FILM COATED ORAL DAILY
Qty: 90 TABLET | Refills: 3 | Status: SHIPPED | OUTPATIENT
Start: 2024-02-20 | End: 2025-02-14

## 2024-02-20 NOTE — PROGRESS NOTES
Assessment/Plan:    Hyperlipidemia  Low-cholesterol diet check lipid profile continue Crestor    Esophageal reflux  We did review recent video barium swallow ordered by rheumatology showing mild reflux currently the patient symptoms are unchanged and controlled she does have a known hiatal hernia she will continue with omeprazole 20 mg at bedtime and we will add Pepcid 20 mg in the a.m. continue ulcer free diet, do not eat 2 hours prior to bedtime elevate the head of the bed.  We did discuss possibly going for another EGD but because there are no red flag symptoms, her current symptoms are stable and recent EGD 2 years ago no history of Vences's we have decided to hold off on EGD shared decision making.  If there is any change of her symptoms she will notify me.    Stage 2 chronic kidney disease  Lab Results   Component Value Date    EGFR 61 10/18/2023    EGFR 60 07/28/2023    EGFR 54 12/06/2022    CREATININE 0.94 10/18/2023    CREATININE 0.95 07/28/2023    CREATININE 1.04 12/06/2022   Drink adequate amount of water, avoid anti-inflammatories, reduce sodium in the diet and will continue to monitor the kidney function    Psoriatic arthritis (HCC)  Symptoms are consistent with psoriatic arthritis she is weaned herself slowly off prednisone at this point he has increase in arthralgias we will check C-reactive protein, sed rate, RF CCP antibody, she will follow-up with rheumatology we did have a long discussion today about starting methotrexate which she is considering and will discuss further with rheumatology    Thyroid function test abnormal  Patient does report to me a constricting sensation in her neck no difficulty with breathing or swallowing we will check ultrasound thyroid third-generation TSH and free T4         Problem List Items Addressed This Visit        Digestive    Esophageal reflux - Primary     We did review recent video barium swallow ordered by rheumatology showing mild reflux currently the  patient symptoms are unchanged and controlled she does have a known hiatal hernia she will continue with omeprazole 20 mg at bedtime and we will add Pepcid 20 mg in the a.m. continue ulcer free diet, do not eat 2 hours prior to bedtime elevate the head of the bed.  We did discuss possibly going for another EGD but because there are no red flag symptoms, her current symptoms are stable and recent EGD 2 years ago no history of Vences's we have decided to hold off on EGD shared decision making.  If there is any change of her symptoms she will notify me.         Relevant Medications    famotidine (PEPCID) 20 mg tablet       Musculoskeletal and Integument    Psoriatic arthritis (HCC)     Symptoms are consistent with psoriatic arthritis she is weaned herself slowly off prednisone at this point he has increase in arthralgias we will check C-reactive protein, sed rate, RF CCP antibody, she will follow-up with rheumatology we did have a long discussion today about starting methotrexate which she is considering and will discuss further with rheumatology         Relevant Orders    C-reactive protein    Sedimentation rate, automated (Completed)    Rheumatoid Factor    Cyclic citrul peptide antibody, IgG       Genitourinary    Stage 2 chronic kidney disease     Lab Results   Component Value Date    EGFR 61 10/18/2023    EGFR 60 07/28/2023    EGFR 54 12/06/2022    CREATININE 0.94 10/18/2023    CREATININE 0.95 07/28/2023    CREATININE 1.04 12/06/2022   Drink adequate amount of water, avoid anti-inflammatories, reduce sodium in the diet and will continue to monitor the kidney function            Other    Hyperlipidemia     Low-cholesterol diet check lipid profile continue Crestor         Thyroid function test abnormal     Patient does report to me a constricting sensation in her neck no difficulty with breathing or swallowing we will check ultrasound thyroid third-generation TSH and free T4         Relevant Orders    US thyroid     TSH, 3rd generation with Free T4 reflex       RTO in 4 to 6 months call if any problems  Subjective:      Patient ID: Katharine Chatman is a 73 y.o. female.    HPI 73-year old female coming in for a follow up office visit regarding chronic kidney disease, thyroid?  Abnormality constricting feeling of the neck, mixed hyperlipidemia, psoriatic arthritis and GERD; the patient reports me compliant taking medications without untoward side effects the.  The patient is here to review his medical condition, update me on the medical condition and the patient reports me no hospitalizations and no ER visits.  No injuries no illnesses the patient did see rheumatology she did not start DMARD she has slowly weaned off prednisone and had increase symptoms of arthralgias.  She did have a swallowing study showing mild GERD patient does not have red flag symptoms no blood in the stool no unintentional weight loss no dysphagia no nocturnal awakenings her GERD is stable on omeprazole.  Patient has noticed increased arthralgias since going off the prednisone she will be making an appointment with rheumatology in the near future.  She does report to me a mild sensation of a tightness in her neck outside her neck not inside her neck no difficulty with swallowing no difficulty with breathing no shortness of breath    The following portions of the patient's history were reviewed and updated as appropriate: allergies, current medications, past family history, past medical history, past social history, past surgical history and problem list.    Review of Systems   Constitutional:  Negative for activity change, appetite change and unexpected weight change.   HENT:  Negative for congestion and postnasal drip.    Eyes:  Negative for visual disturbance.   Respiratory:  Negative for cough and shortness of breath.    Cardiovascular:  Negative for chest pain.   Gastrointestinal:  Negative for abdominal pain, diarrhea, nausea and vomiting.    Musculoskeletal:  Positive for arthralgias.   Neurological:  Negative for dizziness, light-headedness and headaches.   Hematological:  Negative for adenopathy.         Objective:    Return in about 4 months (around 6/20/2024).    No results found.      Allergies   Allergen Reactions   • Clindamycin GI Intolerance and Other (See Comments)   • Penicillin G Rash   • Sulfa Antibiotics Hives and Rash   • Penicillins Hives and Rash       Past Medical History:   Diagnosis Date   • Anxiety 2014   • Arthritis    • Basal cell carcinoma    • Colon polyp    • Depression long time   • Depression with anxiety    • Eating disorder     overeating   • Fibrocystic breast disease, unspecified laterality    • GERD (gastroesophageal reflux disease)    • High cholesterol    • Irritable bowel syndrome    • Osteopenia      Past Surgical History:   Procedure Laterality Date   • BREAST CYST ASPIRATION      one performed-unsure which breast   • BREAST EXCISIONAL BIOPSY Bilateral     one on each breast aprox 1980s   • COLONOSCOPY      7/13/12, colon polyp biopsied, hemorrhoids - Dr. Garzon   • SCALP LESION REMOVAL W/ FLAP AND SKIN GRAFT      Behind right ear. Precancerous.   • SEPTOPLASTY  05/04/2018   • VAGINAL HYSTERECTOMY  1982    prolapse; about age 37     Current Outpatient Medications on File Prior to Visit   Medication Sig Dispense Refill   • acetaminophen (TYLENOL) 500 mg tablet Take 1,000 mg by mouth every 6 (six) hours as needed     • ALPRAZolam (XANAX) 0.25 mg tablet Take 1 tablet (0.25 mg total) by mouth 3 (three) times a day as needed for anxiety 30 tablet 0   • fluticasone (FLONASE) 50 mcg/act nasal spray 1 SPRAY INTO EACH NOSTRIL 2 (TWO) TIMES A DAY WITH MEALS (Patient taking differently: 1 spray into each nostril as needed) 48 mL 1   • ipratropium (ATROVENT) 0.03 % nasal spray 1 spray into each nostril 3 (three) times a day 30 mL 1   • levocetirizine (XYZAL) 5 MG tablet TAKE 1 TABLET BY MOUTH EVERY DAY IN THE EVENING  (Patient taking differently: As needed) 90 tablet 1   • omeprazole (PriLOSEC) 20 mg delayed release capsule TAKE 1 CAPSULE BY MOUTH EVERY DAY 90 capsule 2   • rosuvastatin (CRESTOR) 10 MG tablet TAKE 1 TABLET BY MOUTH EVERY DAY (Patient taking differently: Take 20 mg by mouth daily) 90 tablet 1   • sertraline (ZOLOFT) 25 mg tablet TAKE 1 TABLET BY MOUTH EVERY DAY 90 tablet 1     No current facility-administered medications on file prior to visit.     Family History   Problem Relation Age of Onset   • Atrial fibrillation Mother    • Cancer Mother 80        kidney, bladder,breast   • Breast cancer Mother 80   • COPD Mother         mild   • Kidney cancer Mother 80   • Osteoporosis Mother    • Transient ischemic attack Mother    • Stroke Mother         TIA   • Arthritis Mother    • Cancer Father         prostate, skin   • Glaucoma Father    • Hypertension Father    • Arthritis Father    • Coronary artery disease Maternal Grandfather    • Arthritis Maternal Grandfather    • Coronary artery disease Maternal Aunt    • No Known Problems Maternal Grandmother    • No Known Problems Paternal Grandmother    • No Known Problems Paternal Grandfather    • No Known Problems Sister    • No Known Problems Daughter    • No Known Problems Son    • No Known Problems Paternal Aunt    • No Known Problems Paternal Aunt    • No Known Problems Paternal Aunt    • Cancer Brother         leukemia   • Cancer Brother         Polymyalgia rheumatica     Social History     Socioeconomic History   • Marital status: /Civil Union     Spouse name: Not on file   • Number of children: Not on file   • Years of education: Not on file   • Highest education level: Not on file   Occupational History   • Not on file   Tobacco Use   • Smoking status: Never     Passive exposure: Never   • Smokeless tobacco: Never   Vaping Use   • Vaping status: Never Used   Substance and Sexual Activity   • Alcohol use: Yes     Comment: very rare glass of wine   • Drug  "use: No   • Sexual activity: Not Currently     Partners: Male     Birth control/protection: Post-menopausal, Surgical   Other Topics Concern   • Not on file   Social History Narrative    Advance directive declined by patient     Social Determinants of Health     Financial Resource Strain: Low Risk  (12/13/2023)    Overall Financial Resource Strain (CARDIA)    • Difficulty of Paying Living Expenses: Not hard at all   Food Insecurity: Not on file   Transportation Needs: No Transportation Needs (12/13/2023)    PRAPARE - Transportation    • Lack of Transportation (Medical): No    • Lack of Transportation (Non-Medical): No   Physical Activity: Insufficiently Active (5/21/2020)    Exercise Vital Sign    • Days of Exercise per Week: 3 days    • Minutes of Exercise per Session: 30 min   Stress: Stress Concern Present (5/22/2020)    [employer] [place] [employer] - Occupational Stress Questionnaire    • Feeling of Stress : To some extent   Social Connections: Not on file   Intimate Partner Violence: Not on file   Housing Stability: Not on file     Vitals:    02/20/24 1250   BP: 118/82   BP Location: Left arm   Patient Position: Sitting   Cuff Size: Standard   Pulse: 80   SpO2: 99%   Weight: 67.1 kg (148 lb)   Height: 5' 2\" (1.575 m)     Results for orders placed or performed during the hospital encounter of 01/08/24   Echo complete w/ contrast if indicated   Result Value Ref Range    A4C EF 66 %    LVIDd 4.00 cm    LVIDS 2.60 cm    IVSd 1.20 cm    LVPWd 0.80 cm    FS 35 28 - 44    MV E' Tissue Velocity Septal 9 cm/s    LA Volume Index (BP) 37.7 mL/m2    E/A ratio 0.83     E wave deceleration time 205 ms    MV Peak E Hugo 108 cm/s    MV Peak A Hugo 1.3 m/s    AV LVOT peak gradient 5 mmHg    LVOT peak VTI 25.14 cm    LVOT peak hugo 1.09 m/s    RVID d 3.2 cm    Tricuspid annular plane systolic excursion 2.40 cm    LA size 4 cm    LA length (A2C) 5.10 cm    LA volume (BP) 63 mL    Aortic valve peak velocity 1.37 m/s    Ao " "VTI 30.28 cm    AV mean gradient 4 mmHg    LVOT mn grad 3.0 mmHg    AV peak gradient 8 mmHg    AV Deceleration Time 2,123 ms    AV regurgitation pressure 1/2 time 616 ms    MV stenosis pressure 1/2 time 60 ms    MV valve area p 1/2 method 3.67     TR Peak Hugo 2.6 m/s    Triscuspid Valve Regurgitation Peak Gradient 28.0 mmHg    Ao root 2.70 cm    Asc Ao 3 cm    AV peak gradient 71 mmHg    Aortic valve mean velocity 8.60 m/s    Tricuspid valve peak regurgitation velocity 2.64 m/s    Left ventricular stroke volume (2D) 44.00 mL    IVS 1.2 cm    LEFT VENTRICLE SYSTOLIC VOLUME (MOD BIPLANE) 2D 25 mL    LV DIASTOLIC VOLUME (MOD BIPLANE) 2D 69 mL    Left Atrium Area-systolic Four Chamber 19.3 cm2    Left Atrium Area-systolic Apical Two Chamber 21.5 cm2    LVSV, 2D 44 mL    DVI 0.80     LV EF 65     Est. RA pres 8.0 mmHg    Right Ventricular Peak Systolic Pressure 36.00 mmHg    IVC 15.0 mm     Weight (last 2 days)     Date/Time Weight    02/20/24 1250 67.1 (148)        Body mass index is 27.07 kg/m².  BP      Temp      Pulse     Resp      SpO2        Vitals:    02/20/24 1250   Weight: 67.1 kg (148 lb)     Vitals:    02/20/24 1250   Weight: 67.1 kg (148 lb)       /82 (BP Location: Left arm, Patient Position: Sitting, Cuff Size: Standard)   Pulse 80   Ht 5' 2\" (1.575 m)   Wt 67.1 kg (148 lb)   SpO2 99%   BMI 27.07 kg/m²          Physical Exam  Vitals and nursing note reviewed.   Constitutional:       General: She is not in acute distress.     Appearance: Normal appearance. She is well-developed and normal weight. She is not ill-appearing, toxic-appearing or diaphoretic.   HENT:      Head: Normocephalic.   Eyes:      General: No scleral icterus.        Right eye: No discharge.         Left eye: No discharge.      Conjunctiva/sclera: Conjunctivae normal.      Pupils: Pupils are equal, round, and reactive to light.   Cardiovascular:      Rate and Rhythm: Normal rate and regular rhythm.      Heart sounds: Normal heart " sounds. No murmur heard.     No friction rub. No gallop.   Pulmonary:      Effort: No respiratory distress.      Breath sounds: Normal breath sounds. No wheezing or rales.   Abdominal:      General: Bowel sounds are normal. There is no distension.      Palpations: Abdomen is soft. There is no mass.      Tenderness: There is no abdominal tenderness. There is no guarding or rebound.   Musculoskeletal:         General: No deformity.      Cervical back: Neck supple.   Lymphadenopathy:      Cervical: No cervical adenopathy.   Neurological:      Mental Status: She is alert.      Coordination: Coordination normal.

## 2024-02-21 DIAGNOSIS — Z00.6 ENCOUNTER FOR EXAMINATION FOR NORMAL COMPARISON OR CONTROL IN CLINICAL RESEARCH PROGRAM: ICD-10-CM

## 2024-02-21 LAB — RHEUMATOID FACT SER QL LA: NEGATIVE

## 2024-02-21 NOTE — ASSESSMENT & PLAN NOTE
Symptoms are consistent with psoriatic arthritis she is weaned herself slowly off prednisone at this point he has increase in arthralgias we will check C-reactive protein, sed rate, RF CCP antibody, she will follow-up with rheumatology we did have a long discussion today about starting methotrexate which she is considering and will discuss further with rheumatology

## 2024-02-21 NOTE — ASSESSMENT & PLAN NOTE
Lab Results   Component Value Date    EGFR 61 10/18/2023    EGFR 60 07/28/2023    EGFR 54 12/06/2022    CREATININE 0.94 10/18/2023    CREATININE 0.95 07/28/2023    CREATININE 1.04 12/06/2022   Drink adequate amount of water, avoid anti-inflammatories, reduce sodium in the diet and will continue to monitor the kidney function

## 2024-02-21 NOTE — ASSESSMENT & PLAN NOTE
Patient does report to me a constricting sensation in her neck no difficulty with breathing or swallowing we will check ultrasound thyroid third-generation TSH and free T4

## 2024-02-21 NOTE — ASSESSMENT & PLAN NOTE
We did review recent video barium swallow ordered by rheumatology showing mild reflux currently the patient symptoms are unchanged and controlled she does have a known hiatal hernia she will continue with omeprazole 20 mg at bedtime and we will add Pepcid 20 mg in the a.m. continue ulcer free diet, do not eat 2 hours prior to bedtime elevate the head of the bed.  We did discuss possibly going for another EGD but because there are no red flag symptoms, her current symptoms are stable and recent EGD 2 years ago no history of Vences's we have decided to hold off on EGD shared decision making.  If there is any change of her symptoms she will notify me.

## 2024-02-22 ENCOUNTER — OFFICE VISIT (OUTPATIENT)
Dept: RHEUMATOLOGY | Facility: CLINIC | Age: 74
End: 2024-02-22
Payer: MEDICARE

## 2024-02-22 VITALS
SYSTOLIC BLOOD PRESSURE: 118 MMHG | HEIGHT: 62 IN | WEIGHT: 148 LBS | BODY MASS INDEX: 27.23 KG/M2 | DIASTOLIC BLOOD PRESSURE: 78 MMHG

## 2024-02-22 DIAGNOSIS — Z79.899 HIGH RISK MEDICATION USE: ICD-10-CM

## 2024-02-22 DIAGNOSIS — Z79.60 LONG-TERM USE OF IMMUNOSUPPRESSANT MEDICATION: ICD-10-CM

## 2024-02-22 DIAGNOSIS — Z79.52 LONG TERM (CURRENT) USE OF SYSTEMIC STEROIDS: ICD-10-CM

## 2024-02-22 DIAGNOSIS — L40.50 PSORIATIC ARTHRITIS (HCC): Primary | ICD-10-CM

## 2024-02-22 PROCEDURE — 99215 OFFICE O/P EST HI 40 MIN: CPT | Performed by: STUDENT IN AN ORGANIZED HEALTH CARE EDUCATION/TRAINING PROGRAM

## 2024-02-22 RX ORDER — PREDNISONE 5 MG/1
TABLET ORAL DAILY
Start: 2024-02-22 | End: 2024-05-04

## 2024-02-22 RX ORDER — FOLIC ACID 1 MG/1
1 TABLET ORAL DAILY
Qty: 90 TABLET | Refills: 2 | Status: SHIPPED | OUTPATIENT
Start: 2024-02-22

## 2024-02-22 RX ORDER — METHOTREXATE 2.5 MG/1
TABLET ORAL
Qty: 56 TABLET | Refills: 2 | Status: SHIPPED | OUTPATIENT
Start: 2024-02-26

## 2024-02-22 NOTE — PROGRESS NOTES
Rheumatology Follow-up Visit  2/22/2024     CC: routine follow up    Permanent History: First seen by Dr. Whitney in Aug 2023 for hand arthritis and elevated CRP; was dxd with PMR and continued on prednisone taper.    PMH vitiligo around the arms and neck and legs.    Assessment: 73 y.o. female here for/with the above.    Off prednisone since Jan and no significant shoulder stiffness; not concerning for PMR    Still most concerning for PsA vs a seronegative RA but with history dactylitis and nail pits will treat as PsA. Basically semantics @ this point    Patient's rheumatologic disease(s) threaten long-term function if not appropriately treated.    Patient's rheumatologic medication(s) require intensive monitoring for toxicity.    Encounter Diagnosis     ICD-10-CM    1. Psoriatic arthritis (HCC)  L40.50 methotrexate 2.5 MG tablet     folic acid ( Folic Acid) 1 mg tablet     predniSONE 5 mg tablet      2. Long-term use of immunosuppressant medication  Z79.60       3. High risk medication use  Z79.899       4. Long term (current) use of systemic steroids  Z79.52           Plan:  -Methotrexate   -Prednisone taper  -RTC April or sooner PRN    Ed Carlos DO, ROSETTE Carlos DO, ROSETTE  [facility]      Interval History: Recently found out she has a lot of really bad dental issues. Weaned herself off the prednisone, hasn't had since the beginning of Jan.    Aches have been coming back, she is feeling miserable.    Has some shoulder pain, not really stiffness. Pain down the arms.    Does feel stiffness in the feet and hands in the mornings. Takes hours to loosen up.    Some swelling in the fingers sometimes.    Has been having heel pain; had had plantar fasciitis injections previously. Just in the R heel.    Doesn't feel weak.    Recent DXA good despite prednisone     Outpatient Medications Marked as Taking for the 2/22/24 encounter (Office Visit) with Ed Carlos DO   Medication    folic acid  "( Folic Acid) 1 mg tablet    [START ON 2/26/2024] methotrexate 2.5 MG tablet    predniSONE 5 mg tablet       Social History     Tobacco Use    Smoking status: Never     Passive exposure: Never    Smokeless tobacco: Never   Vaping Use    Vaping status: Never Used   Substance Use Topics    Alcohol use: Yes     Comment: very rare glass of wine    Drug use: No       Review of Systems:  Pertinent findings documented in HPI  ___________________________________    Physical Exam:    /78   Ht 5' 2\" (1.575 m)   Wt 67.1 kg (148 lb)   BMI 27.07 kg/m²     General: Well appearing, in no distress.   Eyes: Sclera non-icteric. EOMI  HENT: No oral ulcers. MMM.   Extremities: Warm, well perfused, no edema.   Neuro: Alert and oriented. No gross focal neurological deficits.   Skin: No rashes  MSK exam: synovitis of a few PIPs w tenderness to palpation few MCPs and PIPs. Subtle synovitis L 2nd toe PIP. Tenderness to palpation R plantar fascia insertion. Mild tenderness to palpation L lat epicondyle  LEI 1. Pain w resisted movement of shoulders but strength 5/5 and active ROM intact  ____________________________    Lab Results: relevant labs reviewed    Imaging Results: relevant images reviewed  "

## 2024-02-22 NOTE — PATIENT INSTRUCTIONS
Please start methotrexate as follows:    Take 3 pills in the morning and 3 pills in the evening one day a week for 2 weeks, then  Increase to 4 pills in the morning and 4 pills in the evening one day a week every week.    Get blood work (CBC, CMP) 2 weeks after starting methotrexate, and 2 weeks after increasing the dose. If these are good then get these tests every 3 months.    Take folic acid every day while taking methotrexate.

## 2024-02-23 ENCOUNTER — PATIENT MESSAGE (OUTPATIENT)
Dept: INTERNAL MEDICINE CLINIC | Facility: CLINIC | Age: 74
End: 2024-02-23

## 2024-02-23 LAB — CCP AB SER IA-ACNC: 0.8

## 2024-02-27 ENCOUNTER — HOSPITAL ENCOUNTER (OUTPATIENT)
Dept: RADIOLOGY | Age: 74
Discharge: HOME | End: 2024-02-27
Payer: MEDICARE

## 2024-02-27 DIAGNOSIS — R94.6 THYROID FUNCTION TEST ABNORMAL: ICD-10-CM

## 2024-02-27 PROCEDURE — 76536 US EXAM OF HEAD AND NECK: CPT

## 2024-02-28 ENCOUNTER — PATIENT MESSAGE (OUTPATIENT)
Dept: INTERNAL MEDICINE CLINIC | Facility: CLINIC | Age: 74
End: 2024-02-28

## 2024-02-28 NOTE — PATIENT COMMUNICATION
Pharmacist Tracking Tool  Reason For Outreach: Embedded Pharmacist  Demographics:  Intervention Method: Xytishart Message  Type of Intervention: New  Topics Addressed: Bone/joint/rheumatology and Infectious Disease   Pharmacologic Interventions: Drug Interaction   Non-Pharmacologic Interventions: Care coordination  Time:  Direct Patient Care:  5  mins  Care Coordination:  15  mins  Recommendation Recipient: Patient/Caregiver  Outcome: Accepted

## 2024-03-01 NOTE — PATIENT COMMUNICATION
Pharmacist Tracking Tool  Reason For Outreach: Embedded Pharmacist  Demographics:  Intervention Method: CBA PHARMAt Message  Type of Intervention: New  Topics Addressed: Bone/joint/rheumatology and Infectious Disease   Pharmacologic Interventions: Prevent or Manage YOVANI, Drug Interaction , and Med Rec  Non-Pharmacologic Interventions: Immunization  Time:  Direct Patient Care:  5  mins  Care Coordination:  15  mins  Recommendation Recipient: Patient/Caregiver  Outcome: Accepted

## 2024-03-11 ENCOUNTER — APPOINTMENT (OUTPATIENT)
Dept: LAB | Age: 74
End: 2024-03-11
Payer: MEDICARE

## 2024-03-11 DIAGNOSIS — E78.2 MIXED HYPERLIPIDEMIA: ICD-10-CM

## 2024-03-11 DIAGNOSIS — Z00.6 ENCOUNTER FOR EXAMINATION FOR NORMAL COMPARISON OR CONTROL IN CLINICAL RESEARCH PROGRAM: ICD-10-CM

## 2024-03-11 DIAGNOSIS — F41.8 DEPRESSION WITH ANXIETY: ICD-10-CM

## 2024-03-11 LAB
ALBUMIN SERPL BCP-MCNC: 4.2 G/DL (ref 3.5–5)
ALP SERPL-CCNC: 61 U/L (ref 34–104)
ALT SERPL W P-5'-P-CCNC: 15 U/L (ref 7–52)
ANION GAP SERPL CALCULATED.3IONS-SCNC: 8 MMOL/L
AST SERPL W P-5'-P-CCNC: 18 U/L (ref 13–39)
BASOPHILS # BLD AUTO: 0.04 THOUSANDS/ÂΜL (ref 0–0.1)
BASOPHILS NFR BLD AUTO: 1 % (ref 0–1)
BILIRUB SERPL-MCNC: 0.51 MG/DL (ref 0.2–1)
BUN SERPL-MCNC: 21 MG/DL (ref 5–25)
CALCIUM SERPL-MCNC: 9.9 MG/DL (ref 8.4–10.2)
CHLORIDE SERPL-SCNC: 104 MMOL/L (ref 96–108)
CHOLEST SERPL-MCNC: 173 MG/DL
CO2 SERPL-SCNC: 30 MMOL/L (ref 21–32)
CREAT SERPL-MCNC: 0.98 MG/DL (ref 0.6–1.3)
EOSINOPHIL # BLD AUTO: 0.1 THOUSAND/ÂΜL (ref 0–0.61)
EOSINOPHIL NFR BLD AUTO: 2 % (ref 0–6)
ERYTHROCYTE [DISTWIDTH] IN BLOOD BY AUTOMATED COUNT: 13.2 % (ref 11.6–15.1)
GFR SERPL CREATININE-BSD FRML MDRD: 58 ML/MIN/1.73SQ M
GLUCOSE P FAST SERPL-MCNC: 86 MG/DL (ref 65–99)
HCT VFR BLD AUTO: 40.6 % (ref 34.8–46.1)
HDLC SERPL-MCNC: 70 MG/DL
HGB BLD-MCNC: 13.2 G/DL (ref 11.5–15.4)
IMM GRANULOCYTES # BLD AUTO: 0.03 THOUSAND/UL (ref 0–0.2)
IMM GRANULOCYTES NFR BLD AUTO: 1 % (ref 0–2)
LDLC SERPL CALC-MCNC: 84 MG/DL (ref 0–100)
LYMPHOCYTES # BLD AUTO: 1.43 THOUSANDS/ÂΜL (ref 0.6–4.47)
LYMPHOCYTES NFR BLD AUTO: 24 % (ref 14–44)
MCH RBC QN AUTO: 29.5 PG (ref 26.8–34.3)
MCHC RBC AUTO-ENTMCNC: 32.5 G/DL (ref 31.4–37.4)
MCV RBC AUTO: 91 FL (ref 82–98)
MONOCYTES # BLD AUTO: 0.5 THOUSAND/ÂΜL (ref 0.17–1.22)
MONOCYTES NFR BLD AUTO: 8 % (ref 4–12)
NEUTROPHILS # BLD AUTO: 3.84 THOUSANDS/ÂΜL (ref 1.85–7.62)
NEUTS SEG NFR BLD AUTO: 64 % (ref 43–75)
NRBC BLD AUTO-RTO: 0 /100 WBCS
PLATELET # BLD AUTO: 257 THOUSANDS/UL (ref 149–390)
PMV BLD AUTO: 10.6 FL (ref 8.9–12.7)
POTASSIUM SERPL-SCNC: 4.2 MMOL/L (ref 3.5–5.3)
PROT SERPL-MCNC: 6.9 G/DL (ref 6.4–8.4)
RBC # BLD AUTO: 4.48 MILLION/UL (ref 3.81–5.12)
SODIUM SERPL-SCNC: 142 MMOL/L (ref 135–147)
TRIGL SERPL-MCNC: 95 MG/DL
WBC # BLD AUTO: 5.94 THOUSAND/UL (ref 4.31–10.16)

## 2024-03-11 PROCEDURE — 80061 LIPID PANEL: CPT

## 2024-03-11 RX ORDER — ALPRAZOLAM 0.25 MG/1
0.25 TABLET ORAL 3 TIMES DAILY PRN
Qty: 30 TABLET | Refills: 0 | Status: SHIPPED | OUTPATIENT
Start: 2024-03-11

## 2024-03-28 LAB
APOB+LDLR+PCSK9 GENE MUT ANL BLD/T: NOT DETECTED
BRCA1+BRCA2 DEL+DUP + FULL MUT ANL BLD/T: NOT DETECTED
MLH1+MSH2+MSH6+PMS2 GN DEL+DUP+FUL M: NOT DETECTED

## 2024-04-02 ENCOUNTER — APPOINTMENT (OUTPATIENT)
Dept: LAB | Age: 74
End: 2024-04-02
Payer: MEDICARE

## 2024-04-02 DIAGNOSIS — Z79.899 HIGH RISK MEDICATION USE: ICD-10-CM

## 2024-04-02 LAB
ALBUMIN SERPL BCP-MCNC: 4.1 G/DL (ref 3.5–5)
ALP SERPL-CCNC: 59 U/L (ref 34–104)
ALT SERPL W P-5'-P-CCNC: 25 U/L (ref 7–52)
ANION GAP SERPL CALCULATED.3IONS-SCNC: 9 MMOL/L (ref 4–13)
AST SERPL W P-5'-P-CCNC: 28 U/L (ref 13–39)
BASOPHILS # BLD AUTO: 0.04 THOUSANDS/ÂΜL (ref 0–0.1)
BASOPHILS NFR BLD AUTO: 1 % (ref 0–1)
BILIRUB SERPL-MCNC: 0.45 MG/DL (ref 0.2–1)
BUN SERPL-MCNC: 21 MG/DL (ref 5–25)
CALCIUM SERPL-MCNC: 9 MG/DL (ref 8.4–10.2)
CHLORIDE SERPL-SCNC: 105 MMOL/L (ref 96–108)
CO2 SERPL-SCNC: 29 MMOL/L (ref 21–32)
CREAT SERPL-MCNC: 0.96 MG/DL (ref 0.6–1.3)
EOSINOPHIL # BLD AUTO: 0.07 THOUSAND/ÂΜL (ref 0–0.61)
EOSINOPHIL NFR BLD AUTO: 1 % (ref 0–6)
ERYTHROCYTE [DISTWIDTH] IN BLOOD BY AUTOMATED COUNT: 14.6 % (ref 11.6–15.1)
GFR SERPL CREATININE-BSD FRML MDRD: 59 ML/MIN/1.73SQ M
GLUCOSE P FAST SERPL-MCNC: 82 MG/DL (ref 65–99)
HCT VFR BLD AUTO: 41.2 % (ref 34.8–46.1)
HGB BLD-MCNC: 13 G/DL (ref 11.5–15.4)
IMM GRANULOCYTES # BLD AUTO: 0.04 THOUSAND/UL (ref 0–0.2)
IMM GRANULOCYTES NFR BLD AUTO: 1 % (ref 0–2)
LYMPHOCYTES # BLD AUTO: 1.07 THOUSANDS/ÂΜL (ref 0.6–4.47)
LYMPHOCYTES NFR BLD AUTO: 17 % (ref 14–44)
MCH RBC QN AUTO: 29.6 PG (ref 26.8–34.3)
MCHC RBC AUTO-ENTMCNC: 31.6 G/DL (ref 31.4–37.4)
MCV RBC AUTO: 94 FL (ref 82–98)
MONOCYTES # BLD AUTO: 0.59 THOUSAND/ÂΜL (ref 0.17–1.22)
MONOCYTES NFR BLD AUTO: 9 % (ref 4–12)
NEUTROPHILS # BLD AUTO: 4.68 THOUSANDS/ÂΜL (ref 1.85–7.62)
NEUTS SEG NFR BLD AUTO: 71 % (ref 43–75)
NRBC BLD AUTO-RTO: 0 /100 WBCS
PLATELET # BLD AUTO: 247 THOUSANDS/UL (ref 149–390)
PMV BLD AUTO: 10.6 FL (ref 8.9–12.7)
POTASSIUM SERPL-SCNC: 3.8 MMOL/L (ref 3.5–5.3)
PROT SERPL-MCNC: 6.9 G/DL (ref 6.4–8.4)
RBC # BLD AUTO: 4.39 MILLION/UL (ref 3.81–5.12)
SODIUM SERPL-SCNC: 143 MMOL/L (ref 135–147)
WBC # BLD AUTO: 6.49 THOUSAND/UL (ref 4.31–10.16)

## 2024-04-02 PROCEDURE — 80053 COMPREHEN METABOLIC PANEL: CPT

## 2024-04-02 PROCEDURE — 85025 COMPLETE CBC W/AUTO DIFF WBC: CPT

## 2024-04-02 PROCEDURE — 36415 COLL VENOUS BLD VENIPUNCTURE: CPT

## 2024-04-09 DIAGNOSIS — K21.9 GERD WITHOUT ESOPHAGITIS: ICD-10-CM

## 2024-04-09 RX ORDER — OMEPRAZOLE 20 MG/1
20 CAPSULE, DELAYED RELEASE ORAL DAILY
Qty: 30 CAPSULE | Refills: 0 | Status: SHIPPED | OUTPATIENT
Start: 2024-04-09

## 2024-04-09 NOTE — TELEPHONE ENCOUNTER
Spoke with patient, Told her we gave a refill, but will need to see her in the office for continued refills. She said she would call back to schedule.

## 2024-04-11 ENCOUNTER — OFFICE VISIT (OUTPATIENT)
Dept: RHEUMATOLOGY | Facility: CLINIC | Age: 74
End: 2024-04-11
Payer: MEDICARE

## 2024-04-11 VITALS
OXYGEN SATURATION: 100 % | DIASTOLIC BLOOD PRESSURE: 74 MMHG | TEMPERATURE: 97.3 F | BODY MASS INDEX: 27.79 KG/M2 | HEART RATE: 83 BPM | SYSTOLIC BLOOD PRESSURE: 118 MMHG | HEIGHT: 62 IN | WEIGHT: 151 LBS

## 2024-04-11 DIAGNOSIS — Z79.899 HIGH RISK MEDICATION USE: ICD-10-CM

## 2024-04-11 DIAGNOSIS — Z79.52 LONG TERM (CURRENT) USE OF SYSTEMIC STEROIDS: ICD-10-CM

## 2024-04-11 DIAGNOSIS — L40.50 PSORIATIC ARTHRITIS (HCC): Primary | ICD-10-CM

## 2024-04-11 PROCEDURE — G2211 COMPLEX E/M VISIT ADD ON: HCPCS | Performed by: STUDENT IN AN ORGANIZED HEALTH CARE EDUCATION/TRAINING PROGRAM

## 2024-04-11 PROCEDURE — 99214 OFFICE O/P EST MOD 30 MIN: CPT | Performed by: STUDENT IN AN ORGANIZED HEALTH CARE EDUCATION/TRAINING PROGRAM

## 2024-04-11 RX ORDER — PREDNISONE 5 MG/1
TABLET ORAL
Start: 2024-04-11

## 2024-04-11 RX ORDER — METHOTREXATE 2.5 MG/1
TABLET ORAL
Qty: 120 TABLET | Refills: 2 | Status: SHIPPED | OUTPATIENT
Start: 2024-04-11

## 2024-04-11 NOTE — PATIENT INSTRUCTIONS
Take 2.5 mg prednisone daily for 4 weeks then stop    Increase methotrexate to 5 pills in the morning and 5 pills in the evening one day a week    Continue folic acid    Continue blood work (CBC, CMP) every 3 months while on methotrexate

## 2024-04-11 NOTE — PROGRESS NOTES
Rheumatology Follow-up Visit  4/11/2024     CC: routine follow up    Permanent History: First seen by Dr. Whitney in Aug 2023 for hand arthritis and elevated CRP; was dxd with PMR and continued on prednisone taper.     When I first saw her Nov 2023, symptoms were not at all consistent with PMR, symptoms much more consistent with PsA with peripheral inflammatory symptoms not controlled on 5 mg prednisone, history dactylitis and appreciable nail pitting. Weaned off of prednisone herself and did not develop any shoulder or hip stiffness. Started on methotrexate for PsA vs seronegative RA.    PMH vitiligo around the arms and neck and legs.    Assessment: 73 y.o. female here for/with the above.    Doing fantastically on methotrexate! Just very occasional monoarticular flares    Patient's rheumatologic disease(s) threaten long-term function if not appropriately treated.    Patient's rheumatologic medication(s) require intensive monitoring for toxicity. Does not endorse any significant side effects.    Encounter Diagnosis     ICD-10-CM    1. Psoriatic arthritis (HCC)  L40.50 methotrexate 2.5 MG tablet     predniSONE 5 mg tablet      2. High risk medication use  Z79.899       3. Long term (current) use of systemic steroids  Z79.52           Plan:  -Max out methotrexate  -Prednisone 2.5 daily for 28 days then stop, will do PRN for flares  -RTC 3 mos or sooner PRN    Ed Carlos DO, CCD    Ed Carlos DO, ROSETTE  [facility]      Interval History: Feels good on the methotrexate, seems to be helping; currently at 4 twice a day once a week    Lately the R middle finger has been getting some swelling in the DIP, then calms down.    No rashes, uveitic symptoms     Outpatient Medications Marked as Taking for the 4/11/24 encounter (Office Visit) with Ed Carlos DO   Medication    acetaminophen (TYLENOL) 500 mg tablet    ALPRAZolam (XANAX) 0.25 mg tablet    famotidine (PEPCID) 20 mg tablet    fluticasone  "(FLONASE) 50 mcg/act nasal spray    folic acid (KP Folic Acid) 1 mg tablet    ipratropium (ATROVENT) 0.03 % nasal spray    levocetirizine (XYZAL) 5 MG tablet    methotrexate 2.5 MG tablet    omeprazole (PriLOSEC) 20 mg delayed release capsule    predniSONE 5 mg tablet    rosuvastatin (CRESTOR) 10 MG tablet    sertraline (ZOLOFT) 25 mg tablet    [DISCONTINUED] methotrexate 2.5 MG tablet    [DISCONTINUED] predniSONE 5 mg tablet       Social History     Tobacco Use    Smoking status: Never     Passive exposure: Never    Smokeless tobacco: Never   Vaping Use    Vaping status: Never Used   Substance Use Topics    Alcohol use: Yes     Comment: very rare glass of wine    Drug use: No       Review of Systems:  Pertinent findings documented in HPI  ___________________________________    Physical Exam:    /74   Pulse 83   Temp (!) 97.3 °F (36.3 °C)   Ht 5' 2\" (1.575 m)   Wt 68.5 kg (151 lb)   SpO2 100%   BMI 27.62 kg/m²     General: Well appearing, in no distress.   Eyes: Sclera non-icteric. EOMI  HENT: No oral ulcers. MMM.   Extremities: Warm, well perfused, no edema.   Neuro: Alert and oriented. No gross focal neurological deficits.   Skin: No rashes  MSK exam: mild synovitis R 3rd PIP, no other appreciable synovitis or tenderness to palpation peripheral joints  ____________________________    Lab Results: relevant labs reviewed    Imaging Results: relevant images reviewed  "

## 2024-05-01 DIAGNOSIS — J30.89 NON-SEASONAL ALLERGIC RHINITIS, UNSPECIFIED TRIGGER: ICD-10-CM

## 2024-05-01 NOTE — TELEPHONE ENCOUNTER
----- Message from Katharine Chatman sent at 5/1/2024  8:43 AM EDT -----  Regarding: Prescription Request  Contact: [phone]  Hi Dr. Rossi,  My previous PCP had ordered Levocetirizine Dihydrochloride tabs 5 mg, one by mouth each evening.  Would you be willing to send an Rx to [facility] [address]?    Thank you, Katharine

## 2024-05-02 RX ORDER — LEVOCETIRIZINE DIHYDROCHLORIDE 5 MG/1
5 TABLET, FILM COATED ORAL EVERY EVENING
Qty: 90 TABLET | Refills: 1 | Status: SHIPPED | OUTPATIENT
Start: 2024-05-02

## 2024-05-03 DIAGNOSIS — K21.9 GERD WITHOUT ESOPHAGITIS: ICD-10-CM

## 2024-05-03 RX ORDER — OMEPRAZOLE 20 MG/1
20 CAPSULE, DELAYED RELEASE ORAL DAILY
Qty: 90 CAPSULE | Refills: 1 | Status: SHIPPED | OUTPATIENT
Start: 2024-05-03

## 2024-05-16 ENCOUNTER — TELEPHONE (OUTPATIENT)
Age: 74
End: 2024-05-16

## 2024-05-16 NOTE — TELEPHONE ENCOUNTER
Call from patient advising she is currently in [place] for her grandsons graduation until Sunday.   She has come down with a sore throat which started about a week ago and then it went into a loose chest cold and bringing up phelm with coughing.  She is asking if in any way Dr. Rossi would be able to call a z pack into the [facility] [address] [place] [address] [phone]  Please call her with any questions or if she should be directed to an urgent care for treatment. She asked that I try Dr. Rossi first.

## 2024-05-21 ENCOUNTER — OFFICE VISIT (OUTPATIENT)
Dept: INTERNAL MEDICINE CLINIC | Facility: CLINIC | Age: 74
End: 2024-05-21
Payer: MEDICARE

## 2024-05-21 VITALS
DIASTOLIC BLOOD PRESSURE: 80 MMHG | SYSTOLIC BLOOD PRESSURE: 126 MMHG | OXYGEN SATURATION: 99 % | HEART RATE: 70 BPM | TEMPERATURE: 97.4 F

## 2024-05-21 DIAGNOSIS — N18.31 STAGE 3A CHRONIC KIDNEY DISEASE (HCC): ICD-10-CM

## 2024-05-21 DIAGNOSIS — J01.20 ACUTE NON-RECURRENT ETHMOIDAL SINUSITIS: Primary | ICD-10-CM

## 2024-05-21 PROCEDURE — G2211 COMPLEX E/M VISIT ADD ON: HCPCS | Performed by: INTERNAL MEDICINE

## 2024-05-21 PROCEDURE — 99213 OFFICE O/P EST LOW 20 MIN: CPT | Performed by: INTERNAL MEDICINE

## 2024-05-21 RX ORDER — AZITHROMYCIN 250 MG/1
TABLET, FILM COATED ORAL
Qty: 6 TABLET | Refills: 0 | Status: SHIPPED | OUTPATIENT
Start: 2024-05-21 | End: 2024-05-25

## 2024-05-21 NOTE — PROGRESS NOTES
Assessment/Plan:    Acute non-recurrent ethmoidal sinusitis  Rx for Z-Yan No. 1 instructed plenty of fluids and rest Tylenol as directed as needed call if any change, worse or symptoms do not make RTO as scheduled call if any problems         Problem List Items Addressed This Visit        Respiratory    Acute non-recurrent ethmoidal sinusitis - Primary     Rx for Z-Yan No. 1 instructed plenty of fluids and rest Tylenol as directed as needed call if any change, worse or symptoms do not make RTO as scheduled call if any problems         Relevant Medications    azithromycin (ZITHROMAX) 250 mg tablet       Genitourinary    Stage 3a chronic kidney disease (HCC)           Subjective:      Patient ID: Katharine Chatman is a 74 y.o. female.    HPI 74-year-old female coming in with a chief complaint of cough, fatigue and frontal headache patient reports me approximately 7 to 10 days ago she had developed cold symptoms she reports me prior to this she had an increase in her allergies she has noticed symptoms of facial pain, nasal congestion postnasal drip and throat irritation mucus is clear she does have a cough to clear her throat no shortness of breath no high temperature cough mucous , no f/c  ha frontal fatigue ,  no gum and dental pain no sob , 7-10 days rapid cov neg.    The following portions of the patient's history were reviewed and updated as appropriate: allergies, current medications, past family history, past medical history, past social history, past surgical history and problem list.    Review of Systems   Constitutional:  Negative for appetite change, chills and fever.   HENT:  Positive for sinus pressure, sinus pain and sore throat. Negative for ear pain, rhinorrhea and sneezing.    Respiratory:  Positive for cough. Negative for shortness of breath and wheezing.    Gastrointestinal:  Negative for diarrhea, nausea and vomiting.   Neurological:  Negative for headaches.         Objective:    Return if  symptoms worsen or fail to improve, for Next scheduled follow up.    No results found.      Allergies   Allergen Reactions   • Clindamycin GI Intolerance and Other (See Comments)   • Penicillin G Rash   • Sulfa Antibiotics Hives and Rash   • Penicillins Hives and Rash       Past Medical History:   Diagnosis Date   • Anxiety 2014   • Arthritis    • Basal cell carcinoma    • Colon polyp    • Depression long time   • Depression with anxiety    • Eating disorder     overeating   • Fibrocystic breast disease, unspecified laterality    • GERD (gastroesophageal reflux disease)    • High cholesterol    • Irritable bowel syndrome    • Osteopenia      Past Surgical History:   Procedure Laterality Date   • BREAST CYST ASPIRATION      one performed-unsure which breast   • BREAST EXCISIONAL BIOPSY Bilateral     one on each breast aprox 1980s   • COLONOSCOPY      7/13/12, colon polyp biopsied, hemorrhoids - Dr. Garzon   • SCALP LESION REMOVAL W/ FLAP AND SKIN GRAFT      Behind right ear. Precancerous.   • SEPTOPLASTY  05/04/2018   • VAGINAL HYSTERECTOMY  1982    prolapse; about age 37     Current Outpatient Medications on File Prior to Visit   Medication Sig Dispense Refill   • acetaminophen (TYLENOL) 500 mg tablet Take 1,000 mg by mouth every 6 (six) hours as needed     • ALPRAZolam (XANAX) 0.25 mg tablet Take 1 tablet (0.25 mg total) by mouth 3 (three) times a day as needed for anxiety 30 tablet 0   • famotidine (PEPCID) 20 mg tablet Take 1 tablet (20 mg total) by mouth daily 90 tablet 3   • fluticasone (FLONASE) 50 mcg/act nasal spray 1 SPRAY INTO EACH NOSTRIL 2 (TWO) TIMES A DAY WITH MEALS (Patient taking differently: 1 spray into each nostril as needed) 48 mL 1   • folic acid (KP Folic Acid) 1 mg tablet Take 1 tablet (1 mg total) by mouth daily 90 tablet 2   • ipratropium (ATROVENT) 0.03 % nasal spray 1 spray into each nostril 3 (three) times a day 30 mL 1   • levocetirizine (XYZAL) 5 MG tablet Take 1 tablet (5 mg total)  by mouth every evening 90 tablet 1   • methotrexate 2.5 MG tablet Take 5 in the morning and 5 in the evening one day a week 120 tablet 2   • omeprazole (PriLOSEC) 20 mg delayed release capsule TAKE 1 CAPSULE BY MOUTH EVERY DAY 90 capsule 1   • rosuvastatin (CRESTOR) 10 MG tablet TAKE 1 TABLET BY MOUTH EVERY DAY (Patient taking differently: Take 20 mg by mouth daily) 90 tablet 1   • sertraline (ZOLOFT) 25 mg tablet TAKE 1 TABLET BY MOUTH EVERY DAY 90 tablet 1   • predniSONE 5 mg tablet Take half a tablet daily for 28 days, then stop. (Patient not taking: Reported on 5/21/2024)       No current facility-administered medications on file prior to visit.     Family History   Problem Relation Age of Onset   • Atrial fibrillation Mother    • Cancer Mother 80        kidney, bladder,breast   • Breast cancer Mother 80   • COPD Mother         mild   • Kidney cancer Mother 80   • Osteoporosis Mother    • Transient ischemic attack Mother    • Stroke Mother         TIA   • Arthritis Mother    • Cancer Father         prostate, skin   • Glaucoma Father    • Hypertension Father    • Arthritis Father    • Coronary artery disease Maternal Grandfather    • Arthritis Maternal Grandfather    • Coronary artery disease Maternal Aunt    • No Known Problems Maternal Grandmother    • No Known Problems Paternal Grandmother    • No Known Problems Paternal Grandfather    • No Known Problems Sister    • No Known Problems Daughter    • No Known Problems Son    • No Known Problems Paternal Aunt    • No Known Problems Paternal Aunt    • No Known Problems Paternal Aunt    • Cancer Brother         leukemia   • Cancer Brother         Polymyalgia rheumatica     Social History     Socioeconomic History   • Marital status: /Civil Union     Spouse name: Not on file   • Number of children: Not on file   • Years of education: Not on file   • Highest education level: Not on file   Occupational History   • Not on file   Tobacco Use   • Smoking status:  Never     Passive exposure: Never   • Smokeless tobacco: Never   Vaping Use   • Vaping status: Never Used   Substance and Sexual Activity   • Alcohol use: Yes     Comment: very rare glass of wine   • Drug use: No   • Sexual activity: Not Currently     Partners: Male     Birth control/protection: Post-menopausal, Surgical   Other Topics Concern   • Not on file   Social History Narrative    Advance directive declined by patient     Social Determinants of Health     Financial Resource Strain: Low Risk  (12/13/2023)    Overall Financial Resource Strain (CARDIA)    • Difficulty of Paying Living Expenses: Not hard at all   Food Insecurity: Not on file   Transportation Needs: No Transportation Needs (12/13/2023)    PRAPARE - Transportation    • Lack of Transportation (Medical): No    • Lack of Transportation (Non-Medical): No   Physical Activity: Insufficiently Active (5/21/2020)    Exercise Vital Sign    • Days of Exercise per Week: 3 days    • Minutes of Exercise per Session: 30 min   Stress: Stress Concern Present (5/22/2020)    [employer] - Occupational Stress Questionnaire    • Feeling of Stress : To some extent   Social Connections: Not on file   Intimate Partner Violence: Not on file   Housing Stability: Not on file     Vitals:    05/21/24 1100   BP: 126/80   BP Location: Left arm   Patient Position: Sitting   Cuff Size: Standard   Pulse: 70   Temp: (!) 97.4 °F (36.3 °C)   TempSrc: Tympanic   SpO2: 99%     Results for orders placed or performed in visit on 04/02/24   CBC and differential   Result Value Ref Range    WBC 6.49 4.31 - 10.16 Thousand/uL    RBC 4.39 3.81 - 5.12 Million/uL    Hemoglobin 13.0 11.5 - 15.4 g/dL    Hematocrit 41.2 34.8 - 46.1 %    MCV 94 82 - 98 fL    MCH 29.6 26.8 - 34.3 pg    MCHC 31.6 31.4 - 37.4 g/dL    RDW 14.6 11.6 - 15.1 %    MPV 10.6 8.9 - 12.7 fL    Platelets 247 149 - 390 Thousands/uL    nRBC 0 /100 WBCs    Segmented % 71 43 - 75 %    Immature Grans % 1  0 - 2 %    Lymphocytes % 17 14 - 44 %    Monocytes % 9 4 - 12 %    Eosinophils Relative 1 0 - 6 %    Basophils Relative 1 0 - 1 %    Absolute Neutrophils 4.68 1.85 - 7.62 Thousands/µL    Absolute Immature Grans 0.04 0.00 - 0.20 Thousand/uL    Absolute Lymphocytes 1.07 0.60 - 4.47 Thousands/µL    Absolute Monocytes 0.59 0.17 - 1.22 Thousand/µL    Eosinophils Absolute 0.07 0.00 - 0.61 Thousand/µL    Basophils Absolute 0.04 0.00 - 0.10 Thousands/µL   Comprehensive metabolic panel   Result Value Ref Range    Sodium 143 135 - 147 mmol/L    Potassium 3.8 3.5 - 5.3 mmol/L    Chloride 105 96 - 108 mmol/L    CO2 29 21 - 32 mmol/L    ANION GAP 9 4 - 13 mmol/L    BUN 21 5 - 25 mg/dL    Creatinine 0.96 0.60 - 1.30 mg/dL    Glucose, Fasting 82 65 - 99 mg/dL    Calcium 9.0 8.4 - 10.2 mg/dL    AST 28 13 - 39 U/L    ALT 25 7 - 52 U/L    Alkaline Phosphatase 59 34 - 104 U/L    Total Protein 6.9 6.4 - 8.4 g/dL    Albumin 4.1 3.5 - 5.0 g/dL    Total Bilirubin 0.45 0.20 - 1.00 mg/dL    eGFR 59 ml/min/1.73sq m     Weight (last 2 days)     None        There is no height or weight on file to calculate BMI.  BP      Temp      Pulse     Resp      SpO2      There were no vitals filed for this visit.There were no vitals filed for this visit.    /80 (BP Location: Left arm, Patient Position: Sitting, Cuff Size: Standard)   Pulse 70   Temp (!) 97.4 °F (36.3 °C) (Tympanic)   SpO2 99%       Ethmoid sinus tenderness bilaterally, clear nasal congestion and postnasal drip normal airway no exudate   Physical Exam  Vitals reviewed.   Constitutional:       General: She is not in acute distress.     Appearance: She is well-developed. She is not diaphoretic.   HENT:      Head: Normocephalic and atraumatic.      Right Ear: Tympanic membrane and external ear normal. Tympanic membrane is not erythematous.      Left Ear: Tympanic membrane and external ear normal. Tympanic membrane is not erythematous.      Nose: Nose normal.      Mouth/Throat:       Mouth: Oropharynx is clear and moist.      Pharynx: No oropharyngeal exudate.   Eyes:      General: No scleral icterus.        Right eye: No discharge.         Left eye: No discharge.      Extraocular Movements: EOM normal.      Conjunctiva/sclera: Conjunctivae normal.      Pupils: Pupils are equal, round, and reactive to light.   Cardiovascular:      Heart sounds: Normal heart sounds. No murmur heard.  Pulmonary:      Effort: No respiratory distress.      Breath sounds: No wheezing or rales.   Lymphadenopathy:      Cervical: No cervical adenopathy.   Neurological:      Mental Status: She is alert.

## 2024-05-21 NOTE — ASSESSMENT & PLAN NOTE
Rx for Z-Yan No. 1 instructed plenty of fluids and rest Tylenol as directed as needed call if any change, worse or symptoms do not make RTO as scheduled call if any problems

## 2024-05-23 PROBLEM — N18.2 STAGE 2 CHRONIC KIDNEY DISEASE: Status: RESOLVED | Noted: 2021-12-12 | Resolved: 2024-05-23

## 2024-06-20 PROBLEM — J01.20 ACUTE NON-RECURRENT ETHMOIDAL SINUSITIS: Status: RESOLVED | Noted: 2024-05-21 | Resolved: 2024-06-20

## 2024-06-25 ENCOUNTER — PATIENT MESSAGE (OUTPATIENT)
Dept: RHEUMATOLOGY | Facility: CLINIC | Age: 74
End: 2024-06-25

## 2024-06-25 DIAGNOSIS — L40.50 PSORIATIC ARTHRITIS (HCC): Primary | ICD-10-CM

## 2024-06-27 ENCOUNTER — LAB (OUTPATIENT)
Dept: LAB | Age: 74
End: 2024-06-27
Payer: MEDICARE

## 2024-06-27 DIAGNOSIS — L40.50 PSORIATIC ARTHRITIS (HCC): ICD-10-CM

## 2024-06-27 DIAGNOSIS — F41.8 DEPRESSION WITH ANXIETY: ICD-10-CM

## 2024-06-27 DIAGNOSIS — Z79.899 HIGH RISK MEDICATION USE: ICD-10-CM

## 2024-06-27 DIAGNOSIS — E03.8 SUBCLINICAL HYPOTHYROIDISM: ICD-10-CM

## 2024-06-27 LAB
ALBUMIN SERPL BCG-MCNC: 4.1 G/DL (ref 3.5–5)
ALP SERPL-CCNC: 61 U/L (ref 34–104)
ALT SERPL W P-5'-P-CCNC: 19 U/L (ref 7–52)
ANION GAP SERPL CALCULATED.3IONS-SCNC: 9 MMOL/L (ref 4–13)
AST SERPL W P-5'-P-CCNC: 25 U/L (ref 13–39)
BASOPHILS # BLD AUTO: 0.05 THOUSANDS/ÂΜL (ref 0–0.1)
BASOPHILS NFR BLD AUTO: 1 % (ref 0–1)
BILIRUB SERPL-MCNC: 0.38 MG/DL (ref 0.2–1)
BUN SERPL-MCNC: 19 MG/DL (ref 5–25)
CALCIUM SERPL-MCNC: 9.5 MG/DL (ref 8.4–10.2)
CHLORIDE SERPL-SCNC: 105 MMOL/L (ref 96–108)
CO2 SERPL-SCNC: 30 MMOL/L (ref 21–32)
CREAT SERPL-MCNC: 0.84 MG/DL (ref 0.6–1.3)
CRP SERPL QL: 2.3 MG/L
EOSINOPHIL # BLD AUTO: 0.14 THOUSAND/ÂΜL (ref 0–0.61)
EOSINOPHIL NFR BLD AUTO: 4 % (ref 0–6)
ERYTHROCYTE [DISTWIDTH] IN BLOOD BY AUTOMATED COUNT: 14.3 % (ref 11.6–15.1)
ERYTHROCYTE [SEDIMENTATION RATE] IN BLOOD: 12 MM/HOUR (ref 0–29)
GFR SERPL CREATININE-BSD FRML MDRD: 69 ML/MIN/1.73SQ M
GLUCOSE P FAST SERPL-MCNC: 85 MG/DL (ref 65–99)
HCT VFR BLD AUTO: 40.9 % (ref 34.8–46.1)
HGB BLD-MCNC: 13.3 G/DL (ref 11.5–15.4)
IMM GRANULOCYTES # BLD AUTO: 0.02 THOUSAND/UL (ref 0–0.2)
IMM GRANULOCYTES NFR BLD AUTO: 1 % (ref 0–2)
LYMPHOCYTES # BLD AUTO: 0.89 THOUSANDS/ÂΜL (ref 0.6–4.47)
LYMPHOCYTES NFR BLD AUTO: 22 % (ref 14–44)
MCH RBC QN AUTO: 31.2 PG (ref 26.8–34.3)
MCHC RBC AUTO-ENTMCNC: 32.5 G/DL (ref 31.4–37.4)
MCV RBC AUTO: 96 FL (ref 82–98)
MONOCYTES # BLD AUTO: 0.3 THOUSAND/ÂΜL (ref 0.17–1.22)
MONOCYTES NFR BLD AUTO: 7 % (ref 4–12)
NEUTROPHILS # BLD AUTO: 2.63 THOUSANDS/ÂΜL (ref 1.85–7.62)
NEUTS SEG NFR BLD AUTO: 65 % (ref 43–75)
NRBC BLD AUTO-RTO: 0 /100 WBCS
PLATELET # BLD AUTO: 259 THOUSANDS/UL (ref 149–390)
PMV BLD AUTO: 10.9 FL (ref 8.9–12.7)
POTASSIUM SERPL-SCNC: 4.3 MMOL/L (ref 3.5–5.3)
PROT SERPL-MCNC: 6.9 G/DL (ref 6.4–8.4)
RBC # BLD AUTO: 4.26 MILLION/UL (ref 3.81–5.12)
SODIUM SERPL-SCNC: 144 MMOL/L (ref 135–147)
T4 FREE SERPL-MCNC: 0.71 NG/DL (ref 0.61–1.12)
TSH SERPL DL<=0.05 MIU/L-ACNC: 6.34 UIU/ML (ref 0.45–4.5)
WBC # BLD AUTO: 4.03 THOUSAND/UL (ref 4.31–10.16)

## 2024-06-27 PROCEDURE — 84443 ASSAY THYROID STIM HORMONE: CPT

## 2024-06-27 PROCEDURE — 85652 RBC SED RATE AUTOMATED: CPT

## 2024-06-27 PROCEDURE — 84439 ASSAY OF FREE THYROXINE: CPT

## 2024-06-27 PROCEDURE — 36415 COLL VENOUS BLD VENIPUNCTURE: CPT

## 2024-06-27 PROCEDURE — 86140 C-REACTIVE PROTEIN: CPT

## 2024-06-27 PROCEDURE — 80053 COMPREHEN METABOLIC PANEL: CPT

## 2024-06-27 PROCEDURE — 85025 COMPLETE CBC W/AUTO DIFF WBC: CPT

## 2024-06-28 RX ORDER — ALPRAZOLAM 0.25 MG/1
0.25 TABLET ORAL 3 TIMES DAILY PRN
Qty: 30 TABLET | Refills: 0 | Status: SHIPPED | OUTPATIENT
Start: 2024-06-28

## 2024-07-06 DIAGNOSIS — E78.2 MIXED HYPERLIPIDEMIA: ICD-10-CM

## 2024-07-08 RX ORDER — ROSUVASTATIN CALCIUM 10 MG/1
10 TABLET, COATED ORAL DAILY
Qty: 90 TABLET | Refills: 1 | Status: SHIPPED | OUTPATIENT
Start: 2024-07-08

## 2024-07-11 ENCOUNTER — OFFICE VISIT (OUTPATIENT)
Dept: RHEUMATOLOGY | Facility: CLINIC | Age: 74
End: 2024-07-11
Payer: MEDICARE

## 2024-07-11 VITALS
BODY MASS INDEX: 27.97 KG/M2 | HEIGHT: 62 IN | HEART RATE: 73 BPM | WEIGHT: 152 LBS | OXYGEN SATURATION: 97 % | DIASTOLIC BLOOD PRESSURE: 68 MMHG | TEMPERATURE: 97.9 F | SYSTOLIC BLOOD PRESSURE: 120 MMHG

## 2024-07-11 DIAGNOSIS — Z11.59 SCREENING FOR VIRAL DISEASE: ICD-10-CM

## 2024-07-11 DIAGNOSIS — L40.50 PSORIATIC ARTHRITIS (HCC): Primary | ICD-10-CM

## 2024-07-11 DIAGNOSIS — Z79.899 HIGH RISK MEDICATION USE: ICD-10-CM

## 2024-07-11 DIAGNOSIS — M25.511 RIGHT SHOULDER PAIN, UNSPECIFIED CHRONICITY: ICD-10-CM

## 2024-07-11 PROCEDURE — G2211 COMPLEX E/M VISIT ADD ON: HCPCS | Performed by: STUDENT IN AN ORGANIZED HEALTH CARE EDUCATION/TRAINING PROGRAM

## 2024-07-11 PROCEDURE — 99215 OFFICE O/P EST HI 40 MIN: CPT | Performed by: STUDENT IN AN ORGANIZED HEALTH CARE EDUCATION/TRAINING PROGRAM

## 2024-07-11 RX ORDER — PREDNISONE 5 MG/1
TABLET ORAL
Qty: 14 TABLET | Refills: 0 | Status: SHIPPED | OUTPATIENT
Start: 2024-07-11 | End: 2024-07-17

## 2024-07-11 NOTE — ASSESSMENT & PLAN NOTE
75yo female presents for follow up of psoriatic arthritis. Currently in a flare with joint pain and swelling in her PIPs and DIPs. On maximum dose of methotrexate. Will treat with 6 day prednisone taper. Discussed with patient that if symptoms do not resolve with prednisone or if they return, will need to consider initiating biologic such as Humira. Discussed benefits and risks of Humira at today's appointment. Patient will send message after completing prednisone taper and will return to clinic in 3 months.

## 2024-07-11 NOTE — PROGRESS NOTES
Ambulatory Visit  Name: Katharine Chatman      : 1950      MRN: [id number]  Encounter Provider: Ed Carlos DO  Encounter Date: 2024   Encounter department: [facility]    Assessment & Plan   1. Psoriatic arthritis (HCC)  Assessment & Plan:  73yo female presents for follow up of psoriatic arthritis. Currently in a flare with joint pain and swelling in her PIPs and DIPs. On maximum dose of methotrexate. Will treat with 6 day prednisone taper. Discussed with patient that if symptoms do not resolve with prednisone or if they return, will need to consider initiating biologic such as Humira. Discussed benefits and risks of Humira at today's appointment. Patient will send message after completing prednisone taper and will return to clinic in 3 months.  Orders:  -     predniSONE 5 mg tablet; Take 4 tablets (20 mg total) by mouth daily for 2 days, THEN 2 tablets (10 mg total) daily for 2 days, THEN 1 tablet (5 mg total) daily for 2 days.  2. Right shoulder pain, unspecified chronicity  Assessment & Plan:  Patient with right shoulder pain after cleaning gutters. Positive empty can and cross arm test. More likely tendinopathy vs joint disease related to psoriatic arthritis. Patient has been referred to therapy.  Orders:  -     Ambulatory Referral to Occupational Therapy; Future  3. High risk medication use  4. Screening for viral disease  -     Hepatitis B core antibody, total; Future  -     Hepatitis B surface antibody; Future  -     Hepatitis B surface antigen; Future  -     Hepatitis C antibody; Future  -     Quantiferon TB Gold Plus Assay; Future      History of Present Illness     Katharine Chatman is a 74 y.o. female who presents for follow up of psoriatic arthritis. She currently does not feel as well controlled but does not think her symptoms are as severe as a year ago. She endorses pain and swelling in her fingers with worst pain in her right third DIP and left second  "and third DIPs. She is also having pain in her legs and shoulders. She is fatigued and feels she is dragging, increasing her folic acid did not change her symptoms. Patient has been off of prednisone since May.    Patient is having right shoulder pain in the shoulder blade and down the front of her upper arm that is worsened by activity, particularly with shoulder flexion. She was cleaning out gutters and thinks this may have caused the pain.     Permanent History: First seen by Dr. Whitney in Aug 2023 for hand arthritis and elevated CRP; was dxd with PMR and continued on prednisone taper.      When I first saw her Nov 2023, symptoms were not at all consistent with PMR, symptoms much more consistent with PsA with peripheral inflammatory symptoms not controlled on 5 mg prednisone, history dactylitis and appreciable nail pitting. Weaned off of prednisone herself and did not develop any shoulder or hip stiffness. Started on methotrexate for PsA vs seronegative RA.     PMH vitiligo around the arms and neck and legs.    Denies:  Fever  Oral/nasal/genital ulcers  Muscle weakness  Uveitis  Dactylitis  Dysphagia/odynophagia  CP  [facility]  SOB at rest  Pleurisy  Stomach pain  Constipation/bloating  Hematochezia  Gross hematuria  Numbness/tingling  Joint issues other than noted above   Review of Systems    Objective     /68   Pulse 73   Temp 97.9 °F (36.6 °C)   Ht 5' 2\" (1.575 m)   Wt 68.9 kg (152 lb)   SpO2 97%   BMI 27.80 kg/m²     Physical Exam  Constitutional:       General: She is not in acute distress.     Appearance: Normal appearance.   HENT:      Head: Normocephalic and atraumatic.      Mouth/Throat:      Mouth: Mucous membranes are moist.      Pharynx: Oropharynx is clear.   Eyes:      General: No scleral icterus.     Extraocular Movements: Extraocular movements intact.   Pulmonary:      Effort: Pulmonary effort is normal.   Musculoskeletal:      Comments: Bony changes and tenderness of DIPs, tenderness and " synovial hypertrophy of left PIPs  Right shoulder: positive empty can test and cross arm test   Skin:     General: Skin is warm and dry.   Neurological:      General: No focal deficit present.      Mental Status: She is alert and oriented to person, place, and time.   Psychiatric:         Mood and Affect: Mood normal.         Thought Content: Thought content normal.       Muscle strength   Right   Left    Shoulder abduction limited by pain  Shoulder abduction 5/5   Shoulder adduction Limited by pain  Shoulder adduction 5/5   Shoulder internal rotation Limited by pain  Shoulder internal rotation 5/5   Shoulder external rotation Limited by pain  Shoulder external rotation 5/5   Elbow flexion 5/5  Elbow flexion 5/5   Elbow extension 5/5  Elbow extension 5/5   Wrist flexion 5/5  Wrist flexion 5/5   Wrist extension 5/5  Wrist extension 5/5   Hip flexion 5/5  Hip flexion 5/5   Hip extension 5/5  Hip extension 5/5   Hip abduction 5/5  Hip abduction 5/5   Hip adduction 5/5  Hip adduction 5/5   Knee flexion 5/5  Knee flexion 5/5   Knee extension 5/5  Knee extension 5/5   Dorsiflexion 5/5  Dorsiflexion 5/5   Plantarflexion 5/5  Plantarflexion 5/5          Neck   Flexion 5/5   Extension 5/5   R sidebending 5/5   L sidebending 5/5      Administrative Statements

## 2024-07-11 NOTE — ASSESSMENT & PLAN NOTE
Patient with right shoulder pain after cleaning gutters. Positive empty can and cross arm test. More likely tendinopathy vs joint disease related to psoriatic arthritis. Patient has been referred to therapy.

## 2024-07-11 NOTE — PATIENT INSTRUCTIONS
Take prednisone as follows:  20 mg daily for 2 days, then  10 mg daily for 2 days, then  5 mg daily for 2 days, then stop    Let me know how you are feeling after the prednisone taper

## 2024-07-15 ENCOUNTER — TELEPHONE (OUTPATIENT)
Age: 74
End: 2024-07-15

## 2024-07-15 NOTE — TELEPHONE ENCOUNTER
"Sherry from [facility] called as they have this patient scheduled for tomorrow but the referral is ordered as \"occupational therapy\" and they need it changed over to \"physical therapy\" as it is for the right shoulder. They asked for this to be updated through Epic and or fax them the new order. Please advise    [facility]  Fax #[phone]  "

## 2024-07-16 ENCOUNTER — EVALUATION (OUTPATIENT)
Dept: PHYSICAL THERAPY | Age: 74
End: 2024-07-16
Payer: MEDICARE

## 2024-07-16 DIAGNOSIS — Z12.31 ENCOUNTER FOR SCREENING MAMMOGRAM FOR MALIGNANT NEOPLASM OF BREAST: Primary | ICD-10-CM

## 2024-07-16 DIAGNOSIS — M25.511 RIGHT SHOULDER PAIN, UNSPECIFIED CHRONICITY: Primary | ICD-10-CM

## 2024-07-16 PROCEDURE — 97161 PT EVAL LOW COMPLEX 20 MIN: CPT | Performed by: PHYSICAL THERAPIST

## 2024-07-16 PROCEDURE — 97110 THERAPEUTIC EXERCISES: CPT | Performed by: PHYSICAL THERAPIST

## 2024-07-16 NOTE — PROGRESS NOTES
PT Evaluation     Today's date: 2024  Patient name: Katharine Chatman  : 1950  MRN: [id number]  Referring provider: Ed Carlos DO  Dx:   Encounter Diagnosis     ICD-10-CM    1. Right shoulder pain, unspecified chronicity  M25.511 Ambulatory Referral to Physical Therapy                     Assessment  Impairments: abnormal or restricted ROM, impaired physical strength, pain with function and poor posture     Assessment details: Patient presents with mid scapular myofascial pain and RTC tendonitis.  Patient presents with decreased ROM, strength, postural dysfunction, and pain.  Patient is an excellent candidate for PT intervention.    Plan    Planned therapy interventions: manual therapy, strengthening, stretching, therapeutic exercise and flexibility    Frequency: 2x week  Duration in weeks: 3    Subjective Evaluation    History of Present Illness  Mechanism of injury: Patient reports an acute onset of R mid-scapular/shoulder pain after trimming trees.  Patient was reaching excessively. Patient is L hand dominant.  Sx's aggravated by shoulder movements.  Neg c/o weakness.  Intermittent sx's into her upper arm to elbow.    Quality of life: excellent    Patient Goals  Patient goals for therapy: decreased pain, increased strength, independence with ADLs/IADLs and increased motion    Pain  Current pain ratin  At best pain rating: 3  At worst pain ratin  Quality: tight, throbbing and dull ache  Aggravating factors: overhead activity and lifting  Progression: no change      Objective     Passive Range of Motion     Right Shoulder   Flexion: 165 degrees   External rotation 90°: 80 degrees   Internal rotation 90°: 65 degrees     Strength/Myotome Testing     Right Shoulder     Planes of Motion   Flexion: 3+   Abduction: 3   External rotation at 0°: 4   Internal rotation at 0°: 4     Additional Strength Details  Neg myotomal changes.      Tests     Right Shoulder   Positive empty can, Neer's and  painful arc.   Negative drop arm, external rotation lag sign, ULTT1, ULTT2, ULTT3 and ULTT4.     Additional Tests Details  Cervical testing negative.             Precautions: None      Manuals             UBE - post             Jt mobs grade II/III             MRE's IR/ER                          Neuro Re-Ed                                                                                                        Ther Ex             SidelyingER             Prone ext             Prone abd                          Tila IR             [place] Biceps             [place] Triceps                                                                                                        Ther Activity                                       Gait Training                                       Modalities

## 2024-07-17 DIAGNOSIS — F41.8 DEPRESSION WITH ANXIETY: ICD-10-CM

## 2024-07-17 RX ORDER — SERTRALINE HYDROCHLORIDE 25 MG/1
TABLET, FILM COATED ORAL
Qty: 90 TABLET | Refills: 1 | Status: SHIPPED | OUTPATIENT
Start: 2024-07-17

## 2024-07-19 ENCOUNTER — OFFICE VISIT (OUTPATIENT)
Dept: PHYSICAL THERAPY | Age: 74
End: 2024-07-19
Payer: MEDICARE

## 2024-07-19 DIAGNOSIS — M25.511 RIGHT SHOULDER PAIN, UNSPECIFIED CHRONICITY: Primary | ICD-10-CM

## 2024-07-19 PROCEDURE — 97140 MANUAL THERAPY 1/> REGIONS: CPT | Performed by: PHYSICAL THERAPIST

## 2024-07-19 PROCEDURE — 97110 THERAPEUTIC EXERCISES: CPT | Performed by: PHYSICAL THERAPIST

## 2024-07-19 NOTE — PROGRESS NOTES
Daily Note     Today's date: 2024  Patient name: Katharine Chatman  : 1950  MRN: [id number]  Referring provider: Ed Carlos DO  Dx:   Encounter Diagnosis     ICD-10-CM    1. Right shoulder pain, unspecified chronicity  M25.511                      Subjective: Patient with posterior shoulder discomfort with TB IR      Objective: See treatment diary below      Assessment: Tolerated treatment well. Patient would benefit from continued PT      Plan: Continue per plan of care.      Precautions: None      Manuals 24            UBE - post 3 minutes            Jt mobs grade II/III completed            MRE's IR/ER 2x10            PROM all motions X15 minutes            Neuro Re-Ed                                                                                                        Ther Ex             SidelyingER 1# - 2x10            Prone ext 2# - 2x10            Prone abd 0# - 2x10                         [place] IR 2x10            [place] Biceps 2x10            [place] Triceps 2x10                                                                                                       Ther Activity                                       Gait Training                                       Modalities

## 2024-07-24 ENCOUNTER — OFFICE VISIT (OUTPATIENT)
Dept: PHYSICAL THERAPY | Age: 74
End: 2024-07-24
Payer: MEDICARE

## 2024-07-24 DIAGNOSIS — M25.511 RIGHT SHOULDER PAIN, UNSPECIFIED CHRONICITY: Primary | ICD-10-CM

## 2024-07-24 PROCEDURE — 97140 MANUAL THERAPY 1/> REGIONS: CPT | Performed by: PHYSICAL THERAPIST

## 2024-07-24 PROCEDURE — 97110 THERAPEUTIC EXERCISES: CPT | Performed by: PHYSICAL THERAPIST

## 2024-07-24 NOTE — PROGRESS NOTES
Daily Note     Today's date: 2024  Patient name: Katharine Chatman  : 1950  MRN: [id number]  Referring provider: Ed Carlos DO  Dx:   Encounter Diagnosis     ICD-10-CM    1. Right shoulder pain, unspecified chronicity  M25.511                      Subjective: Patient sx's are unchanged.      Objective: See treatment diary below      Assessment: Tolerated treatment well. Patient would benefit from continued PT      Plan: Continue per plan of care.      Precautions: None      Manuals 24           UBE - post 3 minutes 4 minutes - 2:2           Jt mobs grade II/III completed completed           MRE's IR/ER 2x10 2x12           PROM all motions X15 minutes X15 minutes           Neuro Re-Ed                                                                                                        Ther Ex             SidelyingER 1# - 2x10 1# - 2x10           Prone ext 2# - 2x10 2# - 2x10           Prone abd 0# - 2x10 0# - 2x10                        [place] IR 2x10 2x10           [place] Biceps 2x10 2x10           [place] Triceps 2x10 2x10                                                                                                      Ther Activity                                       Gait Training                                       Modalities

## 2024-07-26 ENCOUNTER — OFFICE VISIT (OUTPATIENT)
Dept: PHYSICAL THERAPY | Age: 74
End: 2024-07-26
Payer: MEDICARE

## 2024-07-26 ENCOUNTER — TELEPHONE (OUTPATIENT)
Dept: RHEUMATOLOGY | Facility: CLINIC | Age: 74
End: 2024-07-26

## 2024-07-26 DIAGNOSIS — L40.50 PSORIATIC ARTHRITIS (HCC): ICD-10-CM

## 2024-07-26 DIAGNOSIS — M25.511 RIGHT SHOULDER PAIN, UNSPECIFIED CHRONICITY: Primary | ICD-10-CM

## 2024-07-26 PROCEDURE — 97110 THERAPEUTIC EXERCISES: CPT | Performed by: PHYSICAL THERAPIST

## 2024-07-26 PROCEDURE — 97140 MANUAL THERAPY 1/> REGIONS: CPT | Performed by: PHYSICAL THERAPIST

## 2024-07-26 RX ORDER — FOLIC ACID 1 MG/1
2 TABLET ORAL DAILY
Qty: 180 TABLET | Refills: 0 | Status: SHIPPED | OUTPATIENT
Start: 2024-07-26

## 2024-07-26 NOTE — PROGRESS NOTES
Daily Note     Today's date: 2024  Patient name: Katharine Chatman  : 1950  MRN: [id number]  Referring provider: Ed Carlos DO  Dx:   Encounter Diagnosis     ICD-10-CM    1. Right shoulder pain, unspecified chronicity  M25.511                      Subjective: Patient continues to struggle with pain - also at night.      Objective: See treatment diary below.  PROM and AROM are WNL.  Strength demonstrates only minor limitations.  Cervical origin?  Added L Sb'ing cervical stretch.      Assessment: Tolerated treatment well. Patient would benefit from continued PT      Plan: Continue per plan of care.      Precautions: None      Manuals 24          UBE - post 3 minutes 4 minutes - 2:2 X4 minutes          Jt mobs grade II/III all motions completed completed completed          MRE's IR/ER 2x10 2x12 2x12          PROM all motions X15 minutes X15 minutes X15 minutes          Neuro Re-Ed                                                                                                        Ther Ex             SidelyingER 1# - 2x10 1# - 2x10 1#/0#          Prone ext 2# - 2x10 2# - 2x10 0# - 2x10          Prone abd 0# - 2x10 0# - 2x10 0# - 2x10                       [place] IR 2x10 2x10 2x10 - 3.8          [place] Biceps 2x10 2x10 2x10 - 3.5          [place] Triceps 2x10 2x10 2x10 - 5.7                       [place] rows   2x10 - 8.5                                                                           Ther Activity                                       Gait Training                                       Modalities

## 2024-08-05 ENCOUNTER — APPOINTMENT (OUTPATIENT)
Dept: PHYSICAL THERAPY | Age: 74
End: 2024-08-05
Payer: MEDICARE

## 2024-08-07 ENCOUNTER — OFFICE VISIT (OUTPATIENT)
Dept: PHYSICAL THERAPY | Age: 74
End: 2024-08-07
Payer: MEDICARE

## 2024-08-07 DIAGNOSIS — M25.511 RIGHT SHOULDER PAIN, UNSPECIFIED CHRONICITY: Primary | ICD-10-CM

## 2024-08-07 PROCEDURE — 97110 THERAPEUTIC EXERCISES: CPT

## 2024-08-07 PROCEDURE — 97140 MANUAL THERAPY 1/> REGIONS: CPT

## 2024-08-07 NOTE — PROGRESS NOTES
Daily Note     Today's date: 2024  Patient name: Katharine Chatman  : 1950  MRN: [id number]  Referring provider: Ed Carlos DO  Dx: No diagnosis found.               Subjective: Patient states over the weekend she drive 10hrs and then next day mowed the lawn. She has had increased pain in shoulder since then.       Objective: See treatment diary below      Assessment: Tolerated treatment fair. Pain with active movement especially away from her body. Aching pain at end range flexion. Sharp pain with IR at 90* and 45*. Discomfort with MRE ER but not IR. Pain is located in iain-lateral delt. She performed outlined exercises with no weight and minimal discomfort. Instructed to not push through pain. Discussed icing at home and doing activities to tolerance at home, if pain not beginning to subside, may want to F/U with . Ended session with ice which was able to reduce pain level. Patient would benefit from continued PT      Plan: Progress treatment as tolerated.       Precautions: None      Manuals 24         UBE - post 3 minutes 4 minutes - 2:2 X4 minutes held         Jt mobs grade II/III all motions completed completed completed STM delt/bicep         MRE's IR/ER 2x10 2x12 2x12 2x12         PROM all motions X15 minutes X15 minutes X15 minutes X15 min         Neuro Re-Ed                                                                                                        Ther Ex             SidelyingER 1# - 2x10 1# - 2x10 1#/0# 0#   2x10         Prone ext 2# - 2x10 2# - 2x10 0# - 2x10 0# 2x10         Prone abd 0# - 2x10 0# - 2x10 0# - 2x10 0# 2x10          Prone row     2#/0# 2x10         [place] IR 2x10 2x10 2x10 - 3.8 held         [place] Biceps 2x10 2x10 2x10 - 3.5 held         [place] Triceps 2x10 2x10 2x10 - 5.7 held                      [place] rows   2x10 - 8.5          Pendulums    20x                                                             Ther Activity                                        Gait Training                                       Modalities

## 2024-08-12 ENCOUNTER — OFFICE VISIT (OUTPATIENT)
Dept: PHYSICAL THERAPY | Age: 74
End: 2024-08-12
Payer: MEDICARE

## 2024-08-12 DIAGNOSIS — M25.511 RIGHT SHOULDER PAIN, UNSPECIFIED CHRONICITY: Primary | ICD-10-CM

## 2024-08-12 PROCEDURE — 97110 THERAPEUTIC EXERCISES: CPT | Performed by: PHYSICAL THERAPIST

## 2024-08-12 PROCEDURE — 97140 MANUAL THERAPY 1/> REGIONS: CPT | Performed by: PHYSICAL THERAPIST

## 2024-08-12 NOTE — PROGRESS NOTES
Daily Note     Today's date: 2024  Patient name: Katharine Chatman  : 1950  MRN: [id number]  Referring provider: Ed Carlos DO  Dx:   Encounter Diagnosis     ICD-10-CM    1. Right shoulder pain, unspecified chronicity  M25.511                      Subjective: Patient continues to have episodes of minimal pain and a lot of pain.      Objective: See treatment diary below      Assessment: Tolerated treatment well. Patient would benefit from continued PT.  Assessment continues to point towards the shoulder.      Plan: Continue per plan of care.      Precautions: None      Manuals 24        UBE - post 3 minutes 4 minutes - 2:2 X4 minutes held 4 minutes        Jt mobs grade II/III all motions completed completed completed STM delt/bicep completed        MRE's IR/ER 2x10 2x12 2x12 2x12 2x12        PROM all motions X15 minutes X15 minutes X15 minutes X15 min X15 minutes        Neuro Re-Ed                                                                                                        Ther Ex             SidelyingER 1# - 2x10 1# - 2x10 1#/0# 0#   2x10 1# - 2x10        Prone ext 2# - 2x10 2# - 2x10 0# - 2x10 0# 2x10 1# - 2x10        Prone abd 0# - 2x10 0# - 2x10 0# - 2x10 0# 2x10  0# - 2x10        Prone row     2#/0# 2x10 nt        [place] IR 2x10 2x10 2x10 - 3.8 held 3x10        [place] Biceps 2x10 2x10 2x10 - 3.5 held nt        [place] Triceps 2x10 2x10 2x10 - 5.7 held nt                     [place] rows   2x10 - 8.5  2x10        Pendulums    20x nt                                                            Ther Activity                                       Gait Training                                       Modalities

## 2024-08-14 ENCOUNTER — APPOINTMENT (OUTPATIENT)
Dept: PHYSICAL THERAPY | Age: 74
End: 2024-08-14
Payer: MEDICARE

## 2024-08-15 ENCOUNTER — OFFICE VISIT (OUTPATIENT)
Dept: PHYSICAL THERAPY | Age: 74
End: 2024-08-15
Payer: MEDICARE

## 2024-08-15 DIAGNOSIS — M25.511 RIGHT SHOULDER PAIN, UNSPECIFIED CHRONICITY: Primary | ICD-10-CM

## 2024-08-15 PROCEDURE — 97140 MANUAL THERAPY 1/> REGIONS: CPT | Performed by: PHYSICAL THERAPIST

## 2024-08-15 PROCEDURE — 97110 THERAPEUTIC EXERCISES: CPT | Performed by: PHYSICAL THERAPIST

## 2024-08-15 NOTE — PROGRESS NOTES
Daily Note     Today's date: 8/15/2024  Patient name: Katharine Chatman  : 1950  MRN: [id number]  Referring provider: Ed Carlos DO  Dx:   Encounter Diagnosis     ICD-10-CM    1. Right shoulder pain, unspecified chronicity  M25.511                      Subjective: No change in sx's      Objective: See treatment diary below      Assessment: Tolerated treatment well. Patient would benefit from continued PT      Plan: Continue per plan of care.      Precautions: None      Manuals 7/19/24 7/24 7/26 8/7 8/12 8/15       UBE - post 3 minutes 4 minutes - 2:2 X4 minutes held 4 minutes X4 minutes       Jt mobs grade II/III all motions completed completed completed STM delt/bicep completed completed       MRE's IR/ER 2x10 2x12 2x12 2x12 2x12 2x12       PROM all motions X15 minutes X15 minutes X15 minutes X15 min X15 minutes X15 minutes       Neuro Re-Ed                                                                                                        Ther Ex             SidelyingER 1# - 2x10 1# - 2x10 1#/0# 0#   2x10 1# - 2x10 1#       Prone ext 2# - 2x10 2# - 2x10 0# - 2x10 0# 2x10 1# - 2x10 1#       Prone abd 0# - 2x10 0# - 2x10 0# - 2x10 0# 2x10  0# - 2x10 1#       Prone row     2#/0# 2x10 nt Flexion - 1#       [place] IR 2x10 2x10 2x10 - 3.8 held 3x10 3x10       [place] Biceps 2x10 2x10 2x10 - 3.5 held nt nt       [place] Triceps 2x10 2x10 2x10 - 5.7 held nt nt                    [place] rows   2x10 - 8.5  2x10 2x10       Pendulums    20x nt        Standing empty can - no pain to <45      2x10 - 1#                                              Ther Activity                                       Gait Training                                       Modalities

## 2024-08-21 ENCOUNTER — APPOINTMENT (OUTPATIENT)
Dept: PHYSICAL THERAPY | Age: 74
End: 2024-08-21
Payer: MEDICARE

## 2024-08-23 ENCOUNTER — OFFICE VISIT (OUTPATIENT)
Dept: PHYSICAL THERAPY | Age: 74
End: 2024-08-23
Payer: MEDICARE

## 2024-08-23 DIAGNOSIS — M25.511 RIGHT SHOULDER PAIN, UNSPECIFIED CHRONICITY: Primary | ICD-10-CM

## 2024-08-23 PROCEDURE — 97140 MANUAL THERAPY 1/> REGIONS: CPT | Performed by: PHYSICAL THERAPIST

## 2024-08-23 PROCEDURE — 97110 THERAPEUTIC EXERCISES: CPT | Performed by: PHYSICAL THERAPIST

## 2024-08-23 NOTE — PROGRESS NOTES
Daily Note     Today's date: 2024  Patient name: Katharine Chatman  : 1950  MRN: [id number]  Referring provider: Ed Carlos DO  Dx:   Encounter Diagnosis     ICD-10-CM    1. Right shoulder pain, unspecified chronicity  M25.511                      Subjective: Patient continues to feel good with resting shoulder      Objective: See treatment diary below      Assessment: Tolerated treatment well. Patient would benefit from continued PT      Plan: Continue per plan of care.      Precautions: None      Manuals 7/19/24 7/24 7/26 8/7 8/12 8/15 8/23      UBE - post 3 minutes 4 minutes - 2:2 X4 minutes held 4 minutes X4 minutes X6 minutes      Jt mobs grade II/III all motions completed completed completed STM delt/bicep completed completed completed      MRE's IR/ER 2x10 2x12 2x12 2x12 2x12 2x12 2x12      PROM all motions X15 minutes X15 minutes X15 minutes X15 min X15 minutes X15 minutes X15 minutes      Neuro Re-Ed                                                                                                        Ther Ex             SidelyingER 1# - 2x10 1# - 2x10 1#/0# 0#   2x10 1# - 2x10 1# 1# - 2x10      Prone ext 2# - 2x10 2# - 2x10 0# - 2x10 0# 2x10 1# - 2x10 1# 1# - 2x10      Prone abd 0# - 2x10 0# - 2x10 0# - 2x10 0# 2x10  0# - 2x10 1# 1# 2x10      Prone row     2#/0# 2x10 nt Flexion - 1# nt      [place] IR 2x10 2x10 2x10 - 3.8 held 3x10 3x10 3x10      [place] Biceps 2x10 2x10 2x10 - 3.5 held nt nt nt      [place] Triceps 2x10 2x10 2x10 - 5.7 held nt nt nt                   [place] rows   2x10 - 8.5  2x10 2x10 2x10      Pendulums    20x nt        Standing empty can - no pain to <45      2x10 - 1# 2x10 - 1#                                             Ther Activity                                       Gait Training                                       Modalities

## 2024-08-26 ENCOUNTER — OFFICE VISIT (OUTPATIENT)
Dept: INTERNAL MEDICINE CLINIC | Facility: CLINIC | Age: 74
End: 2024-08-26
Payer: MEDICARE

## 2024-08-26 VITALS
SYSTOLIC BLOOD PRESSURE: 120 MMHG | DIASTOLIC BLOOD PRESSURE: 82 MMHG | HEART RATE: 93 BPM | BODY MASS INDEX: 27.98 KG/M2 | OXYGEN SATURATION: 98 % | WEIGHT: 153 LBS

## 2024-08-26 DIAGNOSIS — R39.9 UTI SYMPTOMS: Primary | ICD-10-CM

## 2024-08-26 LAB
BACTERIA UR QL AUTO: NORMAL /HPF
BILIRUB UR QL STRIP: NEGATIVE
CLARITY UR: CLEAR
COLOR UR: YELLOW
GLUCOSE UR STRIP-MCNC: NEGATIVE MG/DL
HGB UR QL STRIP.AUTO: NEGATIVE
KETONES UR STRIP-MCNC: NEGATIVE MG/DL
LEUKOCYTE ESTERASE UR QL STRIP: ABNORMAL
NITRITE UR QL STRIP: NEGATIVE
NON-SQ EPI CELLS URNS QL MICRO: NORMAL /HPF
PH UR STRIP.AUTO: 6.5 [PH]
PROT UR STRIP-MCNC: NEGATIVE MG/DL
RBC #/AREA URNS AUTO: NORMAL /HPF
SP GR UR STRIP.AUTO: 1.01 (ref 1–1.03)
UROBILINOGEN UR STRIP-ACNC: <2 MG/DL
WBC #/AREA URNS AUTO: NORMAL /HPF

## 2024-08-26 PROCEDURE — G2211 COMPLEX E/M VISIT ADD ON: HCPCS | Performed by: INTERNAL MEDICINE

## 2024-08-26 PROCEDURE — 99213 OFFICE O/P EST LOW 20 MIN: CPT | Performed by: INTERNAL MEDICINE

## 2024-08-26 PROCEDURE — 81001 URINALYSIS AUTO W/SCOPE: CPT | Performed by: INTERNAL MEDICINE

## 2024-08-26 RX ORDER — CEPHALEXIN 500 MG/1
500 CAPSULE ORAL EVERY 6 HOURS SCHEDULED
Qty: 28 CAPSULE | Refills: 0 | Status: SHIPPED | OUTPATIENT
Start: 2024-08-26 | End: 2024-09-02

## 2024-08-26 NOTE — ASSESSMENT & PLAN NOTE
No costovertebral angle tenderness, will start treatment with cephalexin.  Patient does have allergy reported to penicillins, but reports she has had cephalexin in the past without a problem.  Patient should stay hydrated, will also check urinalysis, but will start treatment immediately

## 2024-08-26 NOTE — PROGRESS NOTES
Ambulatory Visit  Name: Katharine Chatman      : 1950      MRN: [id number]  Encounter Provider: Wero Murillo MD  Encounter Date: 2024   [facility]    Assessment & Plan   1. UTI symptoms  Assessment & Plan:  No costovertebral angle tenderness, will start treatment with cephalexin.  Patient does have allergy reported to penicillins, but reports she has had cephalexin in the past without a problem.  Patient should stay hydrated, will also check urinalysis, but will start treatment immediately  Orders:  -     cephalexin (KEFLEX) 500 mg capsule; Take 1 capsule (500 mg total) by mouth every 6 (six) hours for 7 days  -     UA w Reflex to Microscopic w Reflex to Culture; Future         History of Present Illness     I am seeing patient in coverage for an acute issue.  Patient reports urinary tract infection symptoms that started 3 days ago.    Urinary Tract Infection   Associated symptoms include flank pain (mild both sides), frequency and urgency. Pertinent negatives include no chills, hematuria, nausea or vomiting.     Review of Systems   Constitutional:  Negative for chills and fever.   Gastrointestinal:  Negative for nausea and vomiting.   Genitourinary:  Positive for dysuria, flank pain (mild both sides), frequency and urgency. Negative for hematuria.     Past Medical History:   Diagnosis Date   • Anxiety    • Arthritis    • Basal cell carcinoma    • Colon polyp    • Depression long time   • Depression with anxiety    • Eating disorder     overeating   • Fibrocystic breast disease, unspecified laterality    • GERD (gastroesophageal reflux disease)    • High cholesterol    • Irritable bowel syndrome    • Osteopenia    • Stage 2 chronic kidney disease 2021     Past Surgical History:   Procedure Laterality Date   • BREAST CYST ASPIRATION      one performed-unsure which breast   • BREAST EXCISIONAL BIOPSY Bilateral     one on each breast aprox    •  COLONOSCOPY      7/13/12, colon polyp biopsied, hemorrhoids - Dr. Garzon   • SCALP LESION REMOVAL W/ FLAP AND SKIN GRAFT      Behind right ear. Precancerous.   • SEPTOPLASTY  05/04/2018   • VAGINAL HYSTERECTOMY  1982    prolapse; about age 37     Family History   Problem Relation Age of Onset   • Atrial fibrillation Mother    • Cancer Mother 80        kidney, bladder,breast   • Breast cancer Mother 80   • COPD Mother         mild   • Kidney cancer Mother 80   • Osteoporosis Mother    • Transient ischemic attack Mother    • Stroke Mother         TIA   • Arthritis Mother    • Cancer Father         prostate, skin   • Glaucoma Father    • Hypertension Father    • Arthritis Father    • Coronary artery disease Maternal Grandfather    • Arthritis Maternal Grandfather    • Coronary artery disease Maternal Aunt    • No Known Problems Maternal Grandmother    • No Known Problems Paternal Grandmother    • No Known Problems Paternal Grandfather    • No Known Problems Sister    • No Known Problems Daughter    • No Known Problems Son    • No Known Problems Paternal Aunt    • No Known Problems Paternal Aunt    • No Known Problems Paternal Aunt    • Cancer Brother         leukemia   • Cancer Brother         Polymyalgia rheumatica     Social History     Tobacco Use   • Smoking status: Never     Passive exposure: Never   • Smokeless tobacco: Never   Vaping Use   • Vaping status: Never Used   Substance and Sexual Activity   • Alcohol use: Yes     Comment: very rare glass of wine   • Drug use: No   • Sexual activity: Not Currently     Partners: Male     Birth control/protection: Post-menopausal, Surgical     Current Outpatient Medications on File Prior to Visit   Medication Sig   • acetaminophen (TYLENOL) 500 mg tablet Take 1,000 mg by mouth every 6 (six) hours as needed   • ALPRAZolam (XANAX) 0.25 mg tablet Take 1 tablet (0.25 mg total) by mouth 3 (three) times a day as needed for anxiety   • famotidine (PEPCID) 20 mg tablet Take 1  tablet (20 mg total) by mouth daily   • fluticasone (FLONASE) 50 mcg/act nasal spray 1 SPRAY INTO EACH NOSTRIL 2 (TWO) TIMES A DAY WITH MEALS (Patient taking differently: 1 spray into each nostril as needed)   • folic acid (KP Folic Acid) 1 mg tablet Take 2 tablets (2 mg total) by mouth daily   • ipratropium (ATROVENT) 0.03 % nasal spray 1 spray into each nostril 3 (three) times a day   • levocetirizine (XYZAL) 5 MG tablet Take 1 tablet (5 mg total) by mouth every evening   • methotrexate 2.5 MG tablet Take 5 in the morning and 5 in the evening one day a week   • omeprazole (PriLOSEC) 20 mg delayed release capsule TAKE 1 CAPSULE BY MOUTH EVERY DAY   • rosuvastatin (CRESTOR) 10 MG tablet TAKE 1 TABLET BY MOUTH EVERY DAY   • sertraline (ZOLOFT) 25 mg tablet TAKE 1 TABLET BY MOUTH EVERY DAY (Patient not taking: Reported on 8/26/2024)     Allergies   Allergen Reactions   • Clindamycin GI Intolerance and Other (See Comments)   • Penicillin G Rash   • Sulfa Antibiotics Hives and Rash   • Penicillins Hives and Rash     Immunization History   Administered Date(s) Administered   • COVID-19 MODERNA VACC 0.5 ML IM 02/04/2021, 03/04/2021, 10/28/2021, 05/10/2022   • COVID-19 Moderna Vac BIVALENT 12 Yr+ IM 0.5 ML 09/13/2022, 07/13/2023   • COVID-19 Moderna mRNA Vaccine 12 Yr+ 50 mcg/0.5 mL (Spikevax) 03/05/2024   • H1N1, All Formulations 12/30/2009   • INFLUENZA 10/30/2015, 10/31/2016, 10/31/2017, 11/01/2018, 10/31/2019, 09/16/2020, 10/02/2021, 09/13/2022, 10/07/2023   • Influenza Quadrivalent Preservative Free 3 years and older IM 09/30/2014   • Influenza Quadrivalent, 6-35 Months IM 10/30/2015   • Influenza Split High Dose Preservative Free IM 10/31/2017, 11/15/2018, 09/13/2022   • Influenza, high dose seasonal 0.7 mL 10/31/2019, 10/02/2021, 10/07/2023   • Influenza, seasonal, injectable 10/26/2012, 10/22/2013, 10/31/2016   • Influenza, seasonal, injectable, preservative free 09/16/2020   • Pneumococcal Conjugate 13-Valent  10/31/2017   • Pneumococcal Polysaccharide PPV23 12/03/2018   • Tdap 06/25/2015   • Tuberculin Skin Test-PPD Intradermal 10/31/2022   • Zoster 06/26/2012   • Zoster Vaccine Recombinant 07/27/2020, 11/12/2020     Objective     /82   Pulse 93   Wt 69.4 kg (153 lb)   SpO2 98%   BMI 27.98 kg/m²     Physical Exam  Constitutional:       General: She is not in acute distress.     Appearance: Normal appearance.   Eyes:      General:         Left eye: Left eye discharge: ceph 500.  Abdominal:      Tenderness: There is no right CVA tenderness or left CVA tenderness.   Neurological:      Mental Status: She is alert.

## 2024-08-26 NOTE — PATIENT INSTRUCTIONS
Problem List Items Addressed This Visit          Urinary    UTI symptoms - Primary     No costovertebral angle tenderness, will start treatment with cephalexin.  Patient does have allergy reported to penicillins, but reports she has had cephalexin in the past without a problem.  Patient should stay hydrated, will also check urinalysis, but will start treatment immediately         Relevant Medications    cephalexin (KEFLEX) 500 mg capsule    Other Relevant Orders    UA w Reflex to Microscopic w Reflex to Culture

## 2024-08-28 ENCOUNTER — OFFICE VISIT (OUTPATIENT)
Dept: PHYSICAL THERAPY | Age: 74
End: 2024-08-28
Payer: MEDICARE

## 2024-08-28 DIAGNOSIS — M25.511 RIGHT SHOULDER PAIN, UNSPECIFIED CHRONICITY: Primary | ICD-10-CM

## 2024-08-28 PROCEDURE — 97110 THERAPEUTIC EXERCISES: CPT | Performed by: PHYSICAL THERAPIST

## 2024-08-28 PROCEDURE — 97140 MANUAL THERAPY 1/> REGIONS: CPT | Performed by: PHYSICAL THERAPIST

## 2024-08-28 NOTE — PROGRESS NOTES
Daily Note     Today's date: 2024  Patient name: Katharine Chatman  : 1950  MRN: [id number]  Referring provider: Ed Carlos DO  Dx:   Encounter Diagnosis     ICD-10-CM    1. Right shoulder pain, unspecified chronicity  M25.511                      Subjective: Patient feeling better.      Objective: See treatment diary below      Assessment: Tolerated treatment well. Patient would benefit from continued PT      Plan: Continue per plan of care.      Precautions: None      Manuals 7/19/24 7/24 7/26 8/7 8/12 8/15 8/23 8/28     UBE - post 3 minutes 4 minutes - 2:2 X4 minutes held 4 minutes X4 minutes X6 minutes X6 minutes     Jt mobs grade II/III all motions completed completed completed STM delt/bicep completed completed completed completed     MRE's IR/ER 2x10 2x12 2x12 2x12 2x12 2x12 2x12 2x12     PROM all motions X15 minutes X15 minutes X15 minutes X15 min X15 minutes X15 minutes X15 minutes X15 minutes     Neuro Re-Ed                                                                                                        Ther Ex             SidelyingER 1# - 2x10 1# - 2x10 1#/0# 0#   2x10 1# - 2x10 1# 1# - 2x10 1#/2# - 3x10     Prone ext 2# - 2x10 2# - 2x10 0# - 2x10 0# 2x10 1# - 2x10 1# 1# - 2x10 2# - 3x10     Prone abd 0# - 2x10 0# - 2x10 0# - 2x10 0# 2x10  0# - 2x10 1# 1# 2x10 1# - 3x10     [place]one row     2#/0# 2x10 nt Flexion - 1# nt nt     [place] IR 2x10 2x10 2x10 - 3.8 held 3x10 3x10 3x10 completed     [place] Biceps 2x10 2x10 2x10 - 3.5 held nt nt nt nt     [place] Triceps 2x10 2x10 2x10 - 5.7 held nt nt nt nt                  [place] rows   2x10 - 8.5  2x10 2x10 2x10 3x10     Pendulums    20x nt        Standing empty can - no pain to <45      2x10 - 1# 2x10 - 1# completed                                            Ther Activity                                       Gait Training                                       Modalities

## 2024-08-30 ENCOUNTER — OFFICE VISIT (OUTPATIENT)
Dept: PHYSICAL THERAPY | Age: 74
End: 2024-08-30
Payer: MEDICARE

## 2024-08-30 DIAGNOSIS — M25.511 RIGHT SHOULDER PAIN, UNSPECIFIED CHRONICITY: Primary | ICD-10-CM

## 2024-08-30 PROCEDURE — 97110 THERAPEUTIC EXERCISES: CPT | Performed by: PHYSICAL THERAPIST

## 2024-08-30 PROCEDURE — 97140 MANUAL THERAPY 1/> REGIONS: CPT | Performed by: PHYSICAL THERAPIST

## 2024-08-30 NOTE — PROGRESS NOTES
Daily Note     Today's date: 2024  Patient name: Katharine Chatman  : 1950  MRN: [id number]  Referring provider: Ed Carlos DO  Dx:   Encounter Diagnosis     ICD-10-CM    1. Right shoulder pain, unspecified chronicity  M25.511                      Subjective: Patient continues to feel good.      Objective: See treatment diary below      Assessment: Tolerated treatment well.       Plan: D/C to HEP goals achieved.     Precautions: None      Manuals 7/19/24 7/24 7/26 8/7 8/12 8/15 8/23 8/28 8/30    UBE - post 3 minutes 4 minutes - 2:2 X4 minutes held 4 minutes X4 minutes X6 minutes X6 minutes X6 minutes    Jt mobs grade II/III all motions completed completed completed STM delt/bicep completed completed completed completed completed    MRE's IR/ER 2x10 2x12 2x12 2x12 2x12 2x12 2x12 2x12 2x12    PROM all motions X15 minutes X15 minutes X15 minutes X15 min X15 minutes X15 minutes X15 minutes X15 minutes X15 minutes    Neuro Re-Ed                                                                                                        Ther Ex             SidelyingER 1# - 2x10 1# - 2x10 1#/0# 0#   2x10 1# - 2x10 1# 1# - 2x10 1#/2# - 3x10 2#    Prone ext 2# - 2x10 2# - 2x10 0# - 2x10 0# 2x10 1# - 2x10 1# 1# - 2x10 2# - 3x10 2#    Prone abd 0# - 2x10 0# - 2x10 0# - 2x10 0# 2x10  0# - 2x10 1# 1# 2x10 1# - 3x10 1!    Prone row     2#/0# 2x10 nt Flexion - 1# nt nt     Tila IR 2x10 2x10 2x10 - 3.8 held 3x10 3x10 3x10 completed 6.9 - 3x19    Tila Biceps 2x10 2x10 2x10 - 3.5 held nt nt nt nt nt    Nolan Triceps 2x10 2x10 2x10 - 5.7 held nt nt nt nt nt                 Tila rows   2x10 - 8.5  2x10 2x10 2x10 3x10 13# - 3x10    Pendulums    20x nt        Standing empty can - no pain to <45      2x10 - 1# 2x10 - 1# completed completed                                           Ther Activity                                       Gait Training                                       Modalities

## 2024-09-09 ENCOUNTER — OFFICE VISIT (OUTPATIENT)
Dept: INTERNAL MEDICINE CLINIC | Facility: CLINIC | Age: 74
End: 2024-09-09
Payer: MEDICARE

## 2024-09-09 ENCOUNTER — TELEPHONE (OUTPATIENT)
Age: 74
End: 2024-09-09

## 2024-09-09 VITALS
OXYGEN SATURATION: 99 % | HEART RATE: 73 BPM | BODY MASS INDEX: 28.41 KG/M2 | DIASTOLIC BLOOD PRESSURE: 72 MMHG | SYSTOLIC BLOOD PRESSURE: 124 MMHG | HEIGHT: 62 IN | WEIGHT: 154.4 LBS

## 2024-09-09 DIAGNOSIS — F41.8 DEPRESSION WITH ANXIETY: ICD-10-CM

## 2024-09-09 DIAGNOSIS — K81.9 CHOLECYSTITIS: Primary | ICD-10-CM

## 2024-09-09 DIAGNOSIS — K80.20 CALCULUS OF GALLBLADDER WITHOUT CHOLECYSTITIS WITHOUT OBSTRUCTION: Primary | ICD-10-CM

## 2024-09-09 DIAGNOSIS — F51.01 PRIMARY INSOMNIA: ICD-10-CM

## 2024-09-09 PROCEDURE — 99214 OFFICE O/P EST MOD 30 MIN: CPT | Performed by: INTERNAL MEDICINE

## 2024-09-09 PROCEDURE — G2211 COMPLEX E/M VISIT ADD ON: HCPCS | Performed by: INTERNAL MEDICINE

## 2024-09-09 NOTE — ASSESSMENT & PLAN NOTE
Patient does report to me anxiety and caregiver stress also mild depression no suicidal ideation reports me improvements in the symptoms with Zoloft when she had stopped the Zoloft for several days she had a return of her depressive symptoms and mild discontinuation symptoms.  She has restarted Zoloft 25 mg once daily I will have her increase Zoloft to 50 mg once daily today we did provide supportive listening and counseling to the patient RTO in 2 to 3 months call if any problems we do encourage healthy outlets and making time for herself I did also talk to her about possibly obtaining consultation with case management to get additional help in the home which will allow her to have more free time, at this point in time she will consider it and let me know in the future if interested

## 2024-09-09 NOTE — ASSESSMENT & PLAN NOTE
Patient did explain to me today that she has interrupted sleep secondary to taking care of her  he has advanced Parkinson's and requires attention in the middle of the night because it is she has multiple awakenings in the middle of the night which affects her overall sleep hygiene

## 2024-09-09 NOTE — ASSESSMENT & PLAN NOTE
Patient reports me more frequent intermittent right upper quadrant abdominal discomfort bloating and belching previous CAT scan did show cholelithiasis at that point in time the patient preferred to hold off on surgical intervention but at this point with the increasing symptoms she would like to see a general surgeon for consideration of cholecystectomy and I did order consult to general surgery Dr. Flynn

## 2024-09-09 NOTE — PROGRESS NOTES
Assessment/Plan:    Primary insomnia  Patient did explain to me today that she has interrupted sleep secondary to taking care of her  he has advanced Parkinson's and requires attention in the middle of the night because it is she has multiple awakenings in the middle of the night which affects her overall sleep hygiene    Depression with anxiety  Patient does report to me anxiety and caregiver stress also mild depression no suicidal ideation reports me improvements in the symptoms with Zoloft when she had stopped the Zoloft for several days she had a return of her depressive symptoms and mild discontinuation symptoms.  She has restarted Zoloft 25 mg once daily I will have her increase Zoloft to 50 mg once daily today we did provide supportive listening and counseling to the patient RTO in 2 to 3 months call if any problems we do encourage healthy outlets and making time for herself I did also talk to her about possibly obtaining consultation with case management to get additional help in the home which will allow her to have more free time, at this point in time she will consider it and let me know in the future if interested    Cholecystitis  Patient reports me more frequent intermittent right upper quadrant abdominal discomfort bloating and belching previous CAT scan did show cholelithiasis at that point in time the patient preferred to hold off on surgical intervention but at this point with the increasing symptoms she would like to see a general surgeon for consideration of cholecystectomy and I did order consult to general surgery Dr. Flynn         Problem List Items Addressed This Visit        Digestive    Cholecystitis - Primary     Patient reports me more frequent intermittent right upper quadrant abdominal discomfort bloating and belching previous CAT scan did show cholelithiasis at that point in time the patient preferred to hold off on surgical intervention but at this point with the  increasing symptoms she would like to see a general surgeon for consideration of cholecystectomy and I did order consult to general surgery Dr. Flynn         Relevant Orders    Ambulatory Referral to General Surgery       Behavioral Health    Depression with anxiety     Patient does report to me anxiety and caregiver stress also mild depression no suicidal ideation reports me improvements in the symptoms with Zoloft when she had stopped the Zoloft for several days she had a return of her depressive symptoms and mild discontinuation symptoms.  She has restarted Zoloft 25 mg once daily I will have her increase Zoloft to 50 mg once daily today we did provide supportive listening and counseling to the patient RTO in 2 to 3 months call if any problems we do encourage healthy outlets and making time for herself I did also talk to her about possibly obtaining consultation with case management to get additional help in the home which will allow her to have more free time, at this point in time she will consider it and let me know in the future if interested         Relevant Medications    sertraline (ZOLOFT) 50 mg tablet       Neurology/Sleep    Primary insomnia     Patient did explain to me today that she has interrupted sleep secondary to taking care of her  he has advanced Parkinson's and requires attention in the middle of the night because it is she has multiple awakenings in the middle of the night which affects her overall sleep hygiene        I have spent a total time of 30 minutes in caring for this patient on the day of the visit/encounter including Diagnostic results, Instructions for management, Risk factor reductions, Impressions, Counseling / Coordination of care, Documenting in the medical record, Reviewing / ordering tests, medicine, procedures  , and Obtaining or reviewing history  .         Subjective:      Patient ID: Katharine Chatman is a 74 y.o. female.    HPI 74-year old female coming in for  a follow up office visit regarding cholecystitis/cholelithiasis, anxiety and depression, insomnia; caregiver stress; the patient does report to me interrupted sleep she reports me that she needs attended to her 's medical condition and the mL of the night he does have advanced Parkinson's disease patient does report to me increased stress and mild depression no suicidal ideation.  Patient also reports me symptoms of bloating, intermittent right upper quadrant pain lasting several hours especially after fatty containing meals.  In the past she has been told she has gallbladder disease and gallstones and in the past it was recommended that she undergo surgical intervention she had held off because she was undergoing a cataract repair at that point.  Patient has noticed increased symptoms and more frequent and at this point time would like to see a general surgeon for consideration of cholecystectomy.     The following portions of the patient's history were reviewed and updated as appropriate: allergies, current medications, past family history, past medical history, past social history, past surgical history and problem list.    Review of Systems   Constitutional:  Negative for activity change, appetite change and unexpected weight change.   HENT:  Negative for congestion and postnasal drip.    Eyes:  Negative for visual disturbance.   Respiratory:  Negative for cough and shortness of breath.    Cardiovascular:  Negative for chest pain.   Gastrointestinal:  Positive for abdominal pain. Negative for diarrhea, nausea and vomiting.        Burping, bloating intermittent right upper quadrant abdominal pain after eating lasting several hours   Neurological:  Negative for dizziness, light-headedness and headaches.   Hematological:  Negative for adenopathy.   Psychiatric/Behavioral:  Negative for suicidal ideas. The patient is nervous/anxious.         Mild depression, caregiver stress         Objective:    Return in  about 2 months (around 11/9/2024), or if symptoms worsen or fail to improve.    No results found.      Allergies   Allergen Reactions   • Clindamycin GI Intolerance and Other (See Comments)   • Sulfa Antibiotics Hives and Rash   • Penicillins Hives and Rash       Past Medical History:   Diagnosis Date   • Anxiety 2014   • Arthritis    • Basal cell carcinoma    • Colon polyp    • Depression long time   • Depression with anxiety    • Eating disorder     overeating   • Fibrocystic breast disease, unspecified laterality    • GERD (gastroesophageal reflux disease)    • High cholesterol    • Irritable bowel syndrome    • Osteopenia    • Stage 2 chronic kidney disease 12/12/2021     Past Surgical History:   Procedure Laterality Date   • BREAST CYST ASPIRATION      one performed-unsure which breast   • BREAST EXCISIONAL BIOPSY Bilateral     one on each breast aprox 1980s   • COLONOSCOPY      7/13/12, colon polyp biopsied, hemorrhoids - Dr. Garzon   • SCALP LESION REMOVAL W/ FLAP AND SKIN GRAFT      Behind right ear. Precancerous.   • SEPTOPLASTY  05/04/2018   • VAGINAL HYSTERECTOMY  1982    prolapse; about age 37     Current Outpatient Medications on File Prior to Visit   Medication Sig Dispense Refill   • acetaminophen (TYLENOL) 500 mg tablet Take 1,000 mg by mouth every 6 (six) hours as needed     • ALPRAZolam (XANAX) 0.25 mg tablet Take 1 tablet (0.25 mg total) by mouth 3 (three) times a day as needed for anxiety 30 tablet 0   • famotidine (PEPCID) 20 mg tablet Take 1 tablet (20 mg total) by mouth daily 90 tablet 3   • fluticasone (FLONASE) 50 mcg/act nasal spray 1 SPRAY INTO EACH NOSTRIL 2 (TWO) TIMES A DAY WITH MEALS (Patient taking differently: 1 spray into each nostril as needed) 48 mL 1   • folic acid (KP Folic Acid) 1 mg tablet Take 2 tablets (2 mg total) by mouth daily 180 tablet 0   • ipratropium (ATROVENT) 0.03 % nasal spray 1 spray into each nostril 3 (three) times a day 30 mL 1   • levocetirizine (XYZAL) 5  MG tablet Take 1 tablet (5 mg total) by mouth every evening 90 tablet 1   • methotrexate 2.5 MG tablet Take 5 in the morning and 5 in the evening one day a week 120 tablet 2   • omeprazole (PriLOSEC) 20 mg delayed release capsule TAKE 1 CAPSULE BY MOUTH EVERY DAY 90 capsule 1   • rosuvastatin (CRESTOR) 10 MG tablet TAKE 1 TABLET BY MOUTH EVERY DAY 90 tablet 1   • [DISCONTINUED] sertraline (ZOLOFT) 25 mg tablet TAKE 1 TABLET BY MOUTH EVERY DAY 90 tablet 1     No current facility-administered medications on file prior to visit.     Family History   Problem Relation Age of Onset   • Atrial fibrillation Mother    • Cancer Mother 80        kidney, bladder,breast   • Breast cancer Mother 80   • COPD Mother         mild   • Kidney cancer Mother 80   • Osteoporosis Mother    • Transient ischemic attack Mother    • Stroke Mother         TIA   • Arthritis Mother    • Cancer Father         prostate, skin   • Glaucoma Father    • Hypertension Father    • Arthritis Father    • Coronary artery disease Maternal Grandfather    • Arthritis Maternal Grandfather    • Coronary artery disease Maternal Aunt    • No Known Problems Maternal Grandmother    • No Known Problems Paternal Grandmother    • No Known Problems Paternal Grandfather    • No Known Problems Sister    • No Known Problems Daughter    • No Known Problems Son    • No Known Problems Paternal Aunt    • No Known Problems Paternal Aunt    • No Known Problems Paternal Aunt    • Cancer Brother         leukemia   • Cancer Brother         Polymyalgia rheumatica     Social History     Socioeconomic History   • Marital status: /Civil Union     Spouse name: Not on file   • Number of children: Not on file   • Years of education: Not on file   • Highest education level: Not on file   Occupational History   • Not on file   Tobacco Use   • Smoking status: Never     Passive exposure: Never   • Smokeless tobacco: Never   Vaping Use   • Vaping status: Never Used   Substance and  "Sexual Activity   • Alcohol use: Yes     Comment: very rare glass of wine   • Drug use: No   • Sexual activity: Not Currently     Partners: Male     Birth control/protection: Post-menopausal, Surgical   Other Topics Concern   • Not on file   Social History Narrative    Advance directive declined by patient     Social Determinants of Health     Financial Resource Strain: Low Risk  (12/13/2023)    Overall Financial Resource Strain (CARDIA)    • Difficulty of Paying Living Expenses: Not hard at all   Food Insecurity: Not on file   Transportation Needs: No Transportation Needs (12/13/2023)    PRAPARE - Transportation    • Lack of Transportation (Medical): No    • Lack of Transportation (Non-Medical): No   Physical Activity: Insufficiently Active (5/21/2020)    Exercise Vital Sign    • Days of Exercise per Week: 3 days    • Minutes of Exercise per Session: 30 min   Stress: Stress Concern Present (5/22/2020)    [employer] - Occupational Stress Questionnaire    • Feeling of Stress : To some extent   Social Connections: Not on file   Intimate Partner Violence: Not on file   Housing Stability: Not on file     Vitals:    09/09/24 1352   BP: 124/72   BP Location: Right arm   Patient Position: Sitting   Cuff Size: Standard   Pulse: 73   SpO2: 99%   Weight: 70 kg (154 lb 6.4 oz)   Height: 5' 2\" (1.575 m)     Results for orders placed or performed in visit on 08/26/24   UA w Reflex to Microscopic w Reflex to Culture    Specimen: Urine   Result Value Ref Range    Color, UA Yellow     Clarity, UA Clear     Specific Gravity, UA 1.009 1.003 - 1.030    pH, UA 6.5 4.5, 5.0, 5.5, 6.0, 6.5, 7.0, 7.5, 8.0    Leukocytes, UA Small (A) Negative    Nitrite, UA Negative Negative    Protein, UA Negative Negative mg/dl    Glucose, UA Negative Negative mg/dl    Ketones, UA Negative Negative mg/dl    Urobilinogen, UA <2.0 <2.0 mg/dl mg/dl    Bilirubin, UA Negative Negative    Occult Blood, UA Negative Negative   Urine " "Microscopic   Result Value Ref Range    RBC, UA None Seen None Seen, 1-2 /hpf    WBC, UA 1-2 None Seen, 1-2 /hpf    Epithelial Cells None Seen None Seen, Occasional /hpf    Bacteria, UA Occasional None Seen, Occasional /hpf     Weight (last 2 days)     Date/Time Weight    09/09/24 1352 70 (154.4)        Body mass index is 28.24 kg/m².  BP      Temp      Pulse     Resp      SpO2        Vitals:    09/09/24 1352   Weight: 70 kg (154 lb 6.4 oz)     Vitals:    09/09/24 1352   Weight: 70 kg (154 lb 6.4 oz)       /72 (BP Location: Right arm, Patient Position: Sitting, Cuff Size: Standard)   Pulse 73   Ht 5' 2\" (1.575 m)   Wt 70 kg (154 lb 6.4 oz)   SpO2 99%   BMI 28.24 kg/m²          Physical Exam  Vitals and nursing note reviewed.   Constitutional:       General: She is not in acute distress.     Appearance: Normal appearance. She is well-developed. She is not ill-appearing, toxic-appearing or diaphoretic.   HENT:      Head: Normocephalic.      Mouth/Throat:      Mouth: Oropharynx is clear and moist.   Eyes:      General: No scleral icterus.        Right eye: No discharge.         Left eye: No discharge.      Conjunctiva/sclera: Conjunctivae normal.      Pupils: Pupils are equal, round, and reactive to light.   Cardiovascular:      Rate and Rhythm: Normal rate and regular rhythm.      Heart sounds: Normal heart sounds. No murmur heard.     No friction rub. No gallop.   Pulmonary:      Effort: No respiratory distress.      Breath sounds: Normal breath sounds. No wheezing or rales.   Abdominal:      General: Bowel sounds are normal. There is no distension.      Palpations: Abdomen is soft. There is no mass.      Tenderness: There is no abdominal tenderness. There is no guarding or rebound.   Musculoskeletal:         General: No deformity or edema.      Cervical back: Neck supple.   Lymphadenopathy:      Cervical: No cervical adenopathy.   Neurological:      Mental Status: She is alert.      Coordination: " Coordination normal.   Psychiatric:         Mood and Affect: Mood is anxious. Mood is not depressed.         Thought Content: Thought content does not include suicidal ideation.

## 2024-09-09 NOTE — TELEPHONE ENCOUNTER
Patient called to schedule New Patient appointment with Dr Mahoney. Advise patient, Unfortunately Dr. Flynn is leaving the practice and is no longer accepting patients at this time. We would have to reschedule with another provider. Patient stated she is going to call her PCP to see what other provider he will recommend.

## 2024-09-10 ENCOUNTER — LAB (OUTPATIENT)
Dept: LAB | Age: 74
End: 2024-09-10
Payer: MEDICARE

## 2024-09-10 DIAGNOSIS — Z11.59 SCREENING FOR VIRAL DISEASE: ICD-10-CM

## 2024-09-10 DIAGNOSIS — Z79.899 HIGH RISK MEDICATION USE: ICD-10-CM

## 2024-09-10 LAB
ALBUMIN SERPL BCG-MCNC: 3.9 G/DL (ref 3.5–5)
ALP SERPL-CCNC: 69 U/L (ref 34–104)
ALT SERPL W P-5'-P-CCNC: 19 U/L (ref 7–52)
ANION GAP SERPL CALCULATED.3IONS-SCNC: 9 MMOL/L (ref 4–13)
AST SERPL W P-5'-P-CCNC: 20 U/L (ref 13–39)
BASOPHILS # BLD AUTO: 0.05 THOUSANDS/ÂΜL (ref 0–0.1)
BASOPHILS NFR BLD AUTO: 1 % (ref 0–1)
BILIRUB SERPL-MCNC: 0.43 MG/DL (ref 0.2–1)
BUN SERPL-MCNC: 17 MG/DL (ref 5–25)
CALCIUM SERPL-MCNC: 9.4 MG/DL (ref 8.4–10.2)
CHLORIDE SERPL-SCNC: 102 MMOL/L (ref 96–108)
CO2 SERPL-SCNC: 29 MMOL/L (ref 21–32)
CREAT SERPL-MCNC: 0.84 MG/DL (ref 0.6–1.3)
EOSINOPHIL # BLD AUTO: 0.55 THOUSAND/ÂΜL (ref 0–0.61)
EOSINOPHIL NFR BLD AUTO: 10 % (ref 0–6)
ERYTHROCYTE [DISTWIDTH] IN BLOOD BY AUTOMATED COUNT: 14.1 % (ref 11.6–15.1)
GFR SERPL CREATININE-BSD FRML MDRD: 69 ML/MIN/1.73SQ M
GLUCOSE P FAST SERPL-MCNC: 99 MG/DL (ref 65–99)
HBV CORE AB SER QL: NORMAL
HBV SURFACE AB SER-ACNC: >500 MIU/ML
HBV SURFACE AG SER QL: NORMAL
HCT VFR BLD AUTO: 39.6 % (ref 34.8–46.1)
HCV AB SER QL: NORMAL
HGB BLD-MCNC: 12.8 G/DL (ref 11.5–15.4)
IMM GRANULOCYTES # BLD AUTO: 0.03 THOUSAND/UL (ref 0–0.2)
IMM GRANULOCYTES NFR BLD AUTO: 1 % (ref 0–2)
LYMPHOCYTES # BLD AUTO: 0.45 THOUSANDS/ÂΜL (ref 0.6–4.47)
LYMPHOCYTES NFR BLD AUTO: 8 % (ref 14–44)
MCH RBC QN AUTO: 31.3 PG (ref 26.8–34.3)
MCHC RBC AUTO-ENTMCNC: 32.3 G/DL (ref 31.4–37.4)
MCV RBC AUTO: 97 FL (ref 82–98)
MONOCYTES # BLD AUTO: 0.57 THOUSAND/ÂΜL (ref 0.17–1.22)
MONOCYTES NFR BLD AUTO: 10 % (ref 4–12)
NEUTROPHILS # BLD AUTO: 4.09 THOUSANDS/ÂΜL (ref 1.85–7.62)
NEUTS SEG NFR BLD AUTO: 70 % (ref 43–75)
NRBC BLD AUTO-RTO: 0 /100 WBCS
PLATELET # BLD AUTO: 219 THOUSANDS/UL (ref 149–390)
PMV BLD AUTO: 10.5 FL (ref 8.9–12.7)
POTASSIUM SERPL-SCNC: 4.2 MMOL/L (ref 3.5–5.3)
PROT SERPL-MCNC: 6.7 G/DL (ref 6.4–8.4)
RBC # BLD AUTO: 4.09 MILLION/UL (ref 3.81–5.12)
SODIUM SERPL-SCNC: 140 MMOL/L (ref 135–147)
WBC # BLD AUTO: 5.74 THOUSAND/UL (ref 4.31–10.16)

## 2024-09-10 PROCEDURE — 85025 COMPLETE CBC W/AUTO DIFF WBC: CPT

## 2024-09-10 PROCEDURE — 86704 HEP B CORE ANTIBODY TOTAL: CPT

## 2024-09-10 PROCEDURE — 80053 COMPREHEN METABOLIC PANEL: CPT

## 2024-09-10 PROCEDURE — 87340 HEPATITIS B SURFACE AG IA: CPT

## 2024-09-10 PROCEDURE — 36415 COLL VENOUS BLD VENIPUNCTURE: CPT

## 2024-09-10 PROCEDURE — 86803 HEPATITIS C AB TEST: CPT

## 2024-09-10 PROCEDURE — 86706 HEP B SURFACE ANTIBODY: CPT

## 2024-09-15 DIAGNOSIS — F41.8 DEPRESSION WITH ANXIETY: ICD-10-CM

## 2024-09-25 ENCOUNTER — OFFICE VISIT (OUTPATIENT)
Dept: SURGERY | Facility: CLINIC | Age: 74
End: 2024-09-25
Payer: MEDICARE

## 2024-09-25 VITALS
OXYGEN SATURATION: 97 % | BODY MASS INDEX: 28.16 KG/M2 | HEIGHT: 62 IN | TEMPERATURE: 97.6 F | RESPIRATION RATE: 12 BRPM | WEIGHT: 153 LBS | DIASTOLIC BLOOD PRESSURE: 80 MMHG | HEART RATE: 88 BPM | SYSTOLIC BLOOD PRESSURE: 126 MMHG

## 2024-09-25 DIAGNOSIS — K80.20 CALCULUS OF GALLBLADDER WITHOUT CHOLECYSTITIS WITHOUT OBSTRUCTION: ICD-10-CM

## 2024-09-25 PROCEDURE — 99214 OFFICE O/P EST MOD 30 MIN: CPT | Performed by: SURGERY

## 2024-09-25 PROCEDURE — 99204 OFFICE O/P NEW MOD 45 MIN: CPT | Performed by: SURGERY

## 2024-09-25 RX ORDER — HEPARIN SODIUM 5000 [USP'U]/ML
5000 INJECTION, SOLUTION INTRAVENOUS; SUBCUTANEOUS ONCE
OUTPATIENT
Start: 2024-09-25 | End: 2024-09-25

## 2024-09-25 NOTE — ASSESSMENT & PLAN NOTE
She has symptomatic biliary tract disease and seems very reasonable to proceed with cholecystectomy.  I talked to her about surgery in detail including risks, benefits, alternatives, and what to expect postoperatively.  Told her we do this either laparoscopically or robotically.  She understands and is agreeable to proceed.  Will get a preop ultrasound since she has not had 1 in a year or so    Plan: Laparoscopic or robotic cholecystectomy  Orders:    Ambulatory Referral to General Surgery    US abdomen limited; Future

## 2024-09-25 NOTE — PROGRESS NOTES
Ambulatory Visit  Name: Katharine Chatman      : 1950      MRN: [id number]  Encounter Provider: Shayan Paz MD  Encounter Date: 2024   Encounter department: [facility] GENERAL SURGERY [facility]    Assessment & Plan  Calculus of gallbladder without cholecystitis without obstruction  She has symptomatic biliary tract disease and seems very reasonable to proceed with cholecystectomy.  I talked to her about surgery in detail including risks, benefits, alternatives, and what to expect postoperatively.  Told her we do this either laparoscopically or robotically.  She understands and is agreeable to proceed.  Will get a preop ultrasound since she has not had 1 in a year or so    Plan: Laparoscopic or robotic cholecystectomy  Orders:    Ambulatory Referral to General Surgery    US abdomen limited; Future      History of Present Illness     Katharine Chatman is a 74 y.o. female who presents with longstanding history of gallbladder problems.  She had been seen in the past and recommended gallbladder surgery but she held off.  She gets pain in the right upper quadrant sometimes discomfort but other times it severe.  Only time makes it better.  Eating things such as dairy products seem to make it worse.  Occasionally pain goes into her back.  Occasional nausea, no vomiting, no fever, no chills, no jaundice, no history of pancreatitis.  Last ultrasound was in  that showed sludge      Review of Systems   Constitutional:  Negative for chills and fever.   HENT:  Negative for trouble swallowing and voice change.    Eyes:  Negative for pain and visual disturbance.   Respiratory:  Negative for cough and shortness of breath.    Cardiovascular:  Negative for chest pain and leg swelling.   Gastrointestinal:  Negative for abdominal pain, nausea and vomiting.   Endocrine: Negative for cold intolerance, heat intolerance, polydipsia, polyphagia and polyuria.   Genitourinary:  Negative for difficulty urinating and flank  pain.   Musculoskeletal:  Negative for arthralgias and gait problem.   Skin:  Negative for rash and wound.   Allergic/Immunologic: Negative for food allergies.   Neurological:  Negative for seizures, syncope, weakness and headaches.   Hematological:  Negative for adenopathy.   Psychiatric/Behavioral:  Negative for confusion.    All other systems reviewed and are negative.    Past Medical History   Past Medical History:   Diagnosis Date    Anxiety 2014    Arthritis     Basal cell carcinoma     Colon polyp     Depression long time    Depression with anxiety     Eating disorder     overeating    Fibrocystic breast disease, unspecified laterality     GERD (gastroesophageal reflux disease)     High cholesterol     Irritable bowel syndrome     Osteopenia     Stage 2 chronic kidney disease 12/12/2021     Past Surgical History:   Procedure Laterality Date    BREAST CYST ASPIRATION      one performed-unsure which breast    BREAST EXCISIONAL BIOPSY Bilateral     one on each breast aprox 1980s    COLONOSCOPY      7/13/12, colon polyp biopsied, hemorrhoids - Dr. Garzon    SCALP LESION REMOVAL W/ FLAP AND SKIN GRAFT      Behind right ear. Precancerous.    SEPTOPLASTY  05/04/2018    VAGINAL HYSTERECTOMY  1982    prolapse; about age 37     Family History   Problem Relation Age of Onset    Atrial fibrillation Mother     Cancer Mother 80        kidney, bladder,breast    Breast cancer Mother 80    COPD Mother         mild    Kidney cancer Mother 80    Osteoporosis Mother     Transient ischemic attack Mother     Stroke Mother         TIA    Arthritis Mother     Cancer Father         prostate, skin    Glaucoma Father     Hypertension Father     Arthritis Father     Coronary artery disease Maternal Grandfather     Arthritis Maternal Grandfather     Coronary artery disease Maternal Aunt     No Known Problems Maternal Grandmother     No Known Problems Paternal Grandmother     No Known Problems Paternal Grandfather     No Known  "Problems Sister     No Known Problems Daughter     No Known Problems Son     No Known Problems Paternal Aunt     No Known Problems Paternal Aunt     No Known Problems Paternal Aunt     Cancer Brother         leukemia    Cancer Brother         Polymyalgia rheumatica     Current Outpatient Medications on File Prior to Visit   Medication Sig Dispense Refill    acetaminophen (TYLENOL) 500 mg tablet Take 1,000 mg by mouth every 6 (six) hours as needed      ALPRAZolam (XANAX) 0.25 mg tablet Take 1 tablet (0.25 mg total) by mouth 3 (three) times a day as needed for anxiety 30 tablet 0    famotidine (PEPCID) 20 mg tablet Take 1 tablet (20 mg total) by mouth daily 90 tablet 3    fluticasone (FLONASE) 50 mcg/act nasal spray 1 SPRAY INTO EACH NOSTRIL 2 (TWO) TIMES A DAY WITH MEALS (Patient taking differently: 1 spray into each nostril as needed) 48 mL 1    folic acid (KP Folic Acid) 1 mg tablet Take 2 tablets (2 mg total) by mouth daily 180 tablet 0    ipratropium (ATROVENT) 0.03 % nasal spray 1 spray into each nostril 3 (three) times a day 30 mL 1    levocetirizine (XYZAL) 5 MG tablet Take 1 tablet (5 mg total) by mouth every evening 90 tablet 1    methotrexate 2.5 MG tablet Take 5 in the morning and 5 in the evening one day a week 120 tablet 2    omeprazole (PriLOSEC) 20 mg delayed release capsule TAKE 1 CAPSULE BY MOUTH EVERY DAY 90 capsule 1    rosuvastatin (CRESTOR) 10 MG tablet TAKE 1 TABLET BY MOUTH EVERY DAY 90 tablet 1    sertraline (ZOLOFT) 50 mg tablet TAKE 1 TABLET BY MOUTH EVERY DAY 90 tablet 1     No current facility-administered medications on file prior to visit.     Allergies   Allergen Reactions    Clindamycin GI Intolerance and Other (See Comments)    Sulfa Antibiotics Hives and Rash    Penicillins Hives and Rash          Objective     /80   Pulse 88   Temp 97.6 °F (36.4 °C) (Temporal)   Resp 12   Ht 5' 2\" (1.575 m)   Wt 69.4 kg (153 lb)   SpO2 97%   BMI 27.98 kg/m²     Physical Exam  Vitals and " nursing note reviewed.   Constitutional:       General: She is not in acute distress.     Appearance: She is well-developed. She is not diaphoretic.   HENT:      Head: Normocephalic and atraumatic.      Right Ear: External ear normal.      Left Ear: External ear normal.   Eyes:      General: No scleral icterus.     Conjunctiva/sclera: Conjunctivae normal.   Neck:      Thyroid: No thyromegaly.      Trachea: No tracheal deviation.   Cardiovascular:      Rate and Rhythm: Normal rate and regular rhythm.      Heart sounds: Normal heart sounds. No murmur heard.     No friction rub.   Pulmonary:      Effort: Pulmonary effort is normal. No respiratory distress.      Breath sounds: Normal breath sounds. No wheezing or rales.   Abdominal:      General: There is no distension.      Palpations: Abdomen is soft. There is no mass.      Tenderness: There is no abdominal tenderness. There is no guarding or rebound.   Musculoskeletal:         General: Normal range of motion.      Cervical back: Neck supple.   Lymphadenopathy:      Cervical: No cervical adenopathy.   Skin:     General: Skin is warm and dry.   Neurological:      Mental Status: She is alert and oriented to person, place, and time.   Psychiatric:         Behavior: Behavior normal.         Thought Content: Thought content normal.         Judgment: Judgment normal.

## 2024-09-30 ENCOUNTER — HOSPITAL ENCOUNTER (OUTPATIENT)
Dept: RADIOLOGY | Age: 74
Discharge: HOME | End: 2024-09-30
Payer: MEDICARE

## 2024-09-30 DIAGNOSIS — K80.20 CALCULUS OF GALLBLADDER WITHOUT CHOLECYSTITIS WITHOUT OBSTRUCTION: ICD-10-CM

## 2024-09-30 PROCEDURE — 76705 ECHO EXAM OF ABDOMEN: CPT

## 2024-10-01 ENCOUNTER — TELEPHONE (OUTPATIENT)
Age: 74
End: 2024-10-01

## 2024-10-01 NOTE — TELEPHONE ENCOUNTER
Ashley from [facility] Radiology called with significant findings. Warm transferred to [facility] for further assistance.

## 2024-10-02 ENCOUNTER — TELEPHONE (OUTPATIENT)
Age: 74
End: 2024-10-02

## 2024-10-02 NOTE — TELEPHONE ENCOUNTER
Patient called requesting to speak with Surgery coordinator. Warm transferred to [facility] [place], [place] for further assistance.

## 2024-10-04 ENCOUNTER — IMMUNIZATIONS (OUTPATIENT)
Dept: INTERNAL MEDICINE CLINIC | Facility: CLINIC | Age: 74
End: 2024-10-04
Payer: MEDICARE

## 2024-10-04 DIAGNOSIS — Z23 ENCOUNTER FOR IMMUNIZATION: Primary | ICD-10-CM

## 2024-10-04 PROCEDURE — G0008 ADMIN INFLUENZA VIRUS VAC: HCPCS

## 2024-10-04 PROCEDURE — 90662 IIV NO PRSV INCREASED AG IM: CPT

## 2024-10-11 DIAGNOSIS — E78.2 MIXED HYPERLIPIDEMIA: ICD-10-CM

## 2024-10-11 RX ORDER — ROSUVASTATIN CALCIUM 10 MG/1
10 TABLET, COATED ORAL DAILY
Qty: 90 TABLET | Refills: 1 | Status: SHIPPED | OUTPATIENT
Start: 2024-10-11

## 2024-10-17 DIAGNOSIS — F41.8 DEPRESSION WITH ANXIETY: ICD-10-CM

## 2024-10-17 RX ORDER — ALPRAZOLAM 0.25 MG/1
0.25 TABLET ORAL 3 TIMES DAILY PRN
Qty: 30 TABLET | Refills: 0 | Status: SHIPPED | OUTPATIENT
Start: 2024-10-17

## 2024-10-23 ENCOUNTER — TELEPHONE (OUTPATIENT)
Age: 74
End: 2024-10-23

## 2024-10-23 NOTE — TELEPHONE ENCOUNTER
Pt of Dr. Roberts with cholecystectomy schedule 11/14/24-  Calling in to notify provider she received a letter of significant finding on most recent US ordered by Dr. Roberts.     Please advise.   Call back # [phone]

## 2024-10-24 ENCOUNTER — TELEPHONE (OUTPATIENT)
Dept: SURGERY | Facility: CLINIC | Age: 74
End: 2024-10-24

## 2024-10-24 NOTE — TELEPHONE ENCOUNTER
Spoke to her about ultrasound findings.  We discussed proceeding with gallbladder surgery since she is very symptomatic. Will do MRI of pancrease later

## 2024-10-31 DIAGNOSIS — L40.50 PSORIATIC ARTHRITIS (HCC): ICD-10-CM

## 2024-10-31 DIAGNOSIS — K21.9 GERD WITHOUT ESOPHAGITIS: ICD-10-CM

## 2024-10-31 RX ORDER — FOLIC ACID 1 MG/1
1000 TABLET ORAL DAILY
Qty: 90 TABLET | Refills: 0 | Status: SHIPPED | OUTPATIENT
Start: 2024-10-31

## 2024-11-05 ENCOUNTER — ANESTHESIA EVENT (OUTPATIENT)
Dept: PERIOP | Facility: HOSPITAL | Age: 74
End: 2024-11-05
Payer: MEDICARE

## 2024-11-05 NOTE — PRE-PROCEDURE INSTRUCTIONS
Pre-Surgery Instructions:   Medication Instructions    acetaminophen (TYLENOL) 500 mg tablet Uses PRN- OK to take day of surgery    ALPRAZolam (XANAX) 0.25 mg tablet Uses PRN- OK to take day of surgery    famotidine (PEPCID) 20 mg tablet Take day of surgery.    folic acid (FOLVITE) 1 mg tablet Hold day of surgery.    methotrexate 2.5 MG tablet Takes on Mondays- ok to continue    omeprazole (PriLOSEC) 20 mg delayed release capsule Take night before surgery    rosuvastatin (CRESTOR) 10 MG tablet Take day of surgery.    sertraline (ZOLOFT) 50 mg tablet Take day of surgery.   Medication instructions for day surgery reviewed. Please use only a sip of water to take your instructed medications. Avoid all over the counter vitamins, supplements and NSAIDS for one week prior to surgery per anesthesia guidelines. Tylenol is ok to take as needed.     You will receive a call one business day prior to surgery with an arrival time and hospital directions. If your surgery is scheduled on a Monday, the hospital will be calling you on the Friday prior to your surgery. If you have not heard from anyone by 8pm, please call the hospital supervisor through the hospital  at [phone]. ([place] [phone] or [place] [phone]).    Do not eat or drink anything after midnight the night before your surgery, including candy, mints, lifesavers, or chewing gum. Do not drink alcohol 24hrs before your surgery. Try not to smoke at least 24hrs before your surgery.       Follow the pre surgery showering instructions as listed in the “My Surgical Experience Booklet” or otherwise provided by your surgeon's office. Do not use a blade to shave the surgical area 1 week before surgery. It is okay to use a clean electric clippers up to 24 hours before surgery. Do not apply any lotions, creams, including makeup, cologne, deodorant, or perfumes after showering on the day of your surgery. Do not use dry shampoo, hair spray, hair gel, or any  type of hair products.     No contact lenses, eye make-up, or artificial eyelashes. Remove nail polish, including gel polish, and any artificial, gel, or acrylic nails if possible. Remove all jewelry including rings and body piercing jewelry.     Wear causal clothing that is easy to take on and off. Consider your type of surgery.    Keep any valuables, jewelry, piercings at home. Please bring any specially ordered equipment (sling, braces) if indicated.    Arrange for a responsible person to drive you to and from the hospital on the day of your surgery. Please confirm the visitor policy for the day of your procedure when you receive your phone call with an arrival time.     Call the surgeon's office with any new illnesses, exposures, or additional questions prior to surgery.    Please reference your “My Surgical Experience Booklet” for additional information to prepare for your upcoming surgery.

## 2024-11-07 ENCOUNTER — PATIENT MESSAGE (OUTPATIENT)
Dept: INTERNAL MEDICINE CLINIC | Facility: CLINIC | Age: 74
End: 2024-11-07

## 2024-11-11 ENCOUNTER — TELEPHONE (OUTPATIENT)
Dept: BARIATRICS | Facility: CLINIC | Age: 74
End: 2024-11-11

## 2024-11-11 NOTE — TELEPHONE ENCOUNTER
Patient of . Scheduled for Laparoscopic Cholecystectomy on 11/14/24. Is calling to confirm some of the pre op surgical questions. All were answered to patient's satisfaction.

## 2024-11-14 ENCOUNTER — ANESTHESIA (OUTPATIENT)
Dept: PERIOP | Facility: HOSPITAL | Age: 74
End: 2024-11-14
Payer: MEDICARE

## 2024-11-14 ENCOUNTER — HOSPITAL ENCOUNTER (OUTPATIENT)
Facility: HOSPITAL | Age: 74
Setting detail: OUTPATIENT SURGERY
Discharge: HOME | End: 2024-11-14
Attending: SURGERY | Admitting: SURGERY
Payer: MEDICARE

## 2024-11-14 VITALS
OXYGEN SATURATION: 98 % | WEIGHT: 147 LBS | DIASTOLIC BLOOD PRESSURE: 89 MMHG | TEMPERATURE: 97.2 F | HEIGHT: 62 IN | RESPIRATION RATE: 16 BRPM | BODY MASS INDEX: 27.05 KG/M2 | HEART RATE: 70 BPM | SYSTOLIC BLOOD PRESSURE: 166 MMHG

## 2024-11-14 DIAGNOSIS — K80.20 CALCULUS OF GALLBLADDER WITHOUT CHOLECYSTITIS WITHOUT OBSTRUCTION: Primary | ICD-10-CM

## 2024-11-14 DIAGNOSIS — K81.9 CHOLECYSTITIS: ICD-10-CM

## 2024-11-14 LAB
ALBUMIN SERPL BCG-MCNC: 4.1 G/DL (ref 3.5–5)
ALP SERPL-CCNC: 58 U/L (ref 34–104)
ALT SERPL W P-5'-P-CCNC: 48 U/L (ref 7–52)
ANION GAP SERPL CALCULATED.3IONS-SCNC: 6 MMOL/L (ref 4–13)
AST SERPL W P-5'-P-CCNC: 36 U/L (ref 13–39)
BILIRUB SERPL-MCNC: 0.61 MG/DL (ref 0.2–1)
BUN SERPL-MCNC: 17 MG/DL (ref 5–25)
CALCIUM SERPL-MCNC: 9.2 MG/DL (ref 8.4–10.2)
CHLORIDE SERPL-SCNC: 106 MMOL/L (ref 96–108)
CO2 SERPL-SCNC: 29 MMOL/L (ref 21–32)
CREAT SERPL-MCNC: 0.9 MG/DL (ref 0.6–1.3)
GFR SERPL CREATININE-BSD FRML MDRD: 64 ML/MIN/1.73SQ M
GLUCOSE P FAST SERPL-MCNC: 91 MG/DL (ref 65–99)
GLUCOSE SERPL-MCNC: 91 MG/DL (ref 65–140)
POTASSIUM SERPL-SCNC: 3.9 MMOL/L (ref 3.5–5.3)
PROT SERPL-MCNC: 6.6 G/DL (ref 6.4–8.4)
SODIUM SERPL-SCNC: 141 MMOL/L (ref 135–147)

## 2024-11-14 PROCEDURE — 80053 COMPREHEN METABOLIC PANEL: CPT | Performed by: SURGERY

## 2024-11-14 PROCEDURE — NC001 PR NO CHARGE: Performed by: PHYSICIAN ASSISTANT

## 2024-11-14 PROCEDURE — 47562 LAPAROSCOPIC CHOLECYSTECTOMY: CPT | Performed by: SURGERY

## 2024-11-14 PROCEDURE — 88304 TISSUE EXAM BY PATHOLOGIST: CPT | Performed by: PATHOLOGY

## 2024-11-14 PROCEDURE — NC001 PR NO CHARGE: Performed by: SURGERY

## 2024-11-14 PROCEDURE — 93005 ELECTROCARDIOGRAM TRACING: CPT

## 2024-11-14 RX ORDER — HEPARIN SODIUM 5000 [USP'U]/ML
5000 INJECTION, SOLUTION INTRAVENOUS; SUBCUTANEOUS ONCE
Status: COMPLETED | OUTPATIENT
Start: 2024-11-14 | End: 2024-11-14

## 2024-11-14 RX ORDER — PROMETHAZINE HYDROCHLORIDE 25 MG/ML
12.5 INJECTION, SOLUTION INTRAMUSCULAR; INTRAVENOUS ONCE AS NEEDED
Status: COMPLETED | OUTPATIENT
Start: 2024-11-14 | End: 2024-11-14

## 2024-11-14 RX ORDER — ROCURONIUM BROMIDE 10 MG/ML
INJECTION, SOLUTION INTRAVENOUS AS NEEDED
Status: DISCONTINUED | OUTPATIENT
Start: 2024-11-14 | End: 2024-11-14

## 2024-11-14 RX ORDER — BUPIVACAINE HYDROCHLORIDE AND EPINEPHRINE 5; 5 MG/ML; UG/ML
INJECTION, SOLUTION PERINEURAL AS NEEDED
Status: DISCONTINUED | OUTPATIENT
Start: 2024-11-14 | End: 2024-11-14 | Stop reason: HOSPADM

## 2024-11-14 RX ORDER — ONDANSETRON 2 MG/ML
INJECTION INTRAMUSCULAR; INTRAVENOUS AS NEEDED
Status: DISCONTINUED | OUTPATIENT
Start: 2024-11-14 | End: 2024-11-14

## 2024-11-14 RX ORDER — LIDOCAINE HYDROCHLORIDE 10 MG/ML
INJECTION, SOLUTION EPIDURAL; INFILTRATION; INTRACAUDAL; PERINEURAL AS NEEDED
Status: DISCONTINUED | OUTPATIENT
Start: 2024-11-14 | End: 2024-11-14

## 2024-11-14 RX ORDER — DEXAMETHASONE SODIUM PHOSPHATE 10 MG/ML
INJECTION, SOLUTION INTRAMUSCULAR; INTRAVENOUS AS NEEDED
Status: DISCONTINUED | OUTPATIENT
Start: 2024-11-14 | End: 2024-11-14

## 2024-11-14 RX ORDER — HYDROMORPHONE HCL/PF 1 MG/ML
0.2 SYRINGE (ML) INJECTION EVERY 4 HOURS PRN
Refills: 0 | Status: DISCONTINUED | OUTPATIENT
Start: 2024-11-14 | End: 2024-11-14 | Stop reason: HOSPADM

## 2024-11-14 RX ORDER — HYDROMORPHONE HCL/PF 1 MG/ML
SYRINGE (ML) INJECTION AS NEEDED
Status: DISCONTINUED | OUTPATIENT
Start: 2024-11-14 | End: 2024-11-14

## 2024-11-14 RX ORDER — CEFAZOLIN SODIUM 2 G/50ML
2000 SOLUTION INTRAVENOUS
Status: COMPLETED | OUTPATIENT
Start: 2024-11-14 | End: 2024-11-14

## 2024-11-14 RX ORDER — FENTANYL CITRATE 50 UG/ML
INJECTION, SOLUTION INTRAMUSCULAR; INTRAVENOUS AS NEEDED
Status: DISCONTINUED | OUTPATIENT
Start: 2024-11-14 | End: 2024-11-14

## 2024-11-14 RX ORDER — HYDROCODONE BITARTRATE AND ACETAMINOPHEN 5; 325 MG/1; MG/1
1 TABLET ORAL EVERY 4 HOURS PRN
Refills: 0 | Status: DISCONTINUED | OUTPATIENT
Start: 2024-11-14 | End: 2024-11-14 | Stop reason: HOSPADM

## 2024-11-14 RX ORDER — FENTANYL CITRATE/PF 50 MCG/ML
50 SYRINGE (ML) INJECTION
Status: DISCONTINUED | OUTPATIENT
Start: 2024-11-14 | End: 2024-11-14 | Stop reason: HOSPADM

## 2024-11-14 RX ORDER — OXYCODONE HYDROCHLORIDE 5 MG/1
5 TABLET ORAL EVERY 4 HOURS PRN
Qty: 10 TABLET | Refills: 0 | Status: SHIPPED | OUTPATIENT
Start: 2024-11-14

## 2024-11-14 RX ORDER — SODIUM CHLORIDE, SODIUM LACTATE, POTASSIUM CHLORIDE, CALCIUM CHLORIDE 600; 310; 30; 20 MG/100ML; MG/100ML; MG/100ML; MG/100ML
125 INJECTION, SOLUTION INTRAVENOUS CONTINUOUS
Status: DISCONTINUED | OUTPATIENT
Start: 2024-11-14 | End: 2024-11-14 | Stop reason: HOSPADM

## 2024-11-14 RX ORDER — ONDANSETRON 2 MG/ML
4 INJECTION INTRAMUSCULAR; INTRAVENOUS ONCE AS NEEDED
Status: DISCONTINUED | OUTPATIENT
Start: 2024-11-14 | End: 2024-11-14 | Stop reason: HOSPADM

## 2024-11-14 RX ORDER — PROPOFOL 10 MG/ML
INJECTION, EMULSION INTRAVENOUS AS NEEDED
Status: DISCONTINUED | OUTPATIENT
Start: 2024-11-14 | End: 2024-11-14

## 2024-11-14 RX ADMIN — LIDOCAINE HYDROCHLORIDE 50 MG: 10 INJECTION, SOLUTION EPIDURAL; INFILTRATION; INTRACAUDAL; PERINEURAL at 12:47

## 2024-11-14 RX ADMIN — SUGAMMADEX 130 MG: 100 INJECTION, SOLUTION INTRAVENOUS at 13:42

## 2024-11-14 RX ADMIN — ROCURONIUM BROMIDE 50 MG: 10 INJECTION, SOLUTION INTRAVENOUS at 12:47

## 2024-11-14 RX ADMIN — FENTANYL CITRATE 50 MCG: 50 INJECTION INTRAMUSCULAR; INTRAVENOUS at 14:20

## 2024-11-14 RX ADMIN — PROPOFOL 50 MG: 10 INJECTION, EMULSION INTRAVENOUS at 12:53

## 2024-11-14 RX ADMIN — FENTANYL CITRATE 50 MCG: 50 INJECTION INTRAMUSCULAR; INTRAVENOUS at 12:47

## 2024-11-14 RX ADMIN — HYDROCODONE BITARTRATE AND ACETAMINOPHEN 1 TABLET: 5; 325 TABLET ORAL at 17:34

## 2024-11-14 RX ADMIN — FENTANYL CITRATE 50 MCG: 50 INJECTION INTRAMUSCULAR; INTRAVENOUS at 14:42

## 2024-11-14 RX ADMIN — PROMETHAZINE HYDROCHLORIDE 12.5 MG: 25 INJECTION INTRAMUSCULAR; INTRAVENOUS at 15:26

## 2024-11-14 RX ADMIN — HYDROMORPHONE HYDROCHLORIDE 0.5 MG: 1 INJECTION, SOLUTION INTRAMUSCULAR; INTRAVENOUS; SUBCUTANEOUS at 13:35

## 2024-11-14 RX ADMIN — FENTANYL CITRATE 50 MCG: 50 INJECTION INTRAMUSCULAR; INTRAVENOUS at 13:05

## 2024-11-14 RX ADMIN — ONDANSETRON 4 MG: 2 INJECTION INTRAMUSCULAR; INTRAVENOUS at 13:33

## 2024-11-14 RX ADMIN — HEPARIN SODIUM 5000 UNITS: 5000 INJECTION, SOLUTION INTRAVENOUS; SUBCUTANEOUS at 11:04

## 2024-11-14 RX ADMIN — PROPOFOL 100 MG: 10 INJECTION, EMULSION INTRAVENOUS at 12:47

## 2024-11-14 RX ADMIN — PHENYLEPHRINE HYDROCHLORIDE 20 MCG/MIN: 10 INJECTION INTRAVENOUS at 13:00

## 2024-11-14 RX ADMIN — SODIUM CHLORIDE, SODIUM LACTATE, POTASSIUM CHLORIDE, AND CALCIUM CHLORIDE 125 ML/HR: .6; .31; .03; .02 INJECTION, SOLUTION INTRAVENOUS at 11:00

## 2024-11-14 RX ADMIN — DEXAMETHASONE SODIUM PHOSPHATE 10 MG: 10 INJECTION, SOLUTION INTRAMUSCULAR; INTRAVENOUS at 12:53

## 2024-11-14 RX ADMIN — CEFAZOLIN SODIUM 2000 MG: 2 SOLUTION INTRAVENOUS at 12:50

## 2024-11-14 NOTE — ANESTHESIA POSTPROCEDURE EVALUATION
Post-Op Assessment Note    CV Status:  Stable    Pain management: adequate       Mental Status:  Alert and awake   Hydration Status:  Euvolemic   PONV Controlled:  Controlled   Airway Patency:  Patent     Post Op Vitals Reviewed: Yes    No anethesia notable event occurred.    Staff: CRNA           Last Filed PACU Vitals:  Vitals Value Taken Time   Temp     Pulse 71 11/14/24 1359   /60    Resp     SpO2 99 % RA 11/14/24 1359   Vitals shown include unfiled device data.    Modified Abelardo:  No data recorded

## 2024-11-14 NOTE — DISCHARGE INSTR - AVS FIRST PAGE
After Surgery Instructions    ???When you return home, you may eat whatever you feel comfortable eating. It is common to not have much of an appetite. Do not force yourself to eat. Your appetite will usually return in 1 or 2 weeks. Some foods may still not agree with you, but this will usually pass in 4 to 6 weeks.  ???Resume all of your regular medications, including blood thinners and aspirin, after going home.  ???The day after surgery you may remove the dressings. Leave any skin tapes on the incisions in place. A dressing is then optional.  ???You may shower the day after surgery. Plain soap and water is fine. Special soaps, ointments, alcohol or peroxide is not necessary. No swimming in pools for 5 days, and do not go in the ocean until seen in the office.  ???You may become constipated, especially if taking pain medications, for the first 3 or 4 days. You may take any over the counter laxative. Chan Milk of Magnesia is good to start.  ???You will be given a prescription for pain medication. Take it as needed only. You may also take any over the counter medication for more mild pain.  ???Immediately after surgery, you may ride in a car, climb steps and walk as tolerated. When you are pain free, it is Ok to drive. Be aware that you will tire out very easily for the first few days.  ???Do not lift anything over 15 pounds for one week. Otherwise, there are not specific limitations to your activity and you may resume normal activity as tolerated.       Contact [facility] at [phone] if any of the following occur:    ???A fever over 101° that does not respond to Tylenol. A low grade fever is not unusual after surgery and is not necessarily a sign of infection.  ???Increasing abdominal pain. Some pain after surgery is normal. Pain that was improving but has now gotten increasingly worse may not be normal and should be reported  ???Persistent nausea and vomiting.      IF YOU HAVE ANY  QUESTIONS OR CONCERNS PLEASE FEEL FREE TO CONTACT US AT ANY TIME.

## 2024-11-14 NOTE — OP NOTE
OPERATIVE REPORT  PATIENT NAME: Katharine Chatman    :  1950  MRN: [id number]  Pt Location: [facility] OR ROOM 14    SURGERY DATE: 2024    Surgeons and Role:     * Shayan Paz MD - Primary     * Kera Waller PA-C     * Cristobal Pappas MD    Preop Diagnosis:  Cholecystitis [K81.9]    Post-Op Diagnosis Codes:     * Cholecystitis [K81.9]    Procedure(s):  CHOLECYSTECTOMY LAPAROSCOPIC W/ ROBOTICS    Specimen(s):  ID Type Source Tests Collected by Time Destination   1 : Gallbladder Tissue Gallbladder TISSUE EXAM Shayan Paz MD 2024 1346        Estimated Blood Loss:   Minimal    Drains:  * No LDAs found *    Anesthesia Type:   General    Operative Indications:  Cholecystitis [K81.9]      Operative Findings:  Standard robotic cholecystectomy, no evidence of cholecystitis      Complications:   None    Procedure and Technique:  Patient was identified by visualization conversation on operating room table     Satisfactory induction of general anesthesia, the patient's abdomen was prepped and draped in usual sterile fashion.  Timeout taken.  Local anesthesia of 0.5% Marcaine was infiltrated into the skin subcutaneous tissue and deeper layers tissue.  Knife was used incision made.  A Veress needle introduced into the abdominal cavity.  Carbon dioxide was insufflated to 15 mmHg then an 8 mm robotic cannula introduced.  In the left upper abdomen local anesthesia infiltrated incision made and 8 mm robotic cannula introduced.  On the right abdomen, 2 areas were infiltrated with local incisions made an 8 mm robotic cannula was introduced.  The robot was then attached to the appropriate cannulas. The gallbladder was identified. From the lateralmost port, a pro grasper was introduced Grass gallbladder lifted up.  At the end of the gallbladder, sharp and blunt dissection was used with hook cautery to free the end of the gallbladder.  This was traced distally.  Dissection was carried out between the cystic  duct and the gallbladder to free up space.  Once he is cystic duct was freed up.  It was doubly clipped proximally singly distally and then divided with the scissors.  Of note the cystic artery was identified and ligated with electrocautery.  It was hemostatic after ligation without clips.  The gallbladder was lifted up and cauterized free from the liver bed with hook cautery.  Once the gallbladder was removed.  The area was cauterized. The area is copiously irrigated.  The gallbladder was placed in a bag and pulled free from the left lateral mm trocar site.  The robot was disconnected.  The left lateral trocar site had to be dilated to remove the gallbladder so Vicryl stitch was placed in figure-of-eight fashion to close the defect. The remaining ports were then removed.  Skin of all incisions closed with subcuticular 4-0 Monocryl.  Applied.  Patient was awakened taken recovery in satisfactorily.  Instrument needle sponge counts were correct. RF wanding was clear.    Dr. Paz was present for the entire procedure.     Patient Disposition:  PACU              SIGNATURE: Cristobal Pappas MD  DATE: November 14, 2024  TIME: 2:00 PM

## 2024-11-14 NOTE — H&P
Calculus of gallbladder without cholecystitis without obstruction  She has symptomatic biliary tract disease and seems very reasonable to proceed with cholecystectomy.  I talked to her about surgery in detail including risks, benefits, alternatives, and what to expect postoperatively.  Told her we do this either laparoscopically or robotically.  She understands and is agreeable to proceed.     Plan: Laparoscopic or robotic cholecystectomy  Orders:     History of Present Illness     Katharine Chatman is a 74 y.o. female who presents with longstanding history of gallbladder problems.  She had been seen in the past and recommended gallbladder surgery but she held off.  She gets pain in the right upper quadrant sometimes discomfort but other times it severe.  Only time makes it better.  Eating things such as dairy products seem to make it worse.  Occasionally pain goes into her back.  Occasional nausea, no vomiting, no fever, no chills, no jaundice, no history of pancreatitis.  Last ultrasound was in 2023 that showed sludge        Review of Systems   Constitutional:  Negative for chills and fever.   HENT:  Negative for trouble swallowing and voice change.    Eyes:  Negative for pain and visual disturbance.   Respiratory:  Negative for cough and shortness of breath.    Cardiovascular:  Negative for chest pain and leg swelling.   Gastrointestinal:  Negative for abdominal pain, nausea and vomiting.   Endocrine: Negative for cold intolerance, heat intolerance, polydipsia, polyphagia and polyuria.   Genitourinary:  Negative for difficulty urinating and flank pain.   Musculoskeletal:  Negative for arthralgias and gait problem.   Skin:  Negative for rash and wound.   Allergic/Immunologic: Negative for food allergies.   Neurological:  Negative for seizures, syncope, weakness and headaches.   Hematological:  Negative for adenopathy.   Psychiatric/Behavioral:  Negative for confusion.    All other systems reviewed and are  negative.     Past Medical History  Medical History        Past Medical History:   Diagnosis Date    Anxiety 2014    Arthritis      Basal cell carcinoma      Colon polyp      Depression long time    Depression with anxiety      Eating disorder       overeating    Fibrocystic breast disease, unspecified laterality      GERD (gastroesophageal reflux disease)      High cholesterol      Irritable bowel syndrome      Osteopenia      Stage 2 chronic kidney disease 12/12/2021         Surgical History         Past Surgical History:   Procedure Laterality Date    BREAST CYST ASPIRATION         one performed-unsure which breast    BREAST EXCISIONAL BIOPSY Bilateral       one on each breast aprox 1980s    COLONOSCOPY         7/13/12, colon polyp biopsied, hemorrhoids - Dr. Garzon    SCALP LESION REMOVAL W/ FLAP AND SKIN GRAFT         Behind right ear. Precancerous.    SEPTOPLASTY   05/04/2018    VAGINAL HYSTERECTOMY   1982     prolapse; about age 37         Family History         Family History   Problem Relation Age of Onset    Atrial fibrillation Mother      Cancer Mother 80         kidney, bladder,breast    Breast cancer Mother 80    COPD Mother           mild    Kidney cancer Mother 80    Osteoporosis Mother      Transient ischemic attack Mother      Stroke Mother           TIA    Arthritis Mother      Cancer Father           prostate, skin    Glaucoma Father      Hypertension Father      Arthritis Father      Coronary artery disease Maternal Grandfather      Arthritis Maternal Grandfather      Coronary artery disease Maternal Aunt      No Known Problems Maternal Grandmother      No Known Problems Paternal Grandmother      No Known Problems Paternal Grandfather      No Known Problems Sister      No Known Problems Daughter      No Known Problems Son      No Known Problems Paternal Aunt      No Known Problems Paternal Aunt      No Known Problems Paternal Aunt      Cancer Brother           leukemia    Cancer Brother        "    Polymyalgia rheumatica         Medications Ordered Prior to Encounter          Current Outpatient Medications on File Prior to Visit   Medication Sig Dispense Refill    acetaminophen (TYLENOL) 500 mg tablet Take 1,000 mg by mouth every 6 (six) hours as needed        ALPRAZolam (XANAX) 0.25 mg tablet Take 1 tablet (0.25 mg total) by mouth 3 (three) times a day as needed for anxiety 30 tablet 0    famotidine (PEPCID) 20 mg tablet Take 1 tablet (20 mg total) by mouth daily 90 tablet 3    fluticasone (FLONASE) 50 mcg/act nasal spray 1 SPRAY INTO EACH NOSTRIL 2 (TWO) TIMES A DAY WITH MEALS (Patient taking differently: 1 spray into each nostril as needed) 48 mL 1    folic acid (KP Folic Acid) 1 mg tablet Take 2 tablets (2 mg total) by mouth daily 180 tablet 0    ipratropium (ATROVENT) 0.03 % nasal spray 1 spray into each nostril 3 (three) times a day 30 mL 1    levocetirizine (XYZAL) 5 MG tablet Take 1 tablet (5 mg total) by mouth every evening 90 tablet 1    methotrexate 2.5 MG tablet Take 5 in the morning and 5 in the evening one day a week 120 tablet 2    omeprazole (PriLOSEC) 20 mg delayed release capsule TAKE 1 CAPSULE BY MOUTH EVERY DAY 90 capsule 1    rosuvastatin (CRESTOR) 10 MG tablet TAKE 1 TABLET BY MOUTH EVERY DAY 90 tablet 1    sertraline (ZOLOFT) 50 mg tablet TAKE 1 TABLET BY MOUTH EVERY DAY 90 tablet 1      No current facility-administered medications on file prior to visit.         Allergies        Allergies   Allergen Reactions    Clindamycin GI Intolerance and Other (See Comments)    Sulfa Antibiotics Hives and Rash    Penicillins Hives and Rash               Objective[]Expand by Default     /80   Pulse 88   Temp 97.6 °F (36.4 °C) (Temporal)   Resp 12   Ht 5' 2\" (1.575 m)   Wt 69.4 kg (153 lb)   SpO2 97%   BMI 27.98 kg/m²      Physical Exam  Vitals and nursing note reviewed.   Constitutional:       General: She is not in acute distress.     Appearance: She is well-developed. She is not " "diaphoretic.   HENT:      Head: Normocephalic and atraumatic.      Right Ear: External ear normal.      Left Ear: External ear normal.   Eyes:      General: No scleral icterus.     Conjunctiva/sclera: Conjunctivae normal.   Neck:      Thyroid: No thyromegaly.      Trachea: No tracheal deviation.   Cardiovascular:      Rate and Rhythm: Normal rate and regular rhythm.      Heart sounds: Normal heart sounds. No murmur heard.     No friction rub.   Pulmonary:      Effort: Pulmonary effort is normal. No respiratory distress.      Breath sounds: Normal breath sounds. No wheezing or rales.   Abdominal:      General: There is no distension.      Palpations: Abdomen is soft. There is no mass.      Tenderness: There is no abdominal tenderness. There is no guarding or rebound.   Musculoskeletal:         General: Normal range of motion.      Cervical back: Neck supple.   Lymphadenopathy:      Cervical: No cervical adenopathy.   Skin:     General: Skin is warm and dry.   Neurological:      Mental Status: She is alert and oriented to person, place, and time.   Psychiatric:         Behavior: Behavior normal.         Thought Content: Thought content normal.         Judgment: Judgment normal.   /88   Pulse 80   Temp 97.6 °F (36.4 °C) (Temporal)   Resp 18   Ht 5' 2\" (1.575 m)   Wt 66.7 kg (147 lb)   SpO2 99%   BMI 26.89 kg/m²     "

## 2024-11-14 NOTE — ANESTHESIA PREPROCEDURE EVALUATION
Procedure:  CHOLECYSTECTOMY LAPAROSCOPIC W/ ROBOTICS (Abdomen)    Relevant Problems   CARDIO   (+) Aortic valve insufficiency   (+) Hyperlipidemia   (+) Rib pain on right side      ENDO   (+) Subclinical hypothyroidism      GI/HEPATIC   (+) Esophageal reflux   (+) GERD without esophagitis      /RENAL   (+) Stage 3a chronic kidney disease (HCC)      MUSCULOSKELETAL   (+) Arthritis of carpometacarpal (CMC) joint of both thumbs   (+) Back pain   (+) Psoriatic arthritis (HCC)   (+) Synovitis      NEURO/PSYCH   (+) Depression with anxiety      Left Ventricle: Left ventricular cavity size is normal. Wall thickness  is normal. The left ventricular ejection fraction is 65%. Systolic  function is normal. Wall motion is normal. Diastolic function is mildly  abnormal, consistent with grade I (abnormal) relaxation.    Right Ventricle: Right ventricular cavity size is normal. Systolic  function is normal. Normal tricuspid annular plane systolic excursion  (TAPSE) > 1.7 cm. Wall thickness is normal.    Right Atrium: The atrium is mildly dilated.    Aortic Valve: There is mild regurgitation.    Mitral Valve: There is mild annular calcification. There is mild  regurgitation.    Tricuspid Valve: There is mild to moderate regurgitation. The estimated  right ventricular systolic pressure is 36.00 mmHg.    Physical Exam    Airway    Mallampati score: II  TM Distance: >3 FB  Neck ROM: full     Dental       Cardiovascular      Pulmonary      Other Findings  post-pubertal.      Anesthesia Plan  ASA Score- 2     Anesthesia Type- general with ASA Monitors.         Additional Monitors:     Airway Plan: ETT.           Plan Factors-    Chart reviewed.                      Induction- intravenous.    Postoperative Plan-         Informed Consent- Anesthetic plan and risks discussed with patient.  I personally reviewed this patient with the CRNA. Discussed and agreed on the Anesthesia Plan with the CRNA..

## 2024-11-15 ENCOUNTER — TELEPHONE (OUTPATIENT)
Age: 74
End: 2024-11-15

## 2024-11-15 LAB
ATRIAL RATE: 71 BPM
P AXIS: 49 DEGREES
PR INTERVAL: 180 MS
QRS AXIS: 87 DEGREES
QRSD INTERVAL: 74 MS
QT INTERVAL: 416 MS
QTC INTERVAL: 453 MS
T WAVE AXIS: 52 DEGREES
VENTRICULAR RATE: 71 BPM

## 2024-11-15 PROCEDURE — 93010 ELECTROCARDIOGRAM REPORT: CPT | Performed by: INTERNAL MEDICINE

## 2024-11-15 NOTE — TELEPHONE ENCOUNTER
Received call from patient post cholecystectomy 11/14 to reports she's has recently had 3 episodes of severe burning in her chest and  throat post taking Sertraline 20 mg in the evening with adequate amount of water and sits up for a period post taking.  Burning sensation episodes cam last up to 3 hours. Has taken Xanax to help calm down to sleep; does not effect burning sensation. Pepto bismol provides no relief.   Surgeon does not believe it is related to surgical procedure. Please follow up with patient for provider response, care advice, new orders.

## 2024-11-17 ENCOUNTER — PATIENT MESSAGE (OUTPATIENT)
Dept: RHEUMATOLOGY | Facility: CLINIC | Age: 74
End: 2024-11-17

## 2024-11-18 PROCEDURE — 88304 TISSUE EXAM BY PATHOLOGIST: CPT | Performed by: PATHOLOGY

## 2024-11-18 NOTE — PATIENT COMMUNICATION
Incoming call:    Patient would like to know if it is ok to take Methotrexate post lap ramy this past Thurday? MTX due today.    Routing to Dr. Carlos

## 2024-11-19 ENCOUNTER — TELEPHONE (OUTPATIENT)
Age: 74
End: 2024-11-19

## 2024-11-19 ENCOUNTER — PATIENT MESSAGE (OUTPATIENT)
Dept: RHEUMATOLOGY | Facility: CLINIC | Age: 74
End: 2024-11-19

## 2024-11-19 NOTE — TELEPHONE ENCOUNTER
Received call on gen surg line regarding patient restarting her Methotrexate.  Warm transferred to Rheum CTS but call was dropped. Returned call to patient to make them aware and she was understanding and stated she would call Rheumatology back.    No further questions.

## 2024-11-19 NOTE — PATIENT COMMUNICATION
Pt is wanting to find out if she is able to resume her Methotrexate. She usually takes it on Monday and now is Tuesday, she just doesn't want a delay in her medication.       Please advise

## 2024-11-19 NOTE — ANESTHESIA POSTPROCEDURE EVALUATION
Post-Op Assessment Note    CV Status:  Stable    Pain management: adequate       Mental Status:  Alert and awake   Hydration Status:  Euvolemic   PONV Controlled:  Controlled   Airway Patency:  Patent     Post Op Vitals Reviewed: Yes    No anethesia notable event occurred.    Staff: CRNA           Last Filed PACU Vitals:  Vitals Value Taken Time   Temp     Pulse 71 11/14/24 1359   /60    Resp     SpO2 99 % RA 11/14/24 1359   Vitals shown include unfiled device data.    Modified Abelardo:  No data recorded  Post-Op Assessment Note            No anethesia notable event occurred.    Staff: Anesthesiologist           Last Filed PACU Vitals:  Vitals Value Taken Time   Temp 98 °F (36.7 °C) 11/14/24 1400   Pulse 80 11/14/24 1452   /70 11/14/24 1445   Resp 11 11/14/24 1451   SpO2 98 % 11/14/24 1452   Vitals shown include unfiled device data.    Modified Abelardo:  No data recorded

## 2024-12-03 ENCOUNTER — RESULTS FOLLOW-UP (OUTPATIENT)
Dept: RHEUMATOLOGY | Facility: CLINIC | Age: 74
End: 2024-12-03

## 2024-12-03 ENCOUNTER — TELEPHONE (OUTPATIENT)
Age: 74
End: 2024-12-03

## 2024-12-03 ENCOUNTER — APPOINTMENT (OUTPATIENT)
Dept: LAB | Age: 74
End: 2024-12-03
Payer: MEDICARE

## 2024-12-03 DIAGNOSIS — Z79.899 HIGH RISK MEDICATION USE: ICD-10-CM

## 2024-12-03 LAB
ALBUMIN SERPL BCG-MCNC: 4.3 G/DL (ref 3.5–5)
ALP SERPL-CCNC: 72 U/L (ref 34–104)
ALT SERPL W P-5'-P-CCNC: 23 U/L (ref 7–52)
ANION GAP SERPL CALCULATED.3IONS-SCNC: 10 MMOL/L (ref 4–13)
AST SERPL W P-5'-P-CCNC: 24 U/L (ref 13–39)
BASOPHILS # BLD AUTO: 0.06 THOUSANDS/ΜL (ref 0–0.1)
BASOPHILS NFR BLD AUTO: 1 % (ref 0–1)
BILIRUB SERPL-MCNC: 0.49 MG/DL (ref 0.2–1)
BUN SERPL-MCNC: 21 MG/DL (ref 5–25)
CALCIUM SERPL-MCNC: 9.5 MG/DL (ref 8.4–10.2)
CHLORIDE SERPL-SCNC: 100 MMOL/L (ref 96–108)
CO2 SERPL-SCNC: 29 MMOL/L (ref 21–32)
CREAT SERPL-MCNC: 0.94 MG/DL (ref 0.6–1.3)
EOSINOPHIL # BLD AUTO: 0.2 THOUSAND/ΜL (ref 0–0.61)
EOSINOPHIL NFR BLD AUTO: 4 % (ref 0–6)
ERYTHROCYTE [DISTWIDTH] IN BLOOD BY AUTOMATED COUNT: 14.6 % (ref 11.6–15.1)
GFR SERPL CREATININE-BSD FRML MDRD: 60 ML/MIN/1.73SQ M
GLUCOSE P FAST SERPL-MCNC: 91 MG/DL (ref 65–99)
HCT VFR BLD AUTO: 38.7 % (ref 34.8–46.1)
HGB BLD-MCNC: 12.5 G/DL (ref 11.5–15.4)
IMM GRANULOCYTES # BLD AUTO: 0.03 THOUSAND/UL (ref 0–0.2)
IMM GRANULOCYTES NFR BLD AUTO: 1 % (ref 0–2)
LYMPHOCYTES # BLD AUTO: 0.68 THOUSANDS/ΜL (ref 0.6–4.47)
LYMPHOCYTES NFR BLD AUTO: 15 % (ref 14–44)
MCH RBC QN AUTO: 30.5 PG (ref 26.8–34.3)
MCHC RBC AUTO-ENTMCNC: 32.3 G/DL (ref 31.4–37.4)
MCV RBC AUTO: 94 FL (ref 82–98)
MONOCYTES # BLD AUTO: 0.49 THOUSAND/ΜL (ref 0.17–1.22)
MONOCYTES NFR BLD AUTO: 11 % (ref 4–12)
NEUTROPHILS # BLD AUTO: 3.13 THOUSANDS/ΜL (ref 1.85–7.62)
NEUTS SEG NFR BLD AUTO: 68 % (ref 43–75)
NRBC BLD AUTO-RTO: 0 /100 WBCS
PLATELET # BLD AUTO: 315 THOUSANDS/UL (ref 149–390)
PMV BLD AUTO: 11.2 FL (ref 8.9–12.7)
POTASSIUM SERPL-SCNC: 3.9 MMOL/L (ref 3.5–5.3)
PROT SERPL-MCNC: 7.1 G/DL (ref 6.4–8.4)
RBC # BLD AUTO: 4.1 MILLION/UL (ref 3.81–5.12)
SODIUM SERPL-SCNC: 139 MMOL/L (ref 135–147)
WBC # BLD AUTO: 4.59 THOUSAND/UL (ref 4.31–10.16)

## 2024-12-03 PROCEDURE — 36415 COLL VENOUS BLD VENIPUNCTURE: CPT

## 2024-12-03 PROCEDURE — 85025 COMPLETE CBC W/AUTO DIFF WBC: CPT

## 2024-12-03 PROCEDURE — 80053 COMPREHEN METABOLIC PANEL: CPT

## 2024-12-03 NOTE — TELEPHONE ENCOUNTER
Patient called in to reschedule her post op appt with Dr Feng on 12/11, she is now scheduled for 12/17

## 2024-12-04 ENCOUNTER — RESULTS FOLLOW-UP (OUTPATIENT)
Dept: RHEUMATOLOGY | Facility: CLINIC | Age: 74
End: 2024-12-04

## 2024-12-04 LAB
GAMMA INTERFERON BACKGROUND BLD IA-ACNC: 0.01 IU/ML
M TB IFN-G BLD-IMP: NEGATIVE
M TB IFN-G CD4+ BCKGRND COR BLD-ACNC: -0.01 IU/ML
M TB IFN-G CD4+ BCKGRND COR BLD-ACNC: 0 IU/ML
MITOGEN IGNF BCKGRD COR BLD-ACNC: 2.25 IU/ML

## 2024-12-06 ENCOUNTER — OFFICE VISIT (OUTPATIENT)
Dept: RHEUMATOLOGY | Facility: CLINIC | Age: 74
End: 2024-12-06

## 2024-12-06 ENCOUNTER — TELEPHONE (OUTPATIENT)
Age: 74
End: 2024-12-06

## 2024-12-06 VITALS
SYSTOLIC BLOOD PRESSURE: 140 MMHG | DIASTOLIC BLOOD PRESSURE: 82 MMHG | HEART RATE: 80 BPM | WEIGHT: 152 LBS | TEMPERATURE: 97.5 F | HEIGHT: 62 IN | BODY MASS INDEX: 27.97 KG/M2 | OXYGEN SATURATION: 98 %

## 2024-12-06 DIAGNOSIS — L40.50 PSORIATIC ARTHRITIS (HCC): Primary | ICD-10-CM

## 2024-12-06 DIAGNOSIS — M15.0 PRIMARY GENERALIZED (OSTEO)ARTHRITIS: ICD-10-CM

## 2024-12-06 RX ORDER — FOLIC ACID 1 MG/1
2 TABLET ORAL DAILY
Qty: 180 TABLET | Refills: 3 | Status: SHIPPED | OUTPATIENT
Start: 2024-12-06

## 2024-12-06 RX ORDER — METHOTREXATE 2.5 MG/1
TABLET ORAL
Qty: 120 TABLET | Refills: 2 | Status: SHIPPED | OUTPATIENT
Start: 2024-12-06

## 2024-12-06 NOTE — TELEPHONE ENCOUNTER
Please place needed medication order within Epic and the PA team will initiate the Prior Authorization. Thank you

## 2024-12-06 NOTE — ASSESSMENT & PLAN NOTE
Discussed that current symptoms seem more OA than inflammatory, but with some stiffness (albeit shortlived) and the fact that she noticed considerable worsening of her symptoms after stopping methotrexate, I don't think it would be unreasonable to try Humira    Discussed r:b including theoretical cancer risk, she is not sure she wants to pursue it yet but is amenable to us precerting for it    Discussed that if she doesn't have significant improvement on Humira then it's likely that her active issues are OA-related, but if she does have significant improvement then it's likely there's an active inflammatory component  Orders:    Ambulatory Referral to PT/OT Hand Therapy; Future    methotrexate 2.5 MG tablet; Take 5 in the morning and 5 in the evening one day a week    folic acid (FOLVITE) 1 mg tablet; Take 2 tablets (2 mg total) by mouth daily

## 2024-12-06 NOTE — Clinical Note
Precert for Humira 40 mg subcutaneous Q14 days for Psoriatic Arthritis, failed methotrexate alone, sulfasalazine contraindicated due to sulfa allergy  Patient not yet sure she wants to take the medication, but she does want us to get it authorized so we can started asap if she decides to take it

## 2024-12-06 NOTE — PATIENT INSTRUCTIONS
If you need prednisone for severe pain, it's reasonable to take 10-15 mg a day for 2-3 days at a time. If you find yourself needing this more often, please let me know    I will submit authorization for Humira

## 2024-12-06 NOTE — TELEPHONE ENCOUNTER
----- Message from Ed Carlos DO sent at 12/6/2024  3:04 PM EST -----  Precert for Humira 40 mg subcutaneous Q14 days for Psoriatic Arthritis, failed methotrexate alone, sulfasalazine contraindicated due to sulfa allergy    Patient not yet sure she wants to take the medication, but she does want us to get it authorized so we can started asap if she decides to take it

## 2024-12-06 NOTE — PROGRESS NOTES
Name: Katharine Chatman      : 1950      MRN: [id number]  Encounter Provider: Ed Carlos DO  Encounter Date: 2024   Encounter department: [facility]  :  Assessment & Plan  Psoriatic arthritis (HCC)  Discussed that current symptoms seem more OA than inflammatory, but with some stiffness (albeit shortlived) and the fact that she noticed considerable worsening of her symptoms after stopping methotrexate, I don't think it would be unreasonable to try Humira    Discussed r:b including theoretical cancer risk, she is not sure she wants to pursue it yet but is amenable to us precerting for it    Discussed that if she doesn't have significant improvement on Humira then it's likely that her active issues are OA-related, but if she does have significant improvement then it's likely there's an active inflammatory component  Orders:    Ambulatory Referral to PT/OT Hand Therapy; Future    methotrexate 2.5 MG tablet; Take 5 in the morning and 5 in the evening one day a week    folic acid (FOLVITE) 1 mg tablet; Take 2 tablets (2 mg total) by mouth daily    Primary generalized (osteo)arthritis  I feel that her active joint issues are more attributable to this than to PsA  Orders:    Ambulatory Referral to PT/OT Hand Therapy; Future      Patient's rheumatologic disease(s) threaten long-term function if not appropriately managed.    Patient's rheumatologic medication(s) require intensive monitoring for toxicity. Does not endorse any significant side effects.    History of Present Illness     Pain started worsening around the change of the weather. Noticed a big difference after having to stop the methotrexate for her cholecystectomy.    Hurting all over.    Stiffness not so bad in the hands, but if she is sitting for a long time and gets up, she is extremely stiff for the first 12ish steps    Permanent History: First seen by Dr. Whitney in Aug 2023 for hand arthritis and elevated CRP;  "was dxd with PMR and continued on prednisone taper.      When I first saw her Nov 2023, symptoms were not at all consistent with PMR, symptoms much more consistent with PsA with peripheral inflammatory symptoms not controlled on 5 mg prednisone, history dactylitis and appreciable nail pitting. Weaned off of prednisone herself and did not develop any shoulder or hip stiffness. Started on methotrexate for PsA vs seronegative RA, which she responded very well to initially.      PMH vitiligo around the arms and neck and legs.    Objective   /82 (BP Location: Left arm)   Pulse 80   Temp 97.5 °F (36.4 °C)   Ht 5' 2\" (1.575 m)   Wt 68.9 kg (152 lb)   SpO2 98%   BMI 27.80 kg/m²     General: Well appearing, in no distress.   Eyes: Sclera non-icteric. EOMI  Extremities: Warm, well perfused, no edema.   Neuro: Alert and oriented. No gross focal neurological deficits.   Skin: No rashes.  MSK exam: degen changes bilateral hands, no obvious synovitis  "

## 2024-12-09 RX ORDER — ADALIMUMAB 40MG/0.4ML
KIT SUBCUTANEOUS
Qty: 2 EACH | Refills: 3 | Status: SHIPPED | OUTPATIENT
Start: 2024-12-09

## 2024-12-09 NOTE — TELEPHONE ENCOUNTER
PA for Humira SUBMITTED to [facility]    via    [x]CMM-KEY: [id number]  []Surescripts-Case ID #    []Availity-Auth ID #  NDC #    []Faxed to plan   []Other website    []Phone call Case ID #      [x]PA sent as URGENT    All office notes, labs and other pertaining documents and studies sent. Clinical questions answered. Awaiting determination from insurance company.     Turnaround time for your insurance to make a decision on your Prior Authorization can take 7-21 business days.

## 2024-12-11 NOTE — TELEPHONE ENCOUNTER
Can you please give her the info below to apply for [employer]    [email or web address]

## 2024-12-11 NOTE — TELEPHONE ENCOUNTER
PA for Humira  APPROVED     Date(s) approved 12/31/24    Case #     Patient advised by          []MyChart Message  []Phone call   [x]LMOM  []L/M to call office as no active Communication consent on file  []Unable to leave detailed message as VM not approved on Communication consent       Pharmacy advised by    [x]Fax  []Phone call    Approval letter scanned into [employer] Yes

## 2024-12-11 NOTE — TELEPHONE ENCOUNTER
sorry, but got one more for you this morning, until the next batch comes through.... Katharine also has a high copay for Humira ($1,530.59) and not eligible for the copay card, so free drug is prob her best option.

## 2024-12-17 ENCOUNTER — OFFICE VISIT (OUTPATIENT)
Dept: SURGERY | Facility: CLINIC | Age: 74
End: 2024-12-17

## 2024-12-17 VITALS
TEMPERATURE: 97.8 F | BODY MASS INDEX: 27.97 KG/M2 | WEIGHT: 152 LBS | OXYGEN SATURATION: 99 % | SYSTOLIC BLOOD PRESSURE: 145 MMHG | DIASTOLIC BLOOD PRESSURE: 87 MMHG | HEIGHT: 62 IN | HEART RATE: 76 BPM | RESPIRATION RATE: 14 BRPM

## 2024-12-17 DIAGNOSIS — Z90.49 STATUS POST LAPAROSCOPIC CHOLECYSTECTOMY: Primary | ICD-10-CM

## 2024-12-17 PROBLEM — K80.20 CALCULUS OF GALLBLADDER WITHOUT CHOLECYSTITIS WITHOUT OBSTRUCTION: Status: RESOLVED | Noted: 2023-04-03 | Resolved: 2024-12-17

## 2024-12-17 PROBLEM — K81.9 CHOLECYSTITIS: Status: RESOLVED | Noted: 2024-09-09 | Resolved: 2024-12-17

## 2024-12-17 PROCEDURE — 99024 POSTOP FOLLOW-UP VISIT: CPT | Performed by: SURGERY

## 2024-12-17 NOTE — PROGRESS NOTES
"Name: Katharine Chatman      : 1950      MRN: [id number]  Encounter Provider: Shayan Paz MD  Encounter Date: 2024   Encounter department: [facility]  :  Assessment & Plan  Status post laparoscopic cholecystectomy  Very well.  Activity as tolerated.  See us back here if needed.           History of Present Illness   HPI  Katharine Chatman is a 74 y.o. female who presents status post robotic cholecystectomy.  No complaints at this time.  Minimal complaints of pain postop      Review of Systems       Objective   /87 (Patient Position: Sitting)   Pulse 76   Temp 97.8 °F (36.6 °C) (Temporal)   Resp 14   Ht 5' 2\" (1.575 m)   Wt 68.9 kg (152 lb)   SpO2 99%   BMI 27.80 kg/m²      Physical Exam  Abdomen: Incisions healing well, soft, nontender    "

## 2024-12-18 RX ORDER — SERTRALINE HYDROCHLORIDE 25 MG/1
25 TABLET, FILM COATED ORAL DAILY
COMMUNITY

## 2024-12-20 ENCOUNTER — OFFICE VISIT (OUTPATIENT)
Dept: INTERNAL MEDICINE CLINIC | Facility: CLINIC | Age: 74
End: 2024-12-20
Payer: MEDICARE

## 2024-12-20 VITALS
OXYGEN SATURATION: 100 % | HEIGHT: 62 IN | WEIGHT: 152.6 LBS | BODY MASS INDEX: 28.08 KG/M2 | SYSTOLIC BLOOD PRESSURE: 140 MMHG | DIASTOLIC BLOOD PRESSURE: 62 MMHG | TEMPERATURE: 97.6 F | HEART RATE: 78 BPM

## 2024-12-20 DIAGNOSIS — F51.01 PRIMARY INSOMNIA: ICD-10-CM

## 2024-12-20 DIAGNOSIS — N18.2 STAGE 2 CHRONIC KIDNEY DISEASE: ICD-10-CM

## 2024-12-20 DIAGNOSIS — Z00.00 MEDICARE ANNUAL WELLNESS VISIT, SUBSEQUENT: ICD-10-CM

## 2024-12-20 DIAGNOSIS — I10 PRIMARY HYPERTENSION: Primary | ICD-10-CM

## 2024-12-20 DIAGNOSIS — Z13.6 SCREENING FOR CARDIOVASCULAR CONDITION: ICD-10-CM

## 2024-12-20 DIAGNOSIS — L40.50 PSORIATIC ARTHRITIS (HCC): ICD-10-CM

## 2024-12-20 DIAGNOSIS — E78.2 MIXED HYPERLIPIDEMIA: ICD-10-CM

## 2024-12-20 PROCEDURE — 99214 OFFICE O/P EST MOD 30 MIN: CPT | Performed by: INTERNAL MEDICINE

## 2024-12-20 PROCEDURE — G0439 PPPS, SUBSEQ VISIT: HCPCS | Performed by: INTERNAL MEDICINE

## 2024-12-20 RX ORDER — AMLODIPINE BESYLATE 2.5 MG/1
2.5 TABLET ORAL DAILY
Qty: 30 TABLET | Refills: 5 | Status: SHIPPED | OUTPATIENT
Start: 2024-12-20

## 2024-12-20 NOTE — PROGRESS NOTES
Name: Katharine Chatman      : 1950      MRN: [id number]  Encounter Provider: Sukhi Rossi DO  Encounter Date: 2024   Enc[facility] department: [facility]    Assessment & Plan  Primary hypertension  Suboptimal control I would like the patient to start monitoring her blood pressure at home and if persistently elevated greater than 140/90 restart amlodipine 2.5 mg once daily please report blood pressure readings in 1 week  Orders:  •  Comprehensive metabolic panel; Future  •  Lipid Panel with Direct LDL reflex; Future  •  amLODIPine (NORVASC) 2.5 mg tablet; Take 1 tablet (2.5 mg total) by mouth daily    Screening for cardiovascular condition    Orders:  •  Lipid Panel with Direct LDL reflex; Future    Medicare annual wellness visit, subsequent  Assessment and plan 1.  Medicare subsequent annual wellness examination overall the patient is clinically stable and doing well, we encouraged the patient to follow a healthy and balanced diet.  We recommend that the patient exercise routinely approximately 30 minutes 5 times per week .  We have reviewed the patient's vaccines and have made recommendations for updates if necessary up-to-date with shingles vaccine, pneumonia shot, flu shot and COVID booster    .  We will be ordering screening laboratories which are age appropriate.  Return to the office in   2 months   call if any problems.       Primary insomnia  Clinically stable and doing well continue the current medical regiment will continue monitor.  Doing little bit better patient does report to me when her  is doing better she is able to sleep better       Mixed hyperlipidemia  Hyperlipidemia controlled continue with current medical regiment recommend a low-cholesterol diet and recommend routine exercise we will continue to monitor the progress.  Continue Crestor 10 mg once daily       Psoriatic arthritis (HCC)  Recently seen rheumatologist Dr. Jory wells at this  point, patient preferred to hold off on starting medication to start an exercise and fitness program for strengthening and balance through the [employer] program.  She may consider this medication tolerated       Stage 2 chronic kidney disease  Lab Results   Component Value Date    EGFR 60 12/03/2024    EGFR 64 11/14/2024    EGFR 69 09/10/2024    CREATININE 0.94 12/03/2024    CREATININE 0.90 11/14/2024    CREATININE 0.84 09/10/2024     Drink adequate amount of water, avoid anti-inflammatories, reduce sodium in the diet and will continue to monitor the kidney function     RTO in 2 months call for problems     Preventive health issues were discussed with patient, and age appropriate screening tests were ordered as noted in patient's After Visit Summary. Personalized health advice and appropriate referrals for health education or preventive services given if needed, as noted in patient's After Visit Summary.    History of Present Illness     HPI 74-year old female coming in for a follow up office visit regarding hypertension, primary insomnia, hyperlipidemia, psoriatic arthritis and stage II chronic kidney disease; the patient reports me compliant taking medications without untoward side effects the.  The patient is here to review his medical condition, update me on the medical condition and the patient reports me no hospitalizations and no ER visits.  Patient reports me overall doing well here to review laboratories in detail recently has been seen by rheumatology felt to have psoriatic arthritis in conjunction with osteoarthritis is recommended biological agent for treatment at this point in time patient would like to hold off starting medication and would like to start exercising to work on the stiffness and morning stiffness and decreased muscle strength she will be doing the [employer] program and considering.  Trying to follow healthy balanced diet chair yoga , go to the gym she reports me she sleeps  better when her  is doing well and he can sleep better currently does have a neck problem..  There is review laboratories in detail  Patient Care Team:  Sukhi Rossi DO as PCP - General (Internal Medicine)  Vaibhav Patel MD    Review of Systems   Constitutional:  Negative for activity change, appetite change and unexpected weight change.   HENT:  Negative for congestion and postnasal drip.    Eyes:  Negative for visual disturbance.   Respiratory:  Negative for cough and shortness of breath.    Cardiovascular:  Negative for chest pain.   Gastrointestinal:  Negative for abdominal pain, diarrhea, nausea and vomiting.   Neurological:  Negative for dizziness, light-headedness and headaches.   Hematological:  Negative for adenopathy.     Medical History Reviewed by provider this encounter:  Tobacco  Allergies  Meds  Problems  Med Hx  Surg Hx  Fam Hx       Annual Wellness Visit Questionnaire   Katharine is here for her Subsequent Wellness visit.     Health Risk Assessment:   Patient rates overall health as good. Patient feels that their physical health rating is slightly worse. Patient is satisfied with their life. Eyesight was rated as same. Hearing was rated as same. Patient feels that their emotional and mental health rating is same. Patients states they are never, rarely angry. Patient states they are sometimes unusually tired/fatigued. Pain experienced in the last 7 days has been some. Patient's pain rating has been 3/10. Patient states that she has experienced no weight loss or gain in last 6 months.     Depression Screening:   PHQ-9 Score: 2      Fall Risk Screening:   In the past year, patient has experienced: no history of falling in past year      Urinary Incontinence Screening:   Patient has not leaked urine accidently in the last six months.     Home Safety:  Patient does not have trouble with stairs inside or outside of their home. Patient has working smoke alarms and has working carbon  monoxide detector. Home safety hazards include: uneven floors.     Nutrition:   Current diet is Regular and Limited junk food.     Medications:   Patient is not currently taking any over-the-counter supplements. Patient is able to manage medications.     Activities of Daily Living (ADLs)/Instrumental Activities of Daily Living (IADLs):   Walk and transfer into and out of bed and chair?: Yes  Dress and groom yourself?: Yes    Bathe or shower yourself?: Yes    Feed yourself? Yes  Do your laundry/housekeeping?: Yes  Manage your money, pay your bills and track your expenses?: Yes  Make your own meals?: Yes    Do your own shopping?: Yes    Previous Hospitalizations:   Any hospitalizations or ED visits within the last 12 months?: No      Advance Care Planning:   Living will: Yes    Durable POA for healthcare: Yes    Advanced directive: Yes      Cognitive Screening:   Provider or family/friend/caregiver concerned regarding cognition?: No    PREVENTIVE SCREENINGS      Cardiovascular Screening:    General: Screening Not Indicated and History Lipid Disorder      Diabetes Screening:     General: Screening Current      Colorectal Cancer Screening:     General: Screening Current      Breast Cancer Screening:     General: Screening Current      Cervical Cancer Screening:    General: Screening Not Indicated      Lung Cancer Screening:     General: Screening Not Indicated      Hepatitis C Screening:    General: Screening Current    Screening, Brief Intervention, and Referral to Treatment (SBIRT)    Screening  Typical number of drinks in a day: 0  Typical number of drinks in a week: 0  Interpretation: Low risk drinking behavior.    AUDIT-C Screenin) How often did you have a drink containing alcohol in the past year? monthly or less  2) How many drinks did you have on a typical day when you were drinking in the past year? 1 to 2  3) How often did you have 6 or more drinks on one occasion in the past year? never    AUDIT-C Score:  "1  Interpretation: Score 0-2 (female): Negative screen for alcohol misuse    Single Item Drug Screening:  How often have you used an illegal drug (including marijuana) or a prescription medication for non-medical reasons in the past year? never    Single Item Drug Screen Score: 0  Interpretation: Negative screen for possible drug use disorder    Social Drivers of Health     Financial Resource Strain: Low Risk  (12/13/2023)    Overall Financial Resource Strain (CARDIA)    • Difficulty of Paying Living Expenses: Not hard at all   Food Insecurity: No Food Insecurity (12/20/2024)    Hunger Vital Sign    • Worried About Running Out of Food in the Last Year: Never true    • Ran Out of Food in the Last Year: Never true   Transportation Needs: No Transportation Needs (12/20/2024)    PRAPARE - Transportation    • Lack of Transportation (Medical): No    • Lack of Transportation (Non-Medical): No   Housing Stability: Low Risk  (12/20/2024)    Housing Stability Vital Sign    • Unable to Pay for Housing in the Last Year: No    • Number of Times Moved in the Last Year: 0    • Homeless in the Last Year: No   Utilities: Not At Risk (12/20/2024)    [facility] Utilities    • Threatened with loss of utilities: No     No results found.    Objective   /62 (BP Location: Right arm, Patient Position: Sitting, Cuff Size: Standard)   Pulse 78   Temp 97.6 °F (36.4 °C) (Tympanic)   Ht 5' 2\" (1.575 m)   Wt 69.2 kg (152 lb 9.6 oz)   SpO2 100%   BMI 27.91 kg/m²     Physical Exam  Vitals and nursing note reviewed.   Constitutional:       Appearance: She is well-developed.   HENT:      Head: Normocephalic and atraumatic.      Right Ear: External ear normal.      Left Ear: External ear normal.      Nose: Nose normal.   Eyes:      Pupils: Pupils are equal, round, and reactive to light.   Cardiovascular:      Rate and Rhythm: Normal rate and regular rhythm.      Heart sounds: Normal heart sounds. No murmur heard.  Pulmonary:      Effort: " Pulmonary effort is normal.      Breath sounds: Normal breath sounds.   Abdominal:      General: There is no distension.      Palpations: Abdomen is soft.      Tenderness: There is no abdominal tenderness. There is no guarding.

## 2024-12-22 PROBLEM — N18.2 STAGE 2 CHRONIC KIDNEY DISEASE: Status: ACTIVE | Noted: 2024-05-21

## 2024-12-22 NOTE — ASSESSMENT & PLAN NOTE
Lab Results   Component Value Date    EGFR 60 12/03/2024    EGFR 64 11/14/2024    EGFR 69 09/10/2024    CREATININE 0.94 12/03/2024    CREATININE 0.90 11/14/2024    CREATININE 0.84 09/10/2024     Drink adequate amount of water, avoid anti-inflammatories, reduce sodium in the diet and will continue to monitor the kidney function     RTO in 2 months call for problems

## 2024-12-22 NOTE — ASSESSMENT & PLAN NOTE
Assessment and plan 1.  Medicare subsequent annual wellness examination overall the patient is clinically stable and doing well, we encouraged the patient to follow a healthy and balanced diet.  We recommend that the patient exercise routinely approximately 30 minutes 5 times per week .  We have reviewed the patient's vaccines and have made recommendations for updates if necessary up-to-date with shingles vaccine, pneumonia shot, flu shot and COVID booster    .  We will be ordering screening laboratories which are age appropriate.  Return to the office in   2 months   call if any problems.

## 2024-12-22 NOTE — ASSESSMENT & PLAN NOTE
Recently seen rheumatologist Dr. Jory wells at this point, patient preferred to hold off on starting medication to start an exercise and fitness program for strengthening and balance through the [employer] program.  She may consider this medication tolerated

## 2024-12-22 NOTE — ASSESSMENT & PLAN NOTE
Hyperlipidemia controlled continue with current medical regiment recommend a low-cholesterol diet and recommend routine exercise we will continue to monitor the progress.  Continue Crestor 10 mg once daily

## 2024-12-22 NOTE — ASSESSMENT & PLAN NOTE
Clinically stable and doing well continue the current medical regiment will continue monitor.  Doing little bit better patient does report to me when her  is doing better she is able to sleep better

## 2024-12-22 NOTE — PATIENT INSTRUCTIONS
Medicare Preventive Visit Patient Instructions  Thank you for completing your Welcome to Medicare Visit or Medicare Annual Wellness Visit today. Your next wellness visit will be due in one year (12/23/2025).  The screening/preventive services that you may require over the next 5-10 years are detailed below. Some tests may not apply to you based off risk factors and/or age. Screening tests ordered at today's visit but not completed yet may show as past due. Also, please note that scanned in results may not display below.  Preventive Screenings:  Service Recommendations Previous Testing/Comments   Colorectal Cancer Screening  * Colonoscopy    * Fecal Occult Blood Test (FOBT)/Fecal Immunochemical Test (FIT)  * Fecal DNA/Cologuard Test  * Flexible Sigmoidoscopy Age: 45-75 years old   Colonoscopy: every 10 years (may be performed more frequently if at higher risk)  OR  FOBT/FIT: every 1 year  OR  Cologuard: every 3 years  OR  Sigmoidoscopy: every 5 years  Screening may be recommended earlier than age 45 if at higher risk for colorectal cancer. Also, an individualized decision between you and your healthcare provider will decide whether screening between the ages of 76-85 would be appropriate. Colonoscopy: 05/06/2021  FOBT/FIT: Not on file  Cologuard: Not on file  Sigmoidoscopy: Not on file    Screening Current     Breast Cancer Screening Age: 40+ years old  Frequency: every 1-2 years  Not required if history of left and right mastectomy Mammogram: 08/11/2023    Screening Current   Cervical Cancer Screening Between the ages of 21-29, pap smear recommended once every 3 years.   Between the ages of 30-65, can perform pap smear with HPV co-testing every 5 years.   Recommendations may differ for women with a history of total hysterectomy, cervical cancer, or abnormal pap smears in past. Pap Smear: 11/19/2019    Screening Not Indicated   Hepatitis C Screening Once for adults born between 1945 and 1965  More frequently in  patients at high risk for Hepatitis C Hep C Antibody: 09/10/2024    Screening Current   Diabetes Screening 1-2 times per year if you're at risk for diabetes or have pre-diabetes Fasting glucose: 91 mg/dL (12/3/2024)  A1C: 5.5 % (12/6/2022)  Screening Current   Cholesterol Screening Once every 5 years if you don't have a lipid disorder. May order more often based on risk factors. Lipid panel: 03/11/2024    Screening Not Indicated  History Lipid Disorder     Other Preventive Screenings Covered by Medicare:  Abdominal Aortic Aneurysm (AAA) Screening: covered once if your at risk. You're considered to be at risk if you have a family history of AAA.  Lung Cancer Screening: covers low dose CT scan once per year if you meet all of the following conditions: (1) Age 55-77; (2) No signs or symptoms of lung cancer; (3) Current smoker or have quit smoking within the last 15 years; (4) You have a tobacco smoking history of at least 20 pack years (packs per day multiplied by number of years you smoked); (5) You get a written order from a healthcare provider.  Glaucoma Screening: covered annually if you're considered high risk: (1) You have diabetes OR (2) Family history of glaucoma OR (3)  aged 50 and older OR (4)  American aged 65 and older  Osteoporosis Screening: covered every 2 years if you meet one of the following conditions: (1) You're estrogen deficient and at risk for osteoporosis based off medical history and other findings; (2) Have a vertebral abnormality; (3) On glucocorticoid therapy for more than 3 months; (4) Have primary hyperparathyroidism; (5) On osteoporosis medications and need to assess response to drug therapy.   Last bone density test (DXA Scan): 11/10/2023.  HIV Screening: covered annually if you're between the age of 15-65. Also covered annually if you are younger than 15 and older than 65 with risk factors for HIV infection. For pregnant patients, it is covered up to 3 times per  pregnancy.    Immunizations:  Immunization Recommendations   Influenza Vaccine Annual influenza vaccination during flu season is recommended for all persons aged >= 6 months who do not have contraindications   Pneumococcal Vaccine   * Pneumococcal conjugate vaccine = PCV13 (Prevnar 13), PCV15 (Vaxneuvance), PCV20 (Prevnar 20)  * Pneumococcal polysaccharide vaccine = PPSV23 (Pneumovax) Adults 19-65 yo with certain risk factors or if 65+ yo  If never received any pneumonia vaccine: recommend Prevnar 20 (PCV20)  Give PCV20 if previously received 1 dose of PCV13 or PPSV23   Hepatitis B Vaccine 3 dose series if at intermediate or high risk (ex: diabetes, end stage renal disease, liver disease)   Respiratory syncytial virus (RSV) Vaccine - COVERED BY MEDICARE PART D  * RSVPreF3 (Arexvy) CDC recommends that adults 60 years of age and older may receive a single dose of RSV vaccine using shared clinical decision-making (SCDM)   Tetanus (Td) Vaccine - COST NOT COVERED BY MEDICARE PART B Following completion of primary series, a booster dose should be given every 10 years to maintain immunity against tetanus. Td may also be given as tetanus wound prophylaxis.   Tdap Vaccine - COST NOT COVERED BY MEDICARE PART B Recommended at least once for all adults. For pregnant patients, recommended with each pregnancy.   Shingles Vaccine (Shingrix) - COST NOT COVERED BY MEDICARE PART B  2 shot series recommended in those 19 years and older who have or will have weakened immune systems or those 50 years and older     Health Maintenance Due:      Topic Date Due   • Breast Cancer Screening: Mammogram  08/11/2024   • Colorectal Cancer Screening  05/06/2031   • Hepatitis C Screening  Completed     Immunizations Due:      Topic Date Due   • COVID-19 Vaccine (9 - 2024-25 season) 11/14/2024     Advance Directives   What are advance directives?  Advance directives are legal documents that state your wishes and plans for medical care. These plans  are made ahead of time in case you lose your ability to make decisions for yourself. Advance directives can apply to any medical decision, such as the treatments you want, and if you want to donate organs.   What are the types of advance directives?  There are many types of advance directives, and each state has rules about how to use them. You may choose a combination of any of the following:  Living will:  This is a written record of the treatment you want. You can also choose which treatments you do not want, which to limit, and which to stop at a certain time. This includes surgery, medicine, IV fluid, and tube feedings.   Durable power of  for healthcare (DPAHC):  This is a written record that states who you want to make healthcare choices for you when you are unable to make them for yourself. This person, called a proxy, is usually a family member or a friend. You may choose more than 1 proxy.  Do not resuscitate (DNR) order:  A DNR order is used in case your heart stops beating or you stop breathing. It is a request not to have certain forms of treatment, such as CPR. A DNR order may be included in other types of advance directives.  Medical directive:  This covers the care that you want if you are in a coma, near death, or unable to make decisions for yourself. You can list the treatments you want for each condition. Treatment may include pain medicine, surgery, blood transfusions, dialysis, IV or tube feedings, and a ventilator (breathing machine).  Values history:  This document has questions about your views, beliefs, and how you feel and think about life. This information can help others choose the care that you would choose.  Why are advance directives important?  An advance directive helps you control your care. Although spoken wishes may be used, it is better to have your wishes written down. Spoken wishes can be misunderstood, or not followed. Treatments may be given even if you do not want  them. An advance directive may make it easier for your family to make difficult choices about your care.   Weight Management   Why it is important to manage your weight:  Being overweight increases your risk of health conditions such as heart disease, high blood pressure, type 2 diabetes, and certain types of cancer. It can also increase your risk for osteoarthritis, sleep apnea, and other respiratory problems. Aim for a slow, steady weight loss. Even a small amount of weight loss can lower your risk of health problems.  How to lose weight safely:  A safe and healthy way to lose weight is to eat fewer calories and get regular exercise. You can lose up about 1 pound a week by decreasing the number of calories you eat by 500 calories each day.   Healthy meal plan for weight management:  A healthy meal plan includes a variety of foods, contains fewer calories, and helps you stay healthy. A healthy meal plan includes the following:  Eat whole-grain foods more often.  A healthy meal plan should contain fiber. Fiber is the part of grains, fruits, and vegetables that is not broken down by your body. Whole-grain foods are healthy and provide extra fiber in your diet. Some examples of whole-grain foods are whole-wheat breads and pastas, oatmeal, brown rice, and bulgur.  Eat a variety of vegetables every day.  Include dark, leafy greens such as spinach, kale, bryan greens, and mustard greens. Eat yellow and orange vegetables such as carrots, sweet potatoes, and winter squash.   Eat a variety of fruits every day.  Choose fresh or canned fruit (canned in its own juice or light syrup) instead of juice. Fruit juice has very little or no fiber.  Eat low-fat dairy foods.  Drink fat-free (skim) milk or 1% milk. Eat fat-free yogurt and low-fat cottage cheese. Try low-fat cheeses such as mozzarella and other reduced-fat cheeses.  Choose meat and other protein foods that are low in fat.  Choose beans or other legumes such as split  peas or lentils. Choose fish, skinless poultry (chicken or turkey), or lean cuts of red meat (beef or pork). Before you cook meat or poultry, cut off any visible fat.   Use less fat and oil.  Try baking foods instead of frying them. Add less fat, such as margarine, sour cream, regular salad dressing and mayonnaise to foods. Eat fewer high-fat foods. Some examples of high-fat foods include french fries, doughnuts, ice cream, and cakes.  Eat fewer sweets.  Limit foods and drinks that are high in sugar. This includes candy, cookies, regular soda, and sweetened drinks.  Exercise:  Exercise at least 30 minutes per day on most days of the week. Some examples of exercise include walking, biking, dancing, and swimming. You can also fit in more physical activity by taking the stairs instead of the elevator or parking farther away from stores. Ask your healthcare provider about the best exercise plan for you.      © Copyright [employer] 2018 Information is for End User's use only and may not be sold, redistributed or otherwise used for commercial purposes. All illustrations and images included in CareNotes® are the copyrighted property of [employer] or [employer]

## 2025-01-19 PROBLEM — Z00.00 MEDICARE ANNUAL WELLNESS VISIT, SUBSEQUENT: Status: RESOLVED | Noted: 2024-12-20 | Resolved: 2025-01-19

## 2025-01-29 ENCOUNTER — HOSPITAL ENCOUNTER (OUTPATIENT)
Dept: RADIOLOGY | Age: 75
Discharge: HOME | End: 2025-01-29
Payer: MEDICARE

## 2025-01-29 DIAGNOSIS — Z12.31 ENCOUNTER FOR SCREENING MAMMOGRAM FOR HIGH-RISK PATIENT: ICD-10-CM

## 2025-01-29 PROCEDURE — 77063 BREAST TOMOSYNTHESIS BI: CPT

## 2025-01-29 PROCEDURE — 77067 SCR MAMMO BI INCL CAD: CPT

## 2025-02-03 DIAGNOSIS — F41.8 DEPRESSION WITH ANXIETY: ICD-10-CM

## 2025-02-03 RX ORDER — ALPRAZOLAM 0.25 MG/1
0.25 TABLET ORAL 3 TIMES DAILY PRN
Qty: 30 TABLET | Refills: 0 | OUTPATIENT
Start: 2025-02-03

## 2025-02-03 RX ORDER — ALPRAZOLAM 0.25 MG/1
0.25 TABLET ORAL 3 TIMES DAILY PRN
Qty: 30 TABLET | Refills: 0 | Status: SHIPPED | OUTPATIENT
Start: 2025-02-03

## 2025-02-04 ENCOUNTER — OFFICE VISIT (OUTPATIENT)
Dept: OBGYN CLINIC | Facility: CLINIC | Age: 75
End: 2025-02-04
Payer: MEDICARE

## 2025-02-04 ENCOUNTER — APPOINTMENT (OUTPATIENT)
Dept: RADIOLOGY | Age: 75
End: 2025-02-04
Payer: MEDICARE

## 2025-02-04 ENCOUNTER — RESULTS FOLLOW-UP (OUTPATIENT)
Dept: INTERNAL MEDICINE CLINIC | Facility: CLINIC | Age: 75
End: 2025-02-04

## 2025-02-04 VITALS — WEIGHT: 152 LBS | BODY MASS INDEX: 27.97 KG/M2 | HEIGHT: 62 IN

## 2025-02-04 DIAGNOSIS — M25.551 PAIN IN RIGHT HIP: ICD-10-CM

## 2025-02-04 DIAGNOSIS — M70.61 GREATER TROCHANTERIC BURSITIS OF RIGHT HIP: Primary | ICD-10-CM

## 2025-02-04 DIAGNOSIS — M76.31 IT BAND SYNDROME, RIGHT: ICD-10-CM

## 2025-02-04 PROCEDURE — 20610 DRAIN/INJ JOINT/BURSA W/O US: CPT | Performed by: ORTHOPAEDIC SURGERY

## 2025-02-04 PROCEDURE — 73502 X-RAY EXAM HIP UNI 2-3 VIEWS: CPT

## 2025-02-04 PROCEDURE — 99204 OFFICE O/P NEW MOD 45 MIN: CPT | Performed by: ORTHOPAEDIC SURGERY

## 2025-02-04 RX ORDER — LIDOCAINE HYDROCHLORIDE 10 MG/ML
3 INJECTION, SOLUTION INFILTRATION; PERINEURAL
Status: COMPLETED | OUTPATIENT
Start: 2025-02-04 | End: 2025-02-04

## 2025-02-04 RX ORDER — TRIAMCINOLONE ACETONIDE 40 MG/ML
80 INJECTION, SUSPENSION INTRA-ARTICULAR; INTRAMUSCULAR
Status: COMPLETED | OUTPATIENT
Start: 2025-02-04 | End: 2025-02-04

## 2025-02-04 RX ADMIN — TRIAMCINOLONE ACETONIDE 80 MG: 40 INJECTION, SUSPENSION INTRA-ARTICULAR; INTRAMUSCULAR at 13:45

## 2025-02-04 RX ADMIN — LIDOCAINE HYDROCHLORIDE 3 ML: 10 INJECTION, SOLUTION INFILTRATION; PERINEURAL at 13:45

## 2025-02-04 NOTE — PROGRESS NOTES
Large joint arthrocentesis: R greater trochanteric bursa  Universal Protocol:  procedure performed by consultantConsent: Verbal consent obtained.  Risks and benefits: risks, benefits and alternatives were discussed  Consent given by: patient  Patient understanding: patient states understanding of the procedure being performed  Patient consent: the patient's understanding of the procedure matches consent given  Site marked: the operative site was marked  Patient identity confirmed: verbally with patient  Supporting Documentation  Indications: pain   Procedure Details  Location: hip - R greater trochanteric bursa  Needle size: 22 G  Ultrasound guidance: no  Medications administered: 80 mg triamcinolone acetonide 40 mg/mL; 3 mL lidocaine 1 %    Patient tolerance: patient tolerated the procedure well with no immediate complications  Dressing:  Sterile dressing applied           CHIEF COMPLAINT/REASON FOR VISIT  Chief Complaint   Patient presents with    Right Hip - Pain        HISTORY OF PRESENT ILLNESS  Katharine Chatman is a 74 y.o. female who presents for evaluation of her right hip. Patient said for the past month she has been dealing with lateral sided right hip pain. She said it started after playing pickel ball with her grandsons. She feels it can radiate down her lateral hip. She denies groin pain.      REVIEW OF SYSTEMS  Review of systems was performed and, outside that mentioned in the HPI, it was negative for symptomology related to the integumentary, hematologic, immunologic, allergic, neurologic, cardiovascular, respiratory, GI or  systems.    MEDICAL HISTORY  Patient Active Problem List   Diagnosis    Aortic valve insufficiency    Allergic rhinitis    Depression with anxiety    GERD without esophagitis    Hyperlipidemia    Irritable bowel syndrome    Vitamin D deficiency    Impaired fasting blood sugar    Subclinical hypothyroidism    Arthritis of carpometacarpal (CMC) joint of both thumbs    Vitiligo     Esophageal reflux    Benign paroxysmal positional vertigo of right ear    Benign paroxysmal positional vertigo    Tinnitus    Abnormal laboratory test    Pulsatile tinnitus of left ear    Other specified symptoms and signs involving the circulatory and respiratory systems     Fall    Back pain    Neck pain    H/O tooth extraction    Rib pain on right side    Polyarthralgia    Synovitis    Syncope    Drug-induced constipation    Primary insomnia    Psoriatic arthritis (HCC)    Thyroid function test abnormal    Stage 2 chronic kidney disease    Right shoulder pain    UTI symptoms       SURGICAL HISTORY  Past Surgical History:   Procedure Laterality Date    BREAST CYST ASPIRATION      one performed-unsure which breast    BREAST EXCISIONAL BIOPSY Bilateral     one on each breast aprox 1980s    CHOLECYSTECTOMY  2024    COLONOSCOPY      7/13/12, colon polyp biopsied, hemorrhoids - Dr. Garzon    EYE SURGERY      Cataracts    GALLBLADDER SURGERY N/A 11/14/2024    ND LAPAROSCOPY SURG CHOLECYSTECTOMY N/A 11/14/2024    Procedure: CHOLECYSTECTOMY LAPAROSCOPIC W/ ROBOTICS;  Surgeon: Shayan Paz MD;  Location: [facility];  Service: General    SCALP LESION REMOVAL W/ FLAP AND SKIN GRAFT      Behind right ear. Precancerous.    SEPTOPLASTY  05/04/2018    VAGINAL HYSTERECTOMY  1982    prolapse; about age 37       CURRENT MEDICATIONS    Current Outpatient Medications:     acetaminophen (TYLENOL) 500 mg tablet, Take 1,000 mg by mouth every 6 (six) hours as needed, Disp: , Rfl:     ALPRAZolam (XANAX) 0.25 mg tablet, TAKE 1 TABLET (0.25 MG TOTAL) BY MOUTH 3 (THREE) TIMES A DAY AS NEEDED FOR ANXIETY., Disp: 30 tablet, Rfl: 0    amLODIPine (NORVASC) 2.5 mg tablet, Take 1 tablet (2.5 mg total) by mouth daily, Disp: 30 tablet, Rfl: 5    famotidine (PEPCID) 20 mg tablet, Take 1 tablet (20 mg total) by mouth daily, Disp: 90 tablet, Rfl: 3    fluticasone (FLONASE) 50 mcg/act nasal spray, 1 SPRAY INTO EACH NOSTRIL 2 (TWO) TIMES A DAY  WITH MEALS, Disp: 48 mL, Rfl: 1    folic acid (FOLVITE) 1 mg tablet, Take 2 tablets (2 mg total) by mouth daily, Disp: 180 tablet, Rfl: 3    ipratropium (ATROVENT) 0.03 % nasal spray, 1 spray into each nostril 3 (three) times a day, Disp: 30 mL, Rfl: 1    levocetirizine (XYZAL) 5 MG tablet, Take 1 tablet (5 mg total) by mouth every evening, Disp: 90 tablet, Rfl: 1    methotrexate 2.5 MG tablet, Take 5 in the morning and 5 in the evening one day a week, Disp: 120 tablet, Rfl: 2    omeprazole (PriLOSEC) 20 mg delayed release capsule, TAKE 1 CAPSULE BY MOUTH EVERY DAY, Disp: 90 capsule, Rfl: 1    rosuvastatin (CRESTOR) 10 MG tablet, Take 1 tablet (10 mg total) by mouth daily, Disp: 90 tablet, Rfl: 1    sertraline (ZOLOFT) 25 mg tablet, Take 25 mg by mouth daily, Disp: , Rfl:     Adalimumab (Humira, 2 Pen,) 40 MG/0.4ML AJKT, Inject one pen under the skin every 14 days. (Patient not taking: Reported on 12/20/2024), Disp: 2 each, Rfl: 3    SOCIAL HISTORY  Social History     Socioeconomic History    Marital status: /Civil Union     Spouse name: Not on file    Number of children: Not on file    Years of education: Not on file    Highest education level: Not on file   Occupational History    Not on file   Tobacco Use    Smoking status: Never     Passive exposure: Never    Smokeless tobacco: Never   Vaping Use    Vaping status: Never Used   Substance and Sexual Activity    Alcohol use: Not Currently     Comment: very rare glass of wine    Drug use: No    Sexual activity: Not Currently     Partners: Male     Birth control/protection: Post-menopausal, Surgical   Other Topics Concern    Not on file   Social History Narrative    Advance directive declined by patient     Social Drivers of Health     Financial Resource Strain: Low Risk  (12/13/2023)    Overall Financial Resource Strain (CARDIA)     Difficulty of Paying Living Expenses: Not hard at all   Food Insecurity: No Food Insecurity (12/20/2024)    Hunger Vital Sign     " Worried About Running Out of Food in the Last Year: Never true     Ran Out of Food in the Last Year: Never true   Transportation Needs: No Transportation Needs (12/20/2024)    PRAPARE - Transportation     Lack of Transportation (Medical): No     Lack of Transportation (Non-Medical): No   Physical Activity: Insufficiently Active (5/21/2020)    Exercise Vital Sign     Days of Exercise per Week: 3 days     Minutes of Exercise per Session: 30 min   Stress: Stress Concern Present (5/22/2020)    [employer] [place] [employer] - Occupational Stress Questionnaire     Feeling of Stress : To some extent   Social Connections: Not on file   Intimate Partner Violence: Not on file   Housing Stability: Low Risk  (12/20/2024)    Housing Stability Vital Sign     Unable to Pay for Housing in the Last Year: No     Number of Times Moved in the Last Year: 0     Homeless in the Last Year: No       Objective     VITAL SIGNS  Ht 5' 2\" (1.575 m) Comment: Verbal  Wt 68.9 kg (152 lb) Comment: verbal  BMI 27.80 kg/m²        PHYSICAL EXAMINATION    right hip:   Skin- no discoloration, wounds or gross deformity   No limb length discrepancy  Limp positioned normally.  No dislocation/deformity  Gait: normal  Tenderness to palpation: Greater trochanter  ROM: Full  Anthony test: negative  AT/GS intact  Full strength and sensory intact to light touch throughout extremity       RADIOGRAPHIC EXAMINATION/DIAGNOSTICS:  Independent interpretation of the right hip x-rays demonstrate mild degenerative changes of the hip joint, soft tissues unremarkable    ASSESSMENT/PLAN:  Right hip pain;   Greater trochanteric bursitis  Mild DJD right hip  IT band syndrome    We discussed the diagnosis above in length in terms the patient could understand  Educated patient on various conservative treatment options including but are certainly not limited to: rest, ice, compression, elevation, activity modification, anti-inflammatory medication, physical therapy, " and/or injections.  After discussion, patient was agreeable to trying Rest, Ice, Compression, Elevation, Activity Modification, Anti-inflammatory Medication, and Corticosteroid Injection. She declined PT script. Home exercises attached to visit.  Follow up : as needed  They are understanding that if the pain should worsen or they develop new symptoms to please reach out to us sooner. Patient is understanding and agreeable to this plan.     Scribe Attestation      I,:  Yobany Ibrahim PA-C am acting as a scribe while in the presence of the attending physician.:       I,:  Ty Munoz MD personally performed the services described in this documentation    as scribed in my presence.:

## 2025-02-13 DIAGNOSIS — K21.9 GASTROESOPHAGEAL REFLUX DISEASE WITHOUT ESOPHAGITIS: ICD-10-CM

## 2025-02-13 RX ORDER — FAMOTIDINE 20 MG/1
20 TABLET, FILM COATED ORAL DAILY
Qty: 90 TABLET | Refills: 1 | Status: SHIPPED | OUTPATIENT
Start: 2025-02-13

## 2025-03-14 ENCOUNTER — APPOINTMENT (OUTPATIENT)
Dept: LAB | Age: 75
End: 2025-03-14
Payer: MEDICARE

## 2025-03-14 ENCOUNTER — RESULTS FOLLOW-UP (OUTPATIENT)
Dept: INTERNAL MEDICINE CLINIC | Facility: CLINIC | Age: 75
End: 2025-03-14

## 2025-03-14 DIAGNOSIS — Z13.6 SCREENING FOR CARDIOVASCULAR CONDITION: ICD-10-CM

## 2025-03-14 DIAGNOSIS — I10 PRIMARY HYPERTENSION: ICD-10-CM

## 2025-03-14 LAB
ALBUMIN SERPL BCG-MCNC: 4.3 G/DL (ref 3.5–5)
ALP SERPL-CCNC: 67 U/L (ref 34–104)
ALT SERPL W P-5'-P-CCNC: 24 U/L (ref 7–52)
ANION GAP SERPL CALCULATED.3IONS-SCNC: 6 MMOL/L (ref 4–13)
AST SERPL W P-5'-P-CCNC: 24 U/L (ref 13–39)
BILIRUB SERPL-MCNC: 0.63 MG/DL (ref 0.2–1)
BUN SERPL-MCNC: 18 MG/DL (ref 5–25)
CALCIUM SERPL-MCNC: 9.6 MG/DL (ref 8.4–10.2)
CHLORIDE SERPL-SCNC: 104 MMOL/L (ref 96–108)
CHOLEST SERPL-MCNC: 140 MG/DL (ref ?–200)
CO2 SERPL-SCNC: 31 MMOL/L (ref 21–32)
CREAT SERPL-MCNC: 0.94 MG/DL (ref 0.6–1.3)
GFR SERPL CREATININE-BSD FRML MDRD: 60 ML/MIN/1.73SQ M
GLUCOSE P FAST SERPL-MCNC: 96 MG/DL (ref 65–99)
HDLC SERPL-MCNC: 70 MG/DL
LDLC SERPL CALC-MCNC: 60 MG/DL (ref 0–100)
POTASSIUM SERPL-SCNC: 4 MMOL/L (ref 3.5–5.3)
PROT SERPL-MCNC: 7 G/DL (ref 6.4–8.4)
SODIUM SERPL-SCNC: 141 MMOL/L (ref 135–147)
TRIGL SERPL-MCNC: 50 MG/DL (ref ?–150)

## 2025-03-14 PROCEDURE — 80061 LIPID PANEL: CPT

## 2025-03-14 PROCEDURE — 80053 COMPREHEN METABOLIC PANEL: CPT

## 2025-03-14 PROCEDURE — 36415 COLL VENOUS BLD VENIPUNCTURE: CPT

## 2025-03-26 ENCOUNTER — OFFICE VISIT (OUTPATIENT)
Dept: RHEUMATOLOGY | Facility: CLINIC | Age: 75
End: 2025-03-26
Payer: MEDICARE

## 2025-03-26 VITALS
DIASTOLIC BLOOD PRESSURE: 80 MMHG | SYSTOLIC BLOOD PRESSURE: 142 MMHG | BODY MASS INDEX: 27.79 KG/M2 | OXYGEN SATURATION: 99 % | HEIGHT: 62 IN | WEIGHT: 151 LBS | HEART RATE: 83 BPM

## 2025-03-26 DIAGNOSIS — L40.50 PSORIATIC ARTHRITIS (HCC): Primary | ICD-10-CM

## 2025-03-26 PROCEDURE — G2211 COMPLEX E/M VISIT ADD ON: HCPCS | Performed by: STUDENT IN AN ORGANIZED HEALTH CARE EDUCATION/TRAINING PROGRAM

## 2025-03-26 PROCEDURE — 99215 OFFICE O/P EST HI 40 MIN: CPT | Performed by: STUDENT IN AN ORGANIZED HEALTH CARE EDUCATION/TRAINING PROGRAM

## 2025-03-26 NOTE — PATIENT INSTRUCTIONS
You can try decreasing your methotrexate to 4 in the morning and 4 in the evening. If you notice symptoms (particularly stiffness and swelling in the hands) worsening, go back up to 5 in the morning and 5 in the evening.    Get blood work (creatinine, calcium, LFTs, CBC) every 3 months while on methotrexate.    While on your prescribed rheumatologic medication(s) methotrexate: you must STOP the medication if you fall ill (ex: a viral infection, bacterial infection) and not restart it until your illness is resolved and/or you are finished with any antibiotics/antivirals/antifungals that are prescribed for you, whichever happens last.    While on the medication(s), if you are to get a vaccine, you may have to STOP the medication before and after your vaccination. See below for how long to stop your medication according to how often you take it.    LIVE ATTENUATED VACCINES (ex: MMR, rotavirus, smallpox, chickenpox, yellow fever)    Hold before vaccine Hold after vaccine   Steroids 4 weeks 4 weeks   Methotrexate 4 weeks      COVID-19 VACCINE    Instructions   Abatacept (IV) Time vaccine so it is given 2 weeks before next scheduled abatacept dose, then receive abatacept as scheduled   Abatacept (SQ) Delay abatacept dose for 2 weeks after vaccine   Belimumab Delay belimumab dose for 2 weeks after vaccine   Cyclophosphamide (IV) Time cyclophosphamide administration so that it will occur 1 week after each vaccine dose   Hydroxychloroquine No changes to hydroxychloroquine timing or vaccine timing   IVIG No changes to IVIG timing or vaccine timing   Rituximab Time vaccine so that it is given 2 weeks before the next scheduled rituximab dose, then receive rituximab as scheduled   All others Delay rheumatologic medication dose for 2 weeks after vaccine, if disease activity allows     INFLUENZA VACCINE    Hold before vaccine Hold after vaccine   Methotrexate 1 week 2 weeks if able   Rituximab  n/a 2 weeks   All others CONTINUE,  DO NOT HOLD CONTINUE, DO NOT HOLD     ALL OTHER VACCINES    Hold before vaccine Hold after vaccine   Methotrexate CONTINUE, DO NOT HOLD CONTINUE, DO NOT HOLD   Rituximab  n/a Time vaccination for when next dose is due, then give rituximab at least 2 weeks after vaccine   All others CONTINUE, DO NOT HOLD CONTINUE, DO NOT HOLD     These instructions are consistent with the recommendations set forth by the American College of Rheumat[employer] (ACR).

## 2025-03-26 NOTE — ASSESSMENT & PLAN NOTE
Doing great currently!    May try Otezla next if needed rather than a biologic, given that her symptoms seem most consistent with PsA rather than RA    She is interested in trying to decrease the dose. I advised her that we can try backing down to 4/4 weekly but she may have to go back up to 5/5 if symptoms worsen. She is understanding and amenable

## 2025-03-26 NOTE — PROGRESS NOTES
"Name: Katharine Chatman      : 1950      MRN: [id number]  Encounter Provider: Ed Carlos DO  Encounter Date: 3/26/2025   Encounter department: [facility]  :  Assessment & Plan  Psoriatic arthritis (HCC)  Doing great currently!    May try Otezla next if needed rather than a biologic, given that her symptoms seem most consistent with PsA rather than RA    She is interested in trying to decrease the dose. I advised her that we can try backing down to 4/4 weekly but she may have to go back up to 5/5 if symptoms worsen. She is understanding and amenable         Patient's rheumatologic disease(s) threaten long-term function if not appropriately managed.    Patient's rheumatologic medication(s) require intensive monitoring for toxicity. Does not endorse any significant side effects.    History of Present Illness     Very few joint issues, still has some occasional joint swelling but feels like methotrexate is doing well for her currently. Since it's been cold, she's not been doing much outdoor work; she feels like her osteoarthritis gets worse in the warm weather when she's outdoors doing things.    Permanent History: First seen by Dr. Whitney in Aug 2023 for hand arthritis and elevated CRP; was dxd with PMR and continued on prednisone taper.      When I first saw her 2023, symptoms were not at all consistent with PMR, symptoms much more consistent with PsA with peripheral inflammatory symptoms not controlled on 5 mg prednisone, history dactylitis and appreciable nail pitting. Weaned off of prednisone herself and did not develop any shoulder or hip stiffness. Started on methotrexate for PsA vs seronegative RA, which she responded very well to initially. On subsequent follow up, active symptoms seemed more degenerative than inflammatory.     PMH vitiligo around the arms and neck and legs.     Objective   /80   Pulse 83   Ht 5' 2\" (1.575 m)   Wt 68.5 kg (151 lb)   " SpO2 99%   BMI 27.62 kg/m²      General: Well appearing, in no distress.   Eyes: Sclera non-icteric. EOMI  Extremities: Warm, well perfused, no edema.   Neuro: Alert and oriented. No gross focal neurological deficits.   Skin: No rashes.  MSK exam: no tenderness to palpation, synovitis. Some synovial hypertrophy few MCPs and PIPs. Degen changes bilateral hands    CDAI   Swollen 0/28   Tender 0/28   Patient 1/10   Physician 1/10   Total 2/76

## 2025-03-31 ENCOUNTER — TELEPHONE (OUTPATIENT)
Dept: OBGYN CLINIC | Facility: CLINIC | Age: 75
End: 2025-03-31

## 2025-03-31 ENCOUNTER — TELEPHONE (OUTPATIENT)
Dept: OBGYN CLINIC | Facility: HOSPITAL | Age: 75
End: 2025-03-31

## 2025-03-31 NOTE — TELEPHONE ENCOUNTER
Caller: Patient     Doctor: Dr. Munoz     Reason for call: patient states the last past 2 weeks she is having trouble walking again she did receive an injection 2/4/25 she wanted to confirm which injection and if she may be seen for this issue   Please advise call back     Call back#: [phone]

## 2025-04-01 ENCOUNTER — OFFICE VISIT (OUTPATIENT)
Dept: OBGYN CLINIC | Facility: CLINIC | Age: 75
End: 2025-04-01
Payer: MEDICARE

## 2025-04-01 VITALS — WEIGHT: 151 LBS | HEIGHT: 62 IN | BODY MASS INDEX: 27.79 KG/M2

## 2025-04-01 DIAGNOSIS — M70.61 GREATER TROCHANTERIC BURSITIS OF RIGHT HIP: Primary | ICD-10-CM

## 2025-04-01 PROCEDURE — 99213 OFFICE O/P EST LOW 20 MIN: CPT | Performed by: ORTHOPAEDIC SURGERY

## 2025-04-01 NOTE — PROGRESS NOTES
REASON FOR FOLLOW-UP  Katharine Chatman is a 74 y.o. female who presents for follow-up of the right hip    HISTORY OF PRESENT ILLNESS  Following our last visit, we decided to initially treat her hip symptoms non-operatively. Our plan was to manage their symptoms conservatively. Today, patient comes in for further evaluation.  Patient states she continues to experience pain on the lateral portion of the right hip.  She said the cortisone injection she received on February 4, 2025 provided some good relief of her pain up until 2 weeks ago. Patient said she is very active with silver sneakers exercises class and chair yoga.     PHYSICAL EXAM  right hip:   Skin- no discoloration, wounds or gross deformity   No limb length discrepancy  Limp positioned normally.  No dislocation/deformity  Gait: normal  Tenderness to palpation: Greater trochanter  ROM: Full  Anthony test: negative  AT/GS intact  Full strength and sensory intact to light touch throughout extremity       DIAGNOSTIC IMAGING:  No new imaging today      Assessment & Plan  Greater trochanteric bursitis of right hip    Orders:    Ambulatory Referral to Physical Therapy; Future         We discussed the diagnosis above in length in terms the patient could understand  Educated patient on various conservative treatment options including but are certainly not limited to: rest, ice, compression, elevation, activity modification, anti-inflammatory medication, physical therapy, and/or injections.  After discussion, patient was agreeable to trying Rest, Ice, Compression, Elevation, Activity Modification, and Physical Therapy. PT order placed.  Advised unfortunately we need to wait 3 months from the previous injection to proceed with another one. Most recent injection was 2/4/25.  Recommend using Voltaren gel over the area to see if this could help  Follow up : as needed  They are understanding that if the pain should worsen or they develop new symptoms to please reach out  to us sooner. Patient is understanding and agreeable to this plan.     Scribe Attestation      I,:  Yobany Ibrahim PA-C am acting as a scribe while in the presence of the attending physician.:       I,:  Ty Munoz MD personally performed the services described in this documentation    as scribed in my presence.:

## 2025-04-14 ENCOUNTER — EVALUATION (OUTPATIENT)
Dept: PHYSICAL THERAPY | Age: 75
End: 2025-04-14
Payer: MEDICARE

## 2025-04-14 DIAGNOSIS — M70.61 GREATER TROCHANTERIC BURSITIS OF RIGHT HIP: ICD-10-CM

## 2025-04-14 PROCEDURE — 97161 PT EVAL LOW COMPLEX 20 MIN: CPT

## 2025-04-14 PROCEDURE — 97110 THERAPEUTIC EXERCISES: CPT

## 2025-04-14 NOTE — PROGRESS NOTES
PT Evaluation     Today's date: 2025  Patient name: Katharine Chatman  : 1950  MRN: [id number]  Referring provider: Yobany Ibrahim PA*  Dx:   Encounter Diagnosis     ICD-10-CM    1. Greater trochanteric bursitis of right hip  M70.61 Ambulatory Referral to Physical Therapy          Start Time: 1445  Stop Time: 1545  Total time in clinic (min): 60 minutes    Assessment  Impairments: abnormal gait, abnormal or restricted ROM, activity intolerance, impaired balance, impaired physical strength and pain with function    Assessment details: Patient presents to PT with c/c R greater trochanter bursitis. She demonstrates deficits in B/L gross LE strength and mobility, with limitations on the R due to pain. She demonstrates decreased balance, especially on the R side. She posseses functional power and gait speed as demonstrated by 5xSTS and TUG, but walks with positive B/L trendelenburg and trunk lean - indicative of hip abductor weakness. 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 Patient educated and demonstrates understanding of HEP. Patient is a good candidate and will benefit from skilled PT to address R hip greater trochanteric bursitis.     Goals  SHORT TERM:  Patient will complete phase I of HEP.  Patient will complete tandem balance with eyes open for 20s.    Patient will improve strength by 1/2 grade.     LONG TERM:   Patient will complete phase II of HEP.  Patient will demonstrate negative trendelenburg with gait.   Patient will improve strength to return to functional activities.     Plan  Patient would benefit from: skilled physical therapy    Planned therapy interventions: balance/weight bearing training, functional ROM exercises, home exercise program, therapeutic exercise, therapeutic activities, strengthening, stretching, patient/caregiver education, neuromuscular re-education, manual  therapy and joint mobilization    Frequency: 1-2x week  Duration in weeks: 8  Plan of Care beginning date: 2025  Plan of Care expiration date: 2025  Treatment plan discussed with: patient  Plan details: Patient will complete skilled PT, including HEP, therapeutic exercise, therapeutic activity, neuromuscular reduction, and manual therapy, 1-2/week for 8 weeks to address R hip greater trochanteric bursitis.       Subjective Evaluation    History of Present Illness  Mechanism of injury: Patient reports to PT with c/c R hip bursitis. She recalls pain started around 2025. She got her first injection in  which helped significantly decreased pain, but the pain recently returned. She cannot get another injection until May '25 and was referred to PT to help with R hip pain. She describes pain to R lateral hip as constant achy and throbbing during the day and a dull pain down the R leg at night. Pain is aggravated by sitting > 30min, walking > 30min, R sidelying, stair navigation, standing after sitting, and putting on pants. Voltaren, advil, electrical stim, and position change help alleviate pain. Patient is the primary caregiver for her , who has PD. She is I with ADLs, but requires frequent breaks throughout to mitigate increased pain.     Patient Goals  Patient goals for therapy: decreased pain, improved balance, increased motion, increased strength and independence with ADLs/IADLs  Patient goal: yard work  Pain  Current pain ratin  At best pain ratin  At worst pain ratin  Quality: burning, throbbing and dull ache  Relieving factors: change in position, rest and medications  Aggravating factors: standing, sitting, stair climbing, walking and running          Objective     Active Range of Motion     Lumbar   Flexion:  WFL  Extension:  Restriction level: moderate  Left lateral flexion:  with pain Restriction level: minimal  Right lateral flexion:  Restriction level: minimal  Left  rotation:  Restriction level: minimal  Right rotation:  Restriction level: minimal    Strength/Myotome Testing     Left Hip   Planes of Motion   Flexion: 4-  Abduction: 4-    Right Hip   Planes of Motion   Flexion: 4-  Abduction: 4-    Left Knee   Flexion: 4-  Extension: 4-    Right Knee   Flexion: 4-  Extension: 4-    Left Ankle/Foot   Dorsiflexion: 4  Plantar flexion: 4    Right Ankle/Foot   Dorsiflexion: 4  Plantar flexion: 4    Tests     Lumbar     Left   Negative crossed SLR, passive SLR and slump test.     Right   Negative crossed SLR, passive SLR and slump test.     Left Pelvic Girdle/Sacrum   Positive: active SLR test.     Right Pelvic Girdle/Sacrum   Positive: active SLR test.     Left Hip   Positive Ely's.   Negative TAYLOR and FADIR.   90/90 SLR: Positive.   SLR: Negative.     Right Hip   Positive Ely's and modified Nusrat.   Negative TAYLOR and FADIR.   90/90 SLR: Positive.   SLR: Negative.     Ambulation     Observational Gait   Decreased walking speed and stride length.     Functional Assessment        Comments  BALANCE:   -Romberg (EO): 30s steady   -Romberg (EC): 30s steady   -L Tandem (EO): 10s unsteady   -R Tandem (EO): 10s unsteady   -L Tandem (EC): unable to perform  -R Tandem (EC): unable to perform   -L SLS (EO): unable to perform   -R SLS(EO): unable to perform     5xSTS: 8.69s  TU.21s             Precautions: main caregiver for  with Parkinsons      Manuals                                                                 Neuro Re-Ed                                                                                                        Ther Ex             HEP 15min                                                                                                       Ther Activity                                       Gait Training                                       Modalities                                        Access Code: [email or web address]  URL: [email or web address]  Date:  04/14/2025  Prepared by: Michaela Tan    Exercises  - Prone Quadriceps Stretch with Strap  - 1 x daily - 7 x weekly - 3 sets - 30s hold  - Seated Hamstring Stretch  - 1 x daily - 7 x weekly - 3 sets - 30s hold  - Standing Hamstring Stretch with Step  - 1 x daily - 7 x weekly - 3 sets - 30s hold  - Hip Flexor Stretch on Step  - 1 x daily - 7 x weekly - 3 sets - 30s hold  - Clamshell  - 1 x daily - 7 x weekly - 3 sets - 10 reps  -Hip ABD ISO - 10x10s

## 2025-04-21 ENCOUNTER — OFFICE VISIT (OUTPATIENT)
Dept: PHYSICAL THERAPY | Age: 75
End: 2025-04-21
Payer: MEDICARE

## 2025-04-21 DIAGNOSIS — M70.61 GREATER TROCHANTERIC BURSITIS OF RIGHT HIP: Primary | ICD-10-CM

## 2025-04-21 PROCEDURE — 97140 MANUAL THERAPY 1/> REGIONS: CPT

## 2025-04-21 PROCEDURE — 97110 THERAPEUTIC EXERCISES: CPT

## 2025-04-21 NOTE — PROGRESS NOTES
Daily Note     Today's date: 2025  Patient name: Katharine Chatman  : 1950  MRN: [id number]  Referring provider: Yobany Ibrahim PA*  Dx:   Encounter Diagnosis     ICD-10-CM    1. Greater trochanteric bursitis of right hip  M70.61           Start Time: 1715  Stop Time: 1800  Total time in clinic (min): 45 minutes    Subjective: Patient reports she really likes the stretches in her HEP. She has been able to start doing clam shells at home. She reports the pain is the same as it was last week.       Objective: See treatment diary below      Assessment: Tolerated treatment well. Tolerates exercise without increase in pain. Pain reduction by end of session. Patient demonstrated fatigue post treatment, exhibited good technique with therapeutic exercises, and would benefit from continued PT      Plan: Continue per plan of care.  Progress treatment as tolerated.       Precautions: main caregiver for  with Parkinsons      Manuals            R lateral hip roll out  MS                                                  Neuro Re-Ed                                                                                                        Ther Ex             HEP 15min            bike  5min           SS w/ TB   Yellow 20ft x4           Standing Hip ABD  2# 4x10            Leg Press  45# 4x10           Hip ABD  20# 3x10                                                               Ther Activity                                       Gait Training                                       Modalities                                          Access Code: [email or web address]  URL: [email or web address]  Date: 2025  Prepared by: Michaela Tan    Exercises  - Side Stepping with Resistance at Ankles  - 1 x daily - 7 x weekly - 3 sets - 10 reps  - Standing Hip Abduction with Counter Support  - 1 x daily - 7 x weekly - 3 sets - 10 reps  - Lateral Step Up with Counter Support  - 1 x daily - 7 x weekly - 3 sets -  10 reps

## 2025-04-23 ENCOUNTER — OFFICE VISIT (OUTPATIENT)
Dept: PHYSICAL THERAPY | Age: 75
End: 2025-04-23
Payer: MEDICARE

## 2025-04-23 DIAGNOSIS — M70.61 GREATER TROCHANTERIC BURSITIS OF RIGHT HIP: Primary | ICD-10-CM

## 2025-04-23 PROCEDURE — 97110 THERAPEUTIC EXERCISES: CPT

## 2025-04-23 PROCEDURE — 97140 MANUAL THERAPY 1/> REGIONS: CPT

## 2025-04-23 NOTE — PROGRESS NOTES
Daily Note     Today's date: 2025  Patient name: Katharine Chatman  : 1950  MRN: [id number]  Referring provider: Yobany Ibrahim PA*  Dx:   Encounter Diagnosis     ICD-10-CM    1. Greater trochanteric bursitis of right hip  M70.61           Start Time: 1100  Stop Time: 1145  Total time in clinic (min): 45 minutes    Subjective: Patient reports she felt good after last session. It took a couple hours for the pain to come back and she said she is having an easier time finding a comfortable sleeping position. Yesterday she went to pull a weed and stepped on a loose stone and fell on her side. She says her R hip and wrist are sore from falling on them. Other than falling, she says she is making progress with PT.       Objective: See treatment diary below      Assessment: Tolerated treatment well. Continues to improve activity tolerance. Patient demonstrated fatigue post treatment, exhibited good technique with therapeutic exercises, and would benefit from continued PT      Plan: Continue per plan of care.  Progress treatment as tolerated.       Precautions: main caregiver for  with Parkinsons      Manuals           R lateral hip roll out  MS MS                                                 Neuro Re-Ed                                                                                                        Ther Ex             HEP 15min            bike  5min 5min          SS w/ TB   Yellow 20ft x4 Yellow 20ft x4          Slantboard stretch    3x30s          HS stair stretch    3x30s          Standing Hip ABD  2# 4x10  2# 4x10           Leg Press  45# 4x10 45# 4x10          Hip ABD  20# 3x10 20# 3x10                                                              Ther Activity                                       Gait Training                                       Modalities

## 2025-04-28 DIAGNOSIS — F41.9 ANXIETY: Primary | ICD-10-CM

## 2025-04-28 DIAGNOSIS — K21.9 GERD WITHOUT ESOPHAGITIS: ICD-10-CM

## 2025-04-28 NOTE — TELEPHONE ENCOUNTER
Message sent via Circle.     Omeprazole My current Rx from Dr. Singh has run out of refills. Since my gallbladder surgery I don't feel the need to see her, but they require a visit in order to reorder.   Would you consider ordering it from now on as my PCP. 20 mg.     Also just found out from [facility] i need new Rx for Sertraline 50 mg. I find I need the stronger strength.

## 2025-04-29 ENCOUNTER — OFFICE VISIT (OUTPATIENT)
Dept: PHYSICAL THERAPY | Age: 75
End: 2025-04-29
Payer: MEDICARE

## 2025-04-29 DIAGNOSIS — M70.61 GREATER TROCHANTERIC BURSITIS OF RIGHT HIP: Primary | ICD-10-CM

## 2025-04-29 PROCEDURE — 97140 MANUAL THERAPY 1/> REGIONS: CPT

## 2025-04-29 PROCEDURE — 97110 THERAPEUTIC EXERCISES: CPT

## 2025-04-29 RX ORDER — OMEPRAZOLE 20 MG/1
20 CAPSULE, DELAYED RELEASE ORAL DAILY
Qty: 90 CAPSULE | Refills: 1 | Status: SHIPPED | OUTPATIENT
Start: 2025-04-29

## 2025-04-29 RX ORDER — SERTRALINE HYDROCHLORIDE 25 MG/1
25 TABLET, FILM COATED ORAL DAILY
OUTPATIENT
Start: 2025-04-29

## 2025-04-29 NOTE — PROGRESS NOTES
Daily Note     Today's date: 2025  Patient name: Katharine Chatman  : 1950  MRN: [id number]  Referring provider: Yobany Ibrahim PA*  Dx:   Encounter Diagnosis     ICD-10-CM    1. Greater trochanteric bursitis of right hip  M70.61           Start Time: 1145  Stop Time: 1230  Total time in clinic (min): 45 minutes    Subjective: Patient reports she is still sore in the R hip from falling last week. She says she went to a chiropractor yesterday which helped reduce some pain.       Objective: See treatment diary below      Assessment: Tolerated treatment well. Continues to make improvements in activity tolerance. Patient demonstrated fatigue post treatment, exhibited good technique with therapeutic exercises, and would benefit from continued PT      Plan: Continue per plan of care.  Progress treatment as tolerated.       Precautions: main caregiver for  with Parkinsons      Manuals          R lateral hip roll out  MS MS MS         R Hip flexor stretch     MS                                   Neuro Re-Ed                                                                                                        Ther Ex             HEP 15min            bike  5min 5min 5min         SS w/ TB   Yellow 20ft x4 Yellow 20ft x4 Yellow 20ft x4         Slantboard stretch    3x30s 3x30s         HS stair stretch    3x30s 3x30s         Standing Hip ABD  2# 4x10  2# 4x10           Leg Press  45# 4x10 45# 4x10 45# 4x10          Hip ABD  20# 3x10 20# 3x10 25# 4x10                                                              Ther Activity                                       Gait Training                                       Modalities

## 2025-05-06 ENCOUNTER — OFFICE VISIT (OUTPATIENT)
Dept: PHYSICAL THERAPY | Age: 75
End: 2025-05-06
Payer: MEDICARE

## 2025-05-06 DIAGNOSIS — M70.61 GREATER TROCHANTERIC BURSITIS OF RIGHT HIP: Primary | ICD-10-CM

## 2025-05-06 PROCEDURE — 97110 THERAPEUTIC EXERCISES: CPT

## 2025-05-06 PROCEDURE — 97140 MANUAL THERAPY 1/> REGIONS: CPT

## 2025-05-06 NOTE — PROGRESS NOTES
Daily Note     Today's date: 2025  Patient name: Katharine Chatman  : 1950  MRN: [id number]  Referring provider: Yobany Ibrahim PA*  Dx:   Encounter Diagnosis     ICD-10-CM    1. Greater trochanteric bursitis of right hip  M70.61           Start Time: 1100  Stop Time: 1145  Total time in clinic (min): 45 minutes    Subjective: Patient reports her R hip has been painful since last session.       Objective: See treatment diary below      Assessment: Tolerated treatment well without sx aggravation. Patient is still TTP on R lateral hip, with most sensitivity just anterior and posterior to R greater trochanter. EPAT performed on TFL and mid-lateral quad. Patient demonstrated fatigue post treatment, exhibited good technique with therapeutic exercises, and would benefit from continued PT      Plan: Continue per plan of care.  Progress treatment as tolerated.       Precautions: main caregiver for  with Parkinsons      Manuals  5        R lateral hip roll out  MS MS MS         R Hip flexor stretch     MS MS        EPAT - R Hip     MS                                  Neuro Re-Ed                                                                                                        Ther Ex             HEP 15min            bike  5min 5min 5min 5min        SS w/ TB   Yellow 20ft x4 Yellow 20ft x4 Yellow 20ft x4 Yellow 20ft x4        Slantboard stretch    3x30s 3x30s 3x30s        HS stair stretch    3x30s 3x30s 3x30s        Standing Hip ABD  2# 4x10  2# 4x10           Leg Press  45# 4x10 45# 4x10 45# 4x10  45# 4x10        Hip ABD  20# 3x10 20# 3x10 25# 4x10  20# 3x10                                                            Ther Activity                                       Gait Training                                       Modalities

## 2025-05-09 ENCOUNTER — OFFICE VISIT (OUTPATIENT)
Dept: OBGYN CLINIC | Facility: CLINIC | Age: 75
End: 2025-05-09
Payer: MEDICARE

## 2025-05-09 VITALS — HEIGHT: 62 IN | BODY MASS INDEX: 27.79 KG/M2 | WEIGHT: 151 LBS

## 2025-05-09 DIAGNOSIS — M70.61 GREATER TROCHANTERIC BURSITIS OF RIGHT HIP: Primary | ICD-10-CM

## 2025-05-09 PROCEDURE — 20610 DRAIN/INJ JOINT/BURSA W/O US: CPT | Performed by: ORTHOPAEDIC SURGERY

## 2025-05-09 PROCEDURE — 99214 OFFICE O/P EST MOD 30 MIN: CPT | Performed by: ORTHOPAEDIC SURGERY

## 2025-05-09 RX ADMIN — LIDOCAINE HYDROCHLORIDE 4 ML: 10 INJECTION, SOLUTION INFILTRATION; PERINEURAL at 12:30

## 2025-05-09 RX ADMIN — TRIAMCINOLONE ACETONIDE 2 MG: 40 INJECTION, SUSPENSION INTRA-ARTICULAR; INTRAMUSCULAR at 12:30

## 2025-05-09 NOTE — PROGRESS NOTES
Sports Medicine and Shoulder Surgery    Katharine Chatman, 75 y.o. female   MRN# [id number]   : 1950          REASON FOR FOLLOW-UP  Katharine Chatman is a 75 y.o. female who presents for follow-up of the right Hip    HISTORY OF PRESENT ILLNESS  Following our last visit, we decided to initially treat her hip symptoms non-operatively. Our plan was to manage their symptoms conservatively with Rest, Ice, Compression, Elevation, Activity Modification, Physical Therapy, and Corticosteroid Injection. Today, patient comes in for further evaluation. She claims her previous CSI on 25 which provided about 1.5 months of relief. She is performing physical therapy and a HEP weekly with minimal complications.       PHYSICAL EXAM  right hip:   Skin- no discoloration, wounds or gross deformity   No limb length discrepancy  Limp positioned normally.  No dislocation/deformity  Gait: normal  Tenderness to palpation: Greater trochanter  ROM: Full  Anthony test: negative  AT/GS intact  Full strength and sensory intact to light touch throughout extremity     Large joint arthrocentesis: R greater trochanteric bursa    Performed by: Ty Munoz MD  Authorized by: Ty Munoz MD    Universal Protocol:  Consent: Verbal consent obtained.  Patient understanding: patient states understanding of the procedure being performed  Site marked: the operative site was marked  Supporting Documentation  Indications: pain and joint swelling     Is this a Visco injection? NoProcedure Details  Location: hip - R greater trochanteric bursa  Preparation: Patient was prepped and draped in the usual sterile fashion  Needle size: 22 G  Ultrasound guidance: no  Approach: lateral  Medications administered: 4 mL lidocaine 1 %; 2 mg triamcinolone acetonide 40 mg/mL    Patient tolerance: patient tolerated the procedure well with no immediate complications  Dressing:  Sterile dressing applied         DIAGNOSTIC IMAGING:  No new images       Assessment  & Plan  Greater trochanteric bursitis of right hip  Patient has a history, physical examination and radiographic evaluation consistent with greater trochanteric bursitis.    -WBAT  -Patient informed that this is a self-limiting condition which will improve with appropriate rest, medications and rehabilitation  -Activity modification to limit strain or impact on the joint  -Tylenol 1000mg up to three times daily as needed. Do not exceed 3000mg daily  -Supervised physical therapy. Script provided   -Home exercise program directed by PT  -Corticosteroid injection was offered, accepted and administered to the greater trochanter.  Patient tolerated the procedure well.  -Patient may follow up prn for further evaluation and treatment                  The complexity of the medical decision making, including the comprehensive history, detailed examination and moderate risk assessment, and time spent with patient supports coding at a Level 4 for this encounter     Scribe Attestation      I,:  Phu De am acting as a scribe while in the presence of the attending physician.:       I,:  Ty Munoz MD personally performed the services described in this documentation    as scribed in my presence.:

## 2025-05-13 ENCOUNTER — OFFICE VISIT (OUTPATIENT)
Dept: PHYSICAL THERAPY | Age: 75
End: 2025-05-13
Payer: MEDICARE

## 2025-05-13 DIAGNOSIS — M70.61 GREATER TROCHANTERIC BURSITIS OF RIGHT HIP: Primary | ICD-10-CM

## 2025-05-13 PROCEDURE — 97140 MANUAL THERAPY 1/> REGIONS: CPT

## 2025-05-13 PROCEDURE — 97110 THERAPEUTIC EXERCISES: CPT

## 2025-05-13 NOTE — PROGRESS NOTES
Daily Note     Today's date: 2025  Patient name: Katharine Chatman  : 1950  MRN: [id number]  Referring provider: Yobany Ibrahim PA*  Dx:   Encounter Diagnosis     ICD-10-CM    1. Greater trochanteric bursitis of right hip  M70.61           Start Time: 1100  Stop Time: 1145  Total time in clinic (min): 45 minutes    Subjective: Patient reports she got an injection in her R hip on Friday and felt immediate relief. She reports no pain at all. She says she still has achiness in the hip if she's sidelying on it. She still does her HEP faithfully. Patient reports she was a little dizzy this morning. She says she ate breakfast, has been drinking water, and takes her BP medication everyday.       Objective: See treatment diary below    BP: 149/80  HR: 80  SpO2: 97%    Assessment: Tolerated treatment well. Sx reproduction with sidelying clam exercise. Sx reduction by end of session.  Patient demonstrated fatigue post treatment, exhibited good technique with therapeutic exercises, and would benefit from continued PT      Plan: Continue per plan of care.  Progress treatment as tolerated.       Precautions: main caregiver for  with Parkinsons      Manuals        R lateral hip roll out  MS MS MS         R Hip flexor stretch     MS MS        EPAT - R Hip     MS                                  Neuro Re-Ed                                                                                                        Ther Ex             HEP 15min            bike  5min 5min 5min 5min 5min       SS w/ TB   Yellow 20ft x4 Yellow 20ft x4 Yellow 20ft x4 Yellow 20ft x4 Red 20ft x4        Slantboard stretch    3x30s 3x30s 3x30s 3x30s       HS stair stretch    3x30s 3x30s 3x30s 3x30s       Standing Hip ABD  2# 4x10  2# 4x10           clams      Red 2x10        Supine Hip ABD ISO       10x10s       Leg Press  45# 4x10 45# 4x10 45# 4x10  45# 4x10 50# 4x10        Hip ABD  20# 3x10 20# 3x10 25# 4x10   20# 3x10 20# 3x10                                                            Ther Activity                                       Gait Training                                       Modalities

## 2025-05-16 RX ORDER — LIDOCAINE HYDROCHLORIDE 10 MG/ML
4 INJECTION, SOLUTION INFILTRATION; PERINEURAL
Status: COMPLETED | OUTPATIENT
Start: 2025-05-09 | End: 2025-05-09

## 2025-05-16 RX ORDER — TRIAMCINOLONE ACETONIDE 40 MG/ML
2 INJECTION, SUSPENSION INTRA-ARTICULAR; INTRAMUSCULAR
Status: COMPLETED | OUTPATIENT
Start: 2025-05-09 | End: 2025-05-09

## 2025-05-20 ENCOUNTER — OFFICE VISIT (OUTPATIENT)
Dept: PHYSICAL THERAPY | Age: 75
End: 2025-05-20
Payer: MEDICARE

## 2025-05-20 DIAGNOSIS — M70.61 GREATER TROCHANTERIC BURSITIS OF RIGHT HIP: Primary | ICD-10-CM

## 2025-05-20 PROCEDURE — 97110 THERAPEUTIC EXERCISES: CPT

## 2025-05-20 PROCEDURE — 97140 MANUAL THERAPY 1/> REGIONS: CPT

## 2025-05-20 NOTE — PROGRESS NOTES
Daily Note     Today's date: 2025  Patient name: Katharine Chatman  : 1950  MRN: [id number]  Referring provider: Yobany Ibrahim PA*  Dx:   Encounter Diagnosis     ICD-10-CM    1. Greater trochanteric bursitis of right hip  M70.61           Start Time: 0900  Stop Time: 0940  Total time in clinic (min): 40 minutes    Subjective: Patient reports she is feeling good today. She reports less pain than last week, just some soreness in the R hip. She reports she is sleeping better, and is able to lay on her R side now.       Objective: See treatment diary below      Assessment: Tolerated treatment well without sx re-aggravation. Patient demonstrated fatigue post treatment, exhibited good technique with therapeutic exercises, and would benefit from continued PT      Plan: Continue per plan of care.  Progress treatment as tolerated.       Precautions: main caregiver for  with Parkinsons      Manuals       R lateral hip roll out  MS MS MS   MS      R Hip flexor stretch     MS MS  MS      EPAT - R Hip     MS                                  Neuro Re-Ed                                                                                                        Ther Ex             HEP 15min            bike  5min 5min 5min 5min 5min 5mn      SS w/ TB   Yellow 20ft x4 Yellow 20ft x4 Yellow 20ft x4 Yellow 20ft x4 Red 20ft x4  Red 20ft x4      Slantboard stretch    3x30s 3x30s 3x30s 3x30s 3x30s      HS stair stretch    3x30s 3x30s 3x30s 3x30s 3x30s      Standing Hip ABD  2# 4x10  2# 4x10           clams      Red 2x10        Supine Hip ABD ISO       10x10s 10x10s      Leg Press  45# 4x10 45# 4x10 45# 4x10  45# 4x10 50# 4x10  50# 4x10      Hip ABD  20# 3x10 20# 3x10 25# 4x10  20# 3x10 20# 3x10  20# 3x10                                                          Ther Activity                                       Gait Training                                       Modalities

## 2025-05-24 DIAGNOSIS — F41.9 ANXIETY: ICD-10-CM

## 2025-05-28 ENCOUNTER — TELEPHONE (OUTPATIENT)
Dept: INTERNAL MEDICINE CLINIC | Facility: CLINIC | Age: 75
End: 2025-05-28

## 2025-05-28 NOTE — TELEPHONE ENCOUNTER
Patient called back and cancelled appointment stated will get blood work done and see Dr. Rossi in December for annual. Please advise. Thank you

## 2025-05-29 ENCOUNTER — OFFICE VISIT (OUTPATIENT)
Dept: PHYSICAL THERAPY | Age: 75
End: 2025-05-29
Payer: MEDICARE

## 2025-05-29 DIAGNOSIS — M70.61 GREATER TROCHANTERIC BURSITIS OF RIGHT HIP: Primary | ICD-10-CM

## 2025-05-29 PROCEDURE — 97110 THERAPEUTIC EXERCISES: CPT

## 2025-05-29 PROCEDURE — 97140 MANUAL THERAPY 1/> REGIONS: CPT

## 2025-05-29 NOTE — PROGRESS NOTES
Daily Note     Today's date: 2025  Patient name: Katharine Chatman  : 1950  MRN: [id number]  Referring provider: Yobany Ibrahim PA*  Dx:   Encounter Diagnosis     ICD-10-CM    1. Greater trochanteric bursitis of right hip  M70.61           Start Time: 1100  Stop Time: 1130  Total time in clinic (min): 30 minutes    Subjective: Patient reports she went back to chair yoga this weekend. She completed the session with little aggravation to her hip. She was mindful of movements that bothered her hip before and tried to modify them so she didn't aggravate her hip more. She reports some soreness and achiness in her R hip this morning.       Objective: See treatment diary below      Assessment: Tolerated treatment well. Continues to show improvements in activity tolerance. Completes today's session without sx reproduction. Patient demonstrated fatigue post treatment, exhibited good technique with therapeutic exercises, and would benefit from continued PT.      Plan: Assess patient's sx as she return to regular exercise regiment at next session to determine readiness for D/C. Continue per plan of care.  Progress treatment as tolerated.       Precautions: main caregiver for  with Parkinsons      Manuals      R lateral hip roll out  MS MS MS   MS MS     R Hip flexor stretch     MS MS  MS MS     EPAT - R Hip     MS                                  Neuro Re-Ed                                                                                                        Ther Ex             HEP 15min            bike  5min 5min 5min 5min 5min 5mn 5min     SS w/ TB   Yellow 20ft x4 Yellow 20ft x4 Yellow 20ft x4 Yellow 20ft x4 Red 20ft x4  Red 20ft x4 Red 20ft      Slantboard stretch    3x30s 3x30s 3x30s 3x30s 3x30s 3x30s     HS stair stretch    3x30s 3x30s 3x30s 3x30s 3x30s 3x30s     Standing Hip ABD  2# 4x10  2# 4x10           clams      Red 2x10        Supine Hip ABD ISO        10x10s 10x10s 10x10s     Leg Press  45# 4x10 45# 4x10 45# 4x10  45# 4x10 50# 4x10  50# 4x10 50# 4x10     Hip ABD  20# 3x10 20# 3x10 25# 4x10  20# 3x10 20# 3x10  20# 3x10 20# 3x10                                                          Ther Activity                                       Gait Training                                       Modalities

## 2025-06-04 ENCOUNTER — APPOINTMENT (OUTPATIENT)
Dept: PHYSICAL THERAPY | Age: 75
End: 2025-06-04
Payer: MEDICARE

## 2025-06-05 DIAGNOSIS — F41.8 DEPRESSION WITH ANXIETY: ICD-10-CM

## 2025-06-05 RX ORDER — ALPRAZOLAM 0.25 MG
0.25 TABLET ORAL 3 TIMES DAILY PRN
Qty: 30 TABLET | Refills: 0 | Status: SHIPPED | OUTPATIENT
Start: 2025-06-05

## 2025-06-05 NOTE — TELEPHONE ENCOUNTER
Reason for call:   [x] Refill   [] Prior Auth  [] Other:     Office:   [x] PCP/Provider - Sukhi Rossi   [] Specialty/Provider -     Medication: ALPRAZolam (XANAX) 0.25 mg tablet     Dose/Frequency: TAKE 1 TABLET (0.25 MG TOTAL) BY MOUTH 3 (THREE) TIMES A DAY AS NEEDED FOR ANXIETY     Quantity: 30    Pharmacy: [facility]    Local Pharmacy   Does the patient have enough for 3 days?   [x] Yes   [] No - Send as HP to POD

## 2025-06-10 ENCOUNTER — OFFICE VISIT (OUTPATIENT)
Dept: PHYSICAL THERAPY | Age: 75
End: 2025-06-10
Payer: MEDICARE

## 2025-06-10 DIAGNOSIS — M70.61 GREATER TROCHANTERIC BURSITIS OF RIGHT HIP: Primary | ICD-10-CM

## 2025-06-10 PROCEDURE — 97164 PT RE-EVAL EST PLAN CARE: CPT

## 2025-06-10 PROCEDURE — 97110 THERAPEUTIC EXERCISES: CPT

## 2025-06-10 NOTE — PROGRESS NOTES
Daily Note     Today's date: 6/10/2025  Patient name: Katharine Chatman  : 1950  MRN: [id number]  Referring provider: Yobany Ibrahim PA*  Dx:   Encounter Diagnosis     ICD-10-CM    1. Greater trochanteric bursitis of right hip  M70.61           Start Time: 1100  Stop Time: 1120  Total time in clinic (min): 20 minutes    Subjective: Patient reports she has had no pain.     Objective: See treatment diary below    Strength/Myotome Testing      Hip  L Flexion: 5  L Abduction: 4+  R Flexion: 4+  R Abduction: 4+     Knee   L Flexion: 4+  L Extension: 5  R Flexion: 4+  R Extension: 5     Ankle/Foot   L Dorsiflexion: 5  L Plantar flexion: 5  R Dorsiflexion: 5  R Plantar flexion: 5    Comments  BALANCE:   -Romberg (EO): 30s steady   -Romberg (EC): 30s steady   -L Tandem (EO): 20s some swaying  -R Tandem (EO): 20s some swaying  -L SLS (EO): 3s, negative trendelenberg  -R SLS(EO): 4s, negative trendelenberg     5xSTS: 7.89s  TU.12s      Ambulation    Observational Gait   normal walking speed and stride length.     Goals  SHORT TERM:  Patient will complete phase I of HEP. - MET   Patient will complete tandem balance with eyes open for 20s.  - MET  Patient will improve strength by 1/2 grade. - MET     LONG TERM:   Patient will complete phase II of HEP. - MET  Patient will demonstrate negative trendelenburg with gait. - MET  Patient will improve strength to return to functional activities. - MET    Assessment: Tolerated treatment well. Patient demonstrated fatigue post treatment and exhibited good technique with therapeutic exercises. Patient demonstrates B/L LE strength WFL and improved functional strength without trendelenberg gait. Patient has met all short and long term goals for mobility, strength, and gait with sx reduction. Patient is D/C from PT to continue to progress through HEP independently.       Plan: Patient is D/C from PT to continue to progress through HEP independently.      Precautions: main  caregiver for  with Parkinsons      Manuals 4/14 4/21 4/23 4/29 5/6 5/13 5/19 5/29 6/10    R lateral hip roll out  MS MS MS   MS MS     R Hip flexor stretch     MS MS  MS MS     EPAT - R Hip     MS                                  Neuro Re-Ed                                                                                                        Ther Ex             HEP 15min        5min    bike  5min 5min 5min 5min 5min 5mn 5min 5min    SS w/ TB   Yellow 20ft x4 Yellow 20ft x4 Yellow 20ft x4 Yellow 20ft x4 Red 20ft x4  Red 20ft x4 Red 20ft      Slantboard stretch    3x30s 3x30s 3x30s 3x30s 3x30s 3x30s     HS stair stretch    3x30s 3x30s 3x30s 3x30s 3x30s 3x30s     Standing Hip ABD  2# 4x10  2# 4x10           clams      Red 2x10        Supine Hip ABD ISO       10x10s 10x10s 10x10s     Leg Press  45# 4x10 45# 4x10 45# 4x10  45# 4x10 50# 4x10  50# 4x10 50# 4x10     Hip ABD  20# 3x10 20# 3x10 25# 4x10  20# 3x10 20# 3x10  20# 3x10 20# 3x10                                                          Ther Activity                                       Gait Training                                       Modalities

## 2025-06-14 DIAGNOSIS — I10 PRIMARY HYPERTENSION: ICD-10-CM

## 2025-06-15 RX ORDER — AMLODIPINE BESYLATE 2.5 MG/1
2.5 TABLET ORAL DAILY
Qty: 90 TABLET | Refills: 1 | Status: SHIPPED | OUTPATIENT
Start: 2025-06-15

## 2025-06-24 ENCOUNTER — APPOINTMENT (OUTPATIENT)
Dept: LAB | Age: 75
End: 2025-06-24
Attending: STUDENT IN AN ORGANIZED HEALTH CARE EDUCATION/TRAINING PROGRAM
Payer: MEDICARE

## 2025-06-24 DIAGNOSIS — Z79.60 LONG-TERM USE OF IMMUNOSUPPRESSANT MEDICATION: ICD-10-CM

## 2025-06-24 LAB
ALBUMIN SERPL BCG-MCNC: 4 G/DL (ref 3.5–5)
ALP SERPL-CCNC: 75 U/L (ref 34–104)
ALT SERPL W P-5'-P-CCNC: 20 U/L (ref 7–52)
AST SERPL W P-5'-P-CCNC: 23 U/L (ref 13–39)
BASOPHILS # BLD AUTO: 0.1 THOUSANDS/ÂΜL (ref 0–0.1)
BASOPHILS NFR BLD AUTO: 2 % (ref 0–1)
BILIRUB DIRECT SERPL-MCNC: 0.07 MG/DL (ref 0–0.2)
BILIRUB SERPL-MCNC: 0.46 MG/DL (ref 0.2–1)
CALCIUM SERPL-MCNC: 9.4 MG/DL (ref 8.4–10.2)
CREAT SERPL-MCNC: 0.93 MG/DL (ref 0.6–1.3)
EOSINOPHIL # BLD AUTO: 0.36 THOUSAND/ÂΜL (ref 0–0.61)
EOSINOPHIL NFR BLD AUTO: 6 % (ref 0–6)
ERYTHROCYTE [DISTWIDTH] IN BLOOD BY AUTOMATED COUNT: 13.7 % (ref 11.6–15.1)
GFR SERPL CREATININE-BSD FRML MDRD: 61 ML/MIN/1.73SQ M
HCT VFR BLD AUTO: 39.8 % (ref 34.8–46.1)
HGB BLD-MCNC: 12.7 G/DL (ref 11.5–15.4)
IMM GRANULOCYTES # BLD AUTO: 0.07 THOUSAND/UL (ref 0–0.2)
IMM GRANULOCYTES NFR BLD AUTO: 1 % (ref 0–2)
LYMPHOCYTES # BLD AUTO: 1.17 THOUSANDS/ÂΜL (ref 0.6–4.47)
LYMPHOCYTES NFR BLD AUTO: 18 % (ref 14–44)
MCH RBC QN AUTO: 29.6 PG (ref 26.8–34.3)
MCHC RBC AUTO-ENTMCNC: 31.9 G/DL (ref 31.4–37.4)
MCV RBC AUTO: 93 FL (ref 82–98)
MONOCYTES # BLD AUTO: 0.62 THOUSAND/ÂΜL (ref 0.17–1.22)
MONOCYTES NFR BLD AUTO: 9 % (ref 4–12)
NEUTROPHILS # BLD AUTO: 4.28 THOUSANDS/ÂΜL (ref 1.85–7.62)
NEUTS SEG NFR BLD AUTO: 64 % (ref 43–75)
NRBC BLD AUTO-RTO: 0 /100 WBCS
PLATELET # BLD AUTO: 367 THOUSANDS/UL (ref 149–390)
PMV BLD AUTO: 10.4 FL (ref 8.9–12.7)
PROT SERPL-MCNC: 7 G/DL (ref 6.4–8.4)
RBC # BLD AUTO: 4.29 MILLION/UL (ref 3.81–5.12)
WBC # BLD AUTO: 6.6 THOUSAND/UL (ref 4.31–10.16)

## 2025-06-24 PROCEDURE — 80076 HEPATIC FUNCTION PANEL: CPT

## 2025-06-24 PROCEDURE — 36415 COLL VENOUS BLD VENIPUNCTURE: CPT

## 2025-06-24 PROCEDURE — 82565 ASSAY OF CREATININE: CPT

## 2025-06-24 PROCEDURE — 85025 COMPLETE CBC W/AUTO DIFF WBC: CPT

## 2025-06-25 ENCOUNTER — OFFICE VISIT (OUTPATIENT)
Dept: INTERNAL MEDICINE CLINIC | Facility: CLINIC | Age: 75
End: 2025-06-25
Payer: MEDICARE

## 2025-06-25 VITALS
HEART RATE: 89 BPM | WEIGHT: 154.4 LBS | OXYGEN SATURATION: 97 % | SYSTOLIC BLOOD PRESSURE: 126 MMHG | BODY MASS INDEX: 28.24 KG/M2 | DIASTOLIC BLOOD PRESSURE: 74 MMHG

## 2025-06-25 DIAGNOSIS — R05.1 ACUTE COUGH: Primary | ICD-10-CM

## 2025-06-25 DIAGNOSIS — N18.31 STAGE 3A CHRONIC KIDNEY DISEASE (HCC): ICD-10-CM

## 2025-06-25 PROCEDURE — 99214 OFFICE O/P EST MOD 30 MIN: CPT | Performed by: INTERNAL MEDICINE

## 2025-06-25 PROCEDURE — G2211 COMPLEX E/M VISIT ADD ON: HCPCS | Performed by: INTERNAL MEDICINE

## 2025-06-25 RX ORDER — BENZONATATE 100 MG/1
100 CAPSULE ORAL 3 TIMES DAILY PRN
Qty: 30 CAPSULE | Refills: 1 | Status: SHIPPED | OUTPATIENT
Start: 2025-06-25

## 2025-06-25 RX ORDER — AZITHROMYCIN 250 MG/1
TABLET, FILM COATED ORAL
Qty: 6 TABLET | Refills: 0 | Status: SHIPPED | OUTPATIENT
Start: 2025-06-25 | End: 2025-06-30

## 2025-06-25 NOTE — ASSESSMENT & PLAN NOTE
Lab Results   Component Value Date    EGFR 61 06/24/2025    EGFR 60 03/14/2025    EGFR 60 12/03/2024    CREATININE 0.93 06/24/2025    CREATININE 0.94 03/14/2025    CREATININE 0.94 12/03/2024     Last GFR was good

## 2025-06-25 NOTE — ASSESSMENT & PLAN NOTE
Patient has been using over-the-counter cough medicine like Mucinex, can continue this, will add prescription cough med also.  No need for chest x-ray based on exam, but I recommend getting chest x-ray if not improving with treatment.  Will give a course of antibiotics to cover for possibility of atypical pneumonia/bronchitis  Orders:  •  azithromycin (Zithromax) 250 mg tablet; Take 2 tablets (500 mg total) by mouth daily for 1 day, THEN 1 tablet (250 mg total) daily for 4 days.  •  benzonatate (TESSALON PERLES) 100 mg capsule; Take 1 capsule (100 mg total) by mouth 3 (three) times a day as needed for cough

## 2025-06-25 NOTE — PROGRESS NOTES
Name: Katharine Chatman      : 1950      MRN: [id number]  Encounter Provider: Wero Murillo MD  Encounter Date: 2025   Encounter department: [facility]  :  Assessment & Plan  Stage 3a chronic kidney disease (HCC)  Lab Results   Component Value Date    EGFR 61 2025    EGFR 60 2025    EGFR 60 2024    CREATININE 0.93 2025    CREATININE 0.94 2025    CREATININE 0.94 2024     Last GFR was good       Acute cough  Patient has been using over-the-counter cough medicine like Mucinex, can continue this, will add prescription cough med also.  No need for chest x-ray based on exam, but I recommend getting chest x-ray if not improving with treatment.  Will give a course of antibiotics to cover for possibility of atypical pneumonia/bronchitis  Orders:  •  azithromycin (Zithromax) 250 mg tablet; Take 2 tablets (500 mg total) by mouth daily for 1 day, THEN 1 tablet (250 mg total) daily for 4 days.  •  benzonatate (TESSALON PERLES) 100 mg capsule; Take 1 capsule (100 mg total) by mouth 3 (three) times a day as needed for cough           History of Present Illness   I am seeing patient in coverage for an acute issue.  Patient reports feeling sick for about 3 weeks.  Symptom onset was after being on a boat where she felt dizzy at first, and this has improved.  Pt reports having a deep cough.  No fevers, no chills, COVID test was okay, did have a headache, patient does have head congestion, no itchy eyes.    Cough  Associated symptoms include headaches. Pertinent negatives include no chills, fever or sore throat.   Fatigue  Associated symptoms include congestion, coughing, fatigue and headaches. Pertinent negatives include no chills, fever or sore throat.     Review of Systems   Constitutional:  Positive for fatigue. Negative for chills and fever.   HENT:  Positive for congestion and sinus pressure. Negative for sore throat.    Respiratory:  Positive for cough.     Neurological:  Positive for headaches.       Objective   /74 (BP Location: Left arm, Patient Position: Sitting, Cuff Size: Standard)   Pulse 89   Wt 70 kg (154 lb 6.4 oz)   SpO2 97%   BMI 28.24 kg/m²      Physical Exam  Vitals reviewed.   Constitutional:       General: She is not in acute distress.     Appearance: Normal appearance.   HENT:      Head:      Comments: No tenderness to tapping over sinuses     Mouth/Throat:      Mouth: Mucous membranes are moist.      Pharynx: No oropharyngeal exudate or posterior oropharyngeal erythema.   Pulmonary:      Effort: Pulmonary effort is normal. No respiratory distress.      Breath sounds: Normal breath sounds. No stridor. No wheezing, rhonchi or rales.      Comments: But some rattling with coughing    Neurological:      Mental Status: She is alert.

## 2025-06-25 NOTE — PATIENT INSTRUCTIONS
Problem List Items Addressed This Visit          Genitourinary    Stage 3a chronic kidney disease (HCC)       Other    Acute cough - Primary    Patient has been using over-the-counter cough medicine like Mucinex, can continue this, will add prescription cough med also.  No need for chest x-ray based on exam, but I recommend getting chest x-ray if not improving with treatment.  Will give a course of antibiotics to cover for possibility of atypical pneumonia/bronchitis         Relevant Medications    azithromycin (Zithromax) 250 mg tablet    benzonatate (TESSALON PERLES) 100 mg capsule

## 2025-06-29 DIAGNOSIS — E78.2 MIXED HYPERLIPIDEMIA: ICD-10-CM

## 2025-07-01 RX ORDER — ROSUVASTATIN CALCIUM 10 MG/1
10 TABLET, COATED ORAL DAILY
Qty: 90 TABLET | Refills: 1 | Status: SHIPPED | OUTPATIENT
Start: 2025-07-01

## 2025-07-14 ENCOUNTER — OFFICE VISIT (OUTPATIENT)
Dept: URGENT CARE | Age: 75
End: 2025-07-14
Payer: MEDICARE

## 2025-07-14 VITALS
HEIGHT: 62 IN | OXYGEN SATURATION: 98 % | DIASTOLIC BLOOD PRESSURE: 86 MMHG | HEART RATE: 90 BPM | BODY MASS INDEX: 28.34 KG/M2 | SYSTOLIC BLOOD PRESSURE: 136 MMHG | TEMPERATURE: 97 F | WEIGHT: 154 LBS | RESPIRATION RATE: 18 BRPM
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
## 2025-07-14 DIAGNOSIS — S80.811A ABRASION, RIGHT LOWER LEG, INITIAL ENCOUNTER: ICD-10-CM

## 2025-07-14 DIAGNOSIS — Z23 ENCOUNTER FOR IMMUNIZATION: ICD-10-CM

## 2025-07-14 PROCEDURE — 90715 TDAP VACCINE 7 YRS/> IM: CPT

## 2025-07-14 PROCEDURE — 99214 OFFICE O/P EST MOD 30 MIN: CPT

## 2025-07-14 PROCEDURE — G0463 HOSPITAL OUTPT CLINIC VISIT: HCPCS

## 2025-07-14 RX ORDER — METRONIDAZOLE 500 MG/1
500 TABLET ORAL EVERY 8 HOURS SCHEDULED
Qty: 21 TABLET | Refills: 0 | Status: SHIPPED | OUTPATIENT
Start: 2025-07-14 | End: 2025-07-21

## 2025-07-14 RX ORDER — DOXYCYCLINE 100 MG/1
100 CAPSULE ORAL 2 TIMES DAILY
Qty: 10 CAPSULE | Refills: 0 | Status: SHIPPED | OUTPATIENT
Start: 2025-07-14 | End: 2025-07-19

## 2025-07-14 NOTE — PATIENT INSTRUCTIONS
Tetanus updated in office today.    Monitor site for increased redness, change in rash, increased swelling, or drainage.     If this develops or you develop any fever, chills, aches, joint pain, swelling, headache, dizziness, numbness or any new or concerning symptoms please proceed to ER.    Take antibiotics as prescribed.   Take entire course of antibiotics.     Doxycycline may cause your skin to be more sensitive to the sunlight than it is normally. Exposure to sunlight, even for short periods of time, may cause skin rash, itching, redness or other discoloration of the skin, or a severe sunburn.     Stay out of direct sunlight, especially between hours of 10am-3pm, if possible  Wear protective clothing, including a hat  Wear sunglasses  Apply sunblock   Apply sunblock lipstick  Do not use a sun lamp or tanning bed or priest.     Eat yogurt with live and active cultures and/or take a probiotic at least 3 hours before or after antibiotic dose.   Monitor stool for diarrhea and/or blood. If this occurs, contact primary care doctor ASAP.     Good hand hygiene  Avoid scratching area  Keep area clean and dry    Watch for signs of increased infection as previously discussed    If you develop any facial swelling, shortness of breath, difficulty breathing or any new or concerning symptoms please return or proceed ER.     Follow up with PCP in 3-5 days.

## 2025-07-25 PROBLEM — R05.1 ACUTE COUGH: Status: RESOLVED | Noted: 2025-06-25 | Resolved: 2025-07-25

## 2025-08-06 DIAGNOSIS — K21.9 GASTROESOPHAGEAL REFLUX DISEASE WITHOUT ESOPHAGITIS: ICD-10-CM

## 2025-08-07 RX ORDER — FAMOTIDINE 20 MG/1
20 TABLET, FILM COATED ORAL DAILY
Qty: 90 TABLET | Refills: 1 | Status: SHIPPED | OUTPATIENT
Start: 2025-08-07

## 2025-08-11 ENCOUNTER — OFFICE VISIT (OUTPATIENT)
Dept: OBGYN CLINIC | Facility: CLINIC | Age: 75
End: 2025-08-11
Payer: MEDICARE

## (undated) DEVICE — BLADELESS OBTURATOR: Brand: WECK VISTA

## (undated) DEVICE — COLUMN DRAPE

## (undated) DEVICE — VISUALIZATION SYSTEM: Brand: CLEARIFY

## (undated) DEVICE — INSUFFLATION NEEDLE TO ESTABLISH PNEUMOPERITONEUM.: Brand: INSUFFLATION NEEDLE

## (undated) DEVICE — HEAVY DUTY TABLE COVER: Brand: CONVERTORS

## (undated) DEVICE — MEDIUM-LARGE CLIP APPLIER: Brand: ENDOWRIST

## (undated) DEVICE — FORCE BIPOLAR: Brand: ENDOWRIST

## (undated) DEVICE — LAPAROTOMY DRAPE WITH POUCHES: Brand: CONVERTORS

## (undated) DEVICE — HEM-O-LOK CLIP CARTRIDGE MED/LARGE DA VINCI SI/XI

## (undated) DEVICE — SUT VICRYL PLUS 0 UR-6 27IN VCP603H

## (undated) DEVICE — SEAL

## (undated) DEVICE — INTENDED FOR TISSUE SEPARATION, AND OTHER PROCEDURES THAT REQUIRE A SHARP SURGICAL BLADE TO PUNCTURE OR CUT.: Brand: BARD-PARKER SAFETY BLADES SIZE 15, STERILE

## (undated) DEVICE — CANNULA SEAL

## (undated) DEVICE — PAD GROUNDING DUAL ADULT

## (undated) DEVICE — PROGRASP FORCEPS: Brand: ENDOWRIST

## (undated) DEVICE — INSUFLATION TUBING INSUFLOW (LEXION)

## (undated) DEVICE — SUT SILK 2-0 18 IN A185H

## (undated) DEVICE — ARM DRAPE

## (undated) DEVICE — TUBING SMOKE EVAC W/FILTRATION DEVICE PLUMEPORT ACTIV

## (undated) DEVICE — PACK PBDS LAP CHOLE RF

## (undated) DEVICE — DRAPE SHEET THREE QUARTER

## (undated) DEVICE — 3M™ IOBAN™ 2 ANTIMICROBIAL INCISE DRAPE 6650EZ: Brand: IOBAN™ 2

## (undated) DEVICE — REDUCER: Brand: ENDOWRIST

## (undated) DEVICE — INTENDED FOR TISSUE SEPARATION, AND OTHER PROCEDURES THAT REQUIRE A SHARP SURGICAL BLADE TO PUNCTURE OR CUT.: Brand: BARD-PARKER SAFETY BLADES SIZE 11, STERILE

## (undated) DEVICE — GLOVE SRG BIOGEL ORTHOPEDIC 7.5

## (undated) DEVICE — GLOVE INDICATOR PI UNDERGLOVE SZ 8 BLUE

## (undated) DEVICE — EXOFIN PRECISION PEN HIGH VISCOSITY TOPICAL SKIN ADHESIVE: Brand: EXOFIN PRECISION PEN, 1G

## (undated) DEVICE — TISSUE RETRIEVAL SYSTEM: Brand: INZII RETRIEVAL SYSTEM

## (undated) DEVICE — CLAMP TOWEL TUBING DISPOSABLE

## (undated) DEVICE — SUT MONOCRYL 4-0 PS-2 18 IN Y496G

## (undated) DEVICE — 3000CC GUARDIAN II: Brand: GUARDIAN

## (undated) DEVICE — PERMANENT CAUTERY HOOK: Brand: ENDOWRIST